# Patient Record
Sex: FEMALE | Race: BLACK OR AFRICAN AMERICAN | NOT HISPANIC OR LATINO | Employment: FULL TIME | URBAN - METROPOLITAN AREA
[De-identification: names, ages, dates, MRNs, and addresses within clinical notes are randomized per-mention and may not be internally consistent; named-entity substitution may affect disease eponyms.]

---

## 2017-01-03 DIAGNOSIS — F32.81 PMDD (PREMENSTRUAL DYSPHORIC DISORDER): ICD-10-CM

## 2017-01-03 RX ORDER — FLUOXETINE HYDROCHLORIDE 20 MG/1
CAPSULE ORAL
Qty: 30 CAPSULE | Refills: 0 | Status: SHIPPED | OUTPATIENT
Start: 2017-01-03 | End: 2017-02-20 | Stop reason: SDUPTHER

## 2017-01-27 ENCOUNTER — TELEPHONE (OUTPATIENT)
Dept: OBSTETRICS AND GYNECOLOGY | Facility: CLINIC | Age: 47
End: 2017-01-27

## 2017-01-27 DIAGNOSIS — Z12.31 OTHER SCREENING MAMMOGRAM: Primary | ICD-10-CM

## 2017-01-27 NOTE — TELEPHONE ENCOUNTER
----- Message from Adriana Mcnally sent at 1/27/2017  8:19 AM CST -----  Contact: pt  x_  1st Request  _  2nd Request  _  3rd Request      Who:pt    Why: pt would like orders sent in for [facility]    What Number to Call Back: [phone]    When to Expect a call back: (Before the end of the day)   -- if call after 3:00 call back will be tomorrow.

## 2017-02-05 DIAGNOSIS — F32.81 PMDD (PREMENSTRUAL DYSPHORIC DISORDER): ICD-10-CM

## 2017-02-06 RX ORDER — FLUOXETINE HYDROCHLORIDE 20 MG/1
CAPSULE ORAL
Qty: 30 CAPSULE | Refills: 0 | Status: SHIPPED | OUTPATIENT
Start: 2017-02-06 | End: 2017-02-20 | Stop reason: SDUPTHER

## 2017-02-20 ENCOUNTER — HOSPITAL ENCOUNTER (OUTPATIENT)
Dept: RADIOLOGY | Facility: OTHER | Age: 47
Discharge: HOME | End: 2017-02-20
Attending: OBSTETRICS & GYNECOLOGY
Payer: COMMERCIAL

## 2017-02-20 ENCOUNTER — OFFICE VISIT (OUTPATIENT)
Dept: OBSTETRICS AND GYNECOLOGY | Facility: CLINIC | Age: 47
End: 2017-02-20
Attending: OBSTETRICS & GYNECOLOGY
Payer: COMMERCIAL

## 2017-02-20 VITALS
WEIGHT: 161.19 LBS | HEIGHT: 61 IN | DIASTOLIC BLOOD PRESSURE: 78 MMHG | SYSTOLIC BLOOD PRESSURE: 120 MMHG | BODY MASS INDEX: 30.43 KG/M2

## 2017-02-20 DIAGNOSIS — Z01.419 ENCOUNTER FOR GYNECOLOGICAL EXAMINATION WITHOUT ABNORMAL FINDING: Primary | ICD-10-CM

## 2017-02-20 DIAGNOSIS — Z12.31 OTHER SCREENING MAMMOGRAM: ICD-10-CM

## 2017-02-20 PROCEDURE — 99999 PR PBB SHADOW E&M-EST. PATIENT-LVL III: CPT | Mod: PBBFAC,,, | Performed by: OBSTETRICS & GYNECOLOGY

## 2017-02-20 PROCEDURE — 3078F DIAST BP <80 MM HG: CPT | Mod: S$GLB,,, | Performed by: OBSTETRICS & GYNECOLOGY

## 2017-02-20 PROCEDURE — 99396 PREV VISIT EST AGE 40-64: CPT | Mod: S$GLB,,, | Performed by: OBSTETRICS & GYNECOLOGY

## 2017-02-20 PROCEDURE — 77067 SCR MAMMO BI INCL CAD: CPT | Mod: 26,,, | Performed by: RADIOLOGY

## 2017-02-20 PROCEDURE — 77067 SCR MAMMO BI INCL CAD: CPT | Mod: TC

## 2017-02-20 PROCEDURE — 77063 BREAST TOMOSYNTHESIS BI: CPT | Mod: 26,,, | Performed by: RADIOLOGY

## 2017-02-20 PROCEDURE — 3074F SYST BP LT 130 MM HG: CPT | Mod: S$GLB,,, | Performed by: OBSTETRICS & GYNECOLOGY

## 2017-02-20 RX ORDER — FLUOXETINE HYDROCHLORIDE 20 MG/1
20 CAPSULE ORAL DAILY
Qty: 30 CAPSULE | Refills: 11 | Status: SHIPPED | OUTPATIENT
Start: 2017-02-20 | End: 2017-11-20 | Stop reason: SDUPTHER

## 2017-02-20 NOTE — MR AVS SNAPSHOT
"    [facility] [address]  [address]  [facility] [address]  Phone: [phone]  Fax: [phone]                  Sheila Costa   2017 2:00 PM   Office Visit    Description:  Female : 1970   Provider:  Cortney Mcdonald MD   Department:  [facility] [address]           Reason for Visit     Gynecologic Exam                To Do List           Future Appointments        Provider Department Dept Phone    2017 2:45 PM [facility] MAMMO1 [facility] [phone]      Goals (5 Years of Data)     None      [facility]     [facility] -  Assistance  Registered nurses in the [facility] provide clinical advisement, health education, appointment booking, and other advisory services.  Call for this free service at [phone].             Medications           Message regarding Medications     Verify the changes and/or additions to your medication regime listed below are the same as discussed with your clinician today.  If any of these changes or additions are incorrect, please notify your healthcare provider.             Verify that the below list of medications is an accurate representation of the medications you are currently taking.  If none reported, the list may be blank. If incorrect, please contact your healthcare provider. Carry this list with you in case of emergency.           Current Medications     atenolol-chlorthalidone (TENORETIC) 50-25 mg Tab TAKE ONE-HALF TABLET BY MOUTH EVERY DAY    fluoxetine (PROZAC) 20 MG capsule TAKE ONE CAPSULE BY MOUTH EVERY DAY    fluticasone (FLONASE) 50 mcg/actuation nasal spray            Clinical Reference Information           Your Vitals Were     BP Height Weight BMI       120/78 5' 1" (1.549 m) 73.1 kg (161 lb 2.5 oz) 30.45 kg/m2       Blood Pressure          Most Recent Value    BP  120/78      Allergies as of 2017     Amoxicillin      Immunizations " Administered on Date of Encounter - 2/20/2017     None      Language Assistance Services     ATTENTION: Language assistance services are available, free of charge. Please call [phone].      ATENCIÓN: Si habarnoldo fragoso, tiene a johnson disposición servicios gratuitos de asistencia lingüística. Llame al [phone].     CHÚ Ý: N?u b?n nói Ti?ng Vi?t, có các d?ch v? h? tr? ngôn ng? mi?n phí dành cho b?n. G?i s? [phone].         [facility] [address] complies with applicable Federal civil rights laws and does not discriminate on the basis of race, color, national origin, age, disability, or sex.

## 2017-02-20 NOTE — PROGRESS NOTES
SUBJECTIVE:   46 y.o. female   for annual routine  checkup. No LMP recorded. Patient is not currently having periods (Reason: Birth Control)..  She has no unusual complaints.        Past Medical History   Diagnosis Date    Hypertension     Premenstrual symptom      Past Surgical History   Procedure Laterality Date    Refractive surgery       Social History     Social History    Marital status:      Spouse name: N/A    Number of children: N/A    Years of education: N/A     Occupational History     [place]     Social History Main Topics    Smoking status: Never Smoker    Smokeless tobacco: Not on file    Alcohol use Yes      Comment: Rare, social    Drug use: No    Sexual activity: Yes     Partners: Male     Birth control/ protection: Surgical     Other Topics Concern    Not on file     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     Family History   Problem Relation Age of Onset    Hypertension Mother     Thyroid disease Mother     Rheum arthritis Mother     Heart disease Father      Pacemaker    Cancer Maternal Grandmother     Cataracts Maternal Grandmother     Diabetes Maternal Grandmother     Hypertension Maternal Grandmother     Thyroid disease Maternal Grandmother     Cataracts Maternal Grandfather     Diabetes Maternal Grandfather     Hypertension Maternal Grandfather     Thyroid disease Maternal Grandfather     Lupus Cousin     Amblyopia Neg Hx     Blindness Neg Hx     Glaucoma Neg Hx     Macular degeneration Neg Hx     Retinal detachment Neg Hx     Strabismus Neg Hx     Stroke Neg Hx     Ovarian cancer Neg Hx     Colon cancer Neg Hx     Breast cancer Neg Hx      OB History    Para Term  AB SAB TAB Ectopic Multiple Living   2 2              # Outcome Date GA Lbr Louis/2nd Weight Sex Delivery Anes PTL Lv   2 Para            1 Para                     Current Outpatient Prescriptions   Medication  Sig Dispense Refill    atenolol-chlorthalidone (TENORETIC) 50-25 mg Tab TAKE ONE-HALF TABLET BY MOUTH EVERY DAY 90 tablet 1    fluoxetine (PROZAC) 20 MG capsule TAKE ONE CAPSULE BY MOUTH EVERY DAY 30 capsule 0    fluticasone (FLONASE) 50 mcg/actuation nasal spray   12     No current facility-administered medications for this visit.      Allergies: Amoxicillin     ROS:  Constitutional: no weight loss, weight gain, fever, fatigue  Eyes:  No vision changes, glasses/contacts  ENT/Mouth: No ulcers, sinus problems, ears ringing, headache  Cardiovascular: No inability to lie flat, chest pain, exercise intolerance, swelling, heart palpitations  Respiratory: No wheezing, coughing blood, shortness of breath, or cough  Gastrointestinal: No diarrhea, bloody stool, nausea/vomiting, constipation, gas, hemorrhoids  Genitourinary: No blood in urine, painful urination, urgency of urination, frequency of urination, incomplete emptying, incontinence, abnormal bleeding, painful periods, heavy periods, vaginal discharge, vaginal odor, painful intercourse, sexual problems, bleeding after intercourse.  Musculoskeletal: No muscle weakness  Skin/Breast: No painful breasts, nipple discharge, masses, rash, ulcers  Neurological: No passing out, seizures, numbness, headache  Endocrine: No diabetes, hypothyroid, hyperthyroid, hot flashes, hair loss, abnormal hair growth, acne  Psychiatric: No depression, crying  Hematologic: No bruises, bleeding, swollen lymph nodes, anemia.      Physical Exam:   Constitutional: She is oriented to person, place, and time. She appears well-developed and well-nourished.      Neck: Normal range of motion. No tracheal deviation present. No thyromegaly present.    Cardiovascular: Exam reveals no edema.     Pulmonary/Chest: Effort normal. She exhibits no mass, no tenderness, no deformity and no retraction. Right breast exhibits no inverted nipple, no mass, no nipple discharge, no skin change, no tenderness,  presence, no bleeding and no swelling. Left breast exhibits no inverted nipple, no mass, no nipple discharge, no skin change, no tenderness, presence, no bleeding and no swelling. Breasts are symmetrical.        Abdominal: Soft. She exhibits no distension and no mass. There is no tenderness. There is no rebound and no guarding. No hernia. Hernia confirmed negative in the left inguinal area.     Genitourinary: Vagina normal and uterus normal. Rectal exam shows no external hemorrhoid. There is no rash, tenderness or lesion on the right labia. There is no rash, tenderness or lesion on the left labia. Uterus is not deviated. Cervix is normal. No no adexnal prolapse. Right adnexum displays no mass, no tenderness and no fullness. Left adnexum displays no mass, no tenderness and no fullness. No tenderness, bleeding, rectocele, cystocele or unspecified prolapse of vaginal walls in the vagina. No vaginal discharge found. Cervix exhibits no motion tenderness, no discharge and no friability.           Musculoskeletal: Normal range of motion and moves all extremeties. She exhibits no edema.      Lymphadenopathy:        Right: No inguinal adenopathy present.        Left: No inguinal adenopathy present.    Neurological: She is alert and oriented to person, place, and time.    Skin: No rash noted. No erythema. No pallor.    Psychiatric: She has a normal mood and affect. Her behavior is normal. Judgment and thought content normal.     IUD strings visible    ASSESSMENT:   well woman    PLAN:   mammogram  return annually or prn

## 2017-07-19 RX ORDER — ATENOLOL AND CHLORTHALIDONE TABLET 50; 25 MG/1; MG/1
TABLET ORAL
Qty: 90 TABLET | Refills: 3 | Status: SHIPPED | OUTPATIENT
Start: 2017-07-19 | End: 2018-01-29 | Stop reason: SDUPTHER

## 2017-11-20 RX ORDER — FLUOXETINE HYDROCHLORIDE 20 MG/1
20 CAPSULE ORAL DAILY
Qty: 30 CAPSULE | Refills: 3 | Status: SHIPPED | OUTPATIENT
Start: 2017-11-20 | End: 2018-01-29 | Stop reason: SDUPTHER

## 2017-11-20 NOTE — TELEPHONE ENCOUNTER
Faxed received from [employer] for refill on prozac. Pt due for appointment 2/2018

## 2018-01-04 ENCOUNTER — PATIENT MESSAGE (OUTPATIENT)
Dept: OBSTETRICS AND GYNECOLOGY | Facility: CLINIC | Age: 48
End: 2018-01-04

## 2018-01-05 ENCOUNTER — TELEPHONE (OUTPATIENT)
Dept: OBSTETRICS AND GYNECOLOGY | Facility: CLINIC | Age: 48
End: 2018-01-05

## 2018-01-05 DIAGNOSIS — Z12.31 ENCOUNTER FOR SCREENING MAMMOGRAM FOR MALIGNANT NEOPLASM OF BREAST: Primary | ICD-10-CM

## 2018-01-06 ENCOUNTER — PATIENT MESSAGE (OUTPATIENT)
Dept: INTERNAL MEDICINE | Facility: CLINIC | Age: 48
End: 2018-01-06

## 2018-01-06 DIAGNOSIS — Z71.84 COUNSELING ABOUT TRAVEL: Primary | ICD-10-CM

## 2018-01-08 NOTE — TELEPHONE ENCOUNTER
I am referring to Infectious Disease Travel clinic for further evaluation. Please notify the patient and direct to scheduling desk.

## 2018-01-30 RX ORDER — FLUOXETINE HYDROCHLORIDE 20 MG/1
CAPSULE ORAL
Qty: 30 CAPSULE | Refills: 2 | Status: SHIPPED | OUTPATIENT
Start: 2018-01-30 | End: 2018-02-05 | Stop reason: SDUPTHER

## 2018-01-30 RX ORDER — ATENOLOL AND CHLORTHALIDONE TABLET 50; 25 MG/1; MG/1
TABLET ORAL
Qty: 90 TABLET | Refills: 0 | Status: SHIPPED | OUTPATIENT
Start: 2018-01-30 | End: 2018-07-31 | Stop reason: SDUPTHER

## 2018-02-05 ENCOUNTER — OFFICE VISIT (OUTPATIENT)
Dept: INFECTIOUS DISEASES | Facility: CLINIC | Age: 48
End: 2018-02-05
Payer: COMMERCIAL

## 2018-02-05 ENCOUNTER — CLINICAL SUPPORT (OUTPATIENT)
Dept: INFECTIOUS DISEASES | Facility: CLINIC | Age: 48
End: 2018-02-05
Payer: COMMERCIAL

## 2018-02-05 VITALS
DIASTOLIC BLOOD PRESSURE: 70 MMHG | HEIGHT: 61 IN | HEART RATE: 65 BPM | WEIGHT: 161.63 LBS | TEMPERATURE: 98 F | BODY MASS INDEX: 30.51 KG/M2 | SYSTOLIC BLOOD PRESSURE: 115 MMHG

## 2018-02-05 DIAGNOSIS — Z71.84 TRAVEL ADVICE ENCOUNTER: ICD-10-CM

## 2018-02-05 DIAGNOSIS — Z23 NEED FOR VACCINATION: Primary | ICD-10-CM

## 2018-02-05 PROCEDURE — 90691 TYPHOID VACCINE IM: CPT | Mod: S$GLB,,, | Performed by: INTERNAL MEDICINE

## 2018-02-05 PROCEDURE — 90471 IMMUNIZATION ADMIN: CPT | Mod: S$GLB,,, | Performed by: INTERNAL MEDICINE

## 2018-02-05 PROCEDURE — 90632 HEPA VACCINE ADULT IM: CPT | Mod: S$GLB,,, | Performed by: INTERNAL MEDICINE

## 2018-02-05 PROCEDURE — 99999 PR PBB SHADOW E&M-EST. PATIENT-LVL III: CPT | Mod: PBBFAC,,, | Performed by: INTERNAL MEDICINE

## 2018-02-05 PROCEDURE — 3008F BODY MASS INDEX DOCD: CPT | Mod: S$GLB,,, | Performed by: INTERNAL MEDICINE

## 2018-02-05 PROCEDURE — 90472 IMMUNIZATION ADMIN EACH ADD: CPT | Mod: S$GLB,,, | Performed by: INTERNAL MEDICINE

## 2018-02-05 PROCEDURE — 90686 IIV4 VACC NO PRSV 0.5 ML IM: CPT | Mod: S$GLB,,, | Performed by: INTERNAL MEDICINE

## 2018-02-05 PROCEDURE — 99204 OFFICE O/P NEW MOD 45 MIN: CPT | Mod: S$GLB,,, | Performed by: INTERNAL MEDICINE

## 2018-02-05 RX ORDER — ATOVAQUONE AND PROGUANIL HYDROCHLORIDE 250; 100 MG/1; MG/1
1 TABLET, FILM COATED ORAL DAILY
Qty: 13 TABLET | Refills: 0 | Status: SHIPPED | OUTPATIENT
Start: 2018-02-05 | End: 2018-02-18

## 2018-02-05 RX ORDER — AZITHROMYCIN 500 MG/1
500 TABLET, FILM COATED ORAL DAILY
Qty: 5 TABLET | Refills: 1 | Status: SHIPPED | OUTPATIENT
Start: 2018-02-05 | End: 2018-03-23 | Stop reason: ALTCHOICE

## 2018-02-05 NOTE — PROGRESS NOTES
Subjective:      Patient ID: Sheila Costa is a 47 y.o. female.    Chief Complaint:Travel Consult      History of Present Illness    Going to [facility] for 5 days.     Review of Systems   Constitution: Negative for chills, decreased appetite, fever, weakness, malaise/fatigue, night sweats, weight gain and weight loss.   HENT: Negative for congestion, ear pain, hearing loss, hoarse voice, sore throat and tinnitus.    Eyes: Negative for blurred vision, redness and visual disturbance.   Cardiovascular: Negative for chest pain, leg swelling and palpitations.   Respiratory: Negative for cough, hemoptysis, shortness of breath and sputum production.    Hematologic/Lymphatic: Negative for adenopathy. Does not bruise/bleed easily.   Skin: Negative for dry skin, itching, rash and suspicious lesions.   Musculoskeletal: Negative for back pain, joint pain, myalgias and neck pain.   Gastrointestinal: Negative for abdominal pain, constipation, diarrhea, heartburn, nausea and vomiting.   Genitourinary: Negative for dysuria, flank pain, frequency, hematuria, hesitancy and urgency.   Neurological: Negative for dizziness, headaches, numbness and paresthesias.   Psychiatric/Behavioral: Negative for depression and memory loss. The patient does not have insomnia and is not nervous/anxious.      Objective:   Physical Exam   Constitutional: She appears well-developed and well-nourished.   Eyes: Conjunctivae and EOM are normal. Pupils are equal, round, and reactive to light.   Musculoskeletal: She exhibits no edema.   Nursing note and vitals reviewed.    Assessment:       1. Travel advice encounter          Plan:       Discussed insect precautions. Discussed malaria risks, as well as dengue, zika, chikungunya. Will give malarone for 12 days. Discussed food and water precautions. Will give IM typhoid and HAV vaccines today. SHe will take azithromycin for traveler's diarrhea. Discussed Cholera vaccination - she will consider it.  Discussed animal precautions - she will present for evaluation if she has any concern about rabies exposure. Discussed traveler's safety. I spent over 50% of a 45 minute encounter counseling the patient.

## 2018-02-05 NOTE — PROGRESS NOTES
Pt received her immunizations (Hepatitis A, Typhoid, and influenza). Pt tolerated the injections well. Yellow travel card provided. Pt left the unit in NAD.

## 2018-03-05 ENCOUNTER — HOSPITAL ENCOUNTER (OUTPATIENT)
Dept: RADIOLOGY | Facility: OTHER | Age: 48
Discharge: HOME | End: 2018-03-05
Attending: OBSTETRICS & GYNECOLOGY
Payer: COMMERCIAL

## 2018-03-05 DIAGNOSIS — Z12.31 ENCOUNTER FOR SCREENING MAMMOGRAM FOR MALIGNANT NEOPLASM OF BREAST: ICD-10-CM

## 2018-03-05 PROCEDURE — 77067 SCR MAMMO BI INCL CAD: CPT | Mod: 26,,, | Performed by: RADIOLOGY

## 2018-03-05 PROCEDURE — 77063 BREAST TOMOSYNTHESIS BI: CPT | Mod: 26,,, | Performed by: RADIOLOGY

## 2018-03-05 PROCEDURE — 77067 SCR MAMMO BI INCL CAD: CPT | Mod: TC

## 2018-03-23 ENCOUNTER — LAB VISIT (OUTPATIENT)
Dept: LAB | Facility: HOSPITAL | Age: 48
End: 2018-03-23
Attending: INTERNAL MEDICINE
Payer: COMMERCIAL

## 2018-03-23 ENCOUNTER — OFFICE VISIT (OUTPATIENT)
Dept: INTERNAL MEDICINE | Facility: CLINIC | Age: 48
End: 2018-03-23
Payer: COMMERCIAL

## 2018-03-23 VITALS
HEIGHT: 61 IN | HEART RATE: 62 BPM | DIASTOLIC BLOOD PRESSURE: 74 MMHG | SYSTOLIC BLOOD PRESSURE: 122 MMHG | BODY MASS INDEX: 29.55 KG/M2 | WEIGHT: 156.5 LBS

## 2018-03-23 DIAGNOSIS — Z00.00 ANNUAL PHYSICAL EXAM: Primary | ICD-10-CM

## 2018-03-23 DIAGNOSIS — R53.83 FATIGUE, UNSPECIFIED TYPE: ICD-10-CM

## 2018-03-23 DIAGNOSIS — Z00.00 ANNUAL PHYSICAL EXAM: ICD-10-CM

## 2018-03-23 LAB
25(OH)D3+25(OH)D2 SERPL-MCNC: 34 NG/ML
ALBUMIN SERPL BCP-MCNC: 3.7 G/DL
ALP SERPL-CCNC: 61 U/L
ALT SERPL W/O P-5'-P-CCNC: 14 U/L
ANION GAP SERPL CALC-SCNC: 12 MMOL/L
AST SERPL-CCNC: 21 U/L
BILIRUB SERPL-MCNC: 0.4 MG/DL
BUN SERPL-MCNC: 12 MG/DL
CALCIUM SERPL-MCNC: 9.1 MG/DL
CHLORIDE SERPL-SCNC: 97 MMOL/L
CO2 SERPL-SCNC: 29 MMOL/L
CREAT SERPL-MCNC: 0.7 MG/DL
ERYTHROCYTE [DISTWIDTH] IN BLOOD BY AUTOMATED COUNT: 14.8 %
EST. GFR  (AFRICAN AMERICAN): >60 ML/MIN/1.73 M^2
EST. GFR  (NON AFRICAN AMERICAN): >60 ML/MIN/1.73 M^2
GLUCOSE SERPL-MCNC: 87 MG/DL
HCT VFR BLD AUTO: 39.7 %
HGB BLD-MCNC: 11.7 G/DL
MAGNESIUM SERPL-MCNC: 1.9 MG/DL
MCH RBC QN AUTO: 22.5 PG
MCHC RBC AUTO-ENTMCNC: 29.5 G/DL
MCV RBC AUTO: 76 FL
PLATELET # BLD AUTO: 470 K/UL
PMV BLD AUTO: 9 FL
POTASSIUM SERPL-SCNC: 3.4 MMOL/L
PROT SERPL-MCNC: 7.4 G/DL
RBC # BLD AUTO: 5.21 M/UL
SODIUM SERPL-SCNC: 138 MMOL/L
TSH SERPL DL<=0.005 MIU/L-ACNC: 1.96 UIU/ML
VIT B12 SERPL-MCNC: >2000 PG/ML
WBC # BLD AUTO: 11.6 K/UL

## 2018-03-23 PROCEDURE — 83735 ASSAY OF MAGNESIUM: CPT

## 2018-03-23 PROCEDURE — 80053 COMPREHEN METABOLIC PANEL: CPT

## 2018-03-23 PROCEDURE — 99396 PREV VISIT EST AGE 40-64: CPT | Mod: S$GLB,,, | Performed by: INTERNAL MEDICINE

## 2018-03-23 PROCEDURE — 82306 VITAMIN D 25 HYDROXY: CPT

## 2018-03-23 PROCEDURE — 85027 COMPLETE CBC AUTOMATED: CPT

## 2018-03-23 PROCEDURE — 99999 PR PBB SHADOW E&M-EST. PATIENT-LVL III: CPT | Mod: PBBFAC,,, | Performed by: INTERNAL MEDICINE

## 2018-03-23 PROCEDURE — 36415 COLL VENOUS BLD VENIPUNCTURE: CPT

## 2018-03-23 PROCEDURE — 84443 ASSAY THYROID STIM HORMONE: CPT

## 2018-03-23 PROCEDURE — 3074F SYST BP LT 130 MM HG: CPT | Mod: CPTII,S$GLB,, | Performed by: INTERNAL MEDICINE

## 2018-03-23 PROCEDURE — 82607 VITAMIN B-12: CPT

## 2018-03-23 PROCEDURE — 86038 ANTINUCLEAR ANTIBODIES: CPT

## 2018-03-23 PROCEDURE — 3078F DIAST BP <80 MM HG: CPT | Mod: CPTII,S$GLB,, | Performed by: INTERNAL MEDICINE

## 2018-03-23 RX ORDER — LORATADINE 10 MG/1
10 TABLET ORAL EVERY MORNING
COMMUNITY

## 2018-03-23 RX ORDER — ATOVAQUONE AND PROGUANIL HYDROCHLORIDE 250; 100 MG/1; MG/1
TABLET, FILM COATED ORAL
Refills: 0 | COMMUNITY
Start: 2018-03-05 | End: 2018-03-23 | Stop reason: ALTCHOICE

## 2018-03-23 NOTE — PROGRESS NOTES
Subjective:       Patient ID: Sheila Costa is a 47 y.o. female.    Chief Complaint: Annual Exam and Hypertension    HPI    Last visit with me 11/2016. Since then seen by Gynecology, Infectious Disease.     Fatigue daily. Sleeps 6-8 hrs/night, FIT BIT doesn't note a lot of disrupted sleep. Longer the sleeping then can feel more tired. Doesn't drink a lot of coffee, doesn't like it. If misses med the tiredness doesn't improve. Has been on Prozac because of PMS.    Takes Claritin daily, usually also uses Flonase.    Reviewed PMH, PSH, SH, FH, allergies, and medications.     Review of Systems   All other systems reviewed and are negative.      Objective:      Physical Exam   Constitutional: She is oriented to person, place, and time. No distress.   HENT:   Head: Atraumatic.   Right Ear: Tympanic membrane normal. No tenderness.   Left Ear: Tympanic membrane normal. No tenderness.   Mouth/Throat: Oropharynx is clear and moist. No oropharyngeal exudate.   Eyes: Pupils are equal, round, and reactive to light. Right eye exhibits no discharge. Left eye exhibits no discharge.   Neck: Normal range of motion. No thyromegaly present.   Cardiovascular: Normal rate, regular rhythm and normal heart sounds.    Pulmonary/Chest: Effort normal and breath sounds normal. No stridor. She has no wheezes. She has no rales.   Abdominal: Soft. She exhibits no distension and no mass. There is no tenderness. There is no guarding.   Musculoskeletal: She exhibits no edema or tenderness.   Lymphadenopathy:     She has no cervical adenopathy.   Neurological: She is alert and oriented to person, place, and time.   Skin: Skin is warm and dry. No rash noted.   Psychiatric: She has a normal mood and affect. Her behavior is normal.   Nursing note and vitals reviewed.      Vitals:    03/23/18 1449   BP: 122/74   BP Location: Right arm   Patient Position: Sitting   BP Method: Large (Manual)   Pulse: 62   Weight: 71 kg (156 lb 8.4 oz)   Height:  "5' 1" (1.549 m)     Body mass index is 29.58 kg/m².    RESULTS: Reviewed labs from last 12 months    Assessment:       1. Annual physical exam    2. Fatigue, unspecified type        Plan:   Sheila was seen today for annual exam and hypertension.    Diagnoses and all orders for this visit:    Annual physical exam:  Age-appropriate health screening reviewed, indicated tests ordered.   -     Magnesium; Future  -     Vitamin D; Future  -     Vitamin B12; Future  -     Comprehensive metabolic panel; Future  -     CBC Without Differential; Future  -     CARL; Future  -     TSH; Future    Fatigue, unspecified type:  Chronic problem, no abnormality noted on labs in past. possibly due to insufficient sleep. rule out metabolic abnormality on labs as below.  -     Magnesium; Future  -     Vitamin D; Future  -     Vitamin B12; Future  -     Comprehensive metabolic panel; Future  -     CBC Without Differential; Future  -     CARL; Future  -     TSH; Future      Follow-up in about 1 year (around 3/23/2019).  Jorge Alberto Ruiz MD  Internal Medicine    Portions of this note were completed using [employer] dictation software. Please excuse typographical or syntax errors.   "

## 2018-03-26 ENCOUNTER — OFFICE VISIT (OUTPATIENT)
Dept: OBSTETRICS AND GYNECOLOGY | Facility: CLINIC | Age: 48
End: 2018-03-26
Attending: OBSTETRICS & GYNECOLOGY
Payer: COMMERCIAL

## 2018-03-26 ENCOUNTER — PATIENT MESSAGE (OUTPATIENT)
Dept: INTERNAL MEDICINE | Facility: CLINIC | Age: 48
End: 2018-03-26

## 2018-03-26 VITALS
HEIGHT: 61 IN | DIASTOLIC BLOOD PRESSURE: 88 MMHG | BODY MASS INDEX: 29.97 KG/M2 | WEIGHT: 158.75 LBS | SYSTOLIC BLOOD PRESSURE: 132 MMHG

## 2018-03-26 DIAGNOSIS — Z12.4 SCREENING FOR CERVICAL CANCER: ICD-10-CM

## 2018-03-26 DIAGNOSIS — Z01.419 ENCOUNTER FOR GYNECOLOGICAL EXAMINATION WITHOUT ABNORMAL FINDING: Primary | ICD-10-CM

## 2018-03-26 LAB — ANA SER QL IF: NORMAL

## 2018-03-26 PROCEDURE — 88175 CYTOPATH C/V AUTO FLUID REDO: CPT

## 2018-03-26 PROCEDURE — 99396 PREV VISIT EST AGE 40-64: CPT | Mod: S$GLB,,, | Performed by: OBSTETRICS & GYNECOLOGY

## 2018-03-26 RX ORDER — FLUOXETINE HYDROCHLORIDE 20 MG/1
20 CAPSULE ORAL DAILY
Qty: 30 CAPSULE | Refills: 11 | Status: SHIPPED | OUTPATIENT
Start: 2018-03-26 | End: 2019-03-12 | Stop reason: SDUPTHER

## 2018-03-26 NOTE — PROGRESS NOTES
SUBJECTIVE:   47 y.o. female   for annual routine Pap and checkup. No LMP recorded. Patient has had an implant..  She reports recent minimal RLQ pain- improved with Ibuprofen. Patient wishes to stay on Prozac for PMS.        Past Medical History:   Diagnosis Date    Hypertension     Premenstrual symptom      Past Surgical History:   Procedure Laterality Date    REFRACTIVE SURGERY       Social History     Social History    Marital status:      Spouse name: N/A    Number of children: N/A    Years of education: N/A     Occupational History     [place]     Social History Main Topics    Smoking status: Never Smoker    Smokeless tobacco: Not on file    Alcohol use Yes      Comment: Rare, social    Drug use: No    Sexual activity: Yes     Partners: Male     Birth control/ protection: Surgical     Other Topics Concern    Not on file     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     Family History   Problem Relation Age of Onset    Hypertension Mother     Thyroid disease Mother     Rheum arthritis Mother     Heart disease Father      Pacemaker    Cancer Maternal Grandmother     Cataracts Maternal Grandmother     Diabetes Maternal Grandmother     Hypertension Maternal Grandmother     Thyroid disease Maternal Grandmother     Breast cancer Maternal Grandmother     Cataracts Maternal Grandfather     Diabetes Maternal Grandfather     Hypertension Maternal Grandfather     Thyroid disease Maternal Grandfather     Lupus Cousin     Amblyopia Neg Hx     Blindness Neg Hx     Glaucoma Neg Hx     Macular degeneration Neg Hx     Retinal detachment Neg Hx     Strabismus Neg Hx     Stroke Neg Hx     Ovarian cancer Neg Hx     Colon cancer Neg Hx      OB History    Para Term  AB Living   2 2 2         SAB TAB Ectopic Multiple Live Births                  # Outcome Date GA Lbr Louis/2nd Weight Sex Delivery Anes PTL Lv   2  Term            1 Term                       Current Outpatient Prescriptions   Medication Sig Dispense Refill    atenolol-chlorthalidone (TENORETIC) 50-25 mg Tab TAKE ONE-HALF TABLET BY MOUTH EVERY DAY 90 tablet 0    fluoxetine (PROZAC) 20 MG capsule TAKE ONE CAPSULE BY MOUTH EVERY DAY 30 capsule 0    fluticasone (FLONASE) 50 mcg/actuation nasal spray   12    loratadine (CLARITIN) 10 mg tablet Take 10 mg by mouth once daily.       No current facility-administered medications for this visit.      Allergies: Amoxicillin     The ASCVD Risk score (Stratton HOLLAND Jr., et al., 2013) failed to calculate for the following reasons:    Cannot find a previous HDL lab    Cannot find a previous total cholesterol lab      ROS:  Constitutional: no weight loss, weight gain, fever, fatigue  Eyes:  No vision changes, glasses/contacts  ENT/Mouth: No ulcers, sinus problems, ears ringing, headache  Cardiovascular: No inability to lie flat, chest pain, exercise intolerance, swelling, heart palpitations  Respiratory: No wheezing, coughing blood, shortness of breath, or cough  Gastrointestinal: No diarrhea, bloody stool, nausea/vomiting, constipation, gas, hemorrhoids  Genitourinary: No blood in urine, painful urination, urgency of urination, frequency of urination, incomplete emptying, incontinence, abnormal bleeding, painful periods, heavy periods, vaginal discharge, vaginal odor, painful intercourse, sexual problems, bleeding after intercourse.  Musculoskeletal: No muscle weakness  Skin/Breast: No painful breasts, nipple discharge, masses, rash, ulcers  Neurological: No passing out, seizures, numbness, headache  Endocrine: No diabetes, hypothyroid, hyperthyroid, hot flashes, hair loss, abnormal hair growth, acne  Psychiatric: No depression, crying  Hematologic: No bruises, bleeding, swollen lymph nodes, anemia.      Physical Exam:   Constitutional: She is oriented to person, place, and time. She appears well-developed and well-nourished.       Neck: Normal range of motion. No tracheal deviation present. No thyromegaly present.    Cardiovascular: Exam reveals no edema.     Pulmonary/Chest: Effort normal. She exhibits no mass, no tenderness, no deformity and no retraction. Right breast exhibits no inverted nipple, no mass, no nipple discharge, no skin change, no tenderness, presence, no bleeding and no swelling. Left breast exhibits no inverted nipple, no mass, no nipple discharge, no skin change, no tenderness, presence, no bleeding and no swelling. Breasts are symmetrical.        Abdominal: Soft. She exhibits no distension and no mass. There is no tenderness. There is no rebound and no guarding. No hernia. Hernia confirmed negative in the left inguinal area.     Genitourinary: Vagina normal and uterus normal. Rectal exam shows no external hemorrhoid. There is no rash, tenderness or lesion on the right labia. There is no rash, tenderness or lesion on the left labia. Uterus is not deviated. Cervix is normal. No no adexnal prolapse. Right adnexum displays no mass, no tenderness and no fullness. Left adnexum displays no mass, no tenderness and no fullness. No tenderness, bleeding, rectocele, cystocele or unspecified prolapse of vaginal walls in the vagina. No vaginal discharge found. Cervix exhibits no motion tenderness, no discharge and no friability.           Musculoskeletal: Normal range of motion and moves all extremeties. She exhibits no edema.      Lymphadenopathy:        Right: No inguinal adenopathy present.        Left: No inguinal adenopathy present.    Neurological: She is alert and oriented to person, place, and time.    Skin: No rash noted. No erythema. No pallor.    Psychiatric: She has a normal mood and affect. Her behavior is normal. Judgment and thought content normal.     Cervix is very anterior, IUD strings visible at os    ASSESSMENT:   well woman    PLAN:   mammogram  pap smear  Counseled patient on diet and exercise  If pain  persists, notify me and I will order pelvic sono  return annually or prn

## 2018-08-01 RX ORDER — ATENOLOL AND CHLORTHALIDONE TABLET 50; 25 MG/1; MG/1
TABLET ORAL
Qty: 90 TABLET | Refills: 0 | Status: SHIPPED | OUTPATIENT
Start: 2018-08-01 | End: 2019-02-04 | Stop reason: SDUPTHER

## 2018-08-09 ENCOUNTER — TELEPHONE (OUTPATIENT)
Dept: OBSTETRICS AND GYNECOLOGY | Facility: CLINIC | Age: 48
End: 2018-08-09

## 2018-08-09 NOTE — TELEPHONE ENCOUNTER
----- Message from Hailee Khan sent at 8/9/2018  8:54 AM CDT -----  Contact: ETHAN EPSTEIN [[id number]]      Name of Who is Calling: ETHAN EPSTEIN [[id number]]      What is the request in detail:  Patient called requesting an appointment to update her birth control / MIRENA / IUD.  Please give a call back at your earliest convenience.  THANKS!      Can the clinic reply by [facility]: NO      What Number to Call Back:  ETHAN EPSTEIN  / # [phone]

## 2018-08-10 ENCOUNTER — TELEPHONE (OUTPATIENT)
Dept: OBSTETRICS AND GYNECOLOGY | Facility: CLINIC | Age: 48
End: 2018-08-10

## 2018-08-10 DIAGNOSIS — Z97.5 CONTRACEPTION, DEVICE INTRAUTERINE: Primary | ICD-10-CM

## 2018-08-10 NOTE — TELEPHONE ENCOUNTER
Pt states her last mirena was inserted 8/2013 so pt wants to get another mirena. Advised pt will send in the paper work to make sure it is approved. Will schedule pt once it is approved

## 2018-08-10 NOTE — TELEPHONE ENCOUNTER
----- Message from Marianela Vazquez sent at 8/9/2018  4:10 PM CDT -----  Contact: self  Pt returning a missed call, s he can be reached at [phone].

## 2018-08-13 ENCOUNTER — TELEPHONE (OUTPATIENT)
Dept: PHARMACY | Facility: CLINIC | Age: 48
End: 2018-08-13

## 2018-08-13 NOTE — TELEPHONE ENCOUNTER
Good Afternoon,      [facility] received a prescription for MIRENA. Upon calling the patient's insurance company, we have been told that this medication is not covered under the patient's pharmacy benefits. [facility] is unable to bill medical claims for medications.       The medication itself, and the administration of the medication, will both have to be billed under the medical benefit and may require a prior authorization. Please contact [facility] with any questions at [phone].       Thank you,       Rosanne Hill      Patient Care Advocate      [facility]

## 2018-08-14 ENCOUNTER — TELEPHONE (OUTPATIENT)
Dept: OBSTETRICS AND GYNECOLOGY | Facility: CLINIC | Age: 48
End: 2018-08-14

## 2018-08-14 ENCOUNTER — PATIENT MESSAGE (OUTPATIENT)
Dept: OBSTETRICS AND GYNECOLOGY | Facility: CLINIC | Age: 48
End: 2018-08-14

## 2018-08-14 DIAGNOSIS — Z97.5 CONTRACEPTION, DEVICE INTRAUTERINE: Primary | ICD-10-CM

## 2018-08-14 NOTE — TELEPHONE ENCOUNTER
----- Message from Rosanne Hill sent at 8/13/2018  3:54 PM CDT -----  Regarding: MIRENA. Buy and Bill.   Good Afternoon,      [facility] received a prescription for MIRENA. Upon calling the patient's insurance company, we have been told that this medication is not covered under the patient's pharmacy benefits. [facility] is unable to bill medical claims for medications.       The medication itself, and the administration of the medication, will both have to be billed under the medical benefit and may require a prior authorization. Please contact [facility] with any questions at [phone].       Thank you,       Rosanne Hill      Patient Care Advocate      [facility]

## 2018-08-21 ENCOUNTER — PATIENT MESSAGE (OUTPATIENT)
Dept: OBSTETRICS AND GYNECOLOGY | Facility: CLINIC | Age: 48
End: 2018-08-21

## 2018-09-04 ENCOUNTER — PATIENT MESSAGE (OUTPATIENT)
Dept: OBSTETRICS AND GYNECOLOGY | Facility: CLINIC | Age: 48
End: 2018-09-04

## 2018-09-24 ENCOUNTER — PROCEDURE VISIT (OUTPATIENT)
Dept: OBSTETRICS AND GYNECOLOGY | Facility: CLINIC | Age: 48
End: 2018-09-24
Attending: OBSTETRICS & GYNECOLOGY
Payer: COMMERCIAL

## 2018-09-24 VITALS
BODY MASS INDEX: 30.53 KG/M2 | SYSTOLIC BLOOD PRESSURE: 124 MMHG | DIASTOLIC BLOOD PRESSURE: 62 MMHG | HEIGHT: 61 IN | WEIGHT: 161.69 LBS

## 2018-09-24 DIAGNOSIS — Z30.8 ENCOUNTER FOR OTHER CONTRACEPTIVE MANAGEMENT: Primary | ICD-10-CM

## 2018-09-24 DIAGNOSIS — Z30.431 IUD CHECK UP: ICD-10-CM

## 2018-09-24 DIAGNOSIS — Z30.433 ENCOUNTER FOR REMOVAL AND REINSERTION OF INTRAUTERINE CONTRACEPTIVE DEVICE (IUD): ICD-10-CM

## 2018-09-24 LAB
B-HCG UR QL: NEGATIVE
CTP QC/QA: YES

## 2018-09-24 PROCEDURE — 58301 REMOVE INTRAUTERINE DEVICE: CPT | Mod: S$GLB,,, | Performed by: OBSTETRICS & GYNECOLOGY

## 2018-09-24 PROCEDURE — 58300 INSERT INTRAUTERINE DEVICE: CPT | Mod: 51,S$GLB,, | Performed by: OBSTETRICS & GYNECOLOGY

## 2018-09-24 PROCEDURE — 81025 URINE PREGNANCY TEST: CPT | Mod: S$GLB,,, | Performed by: OBSTETRICS & GYNECOLOGY

## 2018-09-28 NOTE — PROCEDURES
Insertin of IUD-Today  Date/Time: 2018 2:30 PM  Performed by: Cortney Mcdonald MD  Authorized by: Cortney Mcdonald MD   Preparation: Patient was prepped and draped in the usual sterile fashion.  Local anesthesia used: no    Anesthesia:  Local anesthesia used: no    Sedation:  Patient sedated: no    Patient tolerance: Patient tolerated the procedure well with no immediate complications  Comments: Shelia Costa is a 47 y.o. female  presents for IUD removal and placement.  No LMP recorded. Patient has had an IUD.  She desires Mirena.  UPT is negative.      She was counseled on the risks, benefits, indications, and alternatives to IUD use.  She understands that with insertion there is a risk of bleeding, infection, and uterine perforation.  All questions are answered.  Consents signed.  Cervical cultures were not performed.    Procedure:  Time out performed.  The cervix was visualized with a speculum.  IUD strings not visible- IUD  Hook used to pull strings down. IUD strings then grasped with ring forceps and removed without difficulty  A single tooth tenaculum was placed on the anterior lip of the cervix.  The uterus sounds to 8cm using sterile technique.  A Mirena was loaded and placed high in the uterine fundus without difficulty using sterile technique.  The string was was then cut.  The tenaculum and speculum were removed.  The patient tolerated the procedure well.    Assessment:  1.  Contraceptive management/IUD insertion    Post IUD placement counseling:  Manage post IUD placement pain with NSAIDS, Tylenol or Rx per [facility].  IUD danger signs and how to check for strings were discussed.  The IUD needs to be removed in 5 years for Mirena and 10 years for Copper IUD.    Counseling lasted approximately 15 minutes and all her questions were answered.    Follow up:  2 weeks.

## 2018-10-08 ENCOUNTER — HOSPITAL ENCOUNTER (OUTPATIENT)
Dept: RADIOLOGY | Facility: OTHER | Age: 48
Discharge: HOME | End: 2018-10-08
Attending: OBSTETRICS & GYNECOLOGY
Payer: COMMERCIAL

## 2018-10-08 DIAGNOSIS — Z30.431 IUD CHECK UP: ICD-10-CM

## 2018-10-08 PROCEDURE — 76830 TRANSVAGINAL US NON-OB: CPT | Mod: 26,,, | Performed by: RADIOLOGY

## 2018-10-08 PROCEDURE — 76830 TRANSVAGINAL US NON-OB: CPT | Mod: TC

## 2018-10-08 PROCEDURE — 76856 US EXAM PELVIC COMPLETE: CPT | Mod: 26,,, | Performed by: RADIOLOGY

## 2018-10-08 PROCEDURE — 76856 US EXAM PELVIC COMPLETE: CPT | Mod: TC

## 2018-12-02 ENCOUNTER — PATIENT MESSAGE (OUTPATIENT)
Dept: INTERNAL MEDICINE | Facility: CLINIC | Age: 48
End: 2018-12-02

## 2018-12-03 ENCOUNTER — PATIENT MESSAGE (OUTPATIENT)
Dept: INTERNAL MEDICINE | Facility: CLINIC | Age: 48
End: 2018-12-03

## 2018-12-03 DIAGNOSIS — M25.50 POLYARTHRALGIA: Primary | ICD-10-CM

## 2018-12-06 NOTE — TELEPHONE ENCOUNTER
I am referring to Rheumatology  for further evaluation. Please schedule this appointment with the patient. I am also ordering nonfasting labs to be done sometime in the next few days, please schedule with the patient.

## 2018-12-06 NOTE — TELEPHONE ENCOUNTER
Dr pham pt would like a ref to a dr who specializes in the joints hands, wrist  Arms elbows legs, ankles and knees

## 2018-12-28 ENCOUNTER — LAB VISIT (OUTPATIENT)
Dept: LAB | Facility: HOSPITAL | Age: 48
End: 2018-12-28
Attending: INTERNAL MEDICINE
Payer: COMMERCIAL

## 2018-12-28 ENCOUNTER — IMMUNIZATION (OUTPATIENT)
Dept: PHARMACY | Facility: CLINIC | Age: 48
End: 2018-12-28
Payer: COMMERCIAL

## 2018-12-28 DIAGNOSIS — M25.50 POLYARTHRALGIA: ICD-10-CM

## 2018-12-28 LAB
ANION GAP SERPL CALC-SCNC: 7 MMOL/L
BUN SERPL-MCNC: 11 MG/DL
CALCIUM SERPL-MCNC: 9.3 MG/DL
CHLORIDE SERPL-SCNC: 99 MMOL/L
CO2 SERPL-SCNC: 33 MMOL/L
CREAT SERPL-MCNC: 0.8 MG/DL
CRP SERPL-MCNC: 8.8 MG/L
ERYTHROCYTE [DISTWIDTH] IN BLOOD BY AUTOMATED COUNT: 15.3 %
ERYTHROCYTE [SEDIMENTATION RATE] IN BLOOD BY WESTERGREN METHOD: 18 MM/HR
EST. GFR  (AFRICAN AMERICAN): >60 ML/MIN/1.73 M^2
EST. GFR  (NON AFRICAN AMERICAN): >60 ML/MIN/1.73 M^2
GLUCOSE SERPL-MCNC: 97 MG/DL
HCT VFR BLD AUTO: 41.3 %
HGB BLD-MCNC: 12.2 G/DL
MAGNESIUM SERPL-MCNC: 1.9 MG/DL
MCH RBC QN AUTO: 22.7 PG
MCHC RBC AUTO-ENTMCNC: 29.5 G/DL
MCV RBC AUTO: 77 FL
PLATELET # BLD AUTO: 453 K/UL
PMV BLD AUTO: 9.3 FL
POTASSIUM SERPL-SCNC: 3.4 MMOL/L
RBC # BLD AUTO: 5.37 M/UL
SODIUM SERPL-SCNC: 139 MMOL/L
URATE SERPL-MCNC: 6.3 MG/DL
WBC # BLD AUTO: 10.24 K/UL

## 2018-12-28 PROCEDURE — 80048 BASIC METABOLIC PNL TOTAL CA: CPT

## 2018-12-28 PROCEDURE — 36415 COLL VENOUS BLD VENIPUNCTURE: CPT

## 2018-12-28 PROCEDURE — 85652 RBC SED RATE AUTOMATED: CPT

## 2018-12-28 PROCEDURE — 85027 COMPLETE CBC AUTOMATED: CPT

## 2018-12-28 PROCEDURE — 84550 ASSAY OF BLOOD/URIC ACID: CPT

## 2018-12-28 PROCEDURE — 86140 C-REACTIVE PROTEIN: CPT

## 2018-12-28 PROCEDURE — 83735 ASSAY OF MAGNESIUM: CPT

## 2018-12-31 ENCOUNTER — PATIENT MESSAGE (OUTPATIENT)
Dept: INTERNAL MEDICINE | Facility: CLINIC | Age: 48
End: 2018-12-31

## 2018-12-31 DIAGNOSIS — M25.50 ARTHRALGIA, UNSPECIFIED JOINT: Primary | ICD-10-CM

## 2019-01-03 NOTE — TELEPHONE ENCOUNTER
No answer - left message with # for PCW for patient to call back to schedule labs in the next few days.

## 2019-01-23 ENCOUNTER — PATIENT MESSAGE (OUTPATIENT)
Dept: INTERNAL MEDICINE | Facility: CLINIC | Age: 49
End: 2019-01-23

## 2019-01-23 ENCOUNTER — PATIENT MESSAGE (OUTPATIENT)
Dept: OBSTETRICS AND GYNECOLOGY | Facility: CLINIC | Age: 49
End: 2019-01-23

## 2019-01-23 ENCOUNTER — TELEPHONE (OUTPATIENT)
Dept: OBSTETRICS AND GYNECOLOGY | Facility: CLINIC | Age: 49
End: 2019-01-23

## 2019-01-23 DIAGNOSIS — Z12.31 ENCOUNTER FOR SCREENING MAMMOGRAM FOR MALIGNANT NEOPLASM OF BREAST: Primary | ICD-10-CM

## 2019-02-05 ENCOUNTER — LAB VISIT (OUTPATIENT)
Dept: LAB | Facility: HOSPITAL | Age: 49
End: 2019-02-05
Attending: INTERNAL MEDICINE
Payer: COMMERCIAL

## 2019-02-05 ENCOUNTER — PATIENT MESSAGE (OUTPATIENT)
Dept: INTERNAL MEDICINE | Facility: CLINIC | Age: 49
End: 2019-02-05

## 2019-02-05 ENCOUNTER — PATIENT MESSAGE (OUTPATIENT)
Dept: INFECTIOUS DISEASES | Facility: CLINIC | Age: 49
End: 2019-02-05

## 2019-02-05 DIAGNOSIS — M25.50 ARTHRALGIA, UNSPECIFIED JOINT: ICD-10-CM

## 2019-02-05 LAB
CCP AB SER IA-ACNC: <0.5 U/ML
CRP SERPL-MCNC: 15.9 MG/L
ERYTHROCYTE [SEDIMENTATION RATE] IN BLOOD BY WESTERGREN METHOD: 18 MM/HR
RHEUMATOID FACT SERPL-ACNC: <10 IU/ML

## 2019-02-05 PROCEDURE — 86140 C-REACTIVE PROTEIN: CPT

## 2019-02-05 PROCEDURE — 86431 RHEUMATOID FACTOR QUANT: CPT

## 2019-02-05 PROCEDURE — 85652 RBC SED RATE AUTOMATED: CPT

## 2019-02-05 PROCEDURE — 86200 CCP ANTIBODY: CPT

## 2019-02-05 PROCEDURE — 36415 COLL VENOUS BLD VENIPUNCTURE: CPT

## 2019-02-06 ENCOUNTER — PATIENT MESSAGE (OUTPATIENT)
Dept: INTERNAL MEDICINE | Facility: CLINIC | Age: 49
End: 2019-02-06

## 2019-02-06 RX ORDER — ATENOLOL AND CHLORTHALIDONE TABLET 50; 25 MG/1; MG/1
0.5 TABLET ORAL DAILY
Qty: 90 TABLET | Refills: 0 | OUTPATIENT
Start: 2019-02-06

## 2019-02-06 RX ORDER — ATENOLOL AND CHLORTHALIDONE TABLET 50; 25 MG/1; MG/1
TABLET ORAL
Qty: 90 TABLET | Refills: 0 | Status: SHIPPED | OUTPATIENT
Start: 2019-02-06 | End: 2019-07-29 | Stop reason: SDUPTHER

## 2019-02-25 ENCOUNTER — INITIAL CONSULT (OUTPATIENT)
Dept: RHEUMATOLOGY | Facility: CLINIC | Age: 49
End: 2019-02-25
Payer: COMMERCIAL

## 2019-02-25 VITALS
BODY MASS INDEX: 31.3 KG/M2 | WEIGHT: 165.81 LBS | HEIGHT: 61 IN | SYSTOLIC BLOOD PRESSURE: 122 MMHG | HEART RATE: 65 BPM | DIASTOLIC BLOOD PRESSURE: 71 MMHG

## 2019-02-25 DIAGNOSIS — M79.632 PAIN IN BOTH FOREARMS: ICD-10-CM

## 2019-02-25 DIAGNOSIS — M54.2 CERVICAL PAIN (NECK): Primary | ICD-10-CM

## 2019-02-25 DIAGNOSIS — M79.631 PAIN IN BOTH FOREARMS: ICD-10-CM

## 2019-02-25 PROCEDURE — 3078F PR MOST RECENT DIASTOLIC BLOOD PRESSURE < 80 MM HG: ICD-10-PCS | Mod: CPTII,S$GLB,, | Performed by: INTERNAL MEDICINE

## 2019-02-25 PROCEDURE — 99999 PR PBB SHADOW E&M-EST. PATIENT-LVL IV: CPT | Mod: PBBFAC,,, | Performed by: INTERNAL MEDICINE

## 2019-02-25 PROCEDURE — 99999 PR PBB SHADOW E&M-EST. PATIENT-LVL IV: ICD-10-PCS | Mod: PBBFAC,,, | Performed by: INTERNAL MEDICINE

## 2019-02-25 PROCEDURE — 3008F BODY MASS INDEX DOCD: CPT | Mod: CPTII,S$GLB,, | Performed by: INTERNAL MEDICINE

## 2019-02-25 PROCEDURE — 99203 OFFICE O/P NEW LOW 30 MIN: CPT | Mod: S$GLB,,, | Performed by: INTERNAL MEDICINE

## 2019-02-25 PROCEDURE — 3078F DIAST BP <80 MM HG: CPT | Mod: CPTII,S$GLB,, | Performed by: INTERNAL MEDICINE

## 2019-02-25 PROCEDURE — 3074F SYST BP LT 130 MM HG: CPT | Mod: CPTII,S$GLB,, | Performed by: INTERNAL MEDICINE

## 2019-02-25 PROCEDURE — 3074F PR MOST RECENT SYSTOLIC BLOOD PRESSURE < 130 MM HG: ICD-10-PCS | Mod: CPTII,S$GLB,, | Performed by: INTERNAL MEDICINE

## 2019-02-25 PROCEDURE — 3008F PR BODY MASS INDEX (BMI) DOCUMENTED: ICD-10-PCS | Mod: CPTII,S$GLB,, | Performed by: INTERNAL MEDICINE

## 2019-02-25 PROCEDURE — 99203 PR OFFICE/OUTPT VISIT, NEW, LEVL III, 30-44 MIN: ICD-10-PCS | Mod: S$GLB,,, | Performed by: INTERNAL MEDICINE

## 2019-02-25 ASSESSMENT — ROUTINE ASSESSMENT OF PATIENT INDEX DATA (RAPID3)
TOTAL RAPID3 SCORE: 3
FATIGUE SCORE: 6
AM STIFFNESS SCORE: 1, YES
MDHAQ FUNCTION SCORE: 0
PATIENT GLOBAL ASSESSMENT SCORE: 4
PSYCHOLOGICAL DISTRESS SCORE: 0
PAIN SCORE: 5

## 2019-02-25 NOTE — PROGRESS NOTES
Chief Complaint   Patient presents with    Disease Management    Pain       Patient was referred by     History of presenting illness    48 year old black female comes in with arthralgias in the   Hands,wrists  Sometimes fingers hurt,sometimes wrists hurt    Sometimes back,neck hurts  Leg hurts    No joint swelling  Mornings she has joint stiffness  If she types a lot her hands can be stiff    She has pain in the forearms and arms    No known triggers    Legs can swell if she eats salty    Ibuprofen helps with the pain    No skin rashes,malar rash,photosensitivity  No telangiectasias  No calcinosis     No patchy alopecia  No oral and nasal ulcers  No sicca symptoms   No pleurisy or any cardiopulmonary complaints  No dysphagia,diplopia and dysphonia and muscle weakness  No n/v/d/c  No acid reflux+  No raynaud's+  No digital ulcers     No cytopenias  No renal issues  No blood clots     No fever,chills,night sweats,weight loss and loss of appetite     No pregnancy losses    Past history : HTN,PMS symptoms    Family history : Mom has RA    Social history: not a smoker or alcohol user    Labs     ESR nml  CRP 8.8/15.9  RF,CCP,CARL neg    Remote T/LS spine xrays normal    Review of Systems   Constitutional: Negative for activity change, appetite change, chills, diaphoresis, fatigue, fever and unexpected weight change.   HENT: Negative for congestion, dental problem, drooling, ear discharge, ear pain, facial swelling, hearing loss, mouth sores, nosebleeds, postnasal drip, rhinorrhea, sinus pressure, sinus pain, sneezing, sore throat, tinnitus, trouble swallowing and voice change.    Eyes: Negative for photophobia, pain, discharge, redness, itching and visual disturbance.   Respiratory: Negative for apnea, cough, choking, chest tightness, shortness of breath, wheezing and stridor.    Cardiovascular: Negative for chest pain, palpitations and leg swelling.   Gastrointestinal: Negative for abdominal distention,  abdominal pain, anal bleeding, blood in stool, constipation, diarrhea, nausea, rectal pain and vomiting.   Endocrine: Negative for cold intolerance, heat intolerance, polydipsia, polyphagia and polyuria.   Genitourinary: Negative for decreased urine volume, difficulty urinating, dysuria, enuresis, flank pain, frequency, genital sores, hematuria and urgency.   Musculoskeletal: Positive for arthralgias. Negative for back pain, gait problem, joint swelling, myalgias, neck pain and neck stiffness.   Skin: Negative for color change, pallor, rash and wound.   Allergic/Immunologic: Negative for environmental allergies, food allergies and immunocompromised state.   Neurological: Negative for dizziness, tremors, seizures, syncope, facial asymmetry, speech difficulty, weakness, light-headedness, numbness and headaches.   Hematological: Negative for adenopathy. Does not bruise/bleed easily.   Psychiatric/Behavioral: Negative for agitation, behavioral problems, confusion, decreased concentration, dysphoric mood, hallucinations, self-injury, sleep disturbance and suicidal ideas. The patient is not nervous/anxious and is not hyperactive.      Physical Exam     WOODARD-28 tender joint count: 0  WOODARD-28 swollen joint count: 0    She has no tender or swollen joints in the hands and wrists    She has a pain that shoots down from the neck along the arms and forearms when I was doing certain manuevers    She keeps mentioning that her arms and forearms and hands are all painful and tender but the pain is not inside the joints    C spine tender+    Physical Exam   Constitutional: She is oriented to person, place, and time and well-developed, well-nourished, and in no distress. No distress.   HENT:   Head: Normocephalic.   Mouth/Throat: Oropharynx is clear and moist.   Eyes: Conjunctivae are normal. Pupils are equal, round, and reactive to light. Right eye exhibits no discharge. Left eye exhibits no discharge. No scleral icterus.   Neck:  Normal range of motion. No thyromegaly present.   Cardiovascular: Normal rate, regular rhythm, normal heart sounds and intact distal pulses.    Pulmonary/Chest: Effort normal and breath sounds normal. No stridor.   Abdominal: Soft. Bowel sounds are normal.   Lymphadenopathy:     She has no cervical adenopathy.   Neurological: She is alert and oriented to person, place, and time.   Skin: Skin is warm. No rash noted. She is not diaphoretic.     Psychiatric: Affect and judgment normal.   Musculoskeletal: Normal range of motion.         Assessment     48 year old black female with HTN and premenstrual syndrome comes in with b/l arm,forearm and hand pain    She has no tender or swollen joints in the hands and wrists    She has a pain that shoots down from the neck along the arms and forearms when I was doing certain manuevers    She keeps mentioning that her arms and forearms and hands are all painful and tender but the pain is not inside the joints    C spine tender+    This makes me think that her problem is originating in the C spine       1. Cervical pain (neck)    2. Pain in both forearms            New problem     Plan    MRI C spine    PT C spine     EMG/NCS b/l TONJA Sosa was seen today for disease management and pain.    Diagnoses and all orders for this visit:    Cervical pain (neck)  -     MRI Cervical Spine Without Contrast; Future  -     Ambulatory Referral to Physical/Occupational Therapy  -     EMG W/ ULTRASOUND AND NERVE CONDUCTION TEST 2 Extremities; Future    Pain in both forearms  -     MRI Cervical Spine Without Contrast; Future  -     Ambulatory Referral to Physical/Occupational Therapy  -     EMG W/ ULTRASOUND AND NERVE CONDUCTION TEST 2 Extremities; Future

## 2019-02-25 NOTE — LETTER
February 25, 2019      Jorge Alberto Ruiz MD  [address]  [place] [address]           [facility] - Rheumatology  [address]  [place] [address]  Phone: [phone]  Fax: [phone]          Patient: Sheila Costa   MR Number: [id number]   YOB: 1970   Date of Visit: 2/25/2019       Dear Dr. Jorge Alberto Ruiz:    Thank you for referring Sheila Costa to me for evaluation. Attached you will find relevant portions of my assessment and plan of care.    If you have questions, please do not hesitate to call me. I look forward to following Sheila Costa along with you.    Sincerely,    Amrit Bailey MD    Enclosure  CC:  No Recipients    If you would like to receive this communication electronically, please contact [email or web address] or [phone] to request more information on [employer] Link access.    For providers and/or their staff who would like to refer a patient to [facility], please contact us through our one-stop-shop provider referral line, [facility], at [phone].    If you feel you have received this communication in error or would no longer like to receive these types of communications, please e-mail [email or web address]

## 2019-02-26 ENCOUNTER — TELEPHONE (OUTPATIENT)
Dept: OBSTETRICS AND GYNECOLOGY | Facility: CLINIC | Age: 49
End: 2019-02-26

## 2019-02-26 NOTE — TELEPHONE ENCOUNTER
----- Message from Sigrid Gilbert sent at 2/26/2019  4:31 PM CST -----  Contact: self  Patient is calling to discuss her appt on 06/10/19 the patient is requesting to be seen sonner than that if possible. The patient can be reached at [phone].

## 2019-03-06 ENCOUNTER — CLINICAL SUPPORT (OUTPATIENT)
Dept: REHABILITATION | Facility: HOSPITAL | Age: 49
End: 2019-03-06
Attending: INTERNAL MEDICINE
Payer: COMMERCIAL

## 2019-03-06 ENCOUNTER — HOSPITAL ENCOUNTER (OUTPATIENT)
Dept: RADIOLOGY | Facility: HOSPITAL | Age: 49
Discharge: HOME | End: 2019-03-06
Attending: INTERNAL MEDICINE
Payer: COMMERCIAL

## 2019-03-06 DIAGNOSIS — R29.3 POOR POSTURE: ICD-10-CM

## 2019-03-06 DIAGNOSIS — M79.632 PAIN IN BOTH FOREARMS: ICD-10-CM

## 2019-03-06 DIAGNOSIS — M54.2 CERVICAL PAIN (NECK): ICD-10-CM

## 2019-03-06 DIAGNOSIS — M79.631 PAIN IN BOTH FOREARMS: ICD-10-CM

## 2019-03-06 DIAGNOSIS — M79.601 PAIN IN BOTH UPPER EXTREMITIES: ICD-10-CM

## 2019-03-06 DIAGNOSIS — M79.602 PAIN IN BOTH UPPER EXTREMITIES: ICD-10-CM

## 2019-03-06 PROBLEM — M79.603 UPPER EXTREMITY PAIN: Status: ACTIVE | Noted: 2019-03-06

## 2019-03-06 PROCEDURE — 72141 MRI NECK SPINE W/O DYE: CPT | Mod: 26,,, | Performed by: RADIOLOGY

## 2019-03-06 PROCEDURE — 97161 PT EVAL LOW COMPLEX 20 MIN: CPT | Mod: PN

## 2019-03-06 PROCEDURE — 72141 MRI CERVICAL SPINE WITHOUT CONTRAST: ICD-10-PCS | Mod: 26,,, | Performed by: RADIOLOGY

## 2019-03-06 PROCEDURE — 72141 MRI NECK SPINE W/O DYE: CPT | Mod: TC

## 2019-03-06 NOTE — PLAN OF CARE
Physical Therapy Initial Evaluation     Name: Sheila Costa  Clinic Number: [id number]    Therapy Diagnosis:   Encounter Diagnoses   Name Primary?    Cervical pain (neck)     Pain in both upper extremities     Poor posture      Physician: Amrit Bailey*    Physician Orders: PT Eval and Treat   Medical Diagnosis from Referral:   M54.2 (ICD-10-CM) - Cervical pain (neck)   M79.632,M79.631 (ICD-10-CM) - Pain in both forearms     Evaluation Date: 3/6/2019  Authorization Period Expiration: 12/31/2019  Plan of Care Expiration: 6/6/2019  Visit # / Visits authorized: 1/50    Time In: 1400  Time Out: 1440  Total Billable Time: 40 minutes    Precautions: Standard    Subjective     Date of onset: About 1 year ago  History of current condition - Sheila reports:chronic cervical and UE pain over the past year which has been worsening.  States her pain is located throughout both sides of neck, extending into shoulders, and into her forearms as well.  Denies numbness/tingling.  States there are no aggravating activities, it just tends to hurt constantly and she just lives with it.  Has been tested for rheumatic abnormalities, but initial testing was negative.  Had an MRI today, results remain unknown at time of this examination.       Medical History:   Past Medical History:   Diagnosis Date    Hypertension     Premenstrual symptom        Surgical History:   Sheila Costa  has a past surgical history that includes Refractive surgery (2008).    Medications:   Sheila has a current medication list which includes the following prescription(s): atenolol-chlorthalidone, fluoxetine, fluoxetine, fluticasone, levonorgestrel, and loratadine.    Allergies:   Review of patient's allergies indicates:   Allergen Reactions    Amoxicillin Hives        Imaging, MRI studies: Results unknown at this time    Prior Therapy: Not for this condition, but is also seeing  a chiropractor.    Social History:  lives with their family  Occupation:   Prior Level of Function: Independent   DME owned/used: n/a  Current Level of Function: Independent; pain lifting objects, sitting for extended periods of time, performing household chores.      Pain:  Current 7/10, worst 8/10, best 5/10   Location: bilateral arms and neck   Description: Aching  Aggravating Factors: Unknown - pt is unable to identify  Easing Factors: pain medication    Pts goals:Pt would like to be able to perform her regular routine without any pain.          Objective     Dermatomes: Sensation: Light Touch reported to be intact and equal bilaterally    Posture Alignment: slouched posture    Palpation: TTP throughout cervical spine musculature, with increased tone noted B (L>R)    CERVICAL SPINE AROM:  WNL, painful end range ROT B (R >L)    SEGMENTAL MOBILITY: Hypomobility noted w/ P-A testing of mid cervical levels.      UPPER EXTREMITY STRENGTH:   Left Right   Shoulder Flexion 4+/5 4+/5   Shoulder Abduction 4+/5 4+/5     Elbow Flexion 4+/5 4+/5   Elbow Extension 4+/5 4+/5   Wrist Flexion 4+/5 4+/5   Wrist Extension 4+/5 4+/5     Neural tension testing: negative    Pt/family was provided educational information, including: role of PT, goals for PT, scheduling - pt verbalized understanding. Discussed insurance limitations with pt.     Functional Limitations Reports - G Codes      TREATMENT     Home Exercises and Patient Education Provided    Education provided:   - Current condition, HEP, POC    Written Home Exercises Provided: yes.  Exercises were reviewed and Sheila was able to demonstrate them prior to the end of the session.  Sheila demonstrated good  understanding of the education provided.     See EMR under Patient Instructions for exercises provided 3/6/2019.    Assessment     Sheila is a 48 y.o. female referred to outpatient Physical Therapy with a medical diagnosis of cervicalgia and B UE pain. She presents  with signs and symptoms including: increased cervical and UE pain, decreased pain-free cervical and UE ROM, decreased UE strength, soft tissue dysfunction, postural imbalance,impaired joint mobility, and decreased tolerance to functional activities. Examination of the cervical spine did not reveal any worsening of UE sx's.  Palpation demonstrates increased tone throughout posterior cervical musculature bilaterally, but L>R, all of which may indicate pain is myofascial in origin.  Functional limitations include:  Difficulty sitting for extended periods of time, sleeping, performing work duties, performing household chores.        Pt with good motivation to perform physical activity and responds well to cueing.      Pt prognosis is Good.   Pt will benefit from skilled outpatient Physical Therapy to address the deficits stated above and in the chart below, provide pt/family education, and to maximize pt's level of independence.     Plan of care discussed with patient: Yes  Pt's spiritual, cultural and educational needs considered and patient is agreeable to the plan of care and goals as stated below:     Anticipated Barriers for therapy: n/a    Medical Necessity is demonstrated by the following  History  Co-morbidities and personal factors that may impact the plan of care Co-morbidities:   n/a    Personal Factors:   no deficits     low   Examination  Body Structures and Functions, activity limitations and participation restrictions that may impact the plan of care Body Regions:   neck  upper extremities    Body Systems:    gross symmetry  ROM  strength  motor control    Participation Restrictions:   n/a    Activity limitations:   Learning and applying knowledge  no deficits    General Tasks and Commands  no deficits    Communication  no deficits    Mobility  lifting and carrying objects    Self care  no deficits    Domestic Life  doing house work (cleaning house, washing dishes,  laundry)    Interactions/Relationships  no deficits    Life Areas  no deficits    Community and Social Life  no deficits         low   Clinical Presentation stable and uncomplicated low   Decision Making/ Complexity Score: low     Pt's spiritual, cultural and educational needs considered and pt agreeable to plan of care and goals as stated below:       Short Term GOALS: 3 weeks. Pt agrees with goals set.  1. Patient demonstrates independence with HEP.   2. Patient demonstrates independence with Postural Awareness.   3. Patient demonstrates independence with body mechanics.   4. Patient will report pain of 5/10 at worst, on 0-10 pain scale, with all activity    Long Term GOALS: 6 weeks. Pt agrees with goals set.  1. Patient demonstrates ability to lift 5 lb object overhead to shelf without limitations d/t cervical or UE pain.  2. Patient will report no cervical or UE sx's when sitting at work for a normal full day.  3. Patient demonstrates improved overall function per FOTO Neck Survey to 20% Limitation or less.   4. Patient will report pain of 1/10 at worst, on 0-10 pain scale, with all activity      PLAN       Plan of care Certification: 3/6/2019 to 6/6/2019.    Outpatient Physical Therapy 1 times weekly for 8 weeks to include the following interventions: Cervical/Lumbar Traction, Electrical Stimulation (TENS), Manual Therapy, Moist Heat/ Ice, Neuromuscular Re-ed, Therapeutic Activites and Therapeutic Exercise.  Pt may be seen by PTA as part of the rehabilitation team.     Lauri Cheng, PT  3/6/2019    I have seen the patient, reviewed the therapist's plan of care, and I agree with the plan of care.      I certify the need for these services furnished under this plan of treatment and while under my care.     ___________________ ________ Physician/Referring Practitioner            ___________________________ Date of Signature

## 2019-03-12 RX ORDER — FLUOXETINE HYDROCHLORIDE 20 MG/1
CAPSULE ORAL
Qty: 30 CAPSULE | Refills: 0 | Status: SHIPPED | OUTPATIENT
Start: 2019-03-12 | End: 2020-07-01 | Stop reason: CLARIF

## 2019-03-15 ENCOUNTER — HOSPITAL ENCOUNTER (OUTPATIENT)
Dept: RADIOLOGY | Facility: OTHER | Age: 49
Discharge: HOME | End: 2019-03-15
Attending: OBSTETRICS & GYNECOLOGY
Payer: COMMERCIAL

## 2019-03-15 DIAGNOSIS — Z12.31 ENCOUNTER FOR SCREENING MAMMOGRAM FOR MALIGNANT NEOPLASM OF BREAST: ICD-10-CM

## 2019-03-15 PROCEDURE — 77063 MAMMO DIGITAL SCREENING BILAT WITH TOMOSYNTHESIS_CAD: ICD-10-PCS | Mod: 26,,, | Performed by: INTERNAL MEDICINE

## 2019-03-15 PROCEDURE — 77063 BREAST TOMOSYNTHESIS BI: CPT | Mod: 26,,, | Performed by: INTERNAL MEDICINE

## 2019-03-15 PROCEDURE — 77067 SCR MAMMO BI INCL CAD: CPT | Mod: TC

## 2019-03-15 PROCEDURE — 77067 MAMMO DIGITAL SCREENING BILAT WITH TOMOSYNTHESIS_CAD: ICD-10-PCS | Mod: 26,,, | Performed by: INTERNAL MEDICINE

## 2019-03-15 PROCEDURE — 77067 SCR MAMMO BI INCL CAD: CPT | Mod: 26,,, | Performed by: INTERNAL MEDICINE

## 2019-03-17 ENCOUNTER — PATIENT MESSAGE (OUTPATIENT)
Dept: RHEUMATOLOGY | Facility: CLINIC | Age: 49
End: 2019-03-17

## 2019-03-26 ENCOUNTER — CLINICAL SUPPORT (OUTPATIENT)
Dept: REHABILITATION | Facility: HOSPITAL | Age: 49
End: 2019-03-26
Attending: INTERNAL MEDICINE
Payer: COMMERCIAL

## 2019-03-26 DIAGNOSIS — R29.3 POOR POSTURE: ICD-10-CM

## 2019-03-26 DIAGNOSIS — M79.601 PAIN IN BOTH UPPER EXTREMITIES: ICD-10-CM

## 2019-03-26 DIAGNOSIS — M79.602 PAIN IN BOTH UPPER EXTREMITIES: ICD-10-CM

## 2019-03-26 DIAGNOSIS — M54.2 CERVICAL PAIN (NECK): ICD-10-CM

## 2019-03-26 PROCEDURE — 97110 THERAPEUTIC EXERCISES: CPT | Mod: PN

## 2019-03-26 NOTE — PROGRESS NOTES
Physical Therapy Daily Treatment Note     [id number]    Therapy Diagnosis:   Encounter Diagnoses   Name Primary?    Cervical pain (neck)     Pain in both upper extremities     Poor posture      Physician: Amrit Bailey*    Visit Date: 3/26/2019      Physician Orders: PT Eval and Treat   Medical Diagnosis from Referral:   M54.2 (ICD-10-CM) - Cervical pain (neck)   M79.632,M79.631 (ICD-10-CM) - Pain in both forearms      Evaluation Date: 3/6/2019  Authorization Period Expiration: 12/31/2019  Plan of Care Expiration: 6/6/2019  Visit # / Visits authorized: 2/50    Time In: 1700  Time Out: 1750  Total Billable Time: 30 minutes (1 on 1 w/ PT)    Precautions: Standard    Subjective     Pt reports: No new complaints.  She was compliant with home exercise program.  Response to previous treatment: N/a  Functional change:n/a    Pain: 3/10  Location: B cervical    Objective     BOLD = Performed Today  + = New Exercise or Progression    Sheila received therapeutic exercises to develop strength, ROM, flexibility and posture for 30 minutes including:     Exercise Resistance Sets/Reps/Hold    MHP  10 mins    Sidelying Thoracic ROT Stretch B  15x 3 secs    TB Row Green 3x15    1/2 Kneel Thoracic ROT mob on airex  15x B    Cervical Retraction w/ ROT   2x10 B                               Sheila received the following manual therapy techniques: Joint mobilizations, Manual traction, Myofacial release and Soft tissue Mobilization were applied to the: cervical paraspinals, upper trap, lev scap for 10 minutes, including:  SOR, manual traction, upper trap stretch, IASTM to L sided soft tissue structures.      Home Exercises Provided and Patient Education Provided     Education provided:   - HEP    Written Home Exercises Provided: Patient instructed to cont prior HEP.  Exercises were reviewed and Sheila was able to demonstrate them prior to the end of the session.  Sheila  demonstrated good  understanding of the education provided.     See EMR under Patient Instructions for exercises provided prior visit.    Assessment     Sheila is progressing well towards her goals.   She demonstrated improved L cervical rotation with less pain following manual treatment.   Postural exercises were also progressed to improve dysfunctional movement patterns of the UE and cervical/thoracic spine.    Pt prognosis is Good.     Pt will continue to benefit from skilled outpatient physical therapy to address the deficits listed in the problem list box on initial evaluation, provide pt/family education and to maximize pt's level of independence in the home and community environment.     Pt's spiritual, cultural and educational needs considered and pt agreeable to plan of care and goals.     Anticipated barriers to physical therapy: None    Goals:    Short Term GOALS: 3 weeks. Pt agrees with goals set.  1. Patient demonstrates independence with HEP.   2. Patient demonstrates independence with Postural Awareness.   3. Patient demonstrates independence with body mechanics.   4. Patient will report pain of 5/10 at worst, on 0-10 pain scale, with all activity     Long Term GOALS: 6 weeks. Pt agrees with goals set.  1. Patient demonstrates ability to lift 5 lb object overhead to shelf without limitations d/t cervical or UE pain.  2. Patient will report no cervical or UE sx's when sitting at work for a normal full day.  3. Patient demonstrates improved overall function per FOTO Neck Survey to 20% Limitation or less.   4. Patient will report pain of 1/10 at worst, on 0-10 pain scale, with all activity      Plan     Assess response, continue w/ POC.      Lauri Cheng, PT

## 2019-04-02 ENCOUNTER — CLINICAL SUPPORT (OUTPATIENT)
Dept: REHABILITATION | Facility: HOSPITAL | Age: 49
End: 2019-04-02
Attending: INTERNAL MEDICINE
Payer: COMMERCIAL

## 2019-04-02 DIAGNOSIS — M79.601 PAIN IN BOTH UPPER EXTREMITIES: ICD-10-CM

## 2019-04-02 DIAGNOSIS — M79.602 PAIN IN BOTH UPPER EXTREMITIES: ICD-10-CM

## 2019-04-02 DIAGNOSIS — M54.2 CERVICAL PAIN (NECK): ICD-10-CM

## 2019-04-02 DIAGNOSIS — R29.3 POOR POSTURE: ICD-10-CM

## 2019-04-02 PROCEDURE — 97140 MANUAL THERAPY 1/> REGIONS: CPT | Mod: PN

## 2019-04-02 NOTE — PROGRESS NOTES
Physical Therapy Daily Treatment Note     [facility] [id number]    Therapy Diagnosis:   Encounter Diagnoses   Name Primary?    Cervical pain (neck)     Pain in both upper extremities     Poor posture      Physician: Amrit Bailey*    Visit Date: 4/2/2019      Physician Orders: PT Eval and Treat   Medical Diagnosis from Referral:   M54.2 (ICD-10-CM) - Cervical pain (neck)   M79.632,M79.631 (ICD-10-CM) - Pain in both forearms      Evaluation Date: 3/6/2019  Authorization Period Expiration: 12/31/2019  Plan of Care Expiration: 6/6/2019  Visit # / Visits authorized: 3/50    Time In: 1700  Time Out: 1735  Total Billable Time: 30 minutes (1 on 1 w/ PT)    Precautions: Standard    Subjective     Pt reports: No new complaints.  Felt significant improvements in cervical sx's following previous visit.    She was compliant with home exercise program.  Response to previous treatment: N/a  Functional change:n/a    Pain: 3/10  Location: B cervical    Objective     BOLD = Performed Today  + = New Exercise or Progression    Sheila received therapeutic exercises to develop strength, ROM, flexibility and posture for 10 minutes including:     Exercise Resistance Sets/Reps/Hold    MHP  10 mins    Sidelying Thoracic ROT Stretch B  15x 3 secs    TB Row Green 3x15    1/2 Kneel Thoracic ROT mob on airex  15x B    Cervical Retraction w/ ROT   2x10 B                               Sheila received the following manual therapy techniques: Joint mobilizations, Manual traction, Myofacial release and Soft tissue Mobilization were applied to the: cervical paraspinals, upper trap, lev scap for 25 minutes, including:  SOR, manual traction, upper trap stretch, IASTM to L sided soft tissue structures.      Home Exercises Provided and Patient Education Provided     Education provided:   - HEP    Written Home Exercises Provided: Patient instructed to cont prior HEP.  Exercises were reviewed and Sheila was  able to demonstrate them prior to the end of the session.  Sheila demonstrated good  understanding of the education provided.     See EMR under Patient Instructions for exercises provided prior visit.    Assessment     Sheila is progressing well towards her goals.  She reported improved cervical sx's with less tension and pain following manual treatment.   Postural exercises were held today and will be performed as HEP.    Pt prognosis is Good.     Pt will continue to benefit from skilled outpatient physical therapy to address the deficits listed in the problem list box on initial evaluation, provide pt/family education and to maximize pt's level of independence in the home and community environment.     Pt's spiritual, cultural and educational needs considered and pt agreeable to plan of care and goals.     Anticipated barriers to physical therapy: None    Goals:    Short Term GOALS: 3 weeks. Pt agrees with goals set.  1. Patient demonstrates independence with HEP.   2. Patient demonstrates independence with Postural Awareness.   3. Patient demonstrates independence with body mechanics.   4. Patient will report pain of 5/10 at worst, on 0-10 pain scale, with all activity     Long Term GOALS: 6 weeks. Pt agrees with goals set.  1. Patient demonstrates ability to lift 5 lb object overhead to shelf without limitations d/t cervical or UE pain.  2. Patient will report no cervical or UE sx's when sitting at work for a normal full day.  3. Patient demonstrates improved overall function per FOTO Neck Survey to 20% Limitation or less.   4. Patient will report pain of 1/10 at worst, on 0-10 pain scale, with all activity      Plan     Assess response, continue w/ POC.      Lauri Cheng, PT

## 2019-04-09 ENCOUNTER — CLINICAL SUPPORT (OUTPATIENT)
Dept: REHABILITATION | Facility: HOSPITAL | Age: 49
End: 2019-04-09
Attending: INTERNAL MEDICINE
Payer: COMMERCIAL

## 2019-04-09 DIAGNOSIS — M79.601 PAIN IN BOTH UPPER EXTREMITIES: ICD-10-CM

## 2019-04-09 DIAGNOSIS — M79.602 PAIN IN BOTH UPPER EXTREMITIES: ICD-10-CM

## 2019-04-09 DIAGNOSIS — M54.2 CERVICAL PAIN (NECK): ICD-10-CM

## 2019-04-09 DIAGNOSIS — R29.3 POOR POSTURE: ICD-10-CM

## 2019-04-09 PROCEDURE — 97110 THERAPEUTIC EXERCISES: CPT | Mod: PN

## 2019-04-09 NOTE — PROGRESS NOTES
Physical Therapy Daily Treatment Note     [id number]    Therapy Diagnosis:   Encounter Diagnoses   Name Primary?    Cervical pain (neck)     Pain in both upper extremities     Poor posture      Physician: Amrit Bailey*    Visit Date: 4/9/2019      Physician Orders: PT Eval and Treat   Medical Diagnosis from Referral:   M54.2 (ICD-10-CM) - Cervical pain (neck)   M79.632,M79.631 (ICD-10-CM) - Pain in both forearms      Evaluation Date: 3/6/2019  Authorization Period Expiration: 12/31/2019  Plan of Care Expiration: 6/6/2019  Visit # / Visits authorized: 4/50    Time In:  1710  Time Out: 1800  Total Billable Time: 30 minutes (1 on 1 w/ PT)    Precautions: Standard    Subjective     Pt reports: No new complaints.  Felt significant improvements in cervical sx's following previous visit.    She was compliant with home exercise program.  Response to previous treatment: N/a  Functional change:n/a    Pain: 3/10  Location: B cervical    Objective     BOLD = Performed Today  + = New Exercise or Progression    Sheila received therapeutic exercises to develop strength, ROM, flexibility and posture for 10 minutes including:     Exercise Resistance Sets/Reps/Hold    UBE  8 mins    Sidelying Thoracic ROT Stretch B  15x 3 secs    TB Row Green 3x15    1/2 Kneel Thoracic ROT mob on airex  15x B    Cervical Retraction w/ ROT   2x10 B    Supine Horizontal ABD w/ TB on 1/2 FR  2x10    Quadruped Serratus Press Up  20x    FR Wall Slides  20x2             Sheila received the following manual therapy techniques: Joint mobilizations, Manual traction, Myofacial release and Soft tissue Mobilization were applied to the: cervical paraspinals, upper trap, lev scap for 25 minutes, including:  SOR, manual traction, upper trap stretch, IASTM to L sided soft tissue structures.      Home Exercises Provided and Patient Education Provided     Education provided:   - HEP    Written Home Exercises  Provided: Patient instructed to cont prior HEP.  Exercises were reviewed and Sheila was able to demonstrate them prior to the end of the session.  Sheila demonstrated good  understanding of the education provided.     See EMR under Patient Instructions for exercises provided prior visit.    Assessment     Sheila is progressing well towards her goals.  She reported improved cervical sx's with less tension and pain following manual treatment.   Improvements noted in upper trap tone compared to previous visits.      Pt prognosis is Good.     Pt will continue to benefit from skilled outpatient physical therapy to address the deficits listed in the problem list box on initial evaluation, provide pt/family education and to maximize pt's level of independence in the home and community environment.     Pt's spiritual, cultural and educational needs considered and pt agreeable to plan of care and goals.     Anticipated barriers to physical therapy: None    Goals:    Short Term GOALS: 3 weeks. Pt agrees with goals set.  1. Patient demonstrates independence with HEP.   2. Patient demonstrates independence with Postural Awareness.   3. Patient demonstrates independence with body mechanics.   4. Patient will report pain of 5/10 at worst, on 0-10 pain scale, with all activity     Long Term GOALS: 6 weeks. Pt agrees with goals set.  1. Patient demonstrates ability to lift 5 lb object overhead to shelf without limitations d/t cervical or UE pain.  2. Patient will report no cervical or UE sx's when sitting at work for a normal full day.  3. Patient demonstrates improved overall function per FOTO Neck Survey to 20% Limitation or less.   4. Patient will report pain of 1/10 at worst, on 0-10 pain scale, with all activity      Plan     Assess response, continue w/ POC.      Lauri Cheng, PT

## 2019-04-12 ENCOUNTER — OFFICE VISIT (OUTPATIENT)
Dept: OBSTETRICS AND GYNECOLOGY | Facility: CLINIC | Age: 49
End: 2019-04-12
Attending: OBSTETRICS & GYNECOLOGY
Payer: COMMERCIAL

## 2019-04-12 VITALS
WEIGHT: 162.69 LBS | BODY MASS INDEX: 30.71 KG/M2 | HEIGHT: 61 IN | DIASTOLIC BLOOD PRESSURE: 72 MMHG | SYSTOLIC BLOOD PRESSURE: 120 MMHG

## 2019-04-12 DIAGNOSIS — Z01.419 ENCOUNTER FOR GYNECOLOGICAL EXAMINATION WITHOUT ABNORMAL FINDING: ICD-10-CM

## 2019-04-12 DIAGNOSIS — F32.81 PMDD (PREMENSTRUAL DYSPHORIC DISORDER): ICD-10-CM

## 2019-04-12 DIAGNOSIS — Z12.4 SCREENING FOR MALIGNANT NEOPLASM OF CERVIX: Primary | ICD-10-CM

## 2019-04-12 PROCEDURE — 88175 CYTOPATH C/V AUTO FLUID REDO: CPT

## 2019-04-12 PROCEDURE — 3074F PR MOST RECENT SYSTOLIC BLOOD PRESSURE < 130 MM HG: ICD-10-PCS | Mod: CPTII,S$GLB,, | Performed by: OBSTETRICS & GYNECOLOGY

## 2019-04-12 PROCEDURE — 99396 PR PREVENTIVE VISIT,EST,40-64: ICD-10-PCS | Mod: S$GLB,,, | Performed by: OBSTETRICS & GYNECOLOGY

## 2019-04-12 PROCEDURE — 3078F DIAST BP <80 MM HG: CPT | Mod: CPTII,S$GLB,, | Performed by: OBSTETRICS & GYNECOLOGY

## 2019-04-12 PROCEDURE — 3074F SYST BP LT 130 MM HG: CPT | Mod: CPTII,S$GLB,, | Performed by: OBSTETRICS & GYNECOLOGY

## 2019-04-12 PROCEDURE — 99396 PREV VISIT EST AGE 40-64: CPT | Mod: S$GLB,,, | Performed by: OBSTETRICS & GYNECOLOGY

## 2019-04-12 PROCEDURE — 87624 HPV HI-RISK TYP POOLED RSLT: CPT

## 2019-04-12 PROCEDURE — 3078F PR MOST RECENT DIASTOLIC BLOOD PRESSURE < 80 MM HG: ICD-10-PCS | Mod: CPTII,S$GLB,, | Performed by: OBSTETRICS & GYNECOLOGY

## 2019-04-12 RX ORDER — FLUOXETINE HYDROCHLORIDE 20 MG/1
20 CAPSULE ORAL DAILY
Qty: 30 CAPSULE | Refills: 11 | Status: SHIPPED | OUTPATIENT
Start: 2019-04-12 | End: 2020-05-12 | Stop reason: SDUPTHER

## 2019-04-12 NOTE — PROGRESS NOTES
SUBJECTIVE:   48 y.o. female   for annual routine Pap and checkup. Patient's last menstrual period was 2019..  She has no unusual complaints and reports minimal spotting with IUD.        Past Medical History:   Diagnosis Date    Hypertension     Premenstrual symptom      Past Surgical History:   Procedure Laterality Date    REFRACTIVE SURGERY       Social History     Socioeconomic History    Marital status:      Spouse name: Not on file    Number of children: Not on file    Years of education: Not on file    Highest education level: Not on file   Occupational History     Employer: [employer]   Social Needs    Financial resource strain: Not on file    Food insecurity:     Worry: Not on file     Inability: Not on file    Transportation needs:     Medical: Not on file     Non-medical: Not on file   Tobacco Use    Smoking status: Never Smoker   Substance and Sexual Activity    Alcohol use: Yes     Comment: Rare, social    Drug use: No    Sexual activity: Yes     Partners: Male     Birth control/protection: Surgical   Lifestyle    Physical activity:     Days per week: Not on file     Minutes per session: Not on file    Stress: Not on file   Relationships    Social connections:     Talks on phone: Not on file     Gets together: Not on file     Attends [employer] service: Not on file     Active member of club or organization: Not on file     Attends meetings of clubs or organizations: Not on file     Relationship status: Not on file   Other Topics Concern    Not on file   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     Family History   Problem Relation Age of Onset    Hypertension Mother     Thyroid disease Mother     Rheum arthritis Mother     Heart disease Father         Pacemaker    Cancer Maternal Grandmother     Cataracts Maternal Grandmother     Diabetes Maternal Grandmother     Hypertension Maternal Grandmother      Thyroid disease Maternal Grandmother     Breast cancer Maternal Grandmother     Cataracts Maternal Grandfather     Diabetes Maternal Grandfather     Hypertension Maternal Grandfather     Thyroid disease Maternal Grandfather     Lupus Cousin     Amblyopia Neg Hx     Blindness Neg Hx     Glaucoma Neg Hx     Macular degeneration Neg Hx     Retinal detachment Neg Hx     Strabismus Neg Hx     Stroke Neg Hx     Ovarian cancer Neg Hx     Colon cancer Neg Hx      OB History    Para Term  AB Living   2 2 2         SAB TAB Ectopic Multiple Live Births                  # Outcome Date GA Lbr Louis/2nd Weight Sex Delivery Anes PTL Lv   2 Term            1 Term                    Current Outpatient Medications   Medication Sig Dispense Refill    atenolol-chlorthalidone (TENORETIC) 50-25 mg Tab TAKE ONE-HALF TABLET BY MOUTH EVERY DAY 90 tablet 0    fluoxetine (PROZAC) 20 MG capsule TAKE ONE CAPSULE BY MOUTH EVERY DAY 30 capsule 0    FLUoxetine 20 MG capsule TAKE ONE CAPSULE BY MOUTH ONCE DAILY 30 capsule 0    fluticasone (FLONASE) 50 mcg/actuation nasal spray   12    levonorgestrel (MIRENA) 20 mcg/24 hr (5 years) IUD 1 Intra Uterine Device by Intrauterine route once. for 1 dose 1 Intra Uterine Device 0    loratadine (CLARITIN) 10 mg tablet Take 10 mg by mouth once daily.       No current facility-administered medications for this visit.      Allergies: Amoxicillin     The ASCVD Risk score ([place] Jr., et al., 2013) failed to calculate for the following reasons:    Cannot find a previous HDL lab    Cannot find a previous total cholesterol lab      ROS:  Constitutional: no weight loss, weight gain, fever, fatigue  Eyes:  No vision changes, glasses/contacts  ENT/Mouth: No ulcers, sinus problems, ears ringing, headache  Cardiovascular: No inability to lie flat, chest pain, exercise intolerance, swelling, heart palpitations  Respiratory: No wheezing, coughing blood, shortness of breath, or  cough  Gastrointestinal: No diarrhea, bloody stool, nausea/vomiting, constipation, gas, hemorrhoids  Genitourinary: No blood in urine, painful urination, urgency of urination, frequency of urination, incomplete emptying, incontinence, abnormal bleeding, painful periods, heavy periods, vaginal discharge, vaginal odor, painful intercourse, sexual problems, bleeding after intercourse.  Musculoskeletal: No muscle weakness  Skin/Breast: No painful breasts, nipple discharge, masses, rash, ulcers  Neurological: No passing out, seizures, numbness, headache  Endocrine: No diabetes, hypothyroid, hyperthyroid, hot flashes, hair loss, abnormal hair growth, acne  Psychiatric: No depression, crying  Hematologic: No bruises, bleeding, swollen lymph nodes, anemia.      Physical Exam:   Constitutional: She is oriented to person, place, and time. She appears well-developed and well-nourished.      Neck: Normal range of motion. No tracheal deviation present. No thyromegaly present.    Cardiovascular: Exam reveals no edema.     Pulmonary/Chest: Effort normal. She exhibits no mass, no tenderness, no deformity and no retraction. Right breast exhibits no inverted nipple, no mass, no nipple discharge, no skin change, no tenderness, presence, no bleeding and no swelling. Left breast exhibits no inverted nipple, no mass, no nipple discharge, no skin change, no tenderness, presence, no bleeding and no swelling. Breasts are symmetrical.        Abdominal: Soft. She exhibits no distension and no mass. There is no tenderness. There is no rebound and no guarding. No hernia. Hernia confirmed negative in the left inguinal area.     Genitourinary: Vagina normal and uterus normal. Rectal exam shows no external hemorrhoid. There is no rash, tenderness or lesion on the right labia. There is no rash, tenderness or lesion on the left labia. Uterus is not deviated. Cervix is normal. No no adexnal prolapse. Right adnexum displays no mass, no tenderness and  no fullness. Left adnexum displays no mass, no tenderness and no fullness. No tenderness, bleeding, rectocele, cystocele or unspecified prolapse of vaginal walls in the vagina. No vaginal discharge found. Cervix exhibits no motion tenderness, no discharge and no friability.           Musculoskeletal: Normal range of motion and moves all extremeties. She exhibits no edema.      Lymphadenopathy:        Right: No inguinal adenopathy present.        Left: No inguinal adenopathy present.    Neurological: She is alert and oriented to person, place, and time.    Skin: No rash noted. No erythema. No pallor.    Psychiatric: She has a normal mood and affect. Her behavior is normal. Judgment and thought content normal.     IUD strings visible at os    ASSESSMENT:   well woman    PLAN:   mammogram  pap smear  return annually or prn

## 2019-04-16 ENCOUNTER — CLINICAL SUPPORT (OUTPATIENT)
Dept: REHABILITATION | Facility: HOSPITAL | Age: 49
End: 2019-04-16
Attending: INTERNAL MEDICINE
Payer: COMMERCIAL

## 2019-04-16 DIAGNOSIS — M79.601 PAIN IN BOTH UPPER EXTREMITIES: ICD-10-CM

## 2019-04-16 DIAGNOSIS — M54.2 CERVICAL PAIN (NECK): ICD-10-CM

## 2019-04-16 DIAGNOSIS — R29.3 POOR POSTURE: ICD-10-CM

## 2019-04-16 DIAGNOSIS — M79.602 PAIN IN BOTH UPPER EXTREMITIES: ICD-10-CM

## 2019-04-16 PROCEDURE — 97140 MANUAL THERAPY 1/> REGIONS: CPT | Mod: PN

## 2019-04-16 PROCEDURE — 97110 THERAPEUTIC EXERCISES: CPT | Mod: PN

## 2019-04-16 NOTE — PROGRESS NOTES
Physical Therapy Daily Treatment Note     [id number]    Therapy Diagnosis:   Encounter Diagnoses   Name Primary?    Cervical pain (neck)     Pain in both upper extremities     Poor posture      Physician: Amrit Bailey*    Visit Date: 4/16/2019      Physician Orders: PT Eval and Treat   Medical Diagnosis from Referral:   M54.2 (ICD-10-CM) - Cervical pain (neck)   M79.632,M79.631 (ICD-10-CM) - Pain in both forearms      Evaluation Date: 3/6/2019  Authorization Period Expiration: 12/31/2019  Plan of Care Expiration: 6/6/2019  Visit # / Visits authorized: 5/50    Time In:  1700  Time Out: 1800  Total Billable Time: 55 minutes (1 on 1 w/ PT)    Precautions: Standard    Subjective     Pt reports: No new complaints.  Continues to experience improvements, but notes discomfort and tension after sitting or writing for extended periods of time.    She was compliant with home exercise program.  Response to previous treatment: N/a  Functional change:n/a    Pain: 3/10  Location: B cervical    Objective     BOLD = Performed Today  + = New Exercise or Progression    Sheila received therapeutic exercises to develop strength, ROM, flexibility and posture for 30 minutes including:     Exercise Resistance Sets/Reps/Hold    UBE  8 mins    Sidelying Thoracic ROT Stretch B  20x 3 secs   + Matrix Row 25lbs 3x15    1/2 Kneel Thoracic ROT mob on airex  15x B    Cervical Retraction w/ ROT   2x10 B    Supine Horizontal ABD w/ TB on 1/2 FR Green 3x10    Quadruped Serratus Press Up  20x    FR Wall Slides  20x2   + Serratus Press on Matrix Chest Press 10lbs 3x10                                     Sheila received the following manual therapy techniques: Joint mobilizations, Manual traction, Myofacial release and Soft tissue Mobilization were applied to the: cervical paraspinals, upper trap, lev scap for 25 minutes, including:  SOR, manual traction, upper trap stretch, IASTM to L sided  soft tissue structures.      Home Exercises Provided and Patient Education Provided     Education provided:   - HEP    Written Home Exercises Provided: Patient instructed to cont prior HEP.  Exercises were reviewed and Sheila was able to demonstrate them prior to the end of the session.  Sheila demonstrated good  understanding of the education provided.     See EMR under Patient Instructions for exercises provided prior visit.    Assessment     Sheila is progressing well towards her goals.  She reported improved cervical sx's with less tension and pain following manual treatment.   Improvements are reported however, postural demands at work continue to exacerbate sx's d/t limited endurance and motor control over postural musculature.   Pt prognosis is Good.     Pt will continue to benefit from skilled outpatient physical therapy to address the deficits listed in the problem list box on initial evaluation, provide pt/family education and to maximize pt's level of independence in the home and community environment.     Pt's spiritual, cultural and educational needs considered and pt agreeable to plan of care and goals.     Anticipated barriers to physical therapy: None    Goals:    Short Term GOALS: 3 weeks. Pt agrees with goals set.  1. Patient demonstrates independence with HEP.   2. Patient demonstrates independence with Postural Awareness.   3. Patient demonstrates independence with body mechanics.   4. Patient will report pain of 5/10 at worst, on 0-10 pain scale, with all activity     Long Term GOALS: 6 weeks. Pt agrees with goals set.  1. Patient demonstrates ability to lift 5 lb object overhead to shelf without limitations d/t cervical or UE pain.  2. Patient will report no cervical or UE sx's when sitting at work for a normal full day.  3. Patient demonstrates improved overall function per FOTO Neck Survey to 20% Limitation or less.   4. Patient will report pain of 1/10 at worst, on 0-10 pain scale, with all  activity      Plan     Assess response, continue w/ POC.      Lauri Cheng, PT

## 2019-04-19 LAB
HPV HR 12 DNA CVX QL NAA+PROBE: NEGATIVE
HPV16 AG SPEC QL: NEGATIVE
HPV18 DNA SPEC QL NAA+PROBE: NEGATIVE

## 2019-04-22 NOTE — PROGRESS NOTES
Physical Therapy Daily Treatment Note     [id number]    Therapy Diagnosis:   Encounter Diagnoses   Name Primary?    Cervical pain (neck)     Pain in both upper extremities     Poor posture      Physician: Amrit Bailey*    Visit Date: 4/23/2019      Physician Orders: PT Eval and Treat   Medical Diagnosis from Referral:   M54.2 (ICD-10-CM) - Cervical pain (neck)   M79.632,M79.631 (ICD-10-CM) - Pain in both forearms      Evaluation Date: 3/6/2019  Authorization Period Expiration: 12/31/2019  Plan of Care Expiration: 6/6/2019  Visit # / Visits authorized: 6/50    Time In:  1705  Time Out: 1745  Total Billable Time: 40 minutes (1 on 1 w/ PT)    Precautions: Standard    Subjective     Pt reports: Currently suffering from sinus issues, and was going to cancel.  Asked to limit activity during today's session.    She was compliant with home exercise program.  Response to previous treatment: N/a  Functional change:n/a    Pain: 3/10  Location: B cervical    Objective     BOLD = Performed Today  + = New Exercise or Progression    Sheila received therapeutic exercises to develop strength, ROM, flexibility and posture for 30 minutes including:     Exercise Resistance Sets/Reps/Hold    UBE  8 mins    Sidelying Thoracic ROT Stretch   20x 3 secs    Matrix Row 25lbs 3x15    1/2 Kneel Thoracic ROT mob on airex  15x B    Cervical Retraction w/ ROT   2x10 B    Supine Horizontal ABD w/ TB on 1/2 FR Green 3x10    Quadruped Serratus Press Up  20x    FR Wall Slides  20x2    Serratus Press on Matrix Chest Press 10lbs 3x10   + HABD w/ TB standing w/ 1/2 FR against wall Red 3x10                               Sheila received the following manual therapy techniques: Joint mobilizations, Manual traction, Myofacial release and Soft tissue Mobilization were applied to the: cervical paraspinals, upper trap, lev scap for 15 minutes, including:  SOR, manual traction, upper trap stretch,  IASTM to L sided soft tissue structures.      Home Exercises Provided and Patient Education Provided     Education provided:   - HEP    Written Home Exercises Provided: Patient instructed to cont prior HEP.  Exercises were reviewed and Sheila was able to demonstrate them prior to the end of the session.  Sheila demonstrated good  understanding of the education provided.     See EMR under Patient Instructions for exercises provided prior visit.    Assessment     Sheila is progressing well towards her goals.  She reported improved cervical sx's with less tension and pain following manual treatment.   However, postural demands at work continue to exacerbate sx's d/t limited endurance and motor control over postural musculature.   Pt prognosis is Good.     Pt will continue to benefit from skilled outpatient physical therapy to address the deficits listed in the problem list box on initial evaluation, provide pt/family education and to maximize pt's level of independence in the home and community environment.     Pt's spiritual, cultural and educational needs considered and pt agreeable to plan of care and goals.     Anticipated barriers to physical therapy: None    Goals:    Short Term GOALS: 3 weeks. Pt agrees with goals set.  1. Patient demonstrates independence with HEP.   2. Patient demonstrates independence with Postural Awareness.   3. Patient demonstrates independence with body mechanics.   4. Patient will report pain of 5/10 at worst, on 0-10 pain scale, with all activity     Long Term GOALS: 6 weeks. Pt agrees with goals set.  1. Patient demonstrates ability to lift 5 lb object overhead to shelf without limitations d/t cervical or UE pain.  2. Patient will report no cervical or UE sx's when sitting at work for a normal full day.  3. Patient demonstrates improved overall function per FOTO Neck Survey to 20% Limitation or less.   4. Patient will report pain of 1/10 at worst, on 0-10 pain scale, with all  activity      Plan     Assess response, continue w/ POC.      Lauri Cheng, PT

## 2019-04-23 ENCOUNTER — CLINICAL SUPPORT (OUTPATIENT)
Dept: REHABILITATION | Facility: HOSPITAL | Age: 49
End: 2019-04-23
Attending: INTERNAL MEDICINE
Payer: COMMERCIAL

## 2019-04-23 DIAGNOSIS — M79.602 PAIN IN BOTH UPPER EXTREMITIES: ICD-10-CM

## 2019-04-23 DIAGNOSIS — M54.2 CERVICAL PAIN (NECK): ICD-10-CM

## 2019-04-23 DIAGNOSIS — M79.601 PAIN IN BOTH UPPER EXTREMITIES: ICD-10-CM

## 2019-04-23 DIAGNOSIS — R29.3 POOR POSTURE: ICD-10-CM

## 2019-04-23 PROCEDURE — 97110 THERAPEUTIC EXERCISES: CPT | Mod: PN

## 2019-04-23 PROCEDURE — 97140 MANUAL THERAPY 1/> REGIONS: CPT | Mod: PN

## 2019-04-30 ENCOUNTER — CLINICAL SUPPORT (OUTPATIENT)
Dept: REHABILITATION | Facility: HOSPITAL | Age: 49
End: 2019-04-30
Attending: INTERNAL MEDICINE
Payer: COMMERCIAL

## 2019-04-30 DIAGNOSIS — R29.3 POOR POSTURE: ICD-10-CM

## 2019-04-30 DIAGNOSIS — M54.2 CERVICAL PAIN (NECK): ICD-10-CM

## 2019-04-30 DIAGNOSIS — M79.602 PAIN IN BOTH UPPER EXTREMITIES: ICD-10-CM

## 2019-04-30 DIAGNOSIS — M79.601 PAIN IN BOTH UPPER EXTREMITIES: ICD-10-CM

## 2019-04-30 PROCEDURE — 97140 MANUAL THERAPY 1/> REGIONS: CPT | Mod: PN

## 2019-04-30 PROCEDURE — 97110 THERAPEUTIC EXERCISES: CPT | Mod: PN

## 2019-04-30 NOTE — PROGRESS NOTES
Physical Therapy Daily Treatment Note     [id number]    Therapy Diagnosis:   Encounter Diagnoses   Name Primary?    Cervical pain (neck)     Pain in both upper extremities     Poor posture      Physician: Amrit Bailey*    Visit Date: 4/30/2019      Physician Orders: PT Eval and Treat   Medical Diagnosis from Referral:   M54.2 (ICD-10-CM) - Cervical pain (neck)   M79.632,M79.631 (ICD-10-CM) - Pain in both forearms      Evaluation Date: 3/6/2019  Authorization Period Expiration: 12/31/2019  Plan of Care Expiration: 6/6/2019  Visit # / Visits authorized: 7/50    Time In:  1705  Time Out: 1800  Total Billable Time: 55 minutes (1 on 1 w/ PT)    Precautions: Standard    Subjective     Pt reports: Pt reports improvements depend on the day, as she still experiences symptoms fairly frequently.  States she believes treatment is helping somewhat.    She was compliant with home exercise program.  Response to previous treatment: N/a  Functional change:n/a    Pain: 3/10  Location: B cervical    Objective     BOLD = Performed Today  + = New Exercise or Progression    Sheila received therapeutic exercises to develop strength, ROM, flexibility and posture for 30 minutes including:     Exercise Resistance Sets/Reps/Hold    UBE  8 mins    Sidelying Thoracic ROT Stretch   20x 3 secs    Matrix Row 25lbs 3x15    1/2 Kneel Thoracic ROT mob on airex  10x2 B    Cervical Retraction w/ ROT   2x10 B    Supine Horizontal ABD w/ TB on 1/2 FR Green 3x10    Quadruped Serratus Press Up  20x    FR Wall Slides  20x2    Serratus Press on Matrix Chest Press 10lbs 3x10   + HABD w/ TB standing w/ 1/2 FR against wall Red 3x10                               Sheila received the following manual therapy techniques: Joint mobilizations, Manual traction, Myofacial release and Soft tissue Mobilization were applied to the: cervical paraspinals, upper trap, lev scap for 25 minutes, including:  SOR,  manual traction, upper trap stretch, retract/extend mob, IASTM to L sided soft tissue structures.      Home Exercises Provided and Patient Education Provided     Education provided:   - HEP    Written Home Exercises Provided: Patient instructed to cont prior HEP.  Exercises were reviewed and Sheila was able to demonstrate them prior to the end of the session.  Sheila demonstrated good  understanding of the education provided.     See EMR under Patient Instructions for exercises provided prior visit.    Assessment     Sheila is progressing well towards her goals.  She reported improved cervical sx's with less tension and pain following manual treatment.  No issues with new retract/extend mobilizations.  However, postural demands at work continue to exacerbate sx's d/t limited endurance and motor control over postural musculature.   Pt prognosis is Good.     Pt will continue to benefit from skilled outpatient physical therapy to address the deficits listed in the problem list box on initial evaluation, provide pt/family education and to maximize pt's level of independence in the home and community environment.     Pt's spiritual, cultural and educational needs considered and pt agreeable to plan of care and goals.     Anticipated barriers to physical therapy: None    Goals:    Short Term GOALS: 3 weeks. Pt agrees with goals set.  1. Patient demonstrates independence with HEP.   2. Patient demonstrates independence with Postural Awareness.   3. Patient demonstrates independence with body mechanics.   4. Patient will report pain of 5/10 at worst, on 0-10 pain scale, with all activity     Long Term GOALS: 6 weeks. Pt agrees with goals set.  1. Patient demonstrates ability to lift 5 lb object overhead to shelf without limitations d/t cervical or UE pain.  2. Patient will report no cervical or UE sx's when sitting at work for a normal full day.  3. Patient demonstrates improved overall function per FOTO Neck Survey to 20%  Limitation or less.   4. Patient will report pain of 1/10 at worst, on 0-10 pain scale, with all activity      Plan     Assess response, continue w/ POC.      Lauri Cheng, PT

## 2019-05-13 ENCOUNTER — CLINICAL SUPPORT (OUTPATIENT)
Dept: REHABILITATION | Facility: HOSPITAL | Age: 49
End: 2019-05-13
Attending: INTERNAL MEDICINE
Payer: COMMERCIAL

## 2019-05-13 DIAGNOSIS — R29.3 POOR POSTURE: ICD-10-CM

## 2019-05-13 DIAGNOSIS — M54.2 CERVICAL PAIN (NECK): ICD-10-CM

## 2019-05-13 DIAGNOSIS — M79.601 PAIN IN BOTH UPPER EXTREMITIES: ICD-10-CM

## 2019-05-13 DIAGNOSIS — M79.602 PAIN IN BOTH UPPER EXTREMITIES: ICD-10-CM

## 2019-05-13 PROCEDURE — 97012 MECHANICAL TRACTION THERAPY: CPT | Mod: PN

## 2019-05-13 PROCEDURE — 97110 THERAPEUTIC EXERCISES: CPT | Mod: PN

## 2019-05-13 PROCEDURE — 97140 MANUAL THERAPY 1/> REGIONS: CPT | Mod: PN

## 2019-05-13 NOTE — PROGRESS NOTES
Physical Therapy Daily Treatment Note     [id number]    Therapy Diagnosis:   Encounter Diagnoses   Name Primary?    Cervical pain (neck)     Pain in both upper extremities     Poor posture      Physician: Amrit Bailey*    Visit Date: 5/13/2019      Physician Orders: PT Eval and Treat   Medical Diagnosis from Referral:   M54.2 (ICD-10-CM) - Cervical pain (neck)   M79.632,M79.631 (ICD-10-CM) - Pain in both forearms      Evaluation Date: 3/6/2019  Authorization Period Expiration: 12/31/2019  Plan of Care Expiration: 6/6/2019  Visit # / Visits authorized: 7/50    Time In:  1:50 pm  Time Out: 2:50 pm  Total Billable Time: 40 minutes (1 on 1 w/ PT)    Precautions: Standard    Subjective     Pt reports: that neck pain comes and goes.   She was compliant with home exercise program.  Response to previous treatment: she had decrease in neck pain but neck pain came back  Functional change:No change     Pain: 2/10  Location: B cervical    Objective     BOLD = Performed Today  + = New Exercise or Progression    Sheila received therapeutic exercises to develop strength, ROM, flexibility and posture for 30 minutes including:     Exercise Resistance Sets/Reps/Hold    UBE  8 mins    Sidelying Thoracic ROT Stretch   20x 3 secs    Matrix Row 25lbs 3x15    1/2 Kneel Thoracic ROT mob on airex  10x2 B    Cervical Retraction w/ ROT   2x10 B    Supine Horizontal ABD w/ TB on 1/2 FR Green 3x10    Quadruped Serratus Press Up  20x    FR Wall Slides  20x2    Serratus Press on Matrix Chest Press 10lbs 3x10   + HABD w/ TB standing w/ 1/2 FR against wall Red 3x10    Pect stretch   3x10                             Sheila received the following manual therapy techniques: Joint mobilizations, Manual traction, Myofacial release and Soft tissue Mobilization were applied to the: cervical paraspinals, upper trap, lev scap for 10 minutes, including:  SOR, manual traction, upper trap stretch,  retract/extend mob, IASTM to L sided soft tissue structures.        The patient received the following supervised modalities after being cleared for contradictions: Mechanical Traction:  Sheila received static mechanical traction to the cervical spine at a force of 15 pounds for a total of 10 minutes. Hold time of 10 minutes and rest time for 0 Minutes. Patient tolerated treatment well without any adverse effects.        Home Exercises Provided and Patient Education Provided     Education provided:   - HEP    Written Home Exercises Provided: Patient instructed to cont prior HEP.  Exercises were reviewed and Sheila was able to demonstrate them prior to the end of the session.  Sheila demonstrated good  understanding of the education provided.     See EMR under Patient Instructions for exercises provided prior visit.    Assessment     Sheila is progressing well towards her goals.  Pt tolerated well mechanical cervical traction today with decrease of neck pain and radiating pain towards both shoulder, arms, and hands. Pt tolerated STM of cervical with decrease of left proximal upper traps restrictions. Pt demonstrated good tolerance to exercises today. Pt demonstrated weakness of  deep cervical muscles. Pt is ready to be progressed in therapeutic exercises.     Pt prognosis is Good.     Pt will continue to benefit from skilled outpatient physical therapy to address the deficits listed in the problem list box on initial evaluation, provide pt/family education and to maximize pt's level of independence in the home and community environment.     Pt's spiritual, cultural and educational needs considered and pt agreeable to plan of care and goals.     Anticipated barriers to physical therapy: None    Goals:    Short Term GOALS: 3 weeks. Pt agrees with goals set.  1. Patient demonstrates independence with HEP.   2. Patient demonstrates independence with Postural Awareness.   3. Patient demonstrates independence with body  mechanics.   4. Patient will report pain of 5/10 at worst, on 0-10 pain scale, with all activity     Long Term GOALS: 6 weeks. Pt agrees with goals set.  1. Patient demonstrates ability to lift 5 lb object overhead to shelf without limitations d/t cervical or UE pain.  2. Patient will report no cervical or UE sx's when sitting at work for a normal full day.  3. Patient demonstrates improved overall function per FOTO Neck Survey to 20% Limitation or less.   4. Patient will report pain of 1/10 at worst, on 0-10 pain scale, with all activity      Plan     Assess response, continue w/ POC.      Bean Dotson, PT

## 2019-05-21 ENCOUNTER — CLINICAL SUPPORT (OUTPATIENT)
Dept: REHABILITATION | Facility: HOSPITAL | Age: 49
End: 2019-05-21
Attending: INTERNAL MEDICINE
Payer: COMMERCIAL

## 2019-05-21 DIAGNOSIS — M79.601 PAIN IN BOTH UPPER EXTREMITIES: ICD-10-CM

## 2019-05-21 DIAGNOSIS — M54.2 CERVICAL PAIN (NECK): ICD-10-CM

## 2019-05-21 DIAGNOSIS — M79.602 PAIN IN BOTH UPPER EXTREMITIES: ICD-10-CM

## 2019-05-21 DIAGNOSIS — R29.3 POOR POSTURE: ICD-10-CM

## 2019-05-21 PROCEDURE — 97110 THERAPEUTIC EXERCISES: CPT | Mod: PN

## 2019-05-21 PROCEDURE — 97140 MANUAL THERAPY 1/> REGIONS: CPT | Mod: PN

## 2019-05-21 NOTE — PROGRESS NOTES
Physical Therapy Daily Treatment Note     [id number]    Therapy Diagnosis:   Encounter Diagnoses   Name Primary?    Cervical pain (neck)     Pain in both upper extremities     Poor posture      Physician: Amrit Bailey*    Visit Date: 5/21/2019      Physician Orders: PT Eval and Treat   Medical Diagnosis from Referral:   M54.2 (ICD-10-CM) - Cervical pain (neck)   M79.632,M79.631 (ICD-10-CM) - Pain in both forearms      Evaluation Date: 3/6/2019  Authorization Period Expiration: 12/31/2019  Plan of Care Expiration: 6/6/2019  Visit # / Visits authorized: 7/50    Time In:  1700  Time Out: 1810  Total Billable Time:  60 minutes     Precautions: Standard    Subjective     Pt reports: some thoracic and low back soreness following previous treatment session. Patient reports that she is unsure if it was because of the mechanical traction machine used last visit.   She was compliant with home exercise program.  Response to previous treatment: she had decrease in neck pain but neck pain came back  Functional change:No change     Pain: 2/10  Location: B cervical    Objective     BOLD = Performed Today  + = New Exercise or Progression    Sheila received therapeutic exercises to develop strength, ROM, flexibility and posture for 38 minutes including:     Exercise Resistance Sets/Reps/Hold    UBE  8 mins    Sidelying Thoracic ROT Stretch   20x 3 secs    Matrix Row 25lbs 3x15    1/2 Kneel Thoracic ROT mob on airex  10x2 B    Cervical Retraction w/ ROT   2x10 B    Supine Horizontal ABD w/ TB on 1/2 FR Green 3x10    Quadruped Serratus Press Up  20x    FR Wall Slides  20x2    Serratus Press on Matrix Chest Press 10lbs 3x10    HABD w/ TB standing w/ 1/2 FR against wall Red 3x10    Pect stretch   3x10                       Sheila received the following manual therapy techniques: Joint mobilizations, Manual traction, Myofacial release and Soft tissue Mobilization were applied  to the: cervical paraspinals, upper trap, lev scap for 22 minutes, including:  SOR, manual traction, upper trap stretch, retract/extend mob, IASTM to L sided soft tissue structures.      Home Exercises Provided and Patient Education Provided     Education provided:   - HEP    Written Home Exercises Provided: Patient instructed to cont prior HEP.  Exercises were reviewed and Sheila was able to demonstrate them prior to the end of the session.  Sheila demonstrated good  understanding of the education provided.     See EMR under Patient Instructions for exercises provided prior visit.    Assessment     Patient with good tolerance to rx session today. Increased tone and soft tissue restrictions present in bilateral upper traps, L > R. Good response to cupping with reports of improvement in overall muscle tightness. Pt demonstrated good recollection of exercises and good performance. Appropriate muscle fatigue achieved at end of session.   Pt prognosis is Good.     Pt will continue to benefit from skilled outpatient physical therapy to address the deficits listed in the problem list box on initial evaluation, provide pt/family education and to maximize pt's level of independence in the home and community environment.     Pt's spiritual, cultural and educational needs considered and pt agreeable to plan of care and goals.     Anticipated barriers to physical therapy: None    Goals:    Short Term GOALS: 3 weeks. Pt agrees with goals set.  1. Patient demonstrates independence with HEP.   2. Patient demonstrates independence with Postural Awareness.   3. Patient demonstrates independence with body mechanics.   4. Patient will report pain of 5/10 at worst, on 0-10 pain scale, with all activity     Long Term GOALS: 6 weeks. Pt agrees with goals set.  1. Patient demonstrates ability to lift 5 lb object overhead to shelf without limitations d/t cervical or UE pain.  2. Patient will report no cervical or UE sx's when sitting at  work for a normal full day.  3. Patient demonstrates improved overall function per FOTO Neck Survey to 20% Limitation or less.   4. Patient will report pain of 1/10 at worst, on 0-10 pain scale, with all activity      Plan     Assess response, continue w/ POC.      Sarina Turcios, SHAHRZAD Gardner, SPTA

## 2019-05-28 ENCOUNTER — CLINICAL SUPPORT (OUTPATIENT)
Dept: REHABILITATION | Facility: HOSPITAL | Age: 49
End: 2019-05-28
Attending: INTERNAL MEDICINE
Payer: COMMERCIAL

## 2019-05-28 DIAGNOSIS — M79.601 PAIN IN BOTH UPPER EXTREMITIES: ICD-10-CM

## 2019-05-28 DIAGNOSIS — R29.3 POOR POSTURE: ICD-10-CM

## 2019-05-28 DIAGNOSIS — M79.602 PAIN IN BOTH UPPER EXTREMITIES: ICD-10-CM

## 2019-05-28 DIAGNOSIS — M54.2 CERVICAL PAIN (NECK): ICD-10-CM

## 2019-05-28 PROCEDURE — 97110 THERAPEUTIC EXERCISES: CPT | Mod: PN

## 2019-05-28 PROCEDURE — 97140 MANUAL THERAPY 1/> REGIONS: CPT | Mod: PN

## 2019-05-28 NOTE — PROGRESS NOTES
Physical Therapy Daily Treatment Note     [id number]    Therapy Diagnosis:   No diagnosis found.  Physician: Amrit Bailey*    Visit Date: 5/28/2019      Physician Orders: PT Eval and Treat   Medical Diagnosis from Referral:   M54.2 (ICD-10-CM) - Cervical pain (neck)   M79.632,M79.631 (ICD-10-CM) - Pain in both forearms      Evaluation Date: 3/6/2019  Authorization Period Expiration: 12/31/2019  Plan of Care Expiration: 6/6/2019  Visit # / Visits authorized: 10/50    Time In:  1700  Time Out: 1810  Total Billable Time:  60 minutes     Precautions: Standard    Subjective     Pt reports: some thoracic and low back soreness following previous treatment session. Patient reports that she is unsure if it was because of the mechanical traction machine used last visit.   She was compliant with home exercise program.  Response to previous treatment: she had decrease in neck pain but neck pain came back  Functional change:No change     Pain: 2/10  Location: B cervical    Objective     BOLD = Performed Today  + = New Exercise or Progression    Sheila received therapeutic exercises to develop strength, ROM, flexibility and posture for 38 minutes including:     Exercise Resistance Sets/Reps/Hold    UBE  8 mins    Sidelying Thoracic ROT Stretch   20x 3 secs    Matrix Row 25lbs 3x15    1/2 Kneel Thoracic ROT mob on airex  10x2 B    Cervical Retraction w/ ROT   2x10 B    Supine Horizontal ABD w/ TB on 1/2 FR Green 3x10    Quadruped Serratus Press Up  20x    FR Wall Slides  20x2    Serratus Press on Matrix Chest Press 10lbs 3x10    HABD w/ TB standing w/ 1/2 FR against wall Red 3x10    Pec stretch   3x10                       Sheila received the following manual therapy techniques: Joint mobilizations, Manual traction, Myofacial release and Soft tissue Mobilization were applied to the: cervical paraspinals, upper trap, lev scap for 22 minutes, including:  SOR, manual traction,  upper trap stretch, retract/extend mob, IASTM to L sided soft tissue structures.      Home Exercises Provided and Patient Education Provided     Education provided:   - HEP    Written Home Exercises Provided: Patient instructed to cont prior HEP.  Exercises were reviewed and Sheila was able to demonstrate them prior to the end of the session.  Sheila demonstrated good  understanding of the education provided.     See EMR under Patient Instructions for exercises provided prior visit.    Assessment     Patient with good tolerance to rx session today. Increased tone and soft tissue restrictions present in bilateral upper traps, L > R. Good response to cupping with reports of improvement in overall muscle tightness. Pt demonstrated good recollection of exercises and good performance. Appropriate muscle fatigue achieved at end of session.   Pt prognosis is Good.     Pt will continue to benefit from skilled outpatient physical therapy to address the deficits listed in the problem list box on initial evaluation, provide pt/family education and to maximize pt's level of independence in the home and community environment.     Pt's spiritual, cultural and educational needs considered and pt agreeable to plan of care and goals.     Anticipated barriers to physical therapy: None    Goals:    Short Term GOALS: 3 weeks. Pt agrees with goals set.  1. Patient demonstrates independence with HEP.   2. Patient demonstrates independence with Postural Awareness.   3. Patient demonstrates independence with body mechanics.   4. Patient will report pain of 5/10 at worst, on 0-10 pain scale, with all activity     Long Term GOALS: 6 weeks. Pt agrees with goals set.  1. Patient demonstrates ability to lift 5 lb object overhead to shelf without limitations d/t cervical or UE pain.  2. Patient will report no cervical or UE sx's when sitting at work for a normal full day.  3. Patient demonstrates improved overall function per FOTO Neck Survey to  20% Limitation or less.   4. Patient will report pain of 1/10 at worst, on 0-10 pain scale, with all activity      Plan     Assess response, continue w/ POC.      Lauri Cheng, PT   DANTE Tatum

## 2019-05-28 NOTE — PROGRESS NOTES
"  Physical Therapy Daily Treatment Note     [id number]    Therapy Diagnosis:   Encounter Diagnoses   Name Primary?    Cervical pain (neck)     Pain in both upper extremities     Poor posture      Physician: Amrit Bailey*    Visit Date: 5/28/2019      Physician Orders: PT Eval and Treat   Medical Diagnosis from Referral:   M54.2 (ICD-10-CM) - Cervical pain (neck)   M79.632,M79.631 (ICD-10-CM) - Pain in both forearms      Evaluation Date: 3/6/2019  Authorization Period Expiration: 12/31/2019  Plan of Care Expiration: 6/6/2019  Visit # / Visits authorized: 8/50    Time In:  1700  Time Out: 1755  Total Billable Time:  55 minutes     Precautions: Standard    Subjective     Pt reports: some stiffness but liked the scraping she got last time and felt that helped.     She was compliant with home exercise program.  Response to previous treatment: she had decrease in neck pain but neck pain came back  Functional change:No change     Pain: 5/10  Location: B cervical; L worse than R     Objective     BOLD = Performed Today  + = New Exercise or Progression    Sheila received therapeutic exercises to develop strength, ROM, flexibility and posture for 35 minutes including:     Exercise Resistance Sets/Reps/Hold    UBE  8 mins    Sidelying Thoracic ROT Stretch   20x 3 secs    Matrix Row 30lbs 3x15    1/2 Kneel Thoracic ROT mob on airex  10x2 B    Cervical Retraction w/ ROT   2x10 B    Supine Horizontal ABD w/ TB on 1/2 FR Green 3x10    Quadruped Serratus Press Up  20x    FR Wall Slides with YTB SHD ER isometric  20x2    Serratus Press on Matrix Chest Press 15lbs 3x10    HABD w/ TB standing w/ 1/2 FR against wall Red 3x10    Pect stretch   3x30"                       Sheila received the following manual therapy techniques: Joint mobilizations, Manual traction, Myofacial release and Soft tissue Mobilization were applied to the: cervical paraspinals, upper trap, lev scap for " 20 minutes, including:    SOR, manual traction, IASTM to L and R sided soft tissue structures including UT, LT, and rhomboids.    retract/extend mob and upper trap stretch NP  Home Exercises Provided and Patient Education Provided     Education provided:   - HEP    Written Home Exercises Provided: Patient instructed to cont prior HEP.  Exercises were reviewed and Sheila was able to demonstrate them prior to the end of the session.  Sheila demonstrated good  understanding of the education provided.     See EMR under Patient Instructions for exercises provided prior visit.    Assessment     Patient tolerates treatment well today. Increased challenged serratus activation with FR slides adding YTB with min VC on perfromance. Increased resistances for selected exercises. Mild muscle fatigue achieved at end of session demonstrating good training effect. Increased tone and STR in B shoulders and neck present. Patient receives manual care with positive result with decreased stiffness and pain reduced to 0-1/10 at exit.      Pt prognosis is Good.     Pt will continue to benefit from skilled outpatient physical therapy to address the deficits listed in the problem list box on initial evaluation, provide pt/family education and to maximize pt's level of independence in the home and community environment.     Pt's spiritual, cultural and educational needs considered and pt agreeable to plan of care and goals.     Anticipated barriers to physical therapy: None    Goals:    Short Term GOALS: 3 weeks. Pt agrees with goals set.  1. Patient demonstrates independence with HEP.   2. Patient demonstrates independence with Postural Awareness.   3. Patient demonstrates independence with body mechanics.   4. Patient will report pain of 5/10 at worst, on 0-10 pain scale, with all activity     Long Term GOALS: 6 weeks. Pt agrees with goals set.  1. Patient demonstrates ability to lift 5 lb object overhead to shelf without limitations d/t  cervical or UE pain.  2. Patient will report no cervical or UE sx's when sitting at work for a normal full day.  3. Patient demonstrates improved overall function per FOTO Neck Survey to 20% Limitation or less.   4. Patient will report pain of 1/10 at worst, on 0-10 pain scale, with all activity      Plan     Assess response, continue w/ POC.      Hoang Tavarez, PT   Violet Gardner, DANTE

## 2019-06-11 ENCOUNTER — CLINICAL SUPPORT (OUTPATIENT)
Dept: REHABILITATION | Facility: HOSPITAL | Age: 49
End: 2019-06-11
Attending: INTERNAL MEDICINE
Payer: COMMERCIAL

## 2019-06-11 DIAGNOSIS — M79.602 PAIN IN BOTH UPPER EXTREMITIES: ICD-10-CM

## 2019-06-11 DIAGNOSIS — R29.3 POOR POSTURE: ICD-10-CM

## 2019-06-11 DIAGNOSIS — M54.2 CERVICAL PAIN (NECK): ICD-10-CM

## 2019-06-11 DIAGNOSIS — M79.601 PAIN IN BOTH UPPER EXTREMITIES: ICD-10-CM

## 2019-06-11 NOTE — PROGRESS NOTES
"  Physical Therapy Daily Treatment Note     [id number]    Therapy Diagnosis:   Encounter Diagnoses   Name Primary?    Cervical pain (neck)     Pain in both upper extremities     Poor posture      Physician: Amrit Bailey*    Visit Date: 6/11/2019      Physician Orders: PT Eval and Treat   Medical Diagnosis from Referral:   M54.2 (ICD-10-CM) - Cervical pain (neck)   M79.632,M79.631 (ICD-10-CM) - Pain in both forearms      Evaluation Date: 3/6/2019  Authorization Period Expiration: 12/31/2019  Plan of Care Expiration: 6/6/2019  Visit # / Visits authorized: 11/50    Time In:  1705  Time Out:  1740  Total Billable Time:  30 minutes     Precautions: Standard    Subjective     Pt reports: No new complaints.  Has to leave early today to  her son.      She was compliant with home exercise program.  Response to previous treatment: she had decrease in neck pain but neck pain came back  Functional change:No change     Pain: 5/10  Location: B cervical; L worse than R     Objective     BOLD = Performed Today  + = New Exercise or Progression    Sheila received therapeutic exercises to develop strength, ROM, flexibility and posture for 35 minutes including:     Exercise Resistance Sets/Reps/Hold    UBE  8 mins    Sidelying Thoracic ROT Stretch   20x 3 secs    Matrix Row 30lbs 3x15    1/2 Kneel Thoracic ROT mob on airex  10x2 B    Cervical Retraction w/ ROT   2x10 B    Supine Horizontal ABD w/ TB on 1/2 FR Green 3x10    Quadruped Serratus Press Up  20x    FR Wall Slides with YTB SHD ER isometric  20x2    Serratus Press on Matrix Chest Press 15lbs 3x10    HABD w/ TB standing w/ 1/2 FR against wall Red 3x10    Pect stretch   3x30"                       Sheila received the following manual therapy techniques: Joint mobilizations, Manual traction, Myofacial release and Soft tissue Mobilization were applied to the: cervical paraspinals, upper trap, lev scap for 25 minutes, " including:    SOR, manual traction, IASTM to L and R sided soft tissue structures including UT, LT, and rhomboids.    retract/extend mob and upper trap stretch NP  Home Exercises Provided and Patient Education Provided     Education provided:   - HEP    Written Home Exercises Provided: Patient instructed to cont prior HEP.  Exercises were reviewed and Sheila was able to demonstrate them prior to the end of the session.  Sheila demonstrated good  understanding of the education provided.     See EMR under Patient Instructions for exercises provided prior visit.    Assessment     Patient tolerates treatment well today. Increased tone and STR in B shoulders and neck present. Patient receives manual care with positive result with decreased stiffness and pain reduced by end of session.  Tx limited d/t patient requesting to leave early for another appointment.       Pt prognosis is Good.     Pt will continue to benefit from skilled outpatient physical therapy to address the deficits listed in the problem list box on initial evaluation, provide pt/family education and to maximize pt's level of independence in the home and community environment.     Pt's spiritual, cultural and educational needs considered and pt agreeable to plan of care and goals.     Anticipated barriers to physical therapy: None    Goals:    Short Term GOALS: 3 weeks. Pt agrees with goals set.  1. Patient demonstrates independence with HEP.   2. Patient demonstrates independence with Postural Awareness.   3. Patient demonstrates independence with body mechanics.   4. Patient will report pain of 5/10 at worst, on 0-10 pain scale, with all activity     Long Term GOALS: 6 weeks. Pt agrees with goals set.  1. Patient demonstrates ability to lift 5 lb object overhead to shelf without limitations d/t cervical or UE pain.  2. Patient will report no cervical or UE sx's when sitting at work for a normal full day.  3. Patient demonstrates improved overall function  per FOTO Neck Survey to 20% Limitation or less.   4. Patient will report pain of 1/10 at worst, on 0-10 pain scale, with all activity      Plan     Assess response, continue w/ POC.      Lauri Cheng, PT

## 2019-06-17 NOTE — PROGRESS NOTES
"  Physical Therapy PROGRESS NOTE     [id number]    Therapy Diagnosis:   Encounter Diagnoses   Name Primary?    Cervical pain (neck)     Pain in both upper extremities     Poor posture      Physician: Amrit Bailey*    Visit Date: 6/18/2019      Physician Orders: PT Eval and Treat   Medical Diagnosis from Referral:   M54.2 (ICD-10-CM) - Cervical pain (neck)   M79.632,M79.631 (ICD-10-CM) - Pain in both forearms      Evaluation Date: 3/6/2019  Authorization Period Expiration: 12/31/2019  Plan of Care Expiration: 7/18/2019  Visit # / Visits authorized: 12/50     Time In:  1705  Time Out:  1800  Total Billable Time:  55 minutes     Precautions: Standard    Subjective     Pt reports: No new complaints.  Continues to experience tightness at work.  Overall she feels improvements especially after exercise and manual treatments.      She was compliant with home exercise program.  Response to previous treatment: she had decrease in neck pain but neck pain came back  Functional change:No change     Pain: 5/10  Location: B cervical; L worse than R     Objective     BOLD = Performed Today  + = New Exercise or Progression    Sheila received therapeutic exercises to develop strength, ROM, flexibility and posture for 30 minutes including:     Exercise Resistance Sets/Reps/Hold    UBE  8 mins    Sidelying Thoracic ROT Stretch   20x 3 secs    Matrix Row 30lbs 3x15    1/2 Kneel Parloff Press Outs 7lbs 10x2 B    Cervical Retraction w/ ROT   2x10 B    Supine Horizontal ABD w/ TB on 1/2 FR Green 3x10    Quadruped Serratus Press Up  20x    FR Wall Slides with YTB SHD ER isometric  20x2    Serratus Press on Matrix Chest Press 15lbs 3x10    HABD w/ TB standing w/ 1/2 FR against wall Red 3x10    Pect stretch   3x30"    Tall Knee on Bosu w/ physioball overhead flexion  2x10                 Cervical ROM: R ROT 80 deg, L ROT 75 deg, FLX 60 deg, EXT 55 deg    Sheila received the following " manual therapy techniques: Joint mobilizations, Manual traction, Myofacial release and Soft tissue Mobilization were applied to the: cervical paraspinals, upper trap, lev scap for 25 minutes, including:    SOR, manual traction, IASTM to L and R sided soft tissue structures including UT, LT, and rhomboids.    retract/extend mob and upper trap stretch NP  Home Exercises Provided and Patient Education Provided     Education provided:   - HEP    Written Home Exercises Provided: Patient instructed to cont prior HEP.  Exercises were reviewed and Sheila was able to demonstrate them prior to the end of the session.  Sheila demonstrated good  understanding of the education provided.     See EMR under Patient Instructions for exercises provided prior visit.    Assessment     Patient tolerates treatment well today.  Exercise progressions today included core stabilization and lower trap engagement for scapular control.  Patient receives manual care with positive result with decreased stiffness and pain reduced by end of session.  Overall progress remains positive but limited when it comes to complaints with work activities.  Based on her reports, this is likely d/t postural demands and lack of endurance based on her tolerance to exercise progressions in the clinic.     Pt prognosis is Good.     Pt will continue to benefit from skilled outpatient physical therapy to address the deficits listed in the problem list box on initial evaluation, provide pt/family education and to maximize pt's level of independence in the home and community environment.     Pt's spiritual, cultural and educational needs considered and pt agreeable to plan of care and goals.     Anticipated barriers to physical therapy: None    Goals:    Short Term GOALS: 3 weeks. Pt agrees with goals set.  1. Patient demonstrates independence with HEP.  (met)  2. Patient demonstrates independence with Postural Awareness. (met)  3. Patient demonstrates independence  with body mechanics. (met)  4. Patient will report pain of 5/10 at worst, on 0-10 pain scale, with all activity (met)     Long Term GOALS: 6 weeks. Pt agrees with goals set.  1. Patient demonstrates ability to lift 5 lb object overhead to shelf without limitations d/t cervical or UE pain. (met)  2. Patient will report no cervical or UE sx's when sitting at work for a normal full day. (in progress)  3. Patient demonstrates improved overall function per FOTO Neck Survey to 20% Limitation or less. (in progress)  4. Patient will report pain of 1/10 at worst, on 0-10 pain scale, with all activity (in progress)      Plan     Assess response; extend POC to 7/18/2019.    Lauri Cheng, PT

## 2019-06-18 ENCOUNTER — CLINICAL SUPPORT (OUTPATIENT)
Dept: REHABILITATION | Facility: HOSPITAL | Age: 49
End: 2019-06-18
Attending: INTERNAL MEDICINE
Payer: COMMERCIAL

## 2019-06-18 ENCOUNTER — PATIENT MESSAGE (OUTPATIENT)
Dept: INTERNAL MEDICINE | Facility: CLINIC | Age: 49
End: 2019-06-18

## 2019-06-18 ENCOUNTER — TELEPHONE (OUTPATIENT)
Dept: INTERNAL MEDICINE | Facility: CLINIC | Age: 49
End: 2019-06-18

## 2019-06-18 DIAGNOSIS — M54.2 CERVICAL PAIN (NECK): ICD-10-CM

## 2019-06-18 DIAGNOSIS — M79.601 PAIN IN BOTH UPPER EXTREMITIES: ICD-10-CM

## 2019-06-18 DIAGNOSIS — R29.3 POOR POSTURE: ICD-10-CM

## 2019-06-18 DIAGNOSIS — M79.602 PAIN IN BOTH UPPER EXTREMITIES: ICD-10-CM

## 2019-06-18 PROCEDURE — 97110 THERAPEUTIC EXERCISES: CPT | Mod: PN

## 2019-06-18 PROCEDURE — 97140 MANUAL THERAPY 1/> REGIONS: CPT | Mod: PN

## 2019-06-18 NOTE — TELEPHONE ENCOUNTER
Patient is having leg swelling. I'm okay with having her double-booked to see me on Weds or Fri; I can't do Thursday since I'll be out in the middle of the day. Please call and see what works for her; if those days don't work out please schedule her with Yaima Thompson for evaluation.

## 2019-06-19 ENCOUNTER — OFFICE VISIT (OUTPATIENT)
Dept: INTERNAL MEDICINE | Facility: CLINIC | Age: 49
End: 2019-06-19
Payer: COMMERCIAL

## 2019-06-19 VITALS
HEIGHT: 61 IN | DIASTOLIC BLOOD PRESSURE: 76 MMHG | WEIGHT: 163.81 LBS | OXYGEN SATURATION: 99 % | HEART RATE: 69 BPM | BODY MASS INDEX: 30.93 KG/M2 | SYSTOLIC BLOOD PRESSURE: 124 MMHG

## 2019-06-19 DIAGNOSIS — Z00.00 HEALTH MAINTENANCE EXAMINATION: ICD-10-CM

## 2019-06-19 DIAGNOSIS — M79.89 LEFT LEG SWELLING: Primary | ICD-10-CM

## 2019-06-19 PROCEDURE — 99214 OFFICE O/P EST MOD 30 MIN: CPT | Mod: S$GLB,,, | Performed by: INTERNAL MEDICINE

## 2019-06-19 PROCEDURE — 3074F SYST BP LT 130 MM HG: CPT | Mod: CPTII,S$GLB,, | Performed by: INTERNAL MEDICINE

## 2019-06-19 PROCEDURE — 3078F DIAST BP <80 MM HG: CPT | Mod: CPTII,S$GLB,, | Performed by: INTERNAL MEDICINE

## 2019-06-19 PROCEDURE — 3078F PR MOST RECENT DIASTOLIC BLOOD PRESSURE < 80 MM HG: ICD-10-PCS | Mod: CPTII,S$GLB,, | Performed by: INTERNAL MEDICINE

## 2019-06-19 PROCEDURE — 99214 PR OFFICE/OUTPT VISIT, EST, LEVL IV, 30-39 MIN: ICD-10-PCS | Mod: S$GLB,,, | Performed by: INTERNAL MEDICINE

## 2019-06-19 PROCEDURE — 3074F PR MOST RECENT SYSTOLIC BLOOD PRESSURE < 130 MM HG: ICD-10-PCS | Mod: CPTII,S$GLB,, | Performed by: INTERNAL MEDICINE

## 2019-06-19 PROCEDURE — 3008F PR BODY MASS INDEX (BMI) DOCUMENTED: ICD-10-PCS | Mod: CPTII,S$GLB,, | Performed by: INTERNAL MEDICINE

## 2019-06-19 PROCEDURE — 99999 PR PBB SHADOW E&M-EST. PATIENT-LVL III: CPT | Mod: PBBFAC,,, | Performed by: INTERNAL MEDICINE

## 2019-06-19 PROCEDURE — 99999 PR PBB SHADOW E&M-EST. PATIENT-LVL III: ICD-10-PCS | Mod: PBBFAC,,, | Performed by: INTERNAL MEDICINE

## 2019-06-19 PROCEDURE — 3008F BODY MASS INDEX DOCD: CPT | Mod: CPTII,S$GLB,, | Performed by: INTERNAL MEDICINE

## 2019-06-19 NOTE — PROGRESS NOTES
"Subjective:       Patient ID: Sheila Costa is a 48 y.o. female.    Chief Complaint: No chief complaint on file.    HPI    Last visit with me March 2018.  Since then seen by Gynecology, Rheumatology, and rehab.  Upcoming appointments with rehab and Rheumatology.    Patient reports left leg swelling. This started about 7 days ago when she went to a conference in [place].  She was not getting a lot of sleep during those evenings and on her feet a lot, also eating a lot of food at the conference.  She arrived back to [place] on Sunday and symptoms started to improve gradually.  Elevating the leg helps.  No pain, no erythema, no tenderness.  She has had prior swelling of this leg periodically, usually improved with continued use of her blood pressure medication.  The flight was 1-1/2 hours.  Earlier in June she did take a trip to floor which was 5 or 6 hr in the car, though she did stand up at least once in between.    Review of Systems   All other systems reviewed and are negative.      Objective:      Physical Exam   Constitutional: She is oriented to person, place, and time. No distress.   -American woman whose Body mass index is 30.95 kg/m².    Cardiovascular: Normal rate, regular rhythm and normal heart sounds.   Pulmonary/Chest: Effort normal and breath sounds normal. No stridor. She has no wheezes. She has no rales.   Musculoskeletal: She exhibits edema (mild soft nonpitting edema in LLE ankle). She exhibits no tenderness.   Neurological: She is alert and oriented to person, place, and time.   Nursing note and vitals reviewed.      Vitals:    06/19/19 1144   BP: 124/76   BP Location: Right arm   Patient Position: Sitting   BP Method: Large (Manual)   Pulse: 69   SpO2: 99%   Weight: 74.3 kg (163 lb 12.8 oz)   Height: 5' 1" (1.549 m)     Body mass index is 30.95 kg/m².    RESULTS: Reviewed labs from last 12 months    Assessment:       1. Left leg swelling    2. Health maintenance examination  "       Plan:   Diagnoses and all orders for this visit:    Left leg swelling:  New episode of her recurring problem, likely fluid retention from increased salt intake and being on her feet last week while at a conference.  Continue half tablet of atenolol-chlorthalidone daily, encourage use of over-the-counter compression stockings for upcoming flight to [place].  Check ultrasound to rule out any vein problem in the left lower extremity contributing to swelling; low suspicion DVT at this time.  -     US Lower Extremity Veins Bilateral; Future    Health maintenance examination  -     Lipid panel; Future  -     Comprehensive metabolic panel; Future  -     CBC Without Differential; Future  -     TSH; Future    Follow up in about 1 year (around 6/19/2020) for fasting labs 1 week prior.  Jorge Alberto Ruiz MD  Internal Medicine    Portions of this note were completed using medical dictation software. Please excuse typographical or syntax errors that were missed on review.

## 2019-06-20 ENCOUNTER — HOSPITAL ENCOUNTER (OUTPATIENT)
Dept: RADIOLOGY | Facility: HOSPITAL | Age: 49
Discharge: HOME | End: 2019-06-20
Attending: INTERNAL MEDICINE
Payer: COMMERCIAL

## 2019-06-20 ENCOUNTER — PATIENT MESSAGE (OUTPATIENT)
Dept: INTERNAL MEDICINE | Facility: CLINIC | Age: 49
End: 2019-06-20

## 2019-06-20 DIAGNOSIS — M79.89 LEFT LEG SWELLING: ICD-10-CM

## 2019-06-20 PROCEDURE — 93970 US LOWER EXTREMITY VEINS BILATERAL: ICD-10-PCS | Mod: 26,,, | Performed by: RADIOLOGY

## 2019-06-20 PROCEDURE — 93970 EXTREMITY STUDY: CPT | Mod: 26,,, | Performed by: RADIOLOGY

## 2019-06-20 PROCEDURE — 93970 EXTREMITY STUDY: CPT | Mod: TC

## 2019-07-09 ENCOUNTER — CLINICAL SUPPORT (OUTPATIENT)
Dept: REHABILITATION | Facility: HOSPITAL | Age: 49
End: 2019-07-09
Attending: INTERNAL MEDICINE
Payer: COMMERCIAL

## 2019-07-09 DIAGNOSIS — M54.2 CERVICAL PAIN (NECK): ICD-10-CM

## 2019-07-09 DIAGNOSIS — R29.3 POOR POSTURE: ICD-10-CM

## 2019-07-09 DIAGNOSIS — M79.601 PAIN IN BOTH UPPER EXTREMITIES: ICD-10-CM

## 2019-07-09 DIAGNOSIS — M79.602 PAIN IN BOTH UPPER EXTREMITIES: ICD-10-CM

## 2019-07-09 PROCEDURE — 97110 THERAPEUTIC EXERCISES: CPT | Mod: PN

## 2019-07-09 PROCEDURE — 97140 MANUAL THERAPY 1/> REGIONS: CPT | Mod: PN

## 2019-07-09 NOTE — PROGRESS NOTES
"  Physical Therapy daily treatment NOTE     [id number]    Therapy Diagnosis:   Encounter Diagnoses   Name Primary?    Cervical pain (neck)     Pain in both upper extremities     Poor posture      Physician: Amrit Bailey*    Visit Date: 7/9/2019      Physician Orders: PT Eval and Treat   Medical Diagnosis from Referral:   M54.2 (ICD-10-CM) - Cervical pain (neck)   M79.632,M79.631 (ICD-10-CM) - Pain in both forearms      Evaluation Date: 3/6/2019  Authorization Period Expiration: 12/31/2019  Plan of Care Expiration: 7/18/2019  Visit # / Visits authorized: 12/50     Time In:  1600  Time Out:  1655  Total Billable Time:  55 minutes     Precautions: Standard    Subjective     Pt reports: overall she is much improved. Reports less pain.     She was compliant with home exercise program.  Response to previous treatment: good  Functional change none to note    Pain: 5/10  Location: B cervical; L worse than R     Objective     BOLD = Performed Today  + = New Exercise or Progression    Sheila received therapeutic exercises to develop strength, ROM, flexibility and posture for 40 minutes including:     Exercise Resistance Sets/Reps/Hold    UBE  8 mins    Sidelying Thoracic ROT Stretch   20x 3 secs    Matrix Row 30lbs 3x15    1/2 Kneel Parloff Press Outs 7lbs 10x2 B    Cervical Retraction w/ ROT   2x10 B    Supine Horizontal ABD w/ TB on 1/2 FR Green 3x10    Quadruped Serratus Press Up  20x    FR Wall Slides with YTB SHD ER isometric  20x2    Serratus Press on Matrix Chest Press 15lbs 3x10    HABD w/ TB standing w/ 1/2 FR against wall Red 3x10    Pect stretch   3x30"    Tall Knee on Bosu w/ physioball overhead flexion  2x10                 Sheila received the following manual therapy techniques: Joint mobilizations, Manual traction, Myofacial release and Soft tissue Mobilization were applied to the: cervical paraspinals, upper trap, lev scap for 15 minutes, including:  "   SOR, manual traction, IASTM to L and R sided soft tissue structures including UT, LT, and rhomboids.    retract/extend mob and upper trap stretch NP  Home Exercises Provided and Patient Education Provided     Education provided:   - HEP    Written Home Exercises Provided: Patient instructed to cont prior HEP.  Exercises were reviewed and Sheila was able to demonstrate them prior to the end of the session.  Sheila demonstrated good  understanding of the education provided.     See EMR under Patient Instructions for exercises provided prior visit.    Assessment     No c/o increased discomfort with prescribed activities.  Demonstrates improved postural awareness and requires decreased cueing with scapular position with therex.  Good response to exercise progression.  Decreased symptoms reported post treatment.      Pt prognosis is Good.     Pt will continue to benefit from skilled outpatient physical therapy to address the deficits listed in the problem list box on initial evaluation, provide pt/family education and to maximize pt's level of independence in the home and community environment.     Pt's spiritual, cultural and educational needs considered and pt agreeable to plan of care and goals.     Anticipated barriers to physical therapy: None    Goals:    Short Term GOALS: 3 weeks. Pt agrees with goals set.  1. Patient demonstrates independence with HEP.  (met)  2. Patient demonstrates independence with Postural Awareness. (met)  3. Patient demonstrates independence with body mechanics. (met)  4. Patient will report pain of 5/10 at worst, on 0-10 pain scale, with all activity (met)     Long Term GOALS: 6 weeks. Pt agrees with goals set.  1. Patient demonstrates ability to lift 5 lb object overhead to shelf without limitations d/t cervical or UE pain. (met)  2. Patient will report no cervical or UE sx's when sitting at work for a normal full day. (in progress)  3. Patient demonstrates improved overall function per  FOTO Neck Survey to 20% Limitation or less. (in progress)  4. Patient will report pain of 1/10 at worst, on 0-10 pain scale, with all activity (in progress)      Plan     Continue with established Plan of Care towards PT goals.     Recommended Treatment Plan: 1-2 times per week for 12 weeks with treatments to consist of:  Neuromuscular and postural re-education,  training, therapeutic exercise, therapeutic activities,balance training, gait training, manual therapy, soft tissue mobilization, ROM exercises, Cardiovascular,  Postural stabilization, manual traction, spinal mobilization, moist heat, cryotherapy, electrical stimulation, ultrasound, home exercise education and planning.      Robby Galvan, PT

## 2019-07-23 ENCOUNTER — CLINICAL SUPPORT (OUTPATIENT)
Dept: REHABILITATION | Facility: HOSPITAL | Age: 49
End: 2019-07-23
Attending: INTERNAL MEDICINE
Payer: COMMERCIAL

## 2019-07-23 DIAGNOSIS — M54.2 CERVICAL PAIN (NECK): ICD-10-CM

## 2019-07-23 DIAGNOSIS — M79.602 PAIN IN BOTH UPPER EXTREMITIES: ICD-10-CM

## 2019-07-23 DIAGNOSIS — R29.3 POOR POSTURE: ICD-10-CM

## 2019-07-23 DIAGNOSIS — M79.601 PAIN IN BOTH UPPER EXTREMITIES: ICD-10-CM

## 2019-07-23 PROCEDURE — 97110 THERAPEUTIC EXERCISES: CPT | Mod: PN

## 2019-07-23 PROCEDURE — 97140 MANUAL THERAPY 1/> REGIONS: CPT | Mod: PN

## 2019-07-23 NOTE — PROGRESS NOTES
"  Physical Therapy daily treatment NOTE     [id number]    Therapy Diagnosis:   Encounter Diagnoses   Name Primary?    Cervical pain (neck)     Pain in both upper extremities     Poor posture      Physician: Amrit Bailey*    Visit Date: 7/23/2019    Physician Orders: PT Eval and Treat   Medical Diagnosis from Referral:   M54.2 (ICD-10-CM) - Cervical pain (neck)   M79.632,M79.631 (ICD-10-CM) - Pain in both forearms      Evaluation Date: 3/6/2019  Authorization Period Expiration: 12/31/2019  Plan of Care Expiration: 7/18/2019  Visit # / Visits authorized: 14/50     Time In:  1600  Time Out:  1705  Total Billable Time:  55 minutes     Precautions: Standard    Subjective     Pt reports: Greater pain in mid-scapular region today. B upper trap tightness is constant.    She was compliant with home exercise program.  Response to previous treatment: good  Functional change none to note    Pain: 5/10  Location: B cervical; L worse than R     Objective     BOLD = Performed Today  + = New Exercise or Progression    Sheila received therapeutic exercises to develop strength, ROM, flexibility and posture for 40 minutes including:    UBE 3'/3'  Upper Trap Str 3x30" ea  Lev Scap Str 3x30" ea  Matrix Row 30# 3x15  Seated Thoracic Ext c/ towel roll 15x  Seated Thread the Needle c/ ball 10x   Standing Hor Abd w/ GTB against wall on 1/2 Foam 3x10  Serratus Wall Slides c/ foam roll (YTB at wrists) 20x  Quadruped Serratus Press-up 20x  Quadruped Chin Tuck 2x10  Pec Str on 1/2 Foam 3 mins    Sheila received the following manual therapy techniques: Joint mobilizations, Manual traction, Myofacial release and Soft tissue Mobilization were applied to the: cervical paraspinals, upper trap, lev scap for 15 minutes, including:    SOR, manual traction, IASTM to L and R sided soft tissue structures including UT, LT, and rhomboids.    retract/extend mob and upper trap stretch NP  Home " Exercises Provided and Patient Education Provided     Education provided:   - HEP    Written Home Exercises Provided: Patient instructed to cont prior HEP.  Exercises were reviewed and Sheila was able to demonstrate them prior to the end of the session.  Sheila demonstrated good  understanding of the education provided.     See EMR under Patient Instructions for exercises provided prior visit.    Assessment     Pt presented with greater mid-scapular pain, with good response to thoracic ROM activities with relief of symptoms. Pt with increased stiffness noted during upper thoracic joint mobs. Chin retraction progressed to quadruped position against gravity, appropriate movement pattern noted.      Pt prognosis is Good.     Pt will continue to benefit from skilled outpatient physical therapy to address the deficits listed in the problem list box on initial evaluation, provide pt/family education and to maximize pt's level of independence in the home and community environment.     Pt's spiritual, cultural and educational needs considered and pt agreeable to plan of care and goals.     Anticipated barriers to physical therapy: None    Goals:    Short Term GOALS: 3 weeks. Pt agrees with goals set.  1. Patient demonstrates independence with HEP.  (met)  2. Patient demonstrates independence with Postural Awareness. (met)  3. Patient demonstrates independence with body mechanics. (met)  4. Patient will report pain of 5/10 at worst, on 0-10 pain scale, with all activity (met)     Long Term GOALS: 6 weeks. Pt agrees with goals set.  1. Patient demonstrates ability to lift 5 lb object overhead to shelf without limitations d/t cervical or UE pain. (met)  2. Patient will report no cervical or UE sx's when sitting at work for a normal full day. (in progress)  3. Patient demonstrates improved overall function per FOTO Neck Survey to 20% Limitation or less. (in progress)  4. Patient will report pain of 1/10 at worst, on 0-10 pain  scale, with all activity (in progress)      Plan     Continue with established Plan of Care towards PT goals.     Recommended Treatment Plan: 1-2 times per week for 12 weeks with treatments to consist of:  Neuromuscular and postural re-education,  training, therapeutic exercise, therapeutic activities,balance training, gait training, manual therapy, soft tissue mobilization, ROM exercises, Cardiovascular,  Postural stabilization, manual traction, spinal mobilization, moist heat, cryotherapy, electrical stimulation, ultrasound, home exercise education and planning.      Cosmo Bradford, PT

## 2019-08-01 RX ORDER — ATENOLOL AND CHLORTHALIDONE TABLET 50; 25 MG/1; MG/1
TABLET ORAL
Qty: 90 TABLET | Refills: 0 | Status: SHIPPED | OUTPATIENT
Start: 2019-08-01 | End: 2020-01-31

## 2019-09-13 ENCOUNTER — PATIENT OUTREACH (OUTPATIENT)
Dept: ADMINISTRATIVE | Facility: OTHER | Age: 49
End: 2019-09-13

## 2019-09-13 ENCOUNTER — TELEPHONE (OUTPATIENT)
Dept: PAIN MEDICINE | Facility: CLINIC | Age: 49
End: 2019-09-13

## 2019-09-13 ENCOUNTER — OFFICE VISIT (OUTPATIENT)
Dept: RHEUMATOLOGY | Facility: CLINIC | Age: 49
End: 2019-09-13
Payer: COMMERCIAL

## 2019-09-13 VITALS
BODY MASS INDEX: 30.96 KG/M2 | HEART RATE: 67 BPM | WEIGHT: 164 LBS | HEIGHT: 61 IN | DIASTOLIC BLOOD PRESSURE: 75 MMHG | SYSTOLIC BLOOD PRESSURE: 123 MMHG

## 2019-09-13 DIAGNOSIS — M47.812 SPONDYLOSIS OF CERVICAL REGION WITHOUT MYELOPATHY OR RADICULOPATHY: Primary | ICD-10-CM

## 2019-09-13 PROCEDURE — 99214 OFFICE O/P EST MOD 30 MIN: CPT | Mod: S$GLB,,, | Performed by: INTERNAL MEDICINE

## 2019-09-13 PROCEDURE — 3078F PR MOST RECENT DIASTOLIC BLOOD PRESSURE < 80 MM HG: ICD-10-PCS | Mod: CPTII,S$GLB,, | Performed by: INTERNAL MEDICINE

## 2019-09-13 PROCEDURE — 99214 PR OFFICE/OUTPT VISIT, EST, LEVL IV, 30-39 MIN: ICD-10-PCS | Mod: S$GLB,,, | Performed by: INTERNAL MEDICINE

## 2019-09-13 PROCEDURE — 99999 PR PBB SHADOW E&M-EST. PATIENT-LVL III: CPT | Mod: PBBFAC,,, | Performed by: INTERNAL MEDICINE

## 2019-09-13 PROCEDURE — 3008F PR BODY MASS INDEX (BMI) DOCUMENTED: ICD-10-PCS | Mod: CPTII,S$GLB,, | Performed by: INTERNAL MEDICINE

## 2019-09-13 PROCEDURE — 3078F DIAST BP <80 MM HG: CPT | Mod: CPTII,S$GLB,, | Performed by: INTERNAL MEDICINE

## 2019-09-13 PROCEDURE — 3008F BODY MASS INDEX DOCD: CPT | Mod: CPTII,S$GLB,, | Performed by: INTERNAL MEDICINE

## 2019-09-13 PROCEDURE — 3074F PR MOST RECENT SYSTOLIC BLOOD PRESSURE < 130 MM HG: ICD-10-PCS | Mod: CPTII,S$GLB,, | Performed by: INTERNAL MEDICINE

## 2019-09-13 PROCEDURE — 3074F SYST BP LT 130 MM HG: CPT | Mod: CPTII,S$GLB,, | Performed by: INTERNAL MEDICINE

## 2019-09-13 PROCEDURE — 99999 PR PBB SHADOW E&M-EST. PATIENT-LVL III: ICD-10-PCS | Mod: PBBFAC,,, | Performed by: INTERNAL MEDICINE

## 2019-09-13 NOTE — TELEPHONE ENCOUNTER
Called to confirm appointment with Dr. Kirkland on 9/16/19 @ 2:30     Pt did not answer. Left pt detailed message to contact office @ [phone] to confirm appointment

## 2019-09-13 NOTE — PROGRESS NOTES
Chief Complaint   Patient presents with    Disease Management     cervical pain       Patient with neck pain for a follow up    History of presenting illness    48 year old black female comes in with arthralgias in the   Hands,wrists  Sometimes fingers hurt,sometimes wrists hurt    Sometimes back,neck hurts  Leg hurts    No joint swelling  Mornings she has joint stiffness  If she types a lot her hands can be stiff    She has pain in the forearms and arms    No known triggers    Legs can swell if she eats salty    Ibuprofen helps with the pain    No skin rashes,malar rash,photosensitivity  No telangiectasias  No calcinosis     No patchy alopecia  No oral and nasal ulcers  No sicca symptoms   No pleurisy or any cardiopulmonary complaints  No dysphagia,diplopia and dysphonia and muscle weakness  No n/v/d/c  No acid reflux+  No raynaud's+  No digital ulcers     No cytopenias  No renal issues  No blood clots     No fever,chills,night sweats,weight loss and loss of appetite     No pregnancy losses    MRI C spine     Mild degenerative changes of the upper cervical spine.  Mild left neural foraminal narrowing noted at C2 through C3.  No significant right neural foraminal narrowing or spinal canal stenosis.  2. Abnormal signal within the is C2-C3 disc with mild endplate irregularity and subcortical edema is likely degenerative.    She has done a good number of sessions of PT  But ongoing symptoms    Morning stiffness is bothersome  Pain intermittently throughout the day    Past history : HTN,PMS symptoms    Family history : Mom has RA    Social history: not a smoker or alcohol user    Labs     ESR nml  CRP 8.8/15.9  RF,CCP,CARL neg    Remote T/LS spine xrays normal    Review of Systems   Constitutional: Negative for activity change, appetite change, chills, diaphoresis, fatigue, fever and unexpected weight change.   HENT: Negative for congestion, dental problem, drooling, ear discharge, ear pain, facial swelling, hearing  loss, mouth sores, nosebleeds, postnasal drip, rhinorrhea, sinus pressure, sinus pain, sneezing, sore throat, tinnitus, trouble swallowing and voice change.    Eyes: Negative for photophobia, pain, discharge, redness, itching and visual disturbance.   Respiratory: Negative for apnea, cough, choking, chest tightness, shortness of breath, wheezing and stridor.    Cardiovascular: Negative for chest pain, palpitations and leg swelling.   Gastrointestinal: Negative for abdominal distention, abdominal pain, anal bleeding, blood in stool, constipation, diarrhea, nausea, rectal pain and vomiting.   Endocrine: Negative for cold intolerance, heat intolerance, polydipsia, polyphagia and polyuria.   Genitourinary: Negative for decreased urine volume, difficulty urinating, dysuria, enuresis, flank pain, frequency, genital sores, hematuria and urgency.   Musculoskeletal: Positive for arthralgias. Negative for back pain, gait problem, joint swelling, myalgias, neck pain and neck stiffness.   Skin: Negative for color change, pallor, rash and wound.   Allergic/Immunologic: Negative for environmental allergies, food allergies and immunocompromised state.   Neurological: Negative for dizziness, tremors, seizures, syncope, facial asymmetry, speech difficulty, weakness, light-headedness, numbness and headaches.   Hematological: Negative for adenopathy. Does not bruise/bleed easily.   Psychiatric/Behavioral: Negative for agitation, behavioral problems, confusion, decreased concentration, dysphoric mood, hallucinations, self-injury, sleep disturbance and suicidal ideas. The patient is not nervous/anxious and is not hyperactive.      Physical Exam     WOODARD-28 tender joint count: 0  WOODARD-28 swollen joint count: 0    She has no tender or swollen joints in the hands and wrists    She has a pain that shoots down from the neck along the arms and forearms when I was doing certain manuevers    She keeps mentioning that her arms and forearms and  hands are all painful and tender but the pain is not inside the joints    C spine tender+    Physical Exam   Constitutional: She is oriented to person, place, and time and well-developed, well-nourished, and in no distress. No distress.   HENT:   Head: Normocephalic.   Mouth/Throat: Oropharynx is clear and moist.   Eyes: Conjunctivae are normal. Pupils are equal, round, and reactive to light. Right eye exhibits no discharge. Left eye exhibits no discharge. No scleral icterus.   Neck: Normal range of motion. No thyromegaly present.   Cardiovascular: Normal rate, regular rhythm, normal heart sounds and intact distal pulses.    Pulmonary/Chest: Effort normal and breath sounds normal. No stridor.   Abdominal: Soft. Bowel sounds are normal.   Lymphadenopathy:     She has no cervical adenopathy.   Neurological: She is alert and oriented to person, place, and time.   Skin: Skin is warm. No rash noted. She is not diaphoretic.     Psychiatric: Affect and judgment normal.   Musculoskeletal: Normal range of motion.         Assessment     48 year old black female with HTN and premenstrual syndrome comes in with b/l arm,forearm and hand pain    She has no tender or swollen joints in the hands and wrists    She has a pain that shoots down from the neck along the arms and forearms when I was doing certain manuevers    She keeps mentioning that her arms and forearms and hands are all painful and tender but the pain is not inside the joints    C spine tender+    This makes me think that her problem is originating in the C spine     MRI C spine does support the diagnosis     PT didn't help significantly       1. Spondylosis of cervical region without myelopathy or radiculopathy            F/u problem    Refer to neurosurgery    EMG/NCS pending,ordered last visit     Pain clinic for possible injections    Refuses medication prescriptions  She takes NSAIDs      Sheila was seen today for disease management.    Diagnoses and all orders  for this visit:    Spondylosis of cervical region without myelopathy or radiculopathy  -     Ambulatory referral to Neurosurgery  -     Ambulatory consult to Pain Clinic

## 2019-09-16 ENCOUNTER — OFFICE VISIT (OUTPATIENT)
Dept: SPINE | Facility: CLINIC | Age: 49
End: 2019-09-16
Attending: ANESTHESIOLOGY
Payer: COMMERCIAL

## 2019-09-16 VITALS
RESPIRATION RATE: 18 BRPM | DIASTOLIC BLOOD PRESSURE: 73 MMHG | SYSTOLIC BLOOD PRESSURE: 119 MMHG | HEIGHT: 61 IN | HEART RATE: 65 BPM | TEMPERATURE: 99 F | BODY MASS INDEX: 29.83 KG/M2 | WEIGHT: 158 LBS

## 2019-09-16 DIAGNOSIS — M50.30 DDD (DEGENERATIVE DISC DISEASE), CERVICAL: Primary | ICD-10-CM

## 2019-09-16 DIAGNOSIS — M54.12 CERVICAL RADICULOPATHY: ICD-10-CM

## 2019-09-16 DIAGNOSIS — M47.9 OSTEOARTHRITIS OF SPINE, UNSPECIFIED SPINAL OSTEOARTHRITIS COMPLICATION STATUS, UNSPECIFIED SPINAL REGION: ICD-10-CM

## 2019-09-16 PROCEDURE — 99244 OFF/OP CNSLTJ NEW/EST MOD 40: CPT | Mod: S$GLB,,, | Performed by: ANESTHESIOLOGY

## 2019-09-16 PROCEDURE — 99999 PR PBB SHADOW E&M-EST. PATIENT-LVL III: ICD-10-PCS | Mod: PBBFAC,,, | Performed by: ANESTHESIOLOGY

## 2019-09-16 PROCEDURE — 99999 PR PBB SHADOW E&M-EST. PATIENT-LVL III: CPT | Mod: PBBFAC,,, | Performed by: ANESTHESIOLOGY

## 2019-09-16 PROCEDURE — 99244 PR OFFICE CONSULTATION,LEVEL IV: ICD-10-PCS | Mod: S$GLB,,, | Performed by: ANESTHESIOLOGY

## 2019-09-16 RX ORDER — MELOXICAM 7.5 MG/1
7.5 TABLET ORAL
Qty: 30 TABLET | Refills: 2 | Status: SHIPPED | OUTPATIENT
Start: 2019-09-16 | End: 2019-10-16

## 2019-09-16 RX ORDER — AMITRIPTYLINE HYDROCHLORIDE 25 MG/1
25 TABLET, FILM COATED ORAL NIGHTLY PRN
Qty: 30 TABLET | Refills: 2 | Status: SHIPPED | OUTPATIENT
Start: 2019-09-16 | End: 2020-08-24
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
## 2019-09-16 NOTE — LETTER
September 23, 2019      Amrit Bailey MD  [address] [facility] [address]           [address]  [address], [address]  [facility] [address]  Phone: [phone]  Fax: [phone]          Patient: Sheila Costa   MR Number: [id number]   YOB: 1970   Date of Visit: 9/16/2019       Dear Dr. Amrit Bailey:    Thank you for referring Sheila Costa to me for evaluation. Attached you will find relevant portions of my assessment and plan of care.    If you have questions, please do not hesitate to call me. I look forward to following Sheila Costa along with you.    Sincerely,    Cosme Kirkland MD    Enclosure  CC:  No Recipients    If you would like to receive this communication electronically, please contact [email or web address] or [phone] to request more information on [facility] access.    For providers and/or their staff who would like to refer a patient to [facility], please contact us through our one-stop-shop provider referral line, [facility], at [phone].    If you feel you have received this communication in error or would no longer like to receive these types of communications, please e-mail [email or web address]
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
## 2019-10-02 ENCOUNTER — HOSPITAL ENCOUNTER (OUTPATIENT)
Facility: OTHER | Age: 49
Discharge: HOME | End: 2019-10-03
Attending: ANESTHESIOLOGY | Admitting: ANESTHESIOLOGY
Payer: COMMERCIAL

## 2019-10-02 VITALS
HEIGHT: 61 IN | RESPIRATION RATE: 14 BRPM | WEIGHT: 160 LBS | SYSTOLIC BLOOD PRESSURE: 143 MMHG | HEART RATE: 72 BPM | DIASTOLIC BLOOD PRESSURE: 64 MMHG | BODY MASS INDEX: 30.21 KG/M2 | OXYGEN SATURATION: 98 % | TEMPERATURE: 98 F

## 2019-10-02 DIAGNOSIS — M50.30 DDD (DEGENERATIVE DISC DISEASE), CERVICAL: Primary | ICD-10-CM

## 2019-10-02 DIAGNOSIS — G89.29 CHRONIC PAIN: ICD-10-CM

## 2019-10-02 DIAGNOSIS — M54.12 CERVICAL RADICULOPATHY: ICD-10-CM

## 2019-10-02 PROCEDURE — 25500020 PHARM REV CODE 255: Performed by: ANESTHESIOLOGY

## 2019-10-02 PROCEDURE — 63600175 PHARM REV CODE 636 W HCPCS: Performed by: ANESTHESIOLOGY

## 2019-10-02 PROCEDURE — 62321 NJX INTERLAMINAR CRV/THRC: CPT | Performed by: ANESTHESIOLOGY

## 2019-10-02 PROCEDURE — 62321 NJX INTERLAMINAR CRV/THRC: CPT | Mod: ,,, | Performed by: ANESTHESIOLOGY

## 2019-10-02 PROCEDURE — 25000003 PHARM REV CODE 250: Performed by: ANESTHESIOLOGY

## 2019-10-02 PROCEDURE — 62321 PR INJ CERV/THORAC, W/GUIDANCE: ICD-10-PCS | Mod: ,,, | Performed by: ANESTHESIOLOGY

## 2019-10-02 RX ORDER — LIDOCAINE HYDROCHLORIDE 5 MG/ML
INJECTION, SOLUTION INFILTRATION; INTRAVENOUS
Status: DISCONTINUED | OUTPATIENT
Start: 2019-10-02 | End: 2019-10-02 | Stop reason: HOSPADM

## 2019-10-02 RX ORDER — SODIUM CHLORIDE 9 MG/ML
500 INJECTION, SOLUTION INTRAVENOUS CONTINUOUS
Status: DISCONTINUED | OUTPATIENT
Start: 2019-10-02 | End: 2019-10-03 | Stop reason: HOSPADM

## 2019-10-02 RX ORDER — SODIUM CHLORIDE 9 MG/ML
INJECTION, SOLUTION INTRAVENOUS
Status: COMPLETED | OUTPATIENT
Start: 2019-10-02 | End: 2019-10-02

## 2019-10-02 RX ORDER — LIDOCAINE HYDROCHLORIDE 10 MG/ML
INJECTION INFILTRATION; PERINEURAL
Status: DISCONTINUED | OUTPATIENT
Start: 2019-10-02 | End: 2019-10-02 | Stop reason: HOSPADM

## 2019-10-02 RX ORDER — DEXAMETHASONE SODIUM PHOSPHATE 4 MG/ML
INJECTION, SOLUTION INTRA-ARTICULAR; INTRALESIONAL; INTRAMUSCULAR; INTRAVENOUS; SOFT TISSUE
Status: DISCONTINUED | OUTPATIENT
Start: 2019-10-02 | End: 2019-10-02 | Stop reason: HOSPADM

## 2019-10-02 RX ORDER — MIDAZOLAM HYDROCHLORIDE 1 MG/ML
INJECTION INTRAMUSCULAR; INTRAVENOUS
Status: DISCONTINUED | OUTPATIENT
Start: 2019-10-02 | End: 2019-10-02 | Stop reason: HOSPADM

## 2019-10-02 NOTE — DISCHARGE INSTRUCTIONS
Thank you for allowing us to care for you today. You may receive a survey about the care we provided. Your feedback is valuable and helps us provide excellent care throughout the community.     Home Care Instructions for Pain Management:    1. DIET:   You may resume your normal diet today.   2. BATHING:   You may shower with luke warm water. No tub baths or anything that will soak injection sites under water for the next 24 hours.  3. DRESSING:   You may remove your bandage today.   4. ACTIVITY LEVEL:   You may resume your normal activities 24 hrs after your procedure. Nothing strenuous today.  5. MEDICATIONS:   You may resume your normal medications today. To restart blood thinners, ask your doctor.  6. DRIVING    If you have received any sedatives by mouth today, you may not drive for 12 hours.    If you have received any sedation through your IV, you may not drive for 24 hrs.   7. SPECIAL INSTRUCTIONS:   No heat to the injection site for 24 hrs including, hot bath or shower, heating pad, moist heat, or hot tubs.    Use ice pack to injection site for any pain or discomfort.  Apply ice packs for 20 minute intervals as needed.    IF you have diabetes, be sure to monitor your blood sugar more closely. IF your injection contained steroids your blood sugar levels may become higher than normal.    If you are still having pain upon discharge:  Your pain may improve over the next 48 hours. The anesthetic (numbing medication) works immediately to 48 hours. IF your injection contained a steroid (anti-inflammatory medication), it takes approximately 3 days to start feeling relief and 7-10 days to see your greatest results from the medication. It is possible you may need subsequent injections. This would be discussed at your follow up appointment with pain management or your referring doctor.    Please call the PAIN MANAGEMENT office at [phone] or ON CALL pager at [phone] if you experienced any:   -Weakness or  loss of sensation  -Fever > 101.5  -Pain uncontrolled with oral medications   -Persistent nausea, vomiting, or diarrhea  -Redness or drainage from the injection sites, or any other worrisome concerns.   If physician on call was not reached or could not communicate with our office for any reason please go to the nearest emergency department.  Adult Procedural Sedation Instructions    Recovery After Procedural Sedation (Adult)  You have been given medicine by vein to make you sleep during your surgery. This may have included both a pain medicine and sleeping medicine. Most of the effects have worn off. But you may still have some drowsiness for the next 6 to 8 hours.  Home care  Follow these guidelines when you get home:  · For the next 8 hours, you should be watched by a responsible adult. This person should make sure your condition is not getting worse.  · Don't drink any alcohol for the next 24 hours.  · Don't drive, operate dangerous machinery, or make important business or personal decisions during the next 24 hours.  Note: Your healthcare provider may tell you not to take any medicine by mouth for pain or sleep in the next 4 hours. These medicines may react with the medicines you were given in the hospital. This could cause a much stronger response than usual.  Follow-up care  Follow up with your healthcare provider if you are not alert and back to your usual level of activity within 12 hours.  When to seek medical advice  Call your healthcare provider right away if any of these occur:  · Drowsiness gets worse  · Weakness or dizziness gets worse  · Repeated vomiting  · You can't be awakened   Date Last Reviewed: 10/18/2016  © 2788-1583 [employer] [address] [place] [address], [place] [address]. All rights reserved. This information is not intended as a substitute for professional medical care. Always follow your healthcare professional's instructions.

## 2019-10-03 NOTE — DISCHARGE SUMMARY
Discharge Note  Short Stay      SUMMARY     Admit Date: 10/2/2019    Attending Physician: Cosme Kirkland      Discharge Physician: Cosme Kirkland      Discharge Date: 10/2/2019     Procedure(s) (LRB):  INJECTION, STEROID, EPIDURAL, C7-T1 (N/A)    Final Diagnosis: Cervical radiculopathy [M54.12]    Disposition: Home or self care    Patient Instructions:   Current Discharge Medication List      CONTINUE these medications which have NOT CHANGED    Details   amitriptyline (ELAVIL) 25 MG tablet Take 1 tablet (25 mg total) by mouth nightly as needed for Insomnia.  Qty: 30 tablet, Refills: 2    Associated Diagnoses: DDD (degenerative disc disease), cervical; Cervical radiculopathy; Osteoarthritis of spine, unspecified spinal osteoarthritis complication status, unspecified spinal region      atenolol-chlorthalidone (TENORETIC) 50-25 mg Tab TAKE ONE-HALF TABLET BY MOUTH EVERY DAY  Qty: 90 tablet, Refills: 0      !! FLUoxetine 20 MG capsule TAKE ONE CAPSULE BY MOUTH ONCE DAILY  Qty: 30 capsule, Refills: 0      !! FLUoxetine 20 MG capsule Take 1 capsule (20 mg total) by mouth once daily.  Qty: 30 capsule, Refills: 11    Associated Diagnoses: PMDD (premenstrual dysphoric disorder)      fluticasone (FLONASE) 50 mcg/actuation nasal spray Refills: 12      levonorgestrel (MIRENA) 20 mcg/24 hr (5 years) IUD 1 Intra Uterine Device by Intrauterine route once. for 1 dose  Qty: 1 Intra Uterine Device, Refills: 0    Associated Diagnoses: Contraception, device intrauterine      loratadine (CLARITIN) 10 mg tablet Take 10 mg by mouth once daily.      meloxicam (MOBIC) 7.5 MG tablet Take 1 tablet (7.5 mg total) by mouth daily with breakfast.  Qty: 30 tablet, Refills: 2    Associated Diagnoses: DDD (degenerative disc disease), cervical; Cervical radiculopathy; Osteoarthritis of spine, unspecified spinal osteoarthritis complication status, unspecified spinal region       !! - Potential duplicate medications found. Please discuss with  provider.              Discharge Diagnosis: Cervical radiculopathy [M54.12]  Condition on Discharge: Stable with no complications to procedure   Diet on Discharge: Same as before.  Activity: as per instruction sheet.  Discharge to: Home with a responsible adult.  Follow up: 2-4 weeks    Please call my office or pager at [phone] if experienced any weakness or loss of sensation, fever > 101.5, pain uncontrolled with oral medications, persistent nausea/vomiting/or diarrhea, redness or drainage from the incisions, or any other worrisome concerns. If physician on call was not reached or could not communicate with our office for any reason please go to the nearest emergency department

## 2019-10-03 NOTE — OP NOTE
"Patient Name: Sheila Costa  MRN: [id number]    INFORMED CONSENT: The procedure, risks, benefits and options were discussed with patient. There are no contraindications to the procedure. The patient expressed understanding and agreed to proceed. The personnel performing the procedure was discussed. I verify that I personally obtained Sheila's consent prior to the start of the procedure and the signed consent can be found on the patient's chart.    Procedure Date: 10/02/2019    Anesthesia: Topical    Pre Procedure diagnosis: Cervical radiculopathy [M54.12]  1. DDD (degenerative disc disease), cervical    2. Cervical radiculopathy    3. Chronic pain      Post-Procedure diagnosis: SAME        Moderate Sedation: None    PROCEDURE: C7-T1 CERVICAL EPIDURAL STEROID INJECTION         DESCRIPTION OF PROCEDURE: The patient was brought to the procedure room. After performing time out.  IV access was obtained prior to the procedure. The patient was positioned prone on the fluoroscopy table. Continuous hemodynamic monitoring was initiated including blood pressure, EKG, and pulse oximetry. The area of the cervical spine was prepped with chlorhexidine and draped in a sterile fashion. Skin anesthesia was achieved using 3 mL of Lidocaine 1% over the respective injection site. The C7-T1 interspace was visualized under fluoroscopic imaging. An 18 gauge 3 1/2" Tuohy needle was slowly inserted and advanced using loss of resistance to saline with AP, oblique and lateral fluoroscopic imaging for needle guidance. Negative aspiration for blood or CSF was confirmed. Epidural contrast spread was confirmed using 2mL of Omnipaque 300 contrast. Spread of the contrast in the cervical epidural space was noted . 6 mL of lidocaine 0.5% and 10 mg decadron was injected. The needle was removed and bleeding was nil. A sterile dressing was applied. No specimens collected. patient was taken back to the recovery room for further observation. "     Blood Loss: Nill  Specimen: None    Cosme Kirkland MD

## 2019-10-23 ENCOUNTER — PATIENT MESSAGE (OUTPATIENT)
Dept: NEUROSURGERY | Facility: CLINIC | Age: 49
End: 2019-10-23

## 2019-10-23 ENCOUNTER — TELEPHONE (OUTPATIENT)
Dept: NEUROSURGERY | Facility: CLINIC | Age: 49
End: 2019-10-23

## 2019-10-23 NOTE — TELEPHONE ENCOUNTER
----- Message from Shelia Kim sent at 10/23/2019  1:21 PM CDT -----  Contact: pt @ [phone]   Returning a call from Dr. Sparks's office, says she is on a short break, will be back in court. Asking if a list of options/appts can be left on the message. Please call.

## 2019-10-24 ENCOUNTER — PATIENT OUTREACH (OUTPATIENT)
Dept: ADMINISTRATIVE | Facility: OTHER | Age: 49
End: 2019-10-24

## 2019-10-24 DIAGNOSIS — Z12.11 ENCOUNTER FOR FECAL IMMUNOCHEMICAL TEST SCREENING: Primary | ICD-10-CM

## 2019-10-28 ENCOUNTER — OFFICE VISIT (OUTPATIENT)
Dept: PAIN MEDICINE | Facility: CLINIC | Age: 49
End: 2019-10-28
Payer: COMMERCIAL

## 2019-10-28 VITALS
HEART RATE: 68 BPM | DIASTOLIC BLOOD PRESSURE: 69 MMHG | TEMPERATURE: 98 F | OXYGEN SATURATION: 100 % | SYSTOLIC BLOOD PRESSURE: 106 MMHG | HEIGHT: 61 IN | WEIGHT: 165.13 LBS | BODY MASS INDEX: 31.18 KG/M2 | RESPIRATION RATE: 18 BRPM

## 2019-10-28 DIAGNOSIS — M50.30 DDD (DEGENERATIVE DISC DISEASE), CERVICAL: ICD-10-CM

## 2019-10-28 DIAGNOSIS — M47.812 CERVICAL SPONDYLOSIS: ICD-10-CM

## 2019-10-28 DIAGNOSIS — M79.18 MYOFASCIAL PAIN: ICD-10-CM

## 2019-10-28 DIAGNOSIS — M54.12 CERVICAL RADICULOPATHY: Primary | ICD-10-CM

## 2019-10-28 PROCEDURE — 3074F SYST BP LT 130 MM HG: CPT | Mod: CPTII,S$GLB,, | Performed by: NURSE PRACTITIONER

## 2019-10-28 PROCEDURE — 99213 OFFICE O/P EST LOW 20 MIN: CPT | Mod: 25,S$GLB,, | Performed by: NURSE PRACTITIONER

## 2019-10-28 PROCEDURE — 99999 PR PBB SHADOW E&M-EST. PATIENT-LVL III: ICD-10-PCS | Mod: PBBFAC,,, | Performed by: NURSE PRACTITIONER

## 2019-10-28 PROCEDURE — 3008F PR BODY MASS INDEX (BMI) DOCUMENTED: ICD-10-PCS | Mod: CPTII,S$GLB,, | Performed by: NURSE PRACTITIONER

## 2019-10-28 PROCEDURE — 99213 PR OFFICE/OUTPT VISIT, EST, LEVL III, 20-29 MIN: ICD-10-PCS | Mod: 25,S$GLB,, | Performed by: NURSE PRACTITIONER

## 2019-10-28 PROCEDURE — 99999 PR PBB SHADOW E&M-EST. PATIENT-LVL III: CPT | Mod: PBBFAC,,, | Performed by: NURSE PRACTITIONER

## 2019-10-28 PROCEDURE — 3078F PR MOST RECENT DIASTOLIC BLOOD PRESSURE < 80 MM HG: ICD-10-PCS | Mod: CPTII,S$GLB,, | Performed by: NURSE PRACTITIONER

## 2019-10-28 PROCEDURE — 3078F DIAST BP <80 MM HG: CPT | Mod: CPTII,S$GLB,, | Performed by: NURSE PRACTITIONER

## 2019-10-28 PROCEDURE — 20553 PR INJECT TRIGGER POINTS, > 3: ICD-10-PCS | Mod: S$GLB,,, | Performed by: NURSE PRACTITIONER

## 2019-10-28 PROCEDURE — 3008F BODY MASS INDEX DOCD: CPT | Mod: CPTII,S$GLB,, | Performed by: NURSE PRACTITIONER

## 2019-10-28 PROCEDURE — 3074F PR MOST RECENT SYSTOLIC BLOOD PRESSURE < 130 MM HG: ICD-10-PCS | Mod: CPTII,S$GLB,, | Performed by: NURSE PRACTITIONER

## 2019-10-28 PROCEDURE — 20553 NJX 1/MLT TRIGGER POINTS 3/>: CPT | Mod: S$GLB,,, | Performed by: NURSE PRACTITIONER

## 2019-10-28 RX ORDER — MELOXICAM 7.5 MG/1
TABLET ORAL
Refills: 2 | COMMUNITY
Start: 2019-10-16 | End: 2020-08-24

## 2019-10-28 RX ORDER — BUPIVACAINE HYDROCHLORIDE 2.5 MG/ML
9 INJECTION, SOLUTION EPIDURAL; INFILTRATION; INTRACAUDAL
Status: COMPLETED | OUTPATIENT
Start: 2019-10-28 | End: 2019-10-28

## 2019-10-28 RX ORDER — METHYLPREDNISOLONE ACETATE 40 MG/ML
40 INJECTION, SUSPENSION INTRA-ARTICULAR; INTRALESIONAL; INTRAMUSCULAR; SOFT TISSUE
Status: COMPLETED | OUTPATIENT
Start: 2019-10-28 | End: 2019-10-28

## 2019-10-28 RX ADMIN — METHYLPREDNISOLONE ACETATE 40 MG: 40 INJECTION, SUSPENSION INTRA-ARTICULAR; INTRALESIONAL; INTRAMUSCULAR; SOFT TISSUE at 03:10

## 2019-10-28 RX ADMIN — BUPIVACAINE HYDROCHLORIDE 22.5 MG: 2.5 INJECTION, SOLUTION EPIDURAL; INFILTRATION; INTRACAUDAL at 03:10
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
## 2019-10-29 ENCOUNTER — IMMUNIZATION (OUTPATIENT)
Dept: PHARMACY | Facility: CLINIC | Age: 49
End: 2019-10-29
Payer: COMMERCIAL

## 2019-11-21 ENCOUNTER — PATIENT OUTREACH (OUTPATIENT)
Dept: ADMINISTRATIVE | Facility: OTHER | Age: 49
End: 2019-11-21

## 2019-11-22 ENCOUNTER — TELEPHONE (OUTPATIENT)
Dept: VASCULAR SURGERY | Facility: CLINIC | Age: 49
End: 2019-11-22

## 2019-11-22 ENCOUNTER — HOSPITAL ENCOUNTER (OUTPATIENT)
Dept: RADIOLOGY | Facility: HOSPITAL | Age: 49
Discharge: HOME | End: 2019-11-22
Attending: INTERNAL MEDICINE
Payer: COMMERCIAL

## 2019-11-22 DIAGNOSIS — M47.812 SPONDYLOSIS OF CERVICAL REGION WITHOUT MYELOPATHY OR RADICULOPATHY: ICD-10-CM

## 2019-11-22 PROCEDURE — 72141 MRI NECK SPINE W/O DYE: CPT | Mod: 26,,, | Performed by: RADIOLOGY

## 2019-11-22 PROCEDURE — 72141 MRI NECK SPINE W/O DYE: CPT | Mod: TC

## 2019-11-22 PROCEDURE — 72141 MRI CERVICAL SPINE WITHOUT CONTRAST: ICD-10-PCS | Mod: 26,,, | Performed by: RADIOLOGY

## 2019-11-25 ENCOUNTER — OFFICE VISIT (OUTPATIENT)
Dept: PAIN MEDICINE | Facility: CLINIC | Age: 49
End: 2019-11-25
Payer: COMMERCIAL

## 2019-11-25 VITALS
WEIGHT: 162.25 LBS | SYSTOLIC BLOOD PRESSURE: 127 MMHG | TEMPERATURE: 98 F | BODY MASS INDEX: 30.63 KG/M2 | DIASTOLIC BLOOD PRESSURE: 78 MMHG | HEIGHT: 61 IN | HEART RATE: 65 BPM

## 2019-11-25 DIAGNOSIS — M50.30 DDD (DEGENERATIVE DISC DISEASE), CERVICAL: ICD-10-CM

## 2019-11-25 DIAGNOSIS — M47.812 CERVICAL SPONDYLOSIS: Primary | ICD-10-CM

## 2019-11-25 DIAGNOSIS — M54.12 CERVICAL RADICULOPATHY: ICD-10-CM

## 2019-11-25 DIAGNOSIS — M79.18 MYOFASCIAL PAIN: ICD-10-CM

## 2019-11-25 PROCEDURE — 3074F PR MOST RECENT SYSTOLIC BLOOD PRESSURE < 130 MM HG: ICD-10-PCS | Mod: CPTII,S$GLB,, | Performed by: NURSE PRACTITIONER

## 2019-11-25 PROCEDURE — 3008F BODY MASS INDEX DOCD: CPT | Mod: CPTII,S$GLB,, | Performed by: NURSE PRACTITIONER

## 2019-11-25 PROCEDURE — 99213 PR OFFICE/OUTPT VISIT, EST, LEVL III, 20-29 MIN: ICD-10-PCS | Mod: S$GLB,,, | Performed by: NURSE PRACTITIONER

## 2019-11-25 PROCEDURE — 3074F SYST BP LT 130 MM HG: CPT | Mod: CPTII,S$GLB,, | Performed by: NURSE PRACTITIONER

## 2019-11-25 PROCEDURE — 3078F DIAST BP <80 MM HG: CPT | Mod: CPTII,S$GLB,, | Performed by: NURSE PRACTITIONER

## 2019-11-25 PROCEDURE — 3078F PR MOST RECENT DIASTOLIC BLOOD PRESSURE < 80 MM HG: ICD-10-PCS | Mod: CPTII,S$GLB,, | Performed by: NURSE PRACTITIONER

## 2019-11-25 PROCEDURE — 99999 PR PBB SHADOW E&M-EST. PATIENT-LVL III: CPT | Mod: PBBFAC,,, | Performed by: NURSE PRACTITIONER

## 2019-11-25 PROCEDURE — 99999 PR PBB SHADOW E&M-EST. PATIENT-LVL III: ICD-10-PCS | Mod: PBBFAC,,, | Performed by: NURSE PRACTITIONER

## 2019-11-25 PROCEDURE — 3008F PR BODY MASS INDEX (BMI) DOCUMENTED: ICD-10-PCS | Mod: CPTII,S$GLB,, | Performed by: NURSE PRACTITIONER

## 2019-11-25 PROCEDURE — 99213 OFFICE O/P EST LOW 20 MIN: CPT | Mod: S$GLB,,, | Performed by: NURSE PRACTITIONER
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
## 2019-11-26 ENCOUNTER — PATIENT OUTREACH (OUTPATIENT)
Dept: ADMINISTRATIVE | Facility: OTHER | Age: 49
End: 2019-11-26

## 2019-11-29 ENCOUNTER — PATIENT MESSAGE (OUTPATIENT)
Dept: ADMINISTRATIVE | Facility: OTHER | Age: 49
End: 2019-11-29

## 2019-11-29 ENCOUNTER — PATIENT OUTREACH (OUTPATIENT)
Dept: ADMINISTRATIVE | Facility: OTHER | Age: 49
End: 2019-11-29

## 2019-11-29 NOTE — PROGRESS NOTES
2nd attempt to contact pt to remind her to return fitkit. No answer. Patient message sent to patient portal.

## 2019-12-10 ENCOUNTER — TELEPHONE (OUTPATIENT)
Dept: PAIN MEDICINE | Facility: CLINIC | Age: 49
End: 2019-12-10

## 2019-12-10 NOTE — TELEPHONE ENCOUNTER
----- Message from Sigrid Sam sent at 12/10/2019 11:57 AM CST -----  Thank you for informing staff.   ----- Message -----  From: Dariela Orozco RN  Sent: 12/10/2019  11:13 AM CST  To: [facility] Pain Management Schedulers    Patient R/S injection to 1/8/20 due to family emergency.

## 2019-12-13 ENCOUNTER — PROCEDURE VISIT (OUTPATIENT)
Dept: NEUROLOGY | Facility: CLINIC | Age: 49
End: 2019-12-13
Payer: COMMERCIAL

## 2019-12-13 DIAGNOSIS — M79.632 PAIN IN BOTH FOREARMS: ICD-10-CM

## 2019-12-13 DIAGNOSIS — M54.2 CERVICAL PAIN (NECK): ICD-10-CM

## 2019-12-13 DIAGNOSIS — M79.631 PAIN IN BOTH FOREARMS: ICD-10-CM

## 2019-12-13 PROCEDURE — 95912 PR NERVE CONDUCTION STUDY; 11 -12 STUDIES: ICD-10-PCS | Mod: S$GLB,,, | Performed by: PSYCHIATRY & NEUROLOGY

## 2019-12-13 PROCEDURE — 95886 MUSC TEST DONE W/N TEST COMP: CPT | Mod: S$GLB,,, | Performed by: PSYCHIATRY & NEUROLOGY

## 2019-12-13 PROCEDURE — 95886 PR EMG COMPLETE, W/ NERVE CONDUCTION STUDIES, 5+ MUSCLES: ICD-10-PCS | Mod: S$GLB,,, | Performed by: PSYCHIATRY & NEUROLOGY

## 2019-12-13 PROCEDURE — 95912 NRV CNDJ TEST 11-12 STUDIES: CPT | Mod: S$GLB,,, | Performed by: PSYCHIATRY & NEUROLOGY

## 2019-12-26 ENCOUNTER — PATIENT OUTREACH (OUTPATIENT)
Dept: ADMINISTRATIVE | Facility: OTHER | Age: 49
End: 2019-12-26

## 2019-12-30 ENCOUNTER — HOSPITAL ENCOUNTER (OUTPATIENT)
Dept: RADIOLOGY | Facility: HOSPITAL | Age: 49
Discharge: HOME | End: 2019-12-30
Attending: NEUROLOGICAL SURGERY
Payer: COMMERCIAL

## 2019-12-30 ENCOUNTER — OFFICE VISIT (OUTPATIENT)
Dept: NEUROSURGERY | Facility: CLINIC | Age: 49
End: 2019-12-30
Payer: COMMERCIAL

## 2019-12-30 VITALS
WEIGHT: 163 LBS | HEART RATE: 68 BPM | SYSTOLIC BLOOD PRESSURE: 118 MMHG | TEMPERATURE: 98 F | DIASTOLIC BLOOD PRESSURE: 73 MMHG | BODY MASS INDEX: 30.8 KG/M2

## 2019-12-30 DIAGNOSIS — M47.812 CERVICAL SPONDYLOSIS: Primary | ICD-10-CM

## 2019-12-30 DIAGNOSIS — M54.2 CERVICAL PAIN (NECK): ICD-10-CM

## 2019-12-30 DIAGNOSIS — M47.812 CERVICAL SPONDYLOSIS: ICD-10-CM

## 2019-12-30 PROCEDURE — 72052 XR CERVICAL SPINE 5 VIEW WITH FLEX AND EXT: ICD-10-PCS | Mod: 26,,, | Performed by: RADIOLOGY

## 2019-12-30 PROCEDURE — 72052 X-RAY EXAM NECK SPINE 6/>VWS: CPT | Mod: TC

## 2019-12-30 PROCEDURE — 99999 PR PBB SHADOW E&M-EST. PATIENT-LVL III: CPT | Mod: PBBFAC,,, | Performed by: NEUROLOGICAL SURGERY

## 2019-12-30 PROCEDURE — 99244 OFF/OP CNSLTJ NEW/EST MOD 40: CPT | Mod: S$GLB,,, | Performed by: NEUROLOGICAL SURGERY

## 2019-12-30 PROCEDURE — 99244 PR OFFICE CONSULTATION,LEVEL IV: ICD-10-PCS | Mod: S$GLB,,, | Performed by: NEUROLOGICAL SURGERY

## 2019-12-30 PROCEDURE — 72052 X-RAY EXAM NECK SPINE 6/>VWS: CPT | Mod: 26,,, | Performed by: RADIOLOGY

## 2019-12-30 PROCEDURE — 99999 PR PBB SHADOW E&M-EST. PATIENT-LVL III: ICD-10-PCS | Mod: PBBFAC,,, | Performed by: NEUROLOGICAL SURGERY

## 2019-12-30 NOTE — LETTER
December 31, 2019      Amrit Bailey MD  [address] [facility] [address]           [facility] - Neurosurgery 7th Fl  [address]  [place] [address]  Phone: [phone]          Patient: Sheila Costa   MR Number: [id number]   YOB: 1970   Date of Visit: 12/30/2019       Dear Dr. Amrit Bailey:    Thank you for referring Sheila Costa to me for evaluation. Attached you will find relevant portions of my assessment and plan of care.    If you have questions, please do not hesitate to call me. I look forward to following Sheila Costa along with you.    Sincerely,    Olga Sparks MD    Enclosure  CC:  No Recipients    If you would like to receive this communication electronically, please contact [email or web address] or [phone] to request more information on [facility] [facility] Link access.    For providers and/or their staff who would like to refer a patient to [facility], please contact us through our one-stop-shop provider referral line, [facility], at [phone].    If you feel you have received this communication in error or would no longer like to receive these types of communications, please e-mail [email or web address]
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
## 2020-01-08 ENCOUNTER — HOSPITAL ENCOUNTER (OUTPATIENT)
Facility: OTHER | Age: 50
Discharge: HOME | End: 2020-01-08
Attending: ANESTHESIOLOGY | Admitting: ANESTHESIOLOGY
Payer: COMMERCIAL

## 2020-01-08 VITALS
DIASTOLIC BLOOD PRESSURE: 68 MMHG | OXYGEN SATURATION: 100 % | RESPIRATION RATE: 18 BRPM | SYSTOLIC BLOOD PRESSURE: 139 MMHG | HEART RATE: 72 BPM

## 2020-01-08 DIAGNOSIS — G89.29 CHRONIC PAIN: ICD-10-CM

## 2020-01-08 DIAGNOSIS — G89.4 CHRONIC PAIN SYNDROME: Primary | ICD-10-CM

## 2020-01-08 DIAGNOSIS — M47.812 CERVICAL SPONDYLOSIS: ICD-10-CM

## 2020-01-08 DIAGNOSIS — M47.812 OSTEOARTHRITIS OF CERVICAL SPINE, UNSPECIFIED SPINAL OSTEOARTHRITIS COMPLICATION STATUS: ICD-10-CM

## 2020-01-08 PROCEDURE — 64492 INJ PARAVERT F JNT C/T 3 LEV: CPT | Mod: 50 | Performed by: ANESTHESIOLOGY

## 2020-01-08 PROCEDURE — 64490 INJ PARAVERT F JNT C/T 1 LEV: CPT | Mod: 50,,, | Performed by: ANESTHESIOLOGY

## 2020-01-08 PROCEDURE — 64491 INJ PARAVERT F JNT C/T 2 LEV: CPT | Mod: 50 | Performed by: ANESTHESIOLOGY

## 2020-01-08 PROCEDURE — 99152 PR MOD CONSCIOUS SEDATION, SAME PHYS, 5+ YRS, FIRST 15 MIN: ICD-10-PCS | Mod: ,,, | Performed by: ANESTHESIOLOGY

## 2020-01-08 PROCEDURE — 64490 INJ PARAVERT F JNT C/T 1 LEV: CPT | Mod: 50 | Performed by: ANESTHESIOLOGY

## 2020-01-08 PROCEDURE — 64490 PR INJ DX/THER AGNT PARAVERT FACET JOINT,IMG GUIDE,CERV/THORAC, 1ST LEVEL: ICD-10-PCS | Mod: 50,,, | Performed by: ANESTHESIOLOGY

## 2020-01-08 PROCEDURE — 64491 INJ PARAVERT F JNT C/T 2 LEV: CPT | Mod: 50,,, | Performed by: ANESTHESIOLOGY

## 2020-01-08 PROCEDURE — 25000003 PHARM REV CODE 250: Performed by: ANESTHESIOLOGY

## 2020-01-08 PROCEDURE — 63600175 PHARM REV CODE 636 W HCPCS: Performed by: STUDENT IN AN ORGANIZED HEALTH CARE EDUCATION/TRAINING PROGRAM

## 2020-01-08 PROCEDURE — 63600175 PHARM REV CODE 636 W HCPCS: Performed by: ANESTHESIOLOGY

## 2020-01-08 PROCEDURE — 99152 MOD SED SAME PHYS/QHP 5/>YRS: CPT | Mod: ,,, | Performed by: ANESTHESIOLOGY

## 2020-01-08 PROCEDURE — 64491 PR INJ DX/THER AGNT PARAVERT FACET JOINT,IMG GUIDE,CERV/THORAC, 2ND LEVEL: ICD-10-PCS | Mod: 50,,, | Performed by: ANESTHESIOLOGY

## 2020-01-08 RX ORDER — MIDAZOLAM HYDROCHLORIDE 1 MG/ML
INJECTION INTRAMUSCULAR; INTRAVENOUS
Status: DISCONTINUED | OUTPATIENT
Start: 2020-01-08 | End: 2020-01-08 | Stop reason: HOSPADM

## 2020-01-08 RX ORDER — DEXAMETHASONE SODIUM PHOSPHATE 4 MG/ML
INJECTION, SOLUTION INTRA-ARTICULAR; INTRALESIONAL; INTRAMUSCULAR; INTRAVENOUS; SOFT TISSUE
Status: DISCONTINUED | OUTPATIENT
Start: 2020-01-08 | End: 2020-01-08 | Stop reason: HOSPADM

## 2020-01-08 RX ORDER — BUPIVACAINE HYDROCHLORIDE 2.5 MG/ML
INJECTION, SOLUTION EPIDURAL; INFILTRATION; INTRACAUDAL
Status: DISCONTINUED | OUTPATIENT
Start: 2020-01-08 | End: 2020-01-08 | Stop reason: HOSPADM

## 2020-01-08 RX ORDER — FENTANYL CITRATE 50 UG/ML
INJECTION, SOLUTION INTRAMUSCULAR; INTRAVENOUS
Status: DISCONTINUED | OUTPATIENT
Start: 2020-01-08 | End: 2020-01-08 | Stop reason: HOSPADM

## 2020-01-08 RX ORDER — SODIUM CHLORIDE 9 MG/ML
500 INJECTION, SOLUTION INTRAVENOUS CONTINUOUS
Status: DISCONTINUED | OUTPATIENT
Start: 2020-01-08 | End: 2020-01-08 | Stop reason: HOSPADM

## 2020-01-08 RX ORDER — LIDOCAINE HYDROCHLORIDE 10 MG/ML
INJECTION INFILTRATION; PERINEURAL
Status: DISCONTINUED | OUTPATIENT
Start: 2020-01-08 | End: 2020-01-08 | Stop reason: HOSPADM

## 2020-01-08 RX ADMIN — SODIUM CHLORIDE 500 ML: 0.9 INJECTION, SOLUTION INTRAVENOUS at 03:01

## 2020-01-08 NOTE — INTERVAL H&P NOTE
The patient has been examined and the H&P has been reviewed:    No change in the location or quality of the pain since the most recent clinic visit.  No new symptoms.  She wishes to proceed with the procedure today.    PE, unchanged from previous:  CV:  RRR  Resp: unlabored, no wheezing.    NPO since MN.    I concur with the findings and no changes have occurred since H&P was written.    Anesthesia/Surgery risks, benefits and alternative options discussed and understood by patient/family.          There are no hospital problems to display for this patient.

## 2020-01-08 NOTE — DISCHARGE SUMMARY
Discharge Note  Short Stay      SUMMARY     Admit Date: 1/8/2020    Attending Physician: Cosme Kirkland      Discharge Physician: Cosme Kirkland      Discharge Date: 1/8/2020 3:48 PM    Procedure(s) (LRB):  BLOCK, NERVE C4,5,6 (Bilateral)    Final Diagnosis: Cervical spondylosis [M47.812]    Disposition: Home or self care    Patient Instructions:   Current Discharge Medication List      CONTINUE these medications which have NOT CHANGED    Details   amitriptyline (ELAVIL) 25 MG tablet Take 1 tablet (25 mg total) by mouth nightly as needed for Insomnia.  Qty: 30 tablet, Refills: 2    Associated Diagnoses: DDD (degenerative disc disease), cervical; Cervical radiculopathy; Osteoarthritis of spine, unspecified spinal osteoarthritis complication status, unspecified spinal region      atenolol-chlorthalidone (TENORETIC) 50-25 mg Tab TAKE ONE-HALF TABLET BY MOUTH EVERY DAY  Qty: 90 tablet, Refills: 0      !! FLUoxetine 20 MG capsule TAKE ONE CAPSULE BY MOUTH ONCE DAILY  Qty: 30 capsule, Refills: 0      !! FLUoxetine 20 MG capsule Take 1 capsule (20 mg total) by mouth once daily.  Qty: 30 capsule, Refills: 11    Associated Diagnoses: PMDD (premenstrual dysphoric disorder)      fluticasone (FLONASE) 50 mcg/actuation nasal spray Refills: 12      levonorgestrel (MIRENA) 20 mcg/24 hr (5 years) IUD 1 Intra Uterine Device by Intrauterine route once. for 1 dose  Qty: 1 Intra Uterine Device, Refills: 0    Associated Diagnoses: Contraception, device intrauterine      loratadine (CLARITIN) 10 mg tablet Take 10 mg by mouth once daily.      meloxicam (MOBIC) 7.5 MG tablet Refills: 2       !! - Potential duplicate medications found. Please discuss with provider.              Discharge Diagnosis: Cervical spondylosis [M47.812]  Condition on Discharge: Stable with no complications to procedure   Diet on Discharge: Same as before.  Activity: as per instruction sheet.  Discharge to: Home with a responsible adult.  Follow up: 2-4  weeks       Please call my office or pager at [phone] if experienced any weakness or loss of sensation, fever > 101.5, pain uncontrolled with oral medications, persistent nausea/vomiting/or diarrhea, redness or drainage from the incisions, or any other worrisome concerns. If physician on call was not reached or could not communicate with our office for any reason please go to the nearest emergency department

## 2020-01-08 NOTE — DISCHARGE INSTRUCTIONS

## 2020-01-08 NOTE — OP NOTE
"Patient Name: Sheila Costa  MRN: [id number]    INFORMED CONSENT: The procedure, risks, benefits and options were discussed with patient. There are no contraindications to the procedure. The patient expressed understanding and agreed to proceed. The personnel performing the procedure was discussed. I verify that I personally obtained Sheila's consent prior to the start of the procedure and the signed consent can be found on the patient's chart.    Procedure Date: 01/08/2020    Anesthesia: Topical    Pre Procedure diagnosis: Cervical spondylosis [M47.812]  1. Chronic pain syndrome    2. Osteoarthritis of cervical spine, unspecified spinal osteoarthritis complication status    3. Cervical spondylosis    4. Chronic pain      Post-Procedure diagnosis: SAME      Sedation: Yes - Fentanyl 100 mcg and Midazolam 2 mg    PROCEDURE: bilateral C4,5,6 CERVICAL FACET MEDIAL BRANCH NERVE BLOCK (Prone)      DESCRIPTION OF PROCEDURE: The patient was brought to the procedure room.  After performing time out. IV access was obtained prior to the procedure.  The patient was positioned prone on the fluoroscopy table.  Continuous hemodynamic monitoring was initiated including blood pressure, EKG, and pulse oximetry.  The skin overlying the cervical spine was prepped with chlorhexidine and draped into a sterile field.  Fluoroscopy was used to identify the location of the BILATERAL side C4 to C6 medial branch nerves.  Skin anesthesia was achieved using 5 cc of Lidocaine 1% over the injection sites. A 22 gauge, 3 1/2" spinal needle was slowly inserted at each level using AP, lateral and oblique fluoroscopic imaging. Final needle position was at the midpoint of the waist of the articular pillars. Negative aspiration for blood or CSF was confirmed.  6 ml bupivacaine 0.25%was injected at all sites..  The needles were removed and bleeding was nil. A sterile dressing was applied.No specimens collected. The patient was brought to recovery " in stable condition. Sheila was taken back to the recovery room for further observation.     Blood Loss: Nill  Specimen: None    Cosme Kirkland MD

## 2020-01-13 ENCOUNTER — TELEPHONE (OUTPATIENT)
Dept: PAIN MEDICINE | Facility: CLINIC | Age: 50
End: 2020-01-13

## 2020-01-13 NOTE — TELEPHONE ENCOUNTER
----- Message from Sigrid Sam sent at 1/13/2020  8:44 AM CST -----  Pt. Will like to schedule next Block with Dr. Kirkland, Medial Branch Block has been scanned in Epic, please advise.

## 2020-01-29 ENCOUNTER — HOSPITAL ENCOUNTER (OUTPATIENT)
Facility: OTHER | Age: 50
Discharge: HOME | End: 2020-01-29
Attending: ANESTHESIOLOGY | Admitting: ANESTHESIOLOGY
Payer: COMMERCIAL

## 2020-01-29 VITALS
WEIGHT: 160 LBS | HEART RATE: 63 BPM | OXYGEN SATURATION: 100 % | BODY MASS INDEX: 30.21 KG/M2 | RESPIRATION RATE: 16 BRPM | SYSTOLIC BLOOD PRESSURE: 146 MMHG | DIASTOLIC BLOOD PRESSURE: 78 MMHG | HEIGHT: 61 IN | TEMPERATURE: 98 F

## 2020-01-29 DIAGNOSIS — M47.812 OSTEOARTHRITIS OF CERVICAL SPINE, UNSPECIFIED SPINAL OSTEOARTHRITIS COMPLICATION STATUS: Primary | ICD-10-CM

## 2020-01-29 DIAGNOSIS — G89.29 CHRONIC PAIN: ICD-10-CM

## 2020-01-29 DIAGNOSIS — M47.812 CERVICAL SPONDYLOSIS: ICD-10-CM

## 2020-01-29 PROCEDURE — 64491 INJ PARAVERT F JNT C/T 2 LEV: CPT | Mod: 50 | Performed by: ANESTHESIOLOGY

## 2020-01-29 PROCEDURE — 64491 PR INJ DX/THER AGNT PARAVERT FACET JOINT,IMG GUIDE,CERV/THORAC, 2ND LEVEL: ICD-10-PCS | Mod: 50,,, | Performed by: ANESTHESIOLOGY

## 2020-01-29 PROCEDURE — 64490 INJ PARAVERT F JNT C/T 1 LEV: CPT | Mod: 50 | Performed by: ANESTHESIOLOGY

## 2020-01-29 PROCEDURE — 64490 INJ PARAVERT F JNT C/T 1 LEV: CPT | Mod: 50,,, | Performed by: ANESTHESIOLOGY

## 2020-01-29 PROCEDURE — 99152 PR MOD CONSCIOUS SEDATION, SAME PHYS, 5+ YRS, FIRST 15 MIN: ICD-10-PCS | Mod: ,,, | Performed by: ANESTHESIOLOGY

## 2020-01-29 PROCEDURE — 63600175 PHARM REV CODE 636 W HCPCS: Performed by: ANESTHESIOLOGY

## 2020-01-29 PROCEDURE — 64491 INJ PARAVERT F JNT C/T 2 LEV: CPT | Mod: 50,,, | Performed by: ANESTHESIOLOGY

## 2020-01-29 PROCEDURE — 64492 INJ PARAVERT F JNT C/T 3 LEV: CPT | Mod: 50 | Performed by: ANESTHESIOLOGY

## 2020-01-29 PROCEDURE — 99152 MOD SED SAME PHYS/QHP 5/>YRS: CPT | Mod: ,,, | Performed by: ANESTHESIOLOGY

## 2020-01-29 PROCEDURE — 25000003 PHARM REV CODE 250: Performed by: ANESTHESIOLOGY

## 2020-01-29 PROCEDURE — 64490 PR INJ DX/THER AGNT PARAVERT FACET JOINT,IMG GUIDE,CERV/THORAC, 1ST LEVEL: ICD-10-PCS | Mod: 50,,, | Performed by: ANESTHESIOLOGY

## 2020-01-29 RX ORDER — BUPIVACAINE HYDROCHLORIDE 2.5 MG/ML
INJECTION, SOLUTION EPIDURAL; INFILTRATION; INTRACAUDAL
Status: DISCONTINUED | OUTPATIENT
Start: 2020-01-29 | End: 2020-01-29 | Stop reason: HOSPADM

## 2020-01-29 RX ORDER — MIDAZOLAM HYDROCHLORIDE 1 MG/ML
INJECTION INTRAMUSCULAR; INTRAVENOUS
Status: DISCONTINUED | OUTPATIENT
Start: 2020-01-29 | End: 2020-01-29 | Stop reason: HOSPADM

## 2020-01-29 RX ORDER — SODIUM CHLORIDE 9 MG/ML
500 INJECTION, SOLUTION INTRAVENOUS CONTINUOUS
Status: DISCONTINUED | OUTPATIENT
Start: 2020-01-29 | End: 2020-01-29 | Stop reason: HOSPADM

## 2020-01-29 RX ORDER — FENTANYL CITRATE 50 UG/ML
INJECTION, SOLUTION INTRAMUSCULAR; INTRAVENOUS
Status: DISCONTINUED | OUTPATIENT
Start: 2020-01-29 | End: 2020-01-29 | Stop reason: HOSPADM

## 2020-01-29 RX ORDER — DEXAMETHASONE SODIUM PHOSPHATE 4 MG/ML
INJECTION, SOLUTION INTRA-ARTICULAR; INTRALESIONAL; INTRAMUSCULAR; INTRAVENOUS; SOFT TISSUE
Status: DISCONTINUED | OUTPATIENT
Start: 2020-01-29 | End: 2020-01-29 | Stop reason: HOSPADM

## 2020-01-29 RX ORDER — LIDOCAINE HYDROCHLORIDE 10 MG/ML
INJECTION INFILTRATION; PERINEURAL
Status: DISCONTINUED | OUTPATIENT
Start: 2020-01-29 | End: 2020-01-29 | Stop reason: HOSPADM

## 2020-01-29 NOTE — DISCHARGE SUMMARY
Discharge Note  Short Stay      SUMMARY     Admit Date: 1/29/2020    Attending Physician: Cosme Kirkland      Discharge Physician: Cosme Kirkland      Discharge Date: 1/29/2020 4:00 PM    Procedure(s) (LRB):  BLOCK, NERVE, Repeat C4-C5-C6 MEDIAL BRANCH (Bilateral)    Final Diagnosis: Cervical spondylosis [M47.812]    Disposition: Home or self care    Patient Instructions:   Current Discharge Medication List      CONTINUE these medications which have NOT CHANGED    Details   amitriptyline (ELAVIL) 25 MG tablet Take 1 tablet (25 mg total) by mouth nightly as needed for Insomnia.  Qty: 30 tablet, Refills: 2    Associated Diagnoses: DDD (degenerative disc disease), cervical; Cervical radiculopathy; Osteoarthritis of spine, unspecified spinal osteoarthritis complication status, unspecified spinal region      atenolol-chlorthalidone (TENORETIC) 50-25 mg Tab TAKE ONE-HALF TABLET BY MOUTH EVERY DAY  Qty: 90 tablet, Refills: 0      !! FLUoxetine 20 MG capsule TAKE ONE CAPSULE BY MOUTH ONCE DAILY  Qty: 30 capsule, Refills: 0      !! FLUoxetine 20 MG capsule Take 1 capsule (20 mg total) by mouth once daily.  Qty: 30 capsule, Refills: 11    Associated Diagnoses: PMDD (premenstrual dysphoric disorder)      fluticasone (FLONASE) 50 mcg/actuation nasal spray Refills: 12      levonorgestrel (MIRENA) 20 mcg/24 hr (5 years) IUD 1 Intra Uterine Device by Intrauterine route once. for 1 dose  Qty: 1 Intra Uterine Device, Refills: 0    Associated Diagnoses: Contraception, device intrauterine      loratadine (CLARITIN) 10 mg tablet Take 10 mg by mouth once daily.      meloxicam (MOBIC) 7.5 MG tablet Refills: 2       !! - Potential duplicate medications found. Please discuss with provider.              Discharge Diagnosis: Cervical spondylosis [M47.812]  Condition on Discharge: Stable with no complications to procedure   Diet on Discharge: Same as before.  Activity: as per instruction sheet.  Discharge to: Home with a responsible  adult.  Follow up: 2-4 weeks       Please call my office or pager at [phone] if experienced any weakness or loss of sensation, fever > 101.5, pain uncontrolled with oral medications, persistent nausea/vomiting/or diarrhea, redness or drainage from the incisions, or any other worrisome concerns. If physician on call was not reached or could not communicate with our office for any reason please go to the nearest emergency department

## 2020-01-29 NOTE — DISCHARGE INSTRUCTIONS
Thank you for allowing us to care for you today. You may receive a survey about the care we provided. Your feedback is valuable and helps us provide excellent care throughout the community.     Home Care Instructions for Pain Management:    1. DIET:   You may resume your normal diet today.   2. BATHING:   You may shower with luke warm water. No tub baths or anything that will soak injection sites under water for the next 24 hours.  3. DRESSING:   You may remove your bandage today.   4. ACTIVITY LEVEL:   You may resume your normal activities 24 hrs after your procedure. Nothing strenuous today.  5. MEDICATIONS:   You may resume your normal medications today. To restart blood thinners, ask your doctor.  6. DRIVING    If you have received any sedatives by mouth today, you may not drive for 12 hours.    If you have received any sedation through your IV, you may not drive for 24 hrs.   7. SPECIAL INSTRUCTIONS:   No heat to the injection site for 24 hrs including, hot bath or shower, heating pad, moist heat, or hot tubs.    Use ice pack to injection site for any pain or discomfort.  Apply ice packs for 20 minute intervals as needed.    IF you have diabetes, be sure to monitor your blood sugar more closely. IF your injection contained steroids your blood sugar levels may become higher than normal.    If you are still having pain upon discharge:  Your pain may improve over the next 48 hours. The anesthetic (numbing medication) works immediately to 48 hours. IF your injection contained a steroid (anti-inflammatory medication), it takes approximately 3 days to start feeling relief and 7-10 days to see your greatest results from the medication. It is possible you may need subsequent injections. This would be discussed at your follow up appointment with pain management or your referring doctor.    Please call the PAIN MANAGEMENT office at [phone] or ON CALL pager at [phone] if you experienced any:   -Weakness or  loss of sensation  -Fever > 101.5  -Pain uncontrolled with oral medications   -Persistent nausea, vomiting, or diarrhea  -Redness or drainage from the injection sites, or any other worrisome concerns.   If physician on call was not reached or could not communicate with our office for any reason please go to the nearest emergency department.    Recovery After Procedural Sedation (Adult)  You have been given medicine by vein to make you sleep during your surgery. This may have included both a pain medicine and sleeping medicine. Most of the effects have worn off. But you may still have some drowsiness for the next 6 to 8 hours.  Home care  Follow these guidelines when you get home:  · For the next 8 hours, you should be watched by a responsible adult. This person should make sure your condition is not getting worse.  · Don't drink any alcohol for the next 24 hours.  · Don't drive, operate dangerous machinery, or make important business or personal decisions during the next 24 hours.  Note: Your healthcare provider may tell you not to take any medicine by mouth for pain or sleep in the next 4 hours. These medicines may react with the medicines you were given in the hospital. This could cause a much stronger response than usual.  Follow-up care  Follow up with your healthcare provider if you are not alert and back to your usual level of activity within 12 hours.  When to seek medical advice  Call your healthcare provider right away if any of these occur:  · Drowsiness gets worse  · Weakness or dizziness gets worse  · Repeated vomiting  · You can't be awakened   Date Last Reviewed: 10/18/2016  © 3557-1099 [employer], [employer]. [address] [place] [address], [place] [address]. All rights reserved. This information is not intended as a substitute for professional medical care. Always follow your healthcare professional's instructions.

## 2020-01-29 NOTE — OP NOTE
"Patient Name: Sheila Costa  MRN: [id number]    INFORMED CONSENT: The procedure, risks, benefits and options were discussed with patient. There are no contraindications to the procedure. The patient expressed understanding and agreed to proceed. The personnel performing the procedure was discussed. I verify that I personally obtained Sheila's consent prior to the start of the procedure and the signed consent can be found on the patient's chart.    Procedure Date: 01/29/2020    Anesthesia: Topical    Pre Procedure diagnosis: Cervical spondylosis [M47.812]  1. Osteoarthritis of cervical spine, unspecified spinal osteoarthritis complication status    2. Cervical spondylosis    3. Chronic pain      Post-Procedure diagnosis: SAME      Sedation: Yes - Fentanyl 50 mcg and Midazolam 2 mg    PROCEDURE: BILATERAL C4,5,6 CERVICAL FACET MEDIAL BRANCH NERVE BLOCK (Prone)        DESCRIPTION OF PROCEDURE: The patient was brought to the procedure room.  After performing time out. IV access was obtained prior to the procedure.  The patient was positioned prone on the fluoroscopy table.  Continuous hemodynamic monitoring was initiated including blood pressure, EKG, and pulse oximetry.  The skin overlying the cervical spine was prepped with chlorhexidine and draped into a sterile field.  Fluoroscopy was used to identify the location of the bilateral side C3 to C6 medial branch nerves.  Skin anesthesia was achieved using 5 cc of Lidocaine 1% over the injection sites. A 22 gauge, 3 1/2" spinal needle was slowly inserted at each level using AP, lateral and oblique fluoroscopic imaging. Final needle position was at the midpoint of the waist of the articular pillars. Negative aspiration for blood or CSF was confirmed.  8 ml bupivacaine 0.25%was injected at all sites..  The needles were removed and bleeding was nil. A sterile dressing was applied.No specimens collected. The patient was brought to recovery in stable condition. " Sheila was taken back to the recovery room for further observation.     Blood Loss: Nill  Specimen: None    Cosme Kirkland MD

## 2020-01-31 ENCOUNTER — TELEPHONE (OUTPATIENT)
Dept: PAIN MEDICINE | Facility: CLINIC | Age: 50
End: 2020-01-31

## 2020-01-31 RX ORDER — ATENOLOL AND CHLORTHALIDONE TABLET 50; 25 MG/1; MG/1
TABLET ORAL
Qty: 45 TABLET | Refills: 0 | Status: SHIPPED | OUTPATIENT
Start: 2020-01-31 | End: 2020-04-29

## 2020-01-31 NOTE — TELEPHONE ENCOUNTER
Please schedule patient for labs     (CMP, LIPID)    Please also check with your provider if any further labs need to be added and scheduled together.     Thanks!

## 2020-01-31 NOTE — TELEPHONE ENCOUNTER
Staff left a voicemail requesting patient to give clinic a call back in regards to her message about being schedule for the next injection with provider Dr. Kirkland

## 2020-01-31 NOTE — TELEPHONE ENCOUNTER
----- Message from Lola Sutton sent at 1/31/2020  3:20 PM CST -----  Contact: ETHAN EPSTEIN  Name of Who is Calling: ETHAN EPSTEIN      What is the request in detail: Would like to speak to staff in regards to wanting to schedule her next procedure. Please advise.       Can the clinic reply by [facility]: Yes      What Number to Call Back if not in [facility]: [phone]

## 2020-01-31 NOTE — PROGRESS NOTES
Refill Authorization Note     is requesting a refill authorization.    Brief assessment and rationale for refill: APPROVE: needs labs     Medication-related problems identified: Requires labs    Medication Therapy Plan: BP-elevated at Hospital Encounter(Pain Medicine), previous 2 readings wnl; NTBS(cmp, lipid) per [facility]; Pt. has been taking 0.5mg since 10/14; Approve 3 more months                              Comments:   Requested Prescriptions   Pending Prescriptions Disp Refills    atenoloL-chlorthalidone (TENORETIC) 50-25 mg Tab [Pharmacy Med Name: ATENOLOL/CHLORTHALIDONE 50/25 TABS] 45 tablet 0     Sig: TAKE 1/2 TABLET BY MOUTH ONCE A DAY       Cardiovascular: Beta Blocker + Diuretic Combos Failed - 1/31/2020 10:39 AM        Failed - Last BP in normal range within 360 days     BP Readings from Last 3 Encounters:   01/29/20 (!) 146/78   01/08/20 139/68   12/30/19 118/73              Failed - K in normal range and within 180 days     Potassium   Date Value Ref Range Status   12/28/2018 3.4 (L) 3.5 - 5.1 mmol/L Final   03/23/2018 3.4 (L) 3.5 - 5.1 mmol/L Final   02/19/2016 3.5 3.5 - 5.1 mmol/L Final              Failed - Na in normal range and within 180 days     Sodium   Date Value Ref Range Status   12/28/2018 139 136 - 145 mmol/L Final   03/23/2018 138 136 - 145 mmol/L Final   02/19/2016 137 136 - 145 mmol/L Final              Failed - Cr is 1.4 or below and within 180 days     Creatinine   Date Value Ref Range Status   12/28/2018 0.8 0.5 - 1.4 mg/dL Final   03/23/2018 0.7 0.5 - 1.4 mg/dL Final   02/19/2016 0.8 0.5 - 1.4 mg/dL Final              Failed - Ca in normal range and within 360 days     Calcium   Date Value Ref Range Status   12/28/2018 9.3 8.7 - 10.5 mg/dL Final   03/23/2018 9.1 8.7 - 10.5 mg/dL Final   02/19/2016 9.4 8.7 - 10.5 mg/dL Final              Failed - eGFR within 180 days     eGFR if non    Date Value Ref Range Status   12/28/2018 >60 >60 mL/min/1.73 m^2  Final     Comment:     Calculation used to obtain the estimated glomerular filtration  rate (eGFR) is the CKD-EPI equation.      03/23/2018 >60.0 >60 mL/min/1.73 m^2 Final     Comment:     Calculation used to obtain the estimated glomerular filtration  rate (eGFR) is the CKD-EPI equation.      02/19/2016 >60.0 >60 mL/min/1.73 m^2 Final     Comment:     Calculation used to obtain the estimated glomerular filtration  rate (eGFR) is the CKD-EPI equation. Since race is unknown   in our information system, the eGFR values for   -American and Non--American patients are given   for each creatinine result.       eGFR if    Date Value Ref Range Status   12/28/2018 >60 >60 mL/min/1.73 m^2 Final   03/23/2018 >60.0 >60 mL/min/1.73 m^2 Final   02/19/2016 >60.0 >60 mL/min/1.73 m^2 Final              Passed - Patient is at least 18 years old        Passed - Negative Pregnancy Status Check        Passed - Last Heart Rate in normal range within 360 days     Pulse Readings from Last 3 Encounters:   01/29/20 63   01/08/20 72   12/30/19 68             Passed - Office visit in past 12 months or future 90 days     Recent Outpatient Visits            1 month ago Cervical spondylosis    [facility] - Neurosurgery 7th Fl Olga Sparks MD    2 months ago Cervical spondylosis    [facility] [place] [address] Tessa JOURDAN Guzmán, NP    3 months ago Cervical radiculopathy    [facility] [place] [address] Tessa JOURDAN Guzmán NP    4 months ago DDD (degenerative disc disease), cervical    [facility] [place] [address] Cosme Kirkland MD    4 months ago Spondylosis of cervical region without myelopathy or radiculopathy    [facility] - Rheumatology Amrit Bailey MD

## 2020-02-03 ENCOUNTER — TELEPHONE (OUTPATIENT)
Dept: PAIN MEDICINE | Facility: CLINIC | Age: 50
End: 2020-02-03

## 2020-02-03 RX ORDER — ATENOLOL AND CHLORTHALIDONE TABLET 50; 25 MG/1; MG/1
0.5 TABLET ORAL DAILY
Qty: 90 TABLET | Refills: 0 | OUTPATIENT
Start: 2020-02-03

## 2020-02-03 NOTE — TELEPHONE ENCOUNTER
Please schedule the patient for C4,5,6, RFA with Dr Kirkland, one side at a time, two weeks apart. Follow up 3 weeks after both sides are complete with me.     Please review.  Thanks

## 2020-02-03 NOTE — TELEPHONE ENCOUNTER
100% relief is an excellent result from the block.     Please schedule the patient for C4,5,6, RFA with Dr Kirkland, one side at a time, two weeks apart. Follow up 3 weeks after both sides are complete with me.

## 2020-02-03 NOTE — TELEPHONE ENCOUNTER
----- Message from Sigrid Sam sent at 2/3/2020 12:05 PM CST -----  Tessa hernandez. Ready to schedule RFA's , please advise for an approval.

## 2020-02-03 NOTE — PROGRESS NOTES
Refill Authorization Note     is requesting a refill authorization.    Brief assessment and rationale for refill: QUICK DC: duplicate                                         Comments:   Last Prescribed Info:        Ordering Provider: Jorge Alberto Ruiz MD YARA #:  [id number] NPI:  [id number]    Authorizing Provider: Jorge Alberto Ruiz MD YARA #:  [id number] NPI:  [id number]    Ordering User:  GREGROY RUIZ               Original Order:  atenolol-chlorthalidone (TENORETIC) 50-25 mg Tab [[id number]]      Pharmacy:  [id number] [place] [id number] YARA #:  [id number]     Pharmacy Comments:  --          Fill quantity remaining:  -- Fill quantity used:  -- Next fill due: --       Outpatient Medication Detail      Disp Refills Start End    atenoloL-chlorthalidone (TENORETIC) 50-25 mg Tab 45 tablet 0 1/31/2020     Sig: TAKE 1/2 TABLET BY MOUTH ONCE A DAY    Sent to pharmacy as: atenoloL-chlorthalidone (TENORETIC) 50-25 mg Tab    Notes to Pharmacy: Please delete all prior scripts with same name and strength including on holds.    E-Prescribing Status: Receipt confirmed by pharmacy (1/31/2020 11:39 AM CST)           Appointments  past 12m or future 3m with PCP    Date Provider   Last Visit   6/19/2019 Jorge Alberto Ruiz MD   Next Visit   Visit date not found Jorge Alberto Ruiz MD

## 2020-02-03 NOTE — TELEPHONE ENCOUNTER
----- Message from Jhony Sims, Patient Care Assistant sent at 2/3/2020  4:37 PM CST -----  Contact: ETHAN EPSTEIN [[id number]]  Type:  Patient Returning Call    Who Called: ETHAN EPSTEIN [[id number]]    Who Left Message for Patient: Leticia JACKMANBenjamin Garrison    Does the patient know what this is regarding?:Yes    Best Call Back Number:[phone]    Additional Information:   None

## 2020-02-03 NOTE — TELEPHONE ENCOUNTER
Patient called to report that she had 100% of pain relief from procedure on 1/29/20 with Dr. Kirkland and would like to know the next step.    Please review and advise.  Thanks

## 2020-02-04 ENCOUNTER — TELEPHONE (OUTPATIENT)
Dept: PAIN MEDICINE | Facility: CLINIC | Age: 50
End: 2020-02-04

## 2020-02-04 ENCOUNTER — TELEPHONE (OUTPATIENT)
Dept: ADMINISTRATIVE | Facility: OTHER | Age: 50
End: 2020-02-04

## 2020-02-04 NOTE — TELEPHONE ENCOUNTER
----- Message from Winnie Mustafa sent at 2/3/2020 11:03 AM CST -----  Contact: ETHAN EPSTEIN [[id number]]  Name of Who is Calling: ETHAN EPSTEIN [[id number]]      What is the request in detail: patient would like to schedule surgery. Please call     Can the clinic reply by [facility]: no    [facility] [phone]

## 2020-02-04 NOTE — TELEPHONE ENCOUNTER
----- Message from Sigrid Sam sent at 2/4/2020  3:44 PM CST -----  Pt. Scheduled 3/4/20 and 3/25/20 RFA's with  Dr. Kirkland.

## 2020-03-04 ENCOUNTER — HOSPITAL ENCOUNTER (OUTPATIENT)
Facility: OTHER | Age: 50
Discharge: HOME | End: 2020-03-04
Attending: ANESTHESIOLOGY | Admitting: ANESTHESIOLOGY
Payer: COMMERCIAL

## 2020-03-04 VITALS
DIASTOLIC BLOOD PRESSURE: 71 MMHG | OXYGEN SATURATION: 100 % | WEIGHT: 160 LBS | BODY MASS INDEX: 30.21 KG/M2 | SYSTOLIC BLOOD PRESSURE: 122 MMHG | RESPIRATION RATE: 16 BRPM | HEIGHT: 61 IN | HEART RATE: 64 BPM | TEMPERATURE: 98 F

## 2020-03-04 DIAGNOSIS — G89.29 CHRONIC PAIN: ICD-10-CM

## 2020-03-04 DIAGNOSIS — M47.812 OSTEOARTHRITIS OF CERVICAL SPINE, UNSPECIFIED SPINAL OSTEOARTHRITIS COMPLICATION STATUS: Primary | ICD-10-CM

## 2020-03-04 DIAGNOSIS — M47.812 CERVICAL SPONDYLOSIS: ICD-10-CM

## 2020-03-04 PROCEDURE — 99152 MOD SED SAME PHYS/QHP 5/>YRS: CPT | Mod: ,,, | Performed by: ANESTHESIOLOGY

## 2020-03-04 PROCEDURE — 25000003 PHARM REV CODE 250: Performed by: ANESTHESIOLOGY

## 2020-03-04 PROCEDURE — 64633 DESTROY CERV/THOR FACET JNT: CPT | Mod: LT,,, | Performed by: ANESTHESIOLOGY

## 2020-03-04 PROCEDURE — 64634 PR DESTROY C/TH FACET JNT ADDL: ICD-10-PCS | Mod: LT,,, | Performed by: ANESTHESIOLOGY

## 2020-03-04 PROCEDURE — 99152 MOD SED SAME PHYS/QHP 5/>YRS: CPT | Performed by: ANESTHESIOLOGY

## 2020-03-04 PROCEDURE — 63600175 PHARM REV CODE 636 W HCPCS: Performed by: ANESTHESIOLOGY

## 2020-03-04 PROCEDURE — 64633 PR DESTROY CERV/THOR FACET JNT: ICD-10-PCS | Mod: LT,,, | Performed by: ANESTHESIOLOGY

## 2020-03-04 PROCEDURE — 64634 DESTROY C/TH FACET JNT ADDL: CPT | Mod: LT,,, | Performed by: ANESTHESIOLOGY

## 2020-03-04 PROCEDURE — 63600175 PHARM REV CODE 636 W HCPCS: Performed by: STUDENT IN AN ORGANIZED HEALTH CARE EDUCATION/TRAINING PROGRAM

## 2020-03-04 PROCEDURE — 99152 PR MOD CONSCIOUS SEDATION, SAME PHYS, 5+ YRS, FIRST 15 MIN: ICD-10-PCS | Mod: ,,, | Performed by: ANESTHESIOLOGY

## 2020-03-04 PROCEDURE — 64633 DESTROY CERV/THOR FACET JNT: CPT | Mod: LT | Performed by: ANESTHESIOLOGY

## 2020-03-04 PROCEDURE — 64634 DESTROY C/TH FACET JNT ADDL: CPT | Mod: LT | Performed by: ANESTHESIOLOGY

## 2020-03-04 RX ORDER — DEXAMETHASONE SODIUM PHOSPHATE 4 MG/ML
INJECTION, SOLUTION INTRA-ARTICULAR; INTRALESIONAL; INTRAMUSCULAR; INTRAVENOUS; SOFT TISSUE
Status: DISCONTINUED | OUTPATIENT
Start: 2020-03-04 | End: 2020-03-04 | Stop reason: HOSPADM

## 2020-03-04 RX ORDER — BUPIVACAINE HYDROCHLORIDE 2.5 MG/ML
INJECTION, SOLUTION EPIDURAL; INFILTRATION; INTRACAUDAL
Status: DISCONTINUED | OUTPATIENT
Start: 2020-03-04 | End: 2020-03-04 | Stop reason: HOSPADM

## 2020-03-04 RX ORDER — LIDOCAINE HYDROCHLORIDE 10 MG/ML
INJECTION INFILTRATION; PERINEURAL
Status: DISCONTINUED | OUTPATIENT
Start: 2020-03-04 | End: 2020-03-04 | Stop reason: HOSPADM

## 2020-03-04 RX ORDER — MIDAZOLAM HYDROCHLORIDE 1 MG/ML
INJECTION INTRAMUSCULAR; INTRAVENOUS
Status: DISCONTINUED | OUTPATIENT
Start: 2020-03-04 | End: 2020-03-04 | Stop reason: HOSPADM

## 2020-03-04 RX ORDER — SODIUM CHLORIDE 9 MG/ML
500 INJECTION, SOLUTION INTRAVENOUS CONTINUOUS
Status: DISCONTINUED | OUTPATIENT
Start: 2020-03-04 | End: 2020-03-04 | Stop reason: HOSPADM

## 2020-03-04 RX ORDER — FENTANYL CITRATE 50 UG/ML
INJECTION, SOLUTION INTRAMUSCULAR; INTRAVENOUS
Status: DISCONTINUED | OUTPATIENT
Start: 2020-03-04 | End: 2020-03-04 | Stop reason: HOSPADM

## 2020-03-04 RX ADMIN — SODIUM CHLORIDE 500 ML: 0.9 INJECTION, SOLUTION INTRAVENOUS at 02:03

## 2020-03-04 NOTE — DISCHARGE INSTRUCTIONS

## 2020-03-04 NOTE — DISCHARGE SUMMARY
Discharge Note  Short Stay      SUMMARY     Admit Date: 3/4/2020    Attending Physician: Cosme Kirkland      Discharge Physician: Cosme Kirkland      Discharge Date: 3/4/2020 3:54 PM    Procedure(s) (LRB):  RADIOFREQUENCY ABLATION, C4-C5-C6 ME DIAL BRANCH 1 OF 2 need consent (Left)    Final Diagnosis: * No pre-op diagnosis entered *    Disposition: Home or self care    Patient Instructions:   Discharge Medication List as of 3/4/2020  3:19 PM      CONTINUE these medications which have NOT CHANGED    Details   amitriptyline (ELAVIL) 25 MG tablet Take 1 tablet (25 mg total) by mouth nightly as needed for Insomnia., Starting Mon 9/16/2019, Until Wed 10/16/2019, Normal      atenoloL-chlorthalidone (TENORETIC) 50-25 mg Tab TAKE 1/2 TABLET BY MOUTH ONCE A DAY, Normal      !! FLUoxetine 20 MG capsule TAKE ONE CAPSULE BY MOUTH ONCE DAILY, Normal      !! FLUoxetine 20 MG capsule Take 1 capsule (20 mg total) by mouth once daily., Starting Fri 4/12/2019, Normal      fluticasone (FLONASE) 50 mcg/actuation nasal spray Starting 11/15/2014, Until Discontinued, Historical Med      levonorgestrel (MIRENA) 20 mcg/24 hr (5 years) IUD 1 Intra Uterine Device by Intrauterine route once. for 1 dose, Starting Fri 8/10/2018, Normal      loratadine (CLARITIN) 10 mg tablet Take 10 mg by mouth once daily., Historical Med      meloxicam (MOBIC) 7.5 MG tablet Starting Wed 10/16/2019, Historical Med       !! - Potential duplicate medications found. Please discuss with provider.              Discharge Diagnosis: * No pre-op diagnosis entered *  Condition on Discharge: Stable with no complications to procedure   Diet on Discharge: Same as before.  Activity: as per instruction sheet.  Discharge to: Home with a responsible adult.  Follow up: 2-4 weeks       Please call my office or pager at [phone] if experienced any weakness or loss of sensation, fever > 101.5, pain uncontrolled with oral medications, persistent nausea/vomiting/or diarrhea,  redness or drainage from the incisions, or any other worrisome concerns. If physician on call was not reached or could not communicate with our office for any reason please go to the nearest emergency department

## 2020-03-04 NOTE — H&P
"HPI  Patient with known OA of the spine here today for MB RFA        Past Medical History:   Diagnosis Date    Cervical spondylosis 12/30/2019    Hypertension     Premenstrual symptom      Past Surgical History:   Procedure Laterality Date    EPIDURAL STEROID INJECTION N/A 10/2/2019    Procedure: INJECTION, STEROID, EPIDURAL, C7-T1;  Surgeon: Cosme Kirkland MD;  Location: [facility] PAIN MGT;  Service: Pain Management;  Laterality: N/A;    INJECTION OF ANESTHETIC AGENT AROUND NERVE Bilateral 1/8/2020    Procedure: BLOCK, NERVE C4,5,6;  Surgeon: Cosme Kirkland MD;  Location: [facility] PAIN MGT;  Service: Pain Management;  Laterality: Bilateral;  B/L MBB C4,5,6    INJECTION OF ANESTHETIC AGENT AROUND NERVE Bilateral 1/29/2020    Procedure: BLOCK, NERVE, Repeat C4-C5-C6 MEDIAL BRANCH;  Surgeon: Cosme Kirkland MD;  Location: [facility] PAIN MGT;  Service: Pain Management;  Laterality: Bilateral;    REFRACTIVE SURGERY  2008     Review of patient's allergies indicates:   Allergen Reactions    Amoxicillin Hives        PMHx, PSHx, Allergies, Medications reviewed in epic      ROS negative except pain complaints in HPI    OBJECTIVE:    /62 (BP Location: Right arm, Patient Position: Lying)   Pulse (!) 53   Temp 98.2 °F (36.8 °C) (Oral)   Resp 18   Ht 5' 1" (1.549 m)   Wt 72.6 kg (160 lb)   SpO2 100%   Breastfeeding? No   BMI 30.23 kg/m²     PHYSICAL EXAMINATION:    GENERAL: Well appearing, in no acute distress, alert and oriented x3.  PSYCH:  Mood and affect appropriate.  SKIN: Skin color, texture, turgor normal, no rashes or lesions.  CV: RRR with palpation of the radial artery.  PULM: No evidence of respiratory difficulty, symmetric chest rise. Clear to auscultation.  NEURO: Cranial nerves grossly intact.    Plan:    Proceed with procedure as planned    Cosme Kirkland  03/04/2020    "

## 2020-03-04 NOTE — PLAN OF CARE
PATIENT TOLERATED PROCEDURE WELL. PT COMPLAINS OF  6/10 PAIN. ASSISTED PATIENT UP FOR FIRST TIME. STEADY ON FEET AND DISCHARGE INSTRUCTIONS GIVEN.

## 2020-03-04 NOTE — OP NOTE
Patient Name: Sheila Costa  MRN: [id number]    INFORMED CONSENT: The procedure, risks, benefits and options were discussed with patient. There are no contraindications to the procedure. The patient expressed understanding and agreed to proceed. The personnel performing the procedure was discussed. I verify that I personally obtained Sheila's consent prior to the start of the procedure and the signed consent can be found on the patient's chart.    Procedure Date: 03/04/2020    Anesthesia: Topical    Pre Procedure diagnosis: * No pre-op diagnosis entered *  1. Osteoarthritis of cervical spine, unspecified spinal osteoarthritis complication status    2. Cervical spondylosis    3. Chronic pain      Post-Procedure diagnosis: SAME    Moderate Sedation: Yes - Fentanyl 100 mcg and Midazolam 2 mg    PROCEDURE: LEFT C4,5,6 FACET MEDIAL BRANCH NERVE RADIOFREQUENCY NEUROTOMY (prone)      DESCRIPTION OF PROCEDURE:The patient was brought to the procedure room.   After performing time out IV access was obtained prior to the procedure. The patient was positioned prone on the fluoroscopy table. Continuous hemodynamic monitoring was initiated including blood pressure and pulse oximetry. IV sedation was administered incrementally to allow the patient to remain comfortable and conversant throughout the procedure.  The area of the cervical spine was prepped chlorhexidine three times and draped into a sterile field.  Fluoroscopy was used to identify the location of the LEFT side medial branch nerves at the midpoint of the waist of the articular pillars at each respective level.  Skin anesthesia was achieved using 3 cc of Lidocaine 1% over the injection sites. A 20 gauge, 100mm (10mm active tip) curved RF needle was slowly inserted at each level using AP, lateral and oblique fluoroscopic imaging. Negative aspiration for blood or CSF was confirmed.  Sensory stimulation at 50Hz below 0.5V was achieved at every level. Motor  stimulation at 2Hz up to 1.5V did not cause any radicular symptoms at any level. Each level was anesthetized with 1.5 cc of lidocaine 1%.  Radiofrequency lesioning was performed for 90 seconds at 80 degrees in two different positions at each level. At each level, 1 cc of bupivacaine 0.25% and 2.5 mg of decadron was injected. The needles were removed and bleeding was nil.  A sterile dressing was applied. Sheila was taken back to the recovery room for further observation.     Stimulation Results:    C4 = Sensory positive @ 0.4, Motor negative @ 1.5  C5 = Sensory positive @ 0.4, Motor negative @ 1.5  C6 = Sensory positive @ 0.4, Motor negative @ 1.5    as above    Blood Loss: Nill  Specimen: None    Cosme Kirkland MD

## 2020-03-17 ENCOUNTER — TELEPHONE (OUTPATIENT)
Dept: PAIN MEDICINE | Facility: CLINIC | Age: 50
End: 2020-03-17

## 2020-03-31 ENCOUNTER — TELEPHONE (OUTPATIENT)
Dept: ADMINISTRATIVE | Facility: OTHER | Age: 50
End: 2020-03-31

## 2020-04-27 ENCOUNTER — TELEPHONE (OUTPATIENT)
Dept: PAIN MEDICINE | Facility: OTHER | Age: 50
End: 2020-04-27

## 2020-04-29 RX ORDER — ATENOLOL AND CHLORTHALIDONE TABLET 50; 25 MG/1; MG/1
TABLET ORAL
Qty: 45 TABLET | Refills: 0 | Status: SHIPPED | OUTPATIENT
Start: 2020-04-29 | End: 2020-07-27

## 2020-04-29 NOTE — TELEPHONE ENCOUNTER
Please schedule patient for Labs (CMP, LIPID, TSH, CBC without differential)    Please also check with your provider if any further labs need to be added and scheduled together.    Thanks !

## 2020-04-29 NOTE — PROGRESS NOTES
Refill Authorization Note    is requesting a refill authorization.    Brief assessment and rationale for refill: APPROVE: NEEDS LABS     Medication-related problems identified: Requires labs    Medication Therapy Plan: NTBS(cmp, lipid, tsh, cbc without differential per Dr. Ruiz 6/19/19) per [facility]; Approve 3 more months    Medication reconciliation completed: No                         Comments:      Requested Prescriptions   Pending Prescriptions Disp Refills    atenoloL-chlorthalidone (TENORETIC) 50-25 mg Tab [Pharmacy Med Name: ATENOLOL/CHLORTHALIDONE 50/25 TABS] 45 tablet 0     Sig: TAKE 1/2 TABLET BY MOUTH EVERY DAY       Cardiovascular: Beta Blocker + Diuretic Combos Failed - 4/25/2020 12:26 PM        Failed - K in normal range and within 180 days     Potassium   Date Value Ref Range Status   12/28/2018 3.4 (L) 3.5 - 5.1 mmol/L Final   03/23/2018 3.4 (L) 3.5 - 5.1 mmol/L Final   02/19/2016 3.5 3.5 - 5.1 mmol/L Final              Failed - Na is between 130 and 148 and within 180 days     Sodium   Date Value Ref Range Status   12/28/2018 139 136 - 145 mmol/L Final   03/23/2018 138 136 - 145 mmol/L Final   02/19/2016 137 136 - 145 mmol/L Final              Failed - Cr is 1.3 or below and within 180 days     Creatinine   Date Value Ref Range Status   12/28/2018 0.8 0.5 - 1.4 mg/dL Final   03/23/2018 0.7 0.5 - 1.4 mg/dL Final   02/19/2016 0.8 0.5 - 1.4 mg/dL Final              Failed - Ca in normal range and within 360 days     Calcium   Date Value Ref Range Status   12/28/2018 9.3 8.7 - 10.5 mg/dL Final   03/23/2018 9.1 8.7 - 10.5 mg/dL Final   02/19/2016 9.4 8.7 - 10.5 mg/dL Final              Failed - eGFR within 180 days     eGFR if non    Date Value Ref Range Status   12/28/2018 >60 >60 mL/min/1.73 m^2 Final     Comment:     Calculation used to obtain the estimated glomerular filtration  rate (eGFR) is the CKD-EPI equation.      03/23/2018 >60.0 >60 mL/min/1.73 m^2 Final      Comment:     Calculation used to obtain the estimated glomerular filtration  rate (eGFR) is the CKD-EPI equation.      02/19/2016 >60.0 >60 mL/min/1.73 m^2 Final     Comment:     Calculation used to obtain the estimated glomerular filtration  rate (eGFR) is the CKD-EPI equation. Since race is unknown   in our information system, the eGFR values for   -American and Non--American patients are given   for each creatinine result.       eGFR if    Date Value Ref Range Status   12/28/2018 >60 >60 mL/min/1.73 m^2 Final   03/23/2018 >60.0 >60 mL/min/1.73 m^2 Final   02/19/2016 >60.0 >60 mL/min/1.73 m^2 Final              Passed - Patient is at least 18 years old        Passed - Negative Pregnancy Status Check        Passed - Last BP in normal range within 360 days.     BP Readings from Last 3 Encounters:   03/04/20 122/71   01/29/20 (!) 146/78   01/08/20 139/68              Passed - Last Heart Rate in normal range within 360 days.     Pulse Readings from Last 3 Encounters:   03/04/20 64   01/29/20 63   01/08/20 72             Passed - Office visit in past 12 months or future 90 days.     Recent Outpatient Visits            4 months ago Cervical spondylosis    [facility] - Neurosurgery 7th Fl Olga Sparks MD    5 months ago Cervical spondylosis    [facility] [address] Tessa Guzmán, NP    6 months ago Cervical radiculopathy    [facility] [facility] [address] Tessa Guzmán NP    7 months ago DDD (degenerative disc disease), cervical    [facility] [facility] Cosme Kirkland MD    7 months ago Spondylosis of cervical region without myelopathy or radiculopathy    [facility] - Rheumatology Amrit Bailey MD                     Appointments  past 12m or future 3m with PCP    Date Provider   Last Visit   6/19/2019 Jorge Alberto Ruiz MD   Next Visit   Visit date not found Jorge Alberto Ruiz MD   ED visits in past 90 days: 0     Note composed:5:45 PM 04/29/2020

## 2020-05-01 ENCOUNTER — PATIENT MESSAGE (OUTPATIENT)
Dept: OBSTETRICS AND GYNECOLOGY | Facility: CLINIC | Age: 50
End: 2020-05-01

## 2020-05-01 ENCOUNTER — TELEPHONE (OUTPATIENT)
Dept: OBSTETRICS AND GYNECOLOGY | Facility: CLINIC | Age: 50
End: 2020-05-01

## 2020-05-01 DIAGNOSIS — Z12.31 ENCOUNTER FOR SCREENING MAMMOGRAM FOR MALIGNANT NEOPLASM OF BREAST: Primary | ICD-10-CM

## 2020-05-12 DIAGNOSIS — F32.81 PMDD (PREMENSTRUAL DYSPHORIC DISORDER): ICD-10-CM

## 2020-05-12 RX ORDER — FLUOXETINE HYDROCHLORIDE 20 MG/1
20 CAPSULE ORAL DAILY
Qty: 30 CAPSULE | Refills: 2 | Status: SHIPPED | OUTPATIENT
Start: 2020-05-12 | End: 2020-07-30

## 2020-05-19 ENCOUNTER — PATIENT MESSAGE (OUTPATIENT)
Dept: INTERNAL MEDICINE | Facility: CLINIC | Age: 50
End: 2020-05-19

## 2020-05-19 DIAGNOSIS — Z12.11 COLON CANCER SCREENING: Primary | ICD-10-CM

## 2020-05-20 ENCOUNTER — PATIENT MESSAGE (OUTPATIENT)
Dept: INTERNAL MEDICINE | Facility: CLINIC | Age: 50
End: 2020-05-20

## 2020-05-27 DIAGNOSIS — Z12.11 SPECIAL SCREENING FOR MALIGNANT NEOPLASMS, COLON: Primary | ICD-10-CM

## 2020-05-27 RX ORDER — SODIUM, POTASSIUM,MAG SULFATES 17.5-3.13G
1 SOLUTION, RECONSTITUTED, ORAL ORAL DAILY
Qty: 1 KIT | Refills: 0 | Status: SHIPPED | OUTPATIENT
Start: 2020-05-27 | End: 2020-05-29

## 2020-06-01 ENCOUNTER — LAB VISIT (OUTPATIENT)
Dept: FAMILY MEDICINE | Facility: CLINIC | Age: 50
End: 2020-06-01
Payer: COMMERCIAL

## 2020-06-01 ENCOUNTER — TELEPHONE (OUTPATIENT)
Dept: PAIN MEDICINE | Facility: CLINIC | Age: 50
End: 2020-06-01

## 2020-06-01 DIAGNOSIS — Z01.812 PRE-PROCEDURE LAB EXAM: ICD-10-CM

## 2020-06-01 DIAGNOSIS — Z01.812 PRE-PROCEDURE LAB EXAM: Primary | ICD-10-CM

## 2020-06-01 PROCEDURE — U0003 INFECTIOUS AGENT DETECTION BY NUCLEIC ACID (DNA OR RNA); SEVERE ACUTE RESPIRATORY SYNDROME CORONAVIRUS 2 (SARS-COV-2) (CORONAVIRUS DISEASE [COVID-19]), AMPLIFIED PROBE TECHNIQUE, MAKING USE OF HIGH THROUGHPUT TECHNOLOGIES AS DESCRIBED BY CMS-2020-01-R: HCPCS

## 2020-06-01 NOTE — TELEPHONE ENCOUNTER
Called patient to schedule covid19 test prior to 06/03/2020 procedure, no answer, message states mailbox full, unable to leave voicemail message

## 2020-06-01 NOTE — TELEPHONE ENCOUNTER
----- Message from Celena Thompson sent at 6/1/2020  8:39 AM CDT -----  Please re-order covid test today, patient rescheduled with Isael for 6/3/20.    Thanks Celena Hernández

## 2020-06-02 LAB — SARS-COV-2 RNA RESP QL NAA+PROBE: NOT DETECTED

## 2020-06-03 ENCOUNTER — HOSPITAL ENCOUNTER (OUTPATIENT)
Facility: OTHER | Age: 50
Discharge: HOME | End: 2020-06-03
Attending: ANESTHESIOLOGY | Admitting: ANESTHESIOLOGY
Payer: COMMERCIAL

## 2020-06-03 VITALS
HEIGHT: 61 IN | OXYGEN SATURATION: 99 % | WEIGHT: 160 LBS | SYSTOLIC BLOOD PRESSURE: 118 MMHG | HEART RATE: 62 BPM | TEMPERATURE: 98 F | DIASTOLIC BLOOD PRESSURE: 65 MMHG | RESPIRATION RATE: 16 BRPM | BODY MASS INDEX: 30.21 KG/M2

## 2020-06-03 DIAGNOSIS — M47.812 CERVICAL SPONDYLOSIS: ICD-10-CM

## 2020-06-03 DIAGNOSIS — M47.812 OSTEOARTHRITIS OF CERVICAL SPINE, UNSPECIFIED SPINAL OSTEOARTHRITIS COMPLICATION STATUS: Primary | ICD-10-CM

## 2020-06-03 DIAGNOSIS — G89.29 CHRONIC PAIN: ICD-10-CM

## 2020-06-03 PROCEDURE — 25000003 PHARM REV CODE 250: Performed by: ANESTHESIOLOGY

## 2020-06-03 PROCEDURE — 63600175 PHARM REV CODE 636 W HCPCS: Performed by: ANESTHESIOLOGY

## 2020-06-03 PROCEDURE — 64634 DESTROY C/TH FACET JNT ADDL: CPT | Mod: RT,,, | Performed by: ANESTHESIOLOGY

## 2020-06-03 PROCEDURE — 64634 DESTROY C/TH FACET JNT ADDL: CPT

## 2020-06-03 PROCEDURE — 64633 DESTROY CERV/THOR FACET JNT: CPT

## 2020-06-03 PROCEDURE — 64634 PR DESTROY C/TH FACET JNT ADDL: ICD-10-PCS | Mod: RT,,, | Performed by: ANESTHESIOLOGY

## 2020-06-03 PROCEDURE — 64633 DESTROY CERV/THOR FACET JNT: CPT | Mod: RT,,, | Performed by: ANESTHESIOLOGY

## 2020-06-03 PROCEDURE — 64633 PR DESTROY CERV/THOR FACET JNT: ICD-10-PCS | Mod: RT,,, | Performed by: ANESTHESIOLOGY

## 2020-06-03 RX ORDER — FENTANYL CITRATE 50 UG/ML
INJECTION, SOLUTION INTRAMUSCULAR; INTRAVENOUS
Status: DISCONTINUED | OUTPATIENT
Start: 2020-06-03 | End: 2020-06-03 | Stop reason: HOSPADM

## 2020-06-03 RX ORDER — SODIUM CHLORIDE 9 MG/ML
500 INJECTION, SOLUTION INTRAVENOUS CONTINUOUS
Status: DISCONTINUED | OUTPATIENT
Start: 2020-06-03 | End: 2020-06-03 | Stop reason: HOSPADM

## 2020-06-03 RX ORDER — DEXAMETHASONE SODIUM PHOSPHATE 4 MG/ML
INJECTION, SOLUTION INTRA-ARTICULAR; INTRALESIONAL; INTRAMUSCULAR; INTRAVENOUS; SOFT TISSUE
Status: DISCONTINUED | OUTPATIENT
Start: 2020-06-03 | End: 2020-06-03 | Stop reason: HOSPADM

## 2020-06-03 RX ORDER — LIDOCAINE HYDROCHLORIDE 10 MG/ML
INJECTION INFILTRATION; PERINEURAL
Status: DISCONTINUED | OUTPATIENT
Start: 2020-06-03 | End: 2020-06-03 | Stop reason: HOSPADM

## 2020-06-03 RX ORDER — MIDAZOLAM HYDROCHLORIDE 1 MG/ML
INJECTION INTRAMUSCULAR; INTRAVENOUS
Status: DISCONTINUED | OUTPATIENT
Start: 2020-06-03 | End: 2020-06-03 | Stop reason: HOSPADM

## 2020-06-03 RX ORDER — BUPIVACAINE HYDROCHLORIDE 2.5 MG/ML
INJECTION, SOLUTION EPIDURAL; INFILTRATION; INTRACAUDAL
Status: DISCONTINUED | OUTPATIENT
Start: 2020-06-03 | End: 2020-06-03 | Stop reason: HOSPADM

## 2020-06-03 NOTE — H&P
"HPI  Patient presenting for Procedure(s) (LRB):  RADIOFREQUENCY ABLATION, C4-C5-C6 MEDIAL BRANCH 2 OF 2 need consent (Right)     Patient on Anti-coagulation No    No health changes since previous encounter    Past Medical History:   Diagnosis Date    Cervical spondylosis 12/30/2019    Hypertension     Premenstrual symptom      Past Surgical History:   Procedure Laterality Date    EPIDURAL STEROID INJECTION N/A 10/2/2019    Procedure: INJECTION, STEROID, EPIDURAL, C7-T1;  Surgeon: Cosme Kirkland MD;  Location: [facility] PAIN MGT;  Service: Pain Management;  Laterality: N/A;    INJECTION OF ANESTHETIC AGENT AROUND NERVE Bilateral 1/8/2020    Procedure: BLOCK, NERVE C4,5,6;  Surgeon: Cosme Kirkland MD;  Location: [facility] PAIN MGT;  Service: Pain Management;  Laterality: Bilateral;  B/L MBB C4,5,6    INJECTION OF ANESTHETIC AGENT AROUND NERVE Bilateral 1/29/2020    Procedure: BLOCK, NERVE, Repeat C4-C5-C6 MEDIAL BRANCH;  Surgeon: Cosme Kirkland MD;  Location: [facility] PAIN MGT;  Service: Pain Management;  Laterality: Bilateral;    RADIOFREQUENCY ABLATION Left 3/4/2020    Procedure: RADIOFREQUENCY ABLATION, C4-C5-C6 ME DIAL BRANCH 1 OF 2 need consent;  Surgeon: Cosme Kirkland MD;  Location: [facility] PAIN MGT;  Service: Pain Management;  Laterality: Left;    REFRACTIVE SURGERY  2008     Review of patient's allergies indicates:   Allergen Reactions    Amoxicillin Hives      Current Facility-Administered Medications   Medication    0.9%  NaCl infusion       PMHx, PSHx, Allergies, Medications reviewed in epic    ROS negative except pain complaints in HPI    OBJECTIVE:    Pulse (!) 57   Temp 98.1 °F (36.7 °C) (Oral)   Resp 16   Ht 5' 1" (1.549 m)   Wt 72.6 kg (160 lb)   SpO2 99%   BMI 30.23 kg/m²     PHYSICAL EXAMINATION:    GENERAL: Well appearing, in no acute distress, alert and oriented x3.  PSYCH:  Mood and affect appropriate.  SKIN: Skin color, texture, turgor normal, no rashes or lesions which will " impact the procedure.  CV: RRR with palpation of the radial artery.  PULM: No evidence of respiratory difficulty, symmetric chest rise. Clear to auscultation.  NEURO: Cranial nerves grossly intact.    Plan:    Proceed with procedure as planned Procedure(s) (LRB):  RADIOFREQUENCY ABLATION, C4-C5-C6 MEDIAL BRANCH 2 OF 2 need consent (Right)    Paul Trejo MD  06/03/2020

## 2020-06-03 NOTE — OP NOTE
Patient Name: Sheila Costa  MRN: [id number]    INFORMED CONSENT: The procedure, risks, benefits and options were discussed with patient. There are no contraindications to the procedure. The patient expressed understanding and agreed to proceed. The personnel performing the procedure was discussed. I verify that I personally obtained Sheila's consent prior to the start of the procedure and the signed consent can be found on the patient's chart.    Procedure Date: 06/03/2020    Anesthesia: Topical    Pre Procedure diagnosis: Cervical spondylosis [M47.812]  1. Osteoarthritis of cervical spine, unspecified spinal osteoarthritis complication status    2. Cervical spondylosis    3. Chronic pain      Post-Procedure diagnosis: SAME    Moderate Sedation: Yes - Fentanyl 100 mcg and Midazolam 2 mg    PROCEDURE: RIGHT C4-5-6 FACET MEDIAL BRANCH NERVE RADIOFREQUENCY NEUROTOMY (prone)      DESCRIPTION OF PROCEDURE:The patient was brought to the procedure room.   After performing time out IV access was obtained prior to the procedure. The patient was positioned prone on the fluoroscopy table. Continuous hemodynamic monitoring was initiated including blood pressure and pulse oximetry. IV sedation was administered incrementally to allow the patient to remain comfortable and conversant throughout the procedure.  The area of the cervical spine was prepped chlorhexidine three times and draped into a sterile field.  Fluoroscopy was used to identify the location of the right side medial branch nerves at the midpoint of the waist of the articular pillars at each respective level.  Skin anesthesia was achieved using 3 cc of Lidocaine 1% over the injection sites. A 20 gauge, 100mm (10mm active tip) curved RF needle was slowly inserted at each level using AP, lateral and oblique fluoroscopic imaging. Negative aspiration for blood or CSF was confirmed.  Sensory stimulation at 50Hz below 0.5V was achieved at every level. Motor  stimulation at 2Hz up to 1.5V did not cause any radicular symptoms at any level. Each level was anesthetized with 1.5 cc of lidocaine 1%.  Radiofrequency lesioning was performed for 90 seconds at 80 degrees in two different positions at each level. At each level, 1 cc of bupivacaine 0.25% and 2.5 mg of decadron was injected. The needles were removed and bleeding was nil.  A sterile dressing was applied. Sheila was taken back to the recovery room for further observation.     Stimulation Results:  C4 = Sensory positive @ 0.5, Motor negative @ 1.5  C5 = Sensory positive @ 0.5, Motor negative @ 1.5  C6 = Sensory positive @ 0.5, Motor negative @ 1.5  as above    Blood Loss: Nill  Specimen: None    Paul Trejo MD

## 2020-06-03 NOTE — DISCHARGE SUMMARY
Discharge Note  Short Stay      SUMMARY     Admit Date: 6/3/2020    Attending Physician: Paul Trejo      Discharge Physician: Paul Trejo      Discharge Date: 6/3/2020 1:43 PM    Procedure(s) (LRB):  RADIOFREQUENCY ABLATION, C4-C5-C6 MEDIAL BRANCH 2 OF 2 need consent (Right)    Final Diagnosis: Cervical spondylosis [M47.812]    Disposition: Home or self care    Patient Instructions:   Current Discharge Medication List      CONTINUE these medications which have NOT CHANGED    Details   amitriptyline (ELAVIL) 25 MG tablet Take 1 tablet (25 mg total) by mouth nightly as needed for Insomnia.  Qty: 30 tablet, Refills: 2    Associated Diagnoses: DDD (degenerative disc disease), cervical; Cervical radiculopathy; Osteoarthritis of spine, unspecified spinal osteoarthritis complication status, unspecified spinal region      atenoloL-chlorthalidone (TENORETIC) 50-25 mg Tab TAKE 1/2 TABLET BY MOUTH EVERY DAY  Qty: 45 tablet, Refills: 0    Comments: Please delete all prior scripts with same name and strength including on holds.      !! FLUoxetine 20 MG capsule TAKE ONE CAPSULE BY MOUTH ONCE DAILY  Qty: 30 capsule, Refills: 0      !! FLUoxetine 20 MG capsule Take 1 capsule (20 mg total) by mouth once daily.  Qty: 30 capsule, Refills: 2    Comments: Pt needs to call office to schedule appointment  Associated Diagnoses: PMDD (premenstrual dysphoric disorder)      fluticasone (FLONASE) 50 mcg/actuation nasal spray Refills: 12      levonorgestrel (MIRENA) 20 mcg/24 hr (5 years) IUD 1 Intra Uterine Device by Intrauterine route once. for 1 dose  Qty: 1 Intra Uterine Device, Refills: 0    Associated Diagnoses: Contraception, device intrauterine      loratadine (CLARITIN) 10 mg tablet Take 10 mg by mouth once daily.      meloxicam (MOBIC) 7.5 MG tablet Refills: 2       !! - Potential duplicate medications found. Please discuss with provider.              Discharge Diagnosis: Cervical spondylosis [M47.812]  Condition on  Discharge: Stable with no complications to procedure   Diet on Discharge: Same as before.  Activity: as per instruction sheet.  Discharge to: Home with a responsible adult.  Follow up: 2-4 weeks       Please call my office or pager at [phone] if experienced any weakness or loss of sensation, fever > 101.5, pain uncontrolled with oral medications, persistent nausea/vomiting/or diarrhea, redness or drainage from the incisions, or any other worrisome concerns. If physician on call was not reached or could not communicate with our office for any reason please go to the nearest emergency department

## 2020-06-03 NOTE — DISCHARGE INSTRUCTIONS
Thank you for allowing us to care for you today. You may receive a survey about the care we provided. Your feedback is valuable and helps us provide excellent care throughout the community.     Home Care Instructions for Pain Management:    1. DIET:   You may resume your normal diet today.   2. BATHING:   You may shower with luke warm water. No tub baths or anything that will soak injection sites under water for the next 24 hours.  3. DRESSING:   You may remove your bandage today.   4. ACTIVITY LEVEL:   You may resume your normal activities 24 hrs after your procedure. Nothing strenuous today.  5. MEDICATIONS:   You may resume your normal medications today. To restart blood thinners, ask your doctor.  6. DRIVING    If you have received any sedatives by mouth today, you may not drive for 12 hours.    If you have received any sedation through your IV, you may not drive for 24 hrs.   7. SPECIAL INSTRUCTIONS:   No heat to the injection site for 24 hrs including, hot bath or shower, heating pad, moist heat, or hot tubs.    Use ice pack to injection site for any pain or discomfort.  Apply ice packs for 20 minute intervals as needed.    IF you have diabetes, be sure to monitor your blood sugar more closely. IF your injection contained steroids your blood sugar levels may become higher than normal.    If you are still having pain upon discharge:  Your pain may improve over the next 48 hours. The anesthetic (numbing medication) works immediately to 48 hours. IF your injection contained a steroid (anti-inflammatory medication), it takes approximately 3 days to start feeling relief and 7-10 days to see your greatest results from the medication. It is possible you may need subsequent injections. This would be discussed at your follow up appointment with pain management or your referring doctor.    Please call the PAIN MANAGEMENT office at [phone] or ON CALL pager at [phone] if you experienced any:   -Weakness or  loss of sensation  -Fever > 101.5  -Pain uncontrolled with oral medications   -Persistent nausea, vomiting, or diarrhea  -Redness or drainage from the injection sites, or any other worrisome concerns.   If physician on call was not reached or could not communicate with our office for any reason please go to the nearest emergency department.  Adult Procedural Sedation Instructions    Recovery After Procedural Sedation (Adult)  You have been given medicine by vein to make you sleep during your surgery. This may have included both a pain medicine and sleeping medicine. Most of the effects have worn off. But you may still have some drowsiness for the next 6 to 8 hours.  Home care  Follow these guidelines when you get home:  · For the next 8 hours, you should be watched by a responsible adult. This person should make sure your condition is not getting worse.  · Don't drink any alcohol for the next 24 hours.  · Don't drive, operate dangerous machinery, or make important business or personal decisions during the next 24 hours.  Note: Your healthcare provider may tell you not to take any medicine by mouth for pain or sleep in the next 4 hours. These medicines may react with the medicines you were given in the hospital. This could cause a much stronger response than usual.  Follow-up care  Follow up with your healthcare provider if you are not alert and back to your usual level of activity within 12 hours.  When to seek medical advice  Call your healthcare provider right away if any of these occur:  · Drowsiness gets worse  · Weakness or dizziness gets worse  · Repeated vomiting  · You can't be awakened   Date Last Reviewed: 10/18/2016  © 3312-2906 [employer] ([employer]). [address] [place] [address], [place] [address]. All rights reserved. This information is not intended as a substitute for professional medical care. Always follow your healthcare professional's instructions.

## 2020-06-05 ENCOUNTER — HOSPITAL ENCOUNTER (OUTPATIENT)
Dept: RADIOLOGY | Facility: OTHER | Age: 50
Discharge: HOME | End: 2020-06-05
Attending: OBSTETRICS & GYNECOLOGY
Payer: COMMERCIAL

## 2020-06-05 DIAGNOSIS — Z12.31 ENCOUNTER FOR SCREENING MAMMOGRAM FOR MALIGNANT NEOPLASM OF BREAST: ICD-10-CM

## 2020-06-05 PROCEDURE — 77067 MAMMO DIGITAL SCREENING BILAT WITH TOMOSYNTHESIS_CAD: ICD-10-PCS | Mod: 26,,, | Performed by: RADIOLOGY

## 2020-06-05 PROCEDURE — 77063 BREAST TOMOSYNTHESIS BI: CPT | Mod: 26,,, | Performed by: RADIOLOGY

## 2020-06-05 PROCEDURE — 77067 SCR MAMMO BI INCL CAD: CPT | Mod: 26,,, | Performed by: RADIOLOGY

## 2020-06-05 PROCEDURE — 77067 SCR MAMMO BI INCL CAD: CPT | Mod: TC

## 2020-06-05 PROCEDURE — 77063 MAMMO DIGITAL SCREENING BILAT WITH TOMOSYNTHESIS_CAD: ICD-10-PCS | Mod: 26,,, | Performed by: RADIOLOGY

## 2020-06-09 DIAGNOSIS — Z01.818 PRE-OP TESTING: ICD-10-CM

## 2020-06-16 ENCOUNTER — LAB VISIT (OUTPATIENT)
Dept: SPORTS MEDICINE | Facility: CLINIC | Age: 50
End: 2020-06-16
Payer: COMMERCIAL

## 2020-06-16 DIAGNOSIS — Z01.818 PRE-OP TESTING: ICD-10-CM

## 2020-06-16 PROCEDURE — U0003 INFECTIOUS AGENT DETECTION BY NUCLEIC ACID (DNA OR RNA); SEVERE ACUTE RESPIRATORY SYNDROME CORONAVIRUS 2 (SARS-COV-2) (CORONAVIRUS DISEASE [COVID-19]), AMPLIFIED PROBE TECHNIQUE, MAKING USE OF HIGH THROUGHPUT TECHNOLOGIES AS DESCRIBED BY CMS-2020-01-R: HCPCS

## 2020-06-16 NOTE — PROGRESS NOTES
COVID Screening Specimen Collected    POSTIVE for the following symptoms:  None    NEGATIVE for the following symptoms:  Fever  Cough  Shortness of breath  Difficulty breathing  GI symptoms such as diarrhea or nausea  Loss of taste  Loss of smell    Patient was given:  1.Instructions for Patients Awaiting COVID-19 Test Results  2. CDC: What to do if you are sick with coronavirus disease 2019 (COVID-19)

## 2020-06-17 LAB — SARS-COV-2 RNA RESP QL NAA+PROBE: NOT DETECTED

## 2020-06-18 ENCOUNTER — TELEPHONE (OUTPATIENT)
Dept: SURGERY | Facility: CLINIC | Age: 50
End: 2020-06-18

## 2020-06-18 ENCOUNTER — ANESTHESIA (OUTPATIENT)
Dept: ENDOSCOPY | Facility: HOSPITAL | Age: 50
End: 2020-06-18
Payer: COMMERCIAL

## 2020-06-18 ENCOUNTER — HOSPITAL ENCOUNTER (OUTPATIENT)
Facility: HOSPITAL | Age: 50
Discharge: HOME | End: 2020-06-18
Attending: INTERNAL MEDICINE | Admitting: INTERNAL MEDICINE
Payer: COMMERCIAL

## 2020-06-18 ENCOUNTER — ANESTHESIA EVENT (OUTPATIENT)
Dept: ENDOSCOPY | Facility: HOSPITAL | Age: 50
End: 2020-06-18
Payer: COMMERCIAL

## 2020-06-18 VITALS
OXYGEN SATURATION: 100 % | TEMPERATURE: 98 F | SYSTOLIC BLOOD PRESSURE: 128 MMHG | BODY MASS INDEX: 30.21 KG/M2 | WEIGHT: 160 LBS | RESPIRATION RATE: 20 BRPM | HEIGHT: 61 IN | HEART RATE: 66 BPM | DIASTOLIC BLOOD PRESSURE: 65 MMHG

## 2020-06-18 DIAGNOSIS — Z12.11 COLON CANCER SCREENING: ICD-10-CM

## 2020-06-18 LAB
B-HCG UR QL: NEGATIVE
CTP QC/QA: YES

## 2020-06-18 PROCEDURE — 25000003 PHARM REV CODE 250: Performed by: INTERNAL MEDICINE

## 2020-06-18 PROCEDURE — 88305 TISSUE EXAM BY PATHOLOGIST: ICD-10-PCS | Mod: 26,,, | Performed by: PATHOLOGY

## 2020-06-18 PROCEDURE — 45380 COLONOSCOPY AND BIOPSY: CPT | Mod: 33,,, | Performed by: INTERNAL MEDICINE

## 2020-06-18 PROCEDURE — 88305 TISSUE EXAM BY PATHOLOGIST: CPT | Mod: 26,,, | Performed by: PATHOLOGY

## 2020-06-18 PROCEDURE — 63600175 PHARM REV CODE 636 W HCPCS: Performed by: NURSE ANESTHETIST, CERTIFIED REGISTERED

## 2020-06-18 PROCEDURE — E9220 PRA ENDO ANESTHESIA: HCPCS | Mod: 33,,, | Performed by: NURSE ANESTHETIST, CERTIFIED REGISTERED

## 2020-06-18 PROCEDURE — 37000009 HC ANESTHESIA EA ADD 15 MINS: Performed by: INTERNAL MEDICINE

## 2020-06-18 PROCEDURE — 27201012 HC FORCEPS, HOT/COLD, DISP: Performed by: INTERNAL MEDICINE

## 2020-06-18 PROCEDURE — E9220 PRA ENDO ANESTHESIA: ICD-10-PCS | Mod: 33,,, | Performed by: NURSE ANESTHETIST, CERTIFIED REGISTERED

## 2020-06-18 PROCEDURE — 81025 URINE PREGNANCY TEST: CPT | Performed by: INTERNAL MEDICINE

## 2020-06-18 PROCEDURE — 88305 TISSUE EXAM BY PATHOLOGIST: CPT | Performed by: PATHOLOGY

## 2020-06-18 PROCEDURE — 45380 PR COLONOSCOPY,BIOPSY: ICD-10-PCS | Mod: 33,,, | Performed by: INTERNAL MEDICINE

## 2020-06-18 PROCEDURE — 25000003 PHARM REV CODE 250: Performed by: NURSE ANESTHETIST, CERTIFIED REGISTERED

## 2020-06-18 PROCEDURE — 37000008 HC ANESTHESIA 1ST 15 MINUTES: Performed by: INTERNAL MEDICINE

## 2020-06-18 PROCEDURE — 45380 COLONOSCOPY AND BIOPSY: CPT | Performed by: INTERNAL MEDICINE

## 2020-06-18 RX ORDER — SODIUM CHLORIDE 9 MG/ML
INJECTION, SOLUTION INTRAVENOUS CONTINUOUS PRN
Status: DISCONTINUED | OUTPATIENT
Start: 2020-06-18 | End: 2020-06-18

## 2020-06-18 RX ORDER — SODIUM CHLORIDE 9 MG/ML
INJECTION, SOLUTION INTRAVENOUS CONTINUOUS
Status: CANCELLED | OUTPATIENT
Start: 2020-06-18

## 2020-06-18 RX ORDER — PROPOFOL 10 MG/ML
VIAL (ML) INTRAVENOUS CONTINUOUS PRN
Status: DISCONTINUED | OUTPATIENT
Start: 2020-06-18 | End: 2020-06-18

## 2020-06-18 RX ORDER — LIDOCAINE HCL/PF 100 MG/5ML
SYRINGE (ML) INTRAVENOUS
Status: DISCONTINUED | OUTPATIENT
Start: 2020-06-18 | End: 2020-06-18

## 2020-06-18 RX ORDER — SODIUM CHLORIDE 9 MG/ML
INJECTION, SOLUTION INTRAVENOUS CONTINUOUS
Status: DISCONTINUED | OUTPATIENT
Start: 2020-06-18 | End: 2020-06-18 | Stop reason: HOSPADM

## 2020-06-18 RX ORDER — PROPOFOL 10 MG/ML
VIAL (ML) INTRAVENOUS
Status: DISCONTINUED | OUTPATIENT
Start: 2020-06-18 | End: 2020-06-18

## 2020-06-18 RX ADMIN — PROPOFOL 20 MG: 10 INJECTION, EMULSION INTRAVENOUS at 01:06

## 2020-06-18 RX ADMIN — Medication 80 MG: at 01:06

## 2020-06-18 RX ADMIN — SODIUM CHLORIDE: 0.9 INJECTION, SOLUTION INTRAVENOUS at 12:06

## 2020-06-18 RX ADMIN — PROPOFOL 80 MG: 10 INJECTION, EMULSION INTRAVENOUS at 01:06

## 2020-06-18 RX ADMIN — SODIUM CHLORIDE: 0.9 INJECTION, SOLUTION INTRAVENOUS at 01:06

## 2020-06-18 RX ADMIN — PROPOFOL 200 MCG/KG/MIN: 10 INJECTION, EMULSION INTRAVENOUS at 01:06

## 2020-06-18 NOTE — TRANSFER OF CARE
"Anesthesia Transfer of Care Note    Patient: Sheila Costa    Procedure(s) Performed: Procedure(s) (LRB):  COLONOSCOPY (N/A)    Patient location: PACU    Anesthesia Type: general    Transport from OR: Transported from OR on room air with adequate spontaneous ventilation    Post pain: adequate analgesia    Post assessment: no apparent anesthetic complications and tolerated procedure well    Post vital signs: stable    Level of consciousness: sedated    Nausea/Vomiting: no nausea/vomiting    Complications: none    Transfer of care protocol was followed      Last vitals:   Visit Vitals  BP (!) 130/59 (BP Location: Left arm, Patient Position: Lying)   Pulse 74   Temp 36.6 °C (97.9 °F) (Temporal)   Resp 20   Ht 5' 1" (1.549 m)   Wt 72.6 kg (160 lb)   LMP 04/18/2020   SpO2 99%   BMI 30.23 kg/m²     "

## 2020-06-18 NOTE — DISCHARGE INSTRUCTIONS
Colonoscopy     A camera attached to a flexible tube with a viewing lens is used to take video pictures.     Colonoscopy is a test to view the inside of your lower digestive tract (colon and rectum). Sometimes it can show the last part of the small intestine (ileum). During the test, small pieces of tissue may be removed for testing. This is called a biopsy. Small growths, such as polyps, may also be removed.   Why is colonoscopy done?  The test is done to help look for colon cancer. And it can help find the source of abdominal pain, bleeding, and changes in bowel habits. It may be needed once a year, depending on factors such as your:  · Age  · Health history  · Family health history  · Symptoms  · Results from any prior colonoscopy  Risks and possible complications  These include:  · Bleeding               · A puncture or tear in the colon   · Risks of anesthesia  · A cancer lesion not being seen  Getting ready   To prepare for the test:  · Talk with your healthcare provider about the risks of the test (see below). Also ask your healthcare provider about alternatives to the test.  · Tell your healthcare provider about any medicines you take. Also tell him or her about any health conditions you may have.  · Make sure your rectum and colon are empty for the test. Follow the diet and bowel prep instructions exactly. If you dont, the test may need to be rescheduled.  · Plan for a friend or family member to drive you home after the test.     Colonoscopy provides an inside view of the entire colon.     You may discuss the results with your doctor right away or at a future visit.  During the test   The test is usually done in the hospital on an outpatient basis. This means you go home the same day. The procedure takes about 30 minutes. During that time:  · You are given relaxing (sedating) medicine through an IV line. You may be drowsy, or fully asleep.  · The healthcare provider will first give you a physical exam to  check for anal and rectal problems.  · Then the anus is lubricated and the scope inserted.  · If you are awake, you may have a feeling similar to needing to have a bowel movement. You may also feel pressure as air is pumped into the colon. Its OK to pass gas during the procedure.  · Biopsy, polyp removal, or other treatments may be done during the test.  After the test   You may have gas right after the test. It can help to try to pass it to help prevent later bloating. Your healthcare provider may discuss the results with you right away. Or you may need to schedule a follow-up visit to talk about the results. After the test, you can go back to your normal eating and other activities. You may be tired from the sedation and need to rest for a few hours.  Date Last Reviewed: 11/1/2016 © 2000-2017 [employer]. [address] [place] [address], [place] [address]. All rights reserved. This information is not intended as a substitute for professional medical care. Always follow your healthcare professional's instructions.

## 2020-06-18 NOTE — [employer] PATIENT INSTRUCTIONS
Discharge Summary/Instructions after an Endoscopic Procedure  Patient Name: Sheila Hinds  Patient MRN: [id number]  Patient   YOB: 1970  Thursday, June 18, 2020  Eliot Hill MD  RESTRICTIONS:  During your procedure today, you received medications for sedation.  These   medications may affect your judgment, balance and coordination.  Therefore,   for 24 hours, you have the following restrictions:   - DO NOT drive a car, operate machinery, make legal/financial decisions,   sign important papers or drink alcohol.    ACTIVITY:  Today: no heavy lifting, straining or running due to procedural   sedation/anesthesia.  The following day: return to full activity including work.  DIET:  Eat and drink normally unless instructed otherwise.     TREATMENT FOR COMMON SIDE EFFECTS:  - Mild abdominal pain, nausea, belching, bloating or excessive gas:  rest,   eat lightly and use a heating pad.  - Sore Throat: treat with throat lozenges and/or gargle with warm salt   water.  - Because air was used during the procedure, expelling large amounts of air   from your rectum or belching is normal.  - If a bowel prep was taken, you may not have a bowel movement for 1-3 days.    This is normal.  SYMPTOMS TO WATCH FOR AND REPORT TO YOUR PHYSICIAN:  1. Abdominal pain or bloating, other than gas cramps.  2. Chest pain.  3. Back pain.  4. Signs of infection such as: chills or fever occurring within 24 hours   after the procedure.  5. Rectal bleeding, which would show as bright red, maroon, or black stools.   (A tablespoon of blood from the rectum is not serious, especially if   hemorrhoids are present.)  6. Vomiting.  7. Weakness or dizziness.  GO DIRECTLY TO THE NEAREST EMERGENCY ROOM IF YOU HAVE ANY OF THE FOLLOWING:      Difficulty breathing              Chills and/or fever over 101 F   Persistent vomiting and/or vomiting blood   Severe abdominal pain   Severe chest pain   Black, tarry stools   Bleeding- more than one  tablespoon   Any other symptom or condition that you feel may need urgent attention  Your doctor recommends these additional instructions:  If any biopsies were taken, your doctors clinic will contact you in 1 to 2   weeks with any results.  - Patient has a contact number available for emergencies.  The signs and   symptoms of potential delayed complications were discussed with the   patient.  Return to normal activities tomorrow.  Written discharge   instructions were provided to the patient.   - Discharge patient to home (ambulatory).   - Resume previous diet.   - Continue present medications.   - Await pathology results.   - Refer to a colo-rectal surgeon at appointment to be scheduled.   - Repeat colonoscopy (date not yet determined) for surveillance based on   pathology results.  For questions, problems or results please call your physician - Eliot Hill MD at Work:  [phone].  [facility], EMERGENCY ROOM PHONE NUMBER: [phone]  IF A COMPLICATION OR EMERGENCY SITUATION ARISES AND YOU ARE UNABLE TO REACH   YOUR PHYSICIAN - GO DIRECTLY TO THE EMERGENCY ROOM.  Eliot Hill MD  6/18/2020 1:39:07 PM  This report has been verified and signed electronically.  [employer]

## 2020-06-18 NOTE — ANESTHESIA PREPROCEDURE EVALUATION
06/18/2020  Sheila Costa is a 49 y.o., female with pmh listed below presenting for colonoscopy.    Past Medical History:   Diagnosis Date    Cervical spondylosis 12/30/2019    Hypertension     Premenstrual symptom      Past Surgical History:   Procedure Laterality Date    EPIDURAL STEROID INJECTION N/A 10/2/2019    Procedure: INJECTION, STEROID, EPIDURAL, C7-T1;  Surgeon: Cosme Kirkland MD;  Location: [facility];  Service: Pain Management;  Laterality: N/A;    INJECTION OF ANESTHETIC AGENT AROUND NERVE Bilateral 1/8/2020    Procedure: BLOCK, NERVE C4,5,6;  Surgeon: Cosme Kirkland MD;  Location: [facility];  Service: Pain Management;  Laterality: Bilateral;  B/L MBB C4,5,6    INJECTION OF ANESTHETIC AGENT AROUND NERVE Bilateral 1/29/2020    Procedure: BLOCK, NERVE, Repeat C4-C5-C6 MEDIAL BRANCH;  Surgeon: Cosme Kirkland MD;  Location: [facility];  Service: Pain Management;  Laterality: Bilateral;    RADIOFREQUENCY ABLATION Left 3/4/2020    Procedure: RADIOFREQUENCY ABLATION, C4-C5-C6 ME DIAL BRANCH 1 OF 2 need consent;  Surgeon: Cosme Kirkland MD;  Location: [facility];  Service: Pain Management;  Laterality: Left;    RADIOFREQUENCY ABLATION Right 6/3/2020    Procedure: RADIOFREQUENCY ABLATION, C4-C5-C6 MEDIAL BRANCH 2 OF 2 need consent;  Surgeon: Cosme Kirkland MD;  Location: [facility];  Service: Pain Management;  Laterality: Right;    REFRACTIVE SURGERY  2008         Anesthesia Evaluation    I have reviewed the Patient Summary Reports.    I have reviewed the Nursing Notes. I have reviewed the NPO Status.   I have reviewed the Medications.     Review of Systems  Anesthesia Hx:  No problems with previous Anesthesia   Denies Personal Hx of Anesthesia complications.   Social:  Non-Smoker    Cardiovascular:   Hypertension, well controlled     Pulmonary:  Pulmonary Normal    Hepatic/GI:   Bowel Prep.    Endocrine:  Endocrine Normal        Physical Exam  General:  Well nourished    Airway/Jaw/Neck:  Airway Findings: Mouth Opening: Normal Tongue: Normal  General Airway Assessment: Adult  Mallampati: II  Improves to I with phonation.  TM Distance: Normal, at least 6 cm  Jaw/Neck Findings:  Neck ROM: Normal ROM      Dental:  Dental Findings: In tact   Chest/Lungs:  Chest/Lungs Findings: Clear to auscultation, Normal Respiratory Rate     Heart/Vascular:  Heart Findings: Rate: Normal  Rhythm: Regular Rhythm  Sounds: Normal        Mental Status:  Mental Status Findings: Normal        Anesthesia Plan  Type of Anesthesia, risks & benefits discussed:  Anesthesia Type:  general, MAC  Patient's Preference:   Intra-op Monitoring Plan: standard ASA monitors  Intra-op Monitoring Plan Comments:   Post Op Pain Control Plan: multimodal analgesia, IV/PO Opioids PRN and per primary service following discharge from PACU  Post Op Pain Control Plan Comments:   Induction:   IV  Beta Blocker:  Patient is not currently on a Beta-Blocker (No further documentation required).       Informed Consent: Patient understands risks and agrees with Anesthesia plan.  Questions answered. Anesthesia consent signed with patient.  ASA Score: 2     Day of Surgery Review of History & Physical:    H&P update referred to the surgeon.         Ready For Surgery From Anesthesia Perspective.

## 2020-06-18 NOTE — ANESTHESIA POSTPROCEDURE EVALUATION
Anesthesia Post Evaluation    Patient: Sheila Costa    Procedure(s) Performed: Procedure(s) (LRB):  COLONOSCOPY (N/A)    Final Anesthesia Type: general    Patient location during evaluation: PACU  Patient participation: Yes- Able to Participate  Level of consciousness: awake and alert and oriented  Post-procedure vital signs: reviewed and stable  Pain management: adequate  Airway patency: patent    PONV status at discharge: No PONV  Anesthetic complications: no      Cardiovascular status: blood pressure returned to baseline  Respiratory status: unassisted  Hydration status: euvolemic  Follow-up not needed.          Vitals Value Taken Time   /65 06/18/20 1410   Temp 36.6 °C (97.9 °F) 06/18/20 1340   Pulse 66 06/18/20 1410   Resp 20 06/18/20 1410   SpO2 100 % 06/18/20 1410         Event Time   Out of Recovery 14:18:21         Pain/Yadira Score: Yadira Score: 10 (6/18/2020  2:10 PM)

## 2020-06-18 NOTE — PROGRESS NOTES
CRS Office Visit History and Physical    Referring Md:   Wes Raya Md  [address] [place] [address]    SUBJECTIVE:     Chief Complaint:  Cecal polyp    History of Present Illness:  The patient is new patient to this practice.   Course is as follows:  Patient is a 49 y.o. female presents with cecal polyp.  8/18/20:  Underwent screening colonoscopy.  30 mm pedunculated polyp found the appendiceal orifice.   - pathology: pending    She reports no symptoms from this polyp. This was her first colonoscopy. Denies blood per rectum or hematochezia. Denies changes in bowel habits. Denies abdominal pain or weight loss.  No past abdominal surgeries.  No family history of colon or rectal cancer.  She has regular bowel movements with no fecal incontinence.        Review of patient's allergies indicates:   Allergen Reactions    Amoxicillin Hives       Past Medical History:   Diagnosis Date    Cervical spondylosis 12/30/2019    Hypertension     Premenstrual symptom      Past Surgical History:   Procedure Laterality Date    COLONOSCOPY N/A 6/18/2020    Procedure: COLONOSCOPY;  Surgeon: Eliot Hill MD;  Location: [address];  Service: Endoscopy;  Laterality: N/A;  COVID [facility]    EPIDURAL STEROID INJECTION N/A 10/2/2019    Procedure: INJECTION, STEROID, EPIDURAL, C7-T1;  Surgeon: Cosme Kirkland MD;  Location: [facility] PAIN MGT;  Service: Pain Management;  Laterality: N/A;    INJECTION OF ANESTHETIC AGENT AROUND NERVE Bilateral 1/8/2020    Procedure: BLOCK, NERVE C4,5,6;  Surgeon: Cosme Kirkland MD;  Location: [facility] PAIN MGT;  Service: Pain Management;  Laterality: Bilateral;  B/L MBB C4,5,6    INJECTION OF ANESTHETIC AGENT AROUND NERVE Bilateral 1/29/2020    Procedure: BLOCK, NERVE, Repeat C4-C5-C6 MEDIAL BRANCH;  Surgeon: Cosme Kirkland MD;  Location: [facility] PAIN MGT;  Service: Pain Management;  Laterality: Bilateral;    RADIOFREQUENCY ABLATION Left 3/4/2020     Procedure: RADIOFREQUENCY ABLATION, C4-C5-C6 ME DIAL BRANCH 1 OF 2 need consent;  Surgeon: Cosme Kirkland MD;  Location: [facility];  Service: Pain Management;  Laterality: Left;    RADIOFREQUENCY ABLATION Right 6/3/2020    Procedure: RADIOFREQUENCY ABLATION, C4-C5-C6 MEDIAL BRANCH 2 OF 2 need consent;  Surgeon: Cosme Kirkland MD;  Location: [facility] PAIN T;  Service: Pain Management;  Laterality: Right;    REFRACTIVE SURGERY  2008     Family History   Problem Relation Age of Onset    Hypertension Mother     Thyroid disease Mother     Rheum arthritis Mother     Heart disease Father         Pacemaker    Cancer Maternal Grandmother     Cataracts Maternal Grandmother     Diabetes Maternal Grandmother     Hypertension Maternal Grandmother     Thyroid disease Maternal Grandmother     Breast cancer Maternal Grandmother     Cataracts Maternal Grandfather     Diabetes Maternal Grandfather     Hypertension Maternal Grandfather     Thyroid disease Maternal Grandfather     Lupus Cousin     Amblyopia Neg Hx     Blindness Neg Hx     Glaucoma Neg Hx     Macular degeneration Neg Hx     Retinal detachment Neg Hx     Strabismus Neg Hx     Stroke Neg Hx     Ovarian cancer Neg Hx     Colon cancer Neg Hx      Social History     Tobacco Use    Smoking status: Never Smoker   Substance Use Topics    Alcohol use: Yes     Comment: Rare, social    Drug use: No        Review of Systems:  Review of Systems   Constitutional: Negative.  Negative for chills, diaphoresis, fever, malaise/fatigue and weight loss.   HENT: Negative.  Negative for congestion.    Eyes: Negative.    Respiratory: Negative.  Negative for shortness of breath.    Cardiovascular: Negative.  Negative for chest pain and leg swelling.   Gastrointestinal: Negative.  Negative for abdominal pain, blood in stool, constipation, nausea and vomiting.   Genitourinary: Negative.  Negative for dysuria.   Musculoskeletal: Positive for neck pain.  "Negative for back pain and myalgias.   Skin: Negative.  Negative for rash.   Neurological: Negative.  Negative for dizziness and weakness.   Endo/Heme/Allergies: Negative.  Does not bruise/bleed easily.   Psychiatric/Behavioral: Negative.  Negative for depression.       OBJECTIVE:     Vital Signs (Most Recent)  BP (!) 140/71 (BP Location: Left arm, Patient Position: Sitting, BP Method: Large (Automatic))   Pulse 65   Ht 5' 1" (1.549 m)   Wt 75 kg (165 lb 5.5 oz)   BMI 31.24 kg/m²     Physical Exam:  General: Black or  female in no distress   Neuro: alert and oriented x 4.  Moves all extremities.     HEENT: no icterus.  Trachea midline  Respiratory: respirations are even and unlabored  Cardiac: regular rate  Abdomen: soft, nontender, nondistended  Extremities: Warm dry and intact  Skin: no rashes  Anorectal: deferred    Labs:  No recent labs    Imaging:  No recent imaging      ASSESSMENT/PLAN:     Sheila was seen today for colon polyps.    Diagnoses and all orders for this visit:    Cecal neoplasm  -     Case Request Operating Room: EXCISION, CECUM, LAPAROSCOPIC, colonoscopy to start, ERAS low  -     CBC auto differential; Future  -     Comprehensive metabolic panel; Future  -     CT Abdomen Pelvis With Contrast; Future    Cecal polyp  -     Case Request Operating Room: EXCISION, CECUM, LAPAROSCOPIC, colonoscopy to start, ERAS low  -     CBC auto differential; Future  -     Comprehensive metabolic panel; Future  -     CT Abdomen Pelvis With Contrast; Future        49 year old female with large polyp at appendiceal orifice noted on first screening colonoscopy. This appears as if it might be distant enough from the ileocecal valve to be treated with cecectomy for definitive diagnosis. Discussed surgical options with patient. We would plan to start with a colonoscopy to assess distance from ileocecal valve, and if complete removal with a laparoscopic stapler is possible without compromising the " valve, a stapled partial cecectomy would be performed. If this is not possible, we would perform a laparoscopic right colectomy. If the polyp ultimately harbors an invasive cancer, right colectomy may be subsequently indicated for this as well. Discussed this with the patient, who would like to proceed with surgery.   Will plan to start with a colonoscopy as long as her initial path is benign.    GREGG Guzmán MD, FACS  Staff Surgeon  Colon & Rectal Surgery

## 2020-06-18 NOTE — H&P
[facility]  Gastroenterology  H&P    Patient Name: Sheila Costa  MRN: [id number]  Admission Date: 6/18/2020  Code Status: No Order    Attending Provider: estela chand  Primary Care Physician: Jorge Alberto Ruiz MD  Principal Problem:<principal problem not specified>    Subjective:     History of Present Illness: screening    Past Medical History:   Diagnosis Date    Cervical spondylosis 12/30/2019    Hypertension     Premenstrual symptom        Past Surgical History:   Procedure Laterality Date    EPIDURAL STEROID INJECTION N/A 10/2/2019    Procedure: INJECTION, STEROID, EPIDURAL, C7-T1;  Surgeon: Cosme Kirkland MD;  Location: [facility] PAIN MGT;  Service: Pain Management;  Laterality: N/A;    INJECTION OF ANESTHETIC AGENT AROUND NERVE Bilateral 1/8/2020    Procedure: BLOCK, NERVE C4,5,6;  Surgeon: Cosme Kirkland MD;  Location: [facility] PAIN MGT;  Service: Pain Management;  Laterality: Bilateral;  B/L MBB C4,5,6    INJECTION OF ANESTHETIC AGENT AROUND NERVE Bilateral 1/29/2020    Procedure: BLOCK, NERVE, Repeat C4-C5-C6 MEDIAL BRANCH;  Surgeon: Cosme Kirkland MD;  Location: [facility] PAIN MGT;  Service: Pain Management;  Laterality: Bilateral;    RADIOFREQUENCY ABLATION Left 3/4/2020    Procedure: RADIOFREQUENCY ABLATION, C4-C5-C6 ME DIAL BRANCH 1 OF 2 need consent;  Surgeon: Cosme Kirkland MD;  Location: [facility] PAIN MGT;  Service: Pain Management;  Laterality: Left;    RADIOFREQUENCY ABLATION Right 6/3/2020    Procedure: RADIOFREQUENCY ABLATION, C4-C5-C6 MEDIAL BRANCH 2 OF 2 need consent;  Surgeon: Cosme Kirkland MD;  Location: [facility] PAIN MGT;  Service: Pain Management;  Laterality: Right;    REFRACTIVE SURGERY  2008       Review of patient's allergies indicates:   Allergen Reactions    Amoxicillin Hives     Family History     Problem Relation (Age of Onset)    Breast cancer Maternal Grandmother    Cancer Maternal Grandmother    Cataracts Maternal Grandmother, Maternal Grandfather     Diabetes Maternal Grandmother, Maternal Grandfather    Heart disease Father    Hypertension Mother, Maternal Grandmother, Maternal Grandfather    Lupus Cousin    Rheum arthritis Mother    Thyroid disease Mother, Maternal Grandmother, Maternal Grandfather        Tobacco Use    Smoking status: Never Smoker   Substance and Sexual Activity    Alcohol use: Yes     Comment: Rare, social    Drug use: No    Sexual activity: Yes     Partners: Male     Birth control/protection: Surgical     Review of Systems   Respiratory: Negative.    Cardiovascular: Negative.      Objective:     Vital Signs (Most Recent):  Temp: 98.6 °F (37 °C) (06/18/20 1253)  Pulse: 75 (06/18/20 1253)  Resp: 18 (06/18/20 1253)  BP: 139/71 (06/18/20 1253)  SpO2: 99 % (06/18/20 1253) Vital Signs (24h Range):  Temp:  [98.6 °F (37 °C)] 98.6 °F (37 °C)  Pulse:  [75] 75  Resp:  [18] 18  SpO2:  [99 %] 99 %  BP: (139)/(71) 139/71     Weight: 72.6 kg (160 lb) (06/18/20 1253)  Body mass index is 30.23 kg/m².    No intake or output data in the 24 hours ending 06/18/20 1301    Lines/Drains/Airways     Peripheral Intravenous Line                 Peripheral IV - Single Lumen 06/18/20 1242 20 G;1 in Right Hand less than 1 day                Physical Exam  Cardiovascular:      Rate and Rhythm: Normal rate and regular rhythm.   Pulmonary:      Effort: Pulmonary effort is normal.      Breath sounds: Normal breath sounds.         Significant Labs:      Significant Imaging:      Assessment/Plan:     There are no hospital problems to display for this patient.      Indication for procedure:    ASA:II  Airway normal  Malampati class:per anes    Personal and family history negative for anesthesia problems    Plan:colon  Anesthesia plan: general      Eliot Hill MD  Gastroenterology  [facility]

## 2020-06-19 ENCOUNTER — OFFICE VISIT (OUTPATIENT)
Dept: SURGERY | Facility: CLINIC | Age: 50
End: 2020-06-19
Payer: COMMERCIAL

## 2020-06-19 ENCOUNTER — TELEPHONE (OUTPATIENT)
Dept: SURGERY | Facility: CLINIC | Age: 50
End: 2020-06-19

## 2020-06-19 VITALS
BODY MASS INDEX: 31.23 KG/M2 | SYSTOLIC BLOOD PRESSURE: 140 MMHG | HEART RATE: 65 BPM | DIASTOLIC BLOOD PRESSURE: 71 MMHG | WEIGHT: 165.38 LBS | HEIGHT: 61 IN

## 2020-06-19 DIAGNOSIS — K63.5 CECAL POLYP: ICD-10-CM

## 2020-06-19 DIAGNOSIS — D49.0 CECAL NEOPLASM: Primary | ICD-10-CM

## 2020-06-19 DIAGNOSIS — Z01.818 PREOP TESTING: Primary | ICD-10-CM

## 2020-06-19 PROCEDURE — 99204 PR OFFICE/OUTPT VISIT, NEW, LEVL IV, 45-59 MIN: ICD-10-PCS | Mod: S$GLB,,, | Performed by: COLON & RECTAL SURGERY

## 2020-06-19 PROCEDURE — 99999 PR PBB SHADOW E&M-EST. PATIENT-LVL V: ICD-10-PCS | Mod: PBBFAC,,, | Performed by: COLON & RECTAL SURGERY

## 2020-06-19 PROCEDURE — 99204 OFFICE O/P NEW MOD 45 MIN: CPT | Mod: S$GLB,,, | Performed by: COLON & RECTAL SURGERY

## 2020-06-19 PROCEDURE — 3008F PR BODY MASS INDEX (BMI) DOCUMENTED: ICD-10-PCS | Mod: CPTII,S$GLB,, | Performed by: COLON & RECTAL SURGERY

## 2020-06-19 PROCEDURE — 3078F PR MOST RECENT DIASTOLIC BLOOD PRESSURE < 80 MM HG: ICD-10-PCS | Mod: CPTII,S$GLB,, | Performed by: COLON & RECTAL SURGERY

## 2020-06-19 PROCEDURE — 99999 PR PBB SHADOW E&M-EST. PATIENT-LVL V: CPT | Mod: PBBFAC,,, | Performed by: COLON & RECTAL SURGERY

## 2020-06-19 PROCEDURE — 3077F SYST BP >= 140 MM HG: CPT | Mod: CPTII,S$GLB,, | Performed by: COLON & RECTAL SURGERY

## 2020-06-19 PROCEDURE — 3077F PR MOST RECENT SYSTOLIC BLOOD PRESSURE >= 140 MM HG: ICD-10-PCS | Mod: CPTII,S$GLB,, | Performed by: COLON & RECTAL SURGERY

## 2020-06-19 PROCEDURE — 3008F BODY MASS INDEX DOCD: CPT | Mod: CPTII,S$GLB,, | Performed by: COLON & RECTAL SURGERY

## 2020-06-19 PROCEDURE — 3078F DIAST BP <80 MM HG: CPT | Mod: CPTII,S$GLB,, | Performed by: COLON & RECTAL SURGERY

## 2020-06-19 RX ORDER — NEOMYCIN SULFATE 500 MG/1
TABLET ORAL
Qty: 6 TABLET | Refills: 0 | Status: ON HOLD | OUTPATIENT
Start: 2020-06-19 | End: 2020-07-06 | Stop reason: HOSPADM

## 2020-06-19 RX ORDER — METRONIDAZOLE 500 MG/1
TABLET ORAL
Qty: 3 TABLET | Refills: 0 | Status: ON HOLD | OUTPATIENT
Start: 2020-06-19 | End: 2020-07-06 | Stop reason: HOSPADM

## 2020-06-19 RX ORDER — POLYETHYLENE GLYCOL 3350 17 G/17G
POWDER, FOR SOLUTION ORAL
Qty: 238 G | Refills: 0 | Status: ON HOLD | OUTPATIENT
Start: 2020-06-19 | End: 2020-07-06 | Stop reason: HOSPADM

## 2020-06-19 NOTE — LETTER
June 19, 2020      Wes Raya MD  [address] [place] [address]           [facility]  [address]  [facility] [address]  Phone: [phone]          Patient: Sheila Costa   MR Number: [id number]   YOB: 1970   Date of Visit: 6/19/2020       Dear Dr. Wes Raya:    Thank you for referring Sheila Costa to me for evaluation. Attached you will find relevant portions of my assessment and plan of care.    If you have questions, please do not hesitate to call me. I look forward to following Sheila Costa along with you.    Sincerely,    Kang Guzmán MD    Enclosure  CC:  No Recipients    If you would like to receive this communication electronically, please contact [email or web address] or [phone] to request more information on [facility] Link access.    For providers and/or their staff who would like to refer a patient to [facility], please contact us through our one-stop-shop provider referral line, [facility], at [phone].    If you feel you have received this communication in error or would no longer like to receive these types of communications, please e-mail [email or web address]

## 2020-06-23 LAB
FINAL PATHOLOGIC DIAGNOSIS: NORMAL
GROSS: NORMAL

## 2020-06-26 ENCOUNTER — HOSPITAL ENCOUNTER (OUTPATIENT)
Dept: RADIOLOGY | Facility: HOSPITAL | Age: 50
Discharge: HOME | End: 2020-06-26
Attending: COLON & RECTAL SURGERY
Payer: COMMERCIAL

## 2020-06-26 DIAGNOSIS — K63.5 CECAL POLYP: ICD-10-CM

## 2020-06-26 DIAGNOSIS — D49.0 CECAL NEOPLASM: ICD-10-CM

## 2020-06-26 PROCEDURE — 74177 CT ABD & PELVIS W/CONTRAST: CPT | Mod: 26,,, | Performed by: RADIOLOGY

## 2020-06-26 PROCEDURE — 74177 CT ABDOMEN PELVIS WITH CONTRAST: ICD-10-PCS | Mod: 26,,, | Performed by: RADIOLOGY

## 2020-06-26 PROCEDURE — 25500020 PHARM REV CODE 255: Performed by: COLON & RECTAL SURGERY

## 2020-06-26 PROCEDURE — 74177 CT ABD & PELVIS W/CONTRAST: CPT | Mod: TC

## 2020-06-26 RX ADMIN — IOHEXOL 75 ML: 350 INJECTION, SOLUTION INTRAVENOUS at 08:06

## 2020-06-26 RX ADMIN — IOHEXOL 15 ML: 300 INJECTION, SOLUTION INTRAVENOUS at 08:06

## 2020-07-02 ENCOUNTER — TELEPHONE (OUTPATIENT)
Dept: SURGERY | Facility: CLINIC | Age: 50
End: 2020-07-02

## 2020-07-02 NOTE — TELEPHONE ENCOUNTER
Spoke with patient and instructed her to come to the 2nd floor day of surgery center on Monday at 5:00am.

## 2020-07-02 NOTE — TELEPHONE ENCOUNTER
----- Message from Homero Macias sent at 7/2/2020 10:12 AM CDT -----  Contact: pt  Pt is calling to receive surgery arrival time, surgery scheduled for 7/6        Please contact pt: [phone]

## 2020-07-02 NOTE — TELEPHONE ENCOUNTER
----- Message from Homero Macias sent at 7/2/2020 10:12 AM CDT -----  Contact: pt  Pt is calling to receive surgery arrival time, surgery scheduled for 7/6        Please contact pt: [phone]

## 2020-07-05 ENCOUNTER — ANESTHESIA EVENT (OUTPATIENT)
Dept: SURGERY | Facility: HOSPITAL | Age: 50
DRG: 331 | End: 2020-07-05
Payer: COMMERCIAL

## 2020-07-05 NOTE — ANESTHESIA PREPROCEDURE EVALUATION
[facility]  Anesthesia Pre-Operative Evaluation         Patient Name: Sheila Costa  YOB: 1970  MRN: [id number]    SUBJECTIVE:     Pre-operative evaluation for Procedure(s) (LRB):  EXCISION, CECUM, LAPAROSCOPIC, colonoscopy to startCRISTHIAN low (N/A)     07/05/2020    Sheila Costa is a 49 y.o. female w/ a significant PMHx of HTN, cervical spondylosis and cecal polyp who presents for the above procedure.    Prev airway: None documented.       Patient Active Problem List   Diagnosis    Essential hypertension    Premenstrual symptom    Overweight    Sinus congestion    Travel advice encounter    Cervical pain (neck)    Upper extremity pain    Poor posture    Chronic pain    Cervical spondylosis    Colon cancer screening       Review of patient's allergies indicates:   Allergen Reactions    Amoxicillin Hives       Current Inpatient Medications:      No current facility-administered medications on file prior to encounter.      Current Outpatient Medications on File Prior to Encounter   Medication Sig Dispense Refill    atenoloL-chlorthalidone (TENORETIC) 50-25 mg Tab TAKE 1/2 TABLET BY MOUTH EVERY DAY (Patient taking differently: nightly. ) 45 tablet 0    FLUoxetine 20 MG capsule Take 1 capsule (20 mg total) by mouth once daily. (Patient taking differently: Take 20 mg by mouth every evening. ) 30 capsule 2    gabapentin (NEURONTIN) 300 MG capsule Take 1 capsule (300 mg total) by mouth 3 (three) times daily. (Patient taking differently: Take 300 mg by mouth every evening. ) 90 capsule 11    loratadine (CLARITIN) 10 mg tablet Take 10 mg by mouth every morning.       amitriptyline (ELAVIL) 25 MG tablet Take 1 tablet (25 mg total) by mouth nightly as needed for Insomnia. 30 tablet 2    levonorgestrel (MIRENA) 20 mcg/24 hr (5 years) IUD 1 Intra Uterine Device by Intrauterine route once. for 1 dose 1 Intra Uterine Device 0    meloxicam (MOBIC) 7.5 MG  tablet   2       Past Surgical History:   Procedure Laterality Date    COLONOSCOPY N/A 6/18/2020    Procedure: COLONOSCOPY;  Surgeon: Eliot Hill MD;  Location: [facility] ENDO (4TH FLR);  Service: Endoscopy;  Laterality: N/A;  [facility]16/20-[facility]    EPIDURAL STEROID INJECTION N/A 10/2/2019    Procedure: INJECTION, STEROID, EPIDURAL, C7-T1;  Surgeon: Cosme Kirkland MD;  Location: [facility] PAIN MGT;  Service: Pain Management;  Laterality: N/A;    INJECTION OF ANESTHETIC AGENT AROUND NERVE Bilateral 1/8/2020    Procedure: BLOCK, NERVE C4,5,6;  Surgeon: Cosme Kirkland MD;  Location: [facility] PAIN MGT;  Service: Pain Management;  Laterality: Bilateral;  B/L MBB C4,5,6    INJECTION OF ANESTHETIC AGENT AROUND NERVE Bilateral 1/29/2020    Procedure: BLOCK, NERVE, Repeat C4-C5-C6 MEDIAL BRANCH;  Surgeon: Cosme Kirkalnd MD;  Location: [facility] PAIN MGT;  Service: Pain Management;  Laterality: Bilateral;    RADIOFREQUENCY ABLATION Left 3/4/2020    Procedure: RADIOFREQUENCY ABLATION, C4-C5-C6 ME DIAL BRANCH 1 OF 2 need consent;  Surgeon: Cosme Kirkland MD;  Location: [facility] PAIN MGT;  Service: Pain Management;  Laterality: Left;    RADIOFREQUENCY ABLATION Right 6/3/2020    Procedure: RADIOFREQUENCY ABLATION, C4-C5-C6 MEDIAL BRANCH 2 OF 2 need consent;  Surgeon: Cosme Kirkland MD;  Location: [facility] PAIN MGT;  Service: Pain Management;  Laterality: Right;    REFRACTIVE SURGERY  2008       Social History     Socioeconomic History    Marital status:      Spouse name: Not on file    Number of children: Not on file    Years of education: Not on file    Highest education level: Not on file   Occupational History     Employer: [employer]   Social Needs    Financial resource strain: Not on file    Food insecurity     Worry: Not on file     Inability: Not on file    Transportation needs     Medical: Not on file     Non-medical: Not on file   Tobacco Use    Smoking status: Never Smoker    Substance and Sexual Activity    Alcohol use: Yes     Comment: Rare, social    Drug use: No    Sexual activity: Yes     Partners: Male     Birth control/protection: Surgical   Lifestyle    Physical activity     Days per week: Not on file     Minutes per session: Not on file    Stress: Not on file   Relationships    Social connections     Talks on phone: Not on file     Gets together: Not on file     Attends Synagogue service: Not on file     Active member of club or organization: Not on file     Attends meetings of clubs or organizations: Not on file     Relationship status: Not on file   Other Topics Concern    Not on file   Social History Narrative    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    CMP: No results for input(s): NA, K, CL, CO2, BUN, CREATININE, GLU, MG, PHOS, CALCIUM, ALBUMIN, PROT, ALKPHOS, ALT, AST, BILITOT in the last 72 hours.    INR:  No results for input(s): PT, INR, PROTIME, APTT in the last 72 hours.    Diagnostic Studies: No relevant studies.    EKG: No recent studies available.    2D ECHO:  No results found for this or any previous visit.      ASSESSMENT/PLAN:                                                                                                                   07/05/2020  Sheila Costa is a 49 y.o., female.    Anesthesia Evaluation    I have reviewed the Patient Summary Reports.    I have reviewed the Nursing Notes. I have reviewed the NPO Status.   I have reviewed the Medications.     Review of Systems  Anesthesia Hx:  No problems with previous Anesthesia   Denies Personal Hx of Anesthesia complications.   Social:  Non-Smoker  Denies Tobacco Use.   Cardiovascular:   Hypertension, poorly controlled Denies MI.  Denies CAD.    Denies CABG/stent.  Denies Dysrhythmias.  no hyperlipidemia   Denies Coronary Artery Disease.  Hypertension, Essential Hypertension , stage 2 hypertension, systolic 160 - 170 or diastolic 100 - 109, Poorly Controlled on RX    Pulmonary:   Denies COPD.  Denies Asthma.  Denies Shortness of breath.  Denies Recent URI.  Denies Asthma.  Denies Chronic Obstructive Pulmonary Disease (COPD).    Renal/:   Denies Chronic Renal Disease.   Denies Kidney Function/Disease    Hepatic/GI:   Bowel Prep. Denies GERD. Denies Liver Disease.  Denies Esophageal / Stomach Disorders  Denies Liver Disease    Neurological:   Denies TIA. Denies CVA. Denies Seizures.  Denies Seizure Disorder  Denies CVA - Cerebrovasular Accident  Denies TIA - Transient Ischemic Attack    Endocrine:   Denies Diabetes. Denies Hypothyroidism.  Denies Thyroid Disease  Denies Metabolic Disorders      Physical Exam  General:  Well nourished    Airway/Jaw/Neck:  Airway Findings: Mouth Opening: Normal Tongue: Normal  General Airway Assessment: Adult  Mallampati: III  Improves to II with phonation.  TM Distance: 4 - 6 cm  Jaw/Neck Findings:  Neck ROM: Normal ROM      Dental:  Dental Findings: In tact   Chest/Lungs:  Chest/Lungs Findings: Clear to auscultation, Normal Respiratory Rate     Heart/Vascular:  Heart Findings: Rate: Normal  Rhythm: Regular Rhythm  Sounds: Normal  Heart murmur: negative       Mental Status:  Mental Status Findings:  Cooperative, Alert and Oriented         Anesthesia Plan  Type of Anesthesia, risks & benefits discussed:  Anesthesia Type:  general  Patient's Preference:   Intra-op Monitoring Plan: standard ASA monitors  Intra-op Monitoring Plan Comments:   Post Op Pain Control Plan: multimodal analgesia, IV/PO Opioids PRN and per primary service following discharge from PACU  Post Op Pain Control Plan Comments:   Induction:   IV  Beta Blocker:  Patient is on a Beta-Blocker and has received one dose within the past 24 hours (No further documentation required).       Informed Consent: Patient understands  risks and agrees with Anesthesia plan.  Questions answered. Anesthesia consent signed with patient.  ASA Score: 2     Day of Surgery Review of History & Physical:    H&P update referred to the surgeon.         Ready For Surgery From Anesthesia Perspective.

## 2020-07-06 ENCOUNTER — HOSPITAL ENCOUNTER (OUTPATIENT)
Facility: HOSPITAL | Age: 50
Discharge: HOME | DRG: 331 | End: 2020-07-06
Attending: COLON & RECTAL SURGERY | Admitting: COLON & RECTAL SURGERY
Payer: COMMERCIAL

## 2020-07-06 ENCOUNTER — ANESTHESIA (OUTPATIENT)
Dept: SURGERY | Facility: HOSPITAL | Age: 50
DRG: 331 | End: 2020-07-06
Payer: COMMERCIAL

## 2020-07-06 VITALS
OXYGEN SATURATION: 94 % | HEART RATE: 72 BPM | HEIGHT: 61 IN | RESPIRATION RATE: 18 BRPM | BODY MASS INDEX: 30.21 KG/M2 | TEMPERATURE: 98 F | SYSTOLIC BLOOD PRESSURE: 99 MMHG | WEIGHT: 160 LBS | DIASTOLIC BLOOD PRESSURE: 57 MMHG

## 2020-07-06 DIAGNOSIS — K63.5 CECAL POLYP: Primary | ICD-10-CM

## 2020-07-06 LAB
ABO + RH BLD: NORMAL
BLD GP AB SCN CELLS X3 SERPL QL: NORMAL
SARS-COV-2 RDRP RESP QL NAA+PROBE: NEGATIVE

## 2020-07-06 PROCEDURE — 63600175 PHARM REV CODE 636 W HCPCS: Performed by: STUDENT IN AN ORGANIZED HEALTH CARE EDUCATION/TRAINING PROGRAM

## 2020-07-06 PROCEDURE — 63600175 PHARM REV CODE 636 W HCPCS: Performed by: COLON & RECTAL SURGERY

## 2020-07-06 PROCEDURE — 25000003 PHARM REV CODE 250: Performed by: SURGERY

## 2020-07-06 PROCEDURE — 36000710: Performed by: COLON & RECTAL SURGERY

## 2020-07-06 PROCEDURE — 96375 TX/PRO/DX INJ NEW DRUG ADDON: CPT | Mod: 59

## 2020-07-06 PROCEDURE — 88305 TISSUE EXAM BY PATHOLOGIST: CPT | Performed by: PATHOLOGY

## 2020-07-06 PROCEDURE — 71000015 HC POSTOP RECOV 1ST HR: Performed by: COLON & RECTAL SURGERY

## 2020-07-06 PROCEDURE — 88305 TISSUE EXAM BY PATHOLOGIST: CPT | Mod: 26,,, | Performed by: PATHOLOGY

## 2020-07-06 PROCEDURE — 96374 THER/PROPH/DIAG INJ IV PUSH: CPT | Mod: 59

## 2020-07-06 PROCEDURE — U0002 COVID-19 LAB TEST NON-CDC: HCPCS

## 2020-07-06 PROCEDURE — 63600175 PHARM REV CODE 636 W HCPCS: Performed by: NURSE PRACTITIONER

## 2020-07-06 PROCEDURE — G0378 HOSPITAL OBSERVATION PER HR: HCPCS

## 2020-07-06 PROCEDURE — D9220A PRA ANESTHESIA: Mod: ,,, | Performed by: ANESTHESIOLOGY

## 2020-07-06 PROCEDURE — 27100025 HC TUBING, SET FLUID WARMER: Performed by: ANESTHESIOLOGY

## 2020-07-06 PROCEDURE — 94799 UNLISTED PULMONARY SVC/PX: CPT

## 2020-07-06 PROCEDURE — 37000009 HC ANESTHESIA EA ADD 15 MINS: Performed by: COLON & RECTAL SURGERY

## 2020-07-06 PROCEDURE — 27000221 HC OXYGEN, UP TO 24 HOURS

## 2020-07-06 PROCEDURE — S0030 INJECTION, METRONIDAZOLE: HCPCS | Performed by: NURSE PRACTITIONER

## 2020-07-06 PROCEDURE — C9290 INJ, BUPIVACAINE LIPOSOME: HCPCS | Performed by: COLON & RECTAL SURGERY

## 2020-07-06 PROCEDURE — 44979 PR LAPAROSCOPY, APPENDECTOMY W/OTHER MAJOR PROCEDURE: ICD-10-PCS | Mod: ,,, | Performed by: COLON & RECTAL SURGERY

## 2020-07-06 PROCEDURE — 71000016 HC POSTOP RECOV ADDL HR: Performed by: COLON & RECTAL SURGERY

## 2020-07-06 PROCEDURE — D9220A PRA ANESTHESIA: ICD-10-PCS | Mod: ,,, | Performed by: ANESTHESIOLOGY

## 2020-07-06 PROCEDURE — 12000002 HC ACUTE/MED SURGE SEMI-PRIVATE ROOM

## 2020-07-06 PROCEDURE — 86900 BLOOD TYPING SEROLOGIC ABO: CPT

## 2020-07-06 PROCEDURE — 27201423 OPTIME MED/SURG SUP & DEVICES STERILE SUPPLY: Performed by: COLON & RECTAL SURGERY

## 2020-07-06 PROCEDURE — 88305 TISSUE EXAM BY PATHOLOGIST: ICD-10-PCS | Mod: 26,,, | Performed by: PATHOLOGY

## 2020-07-06 PROCEDURE — 63600175 PHARM REV CODE 636 W HCPCS: Performed by: ANESTHESIOLOGY

## 2020-07-06 PROCEDURE — 44979 UNLISTED LAPS PX APPENDIX: CPT | Mod: ,,, | Performed by: COLON & RECTAL SURGERY

## 2020-07-06 PROCEDURE — 44202 LAP ENTERECTOMY: CPT | Mod: ,,, | Performed by: COLON & RECTAL SURGERY

## 2020-07-06 PROCEDURE — 25000003 PHARM REV CODE 250: Performed by: NURSE PRACTITIONER

## 2020-07-06 PROCEDURE — 96372 THER/PROPH/DIAG INJ SC/IM: CPT | Mod: 59

## 2020-07-06 PROCEDURE — 37000008 HC ANESTHESIA 1ST 15 MINUTES: Performed by: COLON & RECTAL SURGERY

## 2020-07-06 PROCEDURE — 25000003 PHARM REV CODE 250: Performed by: STUDENT IN AN ORGANIZED HEALTH CARE EDUCATION/TRAINING PROGRAM

## 2020-07-06 PROCEDURE — 36000711: Performed by: COLON & RECTAL SURGERY

## 2020-07-06 PROCEDURE — 63600175 PHARM REV CODE 636 W HCPCS

## 2020-07-06 PROCEDURE — 44202 PR LAP,SURG,ENTERECTOMY,RESECT & ANAST: ICD-10-PCS | Mod: ,,, | Performed by: COLON & RECTAL SURGERY

## 2020-07-06 PROCEDURE — 71000033 HC RECOVERY, INTIAL HOUR: Performed by: COLON & RECTAL SURGERY

## 2020-07-06 RX ORDER — PHENYLEPHRINE HYDROCHLORIDE 10 MG/ML
INJECTION INTRAVENOUS
Status: DISCONTINUED | OUTPATIENT
Start: 2020-07-06 | End: 2020-07-06

## 2020-07-06 RX ORDER — KETOROLAC TROMETHAMINE 30 MG/ML
30 INJECTION, SOLUTION INTRAMUSCULAR; INTRAVENOUS ONCE
Status: DISCONTINUED | OUTPATIENT
Start: 2020-07-06 | End: 2020-07-06

## 2020-07-06 RX ORDER — LIDOCAINE HYDROCHLORIDE ANHYDROUS AND DEXTROSE MONOHYDRATE .8; 5 G/100ML; G/100ML
INJECTION, SOLUTION INTRAVENOUS CONTINUOUS PRN
Status: DISCONTINUED | OUTPATIENT
Start: 2020-07-06 | End: 2020-07-06

## 2020-07-06 RX ORDER — ACETAMINOPHEN 650 MG/20.3ML
975 LIQUID ORAL
Status: COMPLETED | OUTPATIENT
Start: 2020-07-06 | End: 2020-07-06

## 2020-07-06 RX ORDER — FENTANYL CITRATE 50 UG/ML
INJECTION, SOLUTION INTRAMUSCULAR; INTRAVENOUS
Status: COMPLETED
Start: 2020-07-06 | End: 2020-07-06

## 2020-07-06 RX ORDER — ACETAMINOPHEN 325 MG/1
325 TABLET ORAL EVERY 6 HOURS
Qty: 40 TABLET | Refills: 0 | Status: SHIPPED | OUTPATIENT
Start: 2020-07-06 | End: 2021-03-14

## 2020-07-06 RX ORDER — FENTANYL CITRATE 50 UG/ML
25 INJECTION, SOLUTION INTRAMUSCULAR; INTRAVENOUS EVERY 5 MIN PRN
Status: COMPLETED | OUTPATIENT
Start: 2020-07-06 | End: 2020-07-06

## 2020-07-06 RX ORDER — ROCURONIUM BROMIDE 10 MG/ML
INJECTION, SOLUTION INTRAVENOUS
Status: DISCONTINUED | OUTPATIENT
Start: 2020-07-06 | End: 2020-07-06

## 2020-07-06 RX ORDER — ACETAMINOPHEN 10 MG/ML
1000 INJECTION, SOLUTION INTRAVENOUS ONCE
Status: DISCONTINUED | OUTPATIENT
Start: 2020-07-06 | End: 2020-07-06

## 2020-07-06 RX ORDER — ONDANSETRON 2 MG/ML
4 INJECTION INTRAMUSCULAR; INTRAVENOUS ONCE AS NEEDED
Status: DISCONTINUED | OUTPATIENT
Start: 2020-07-06 | End: 2020-07-06 | Stop reason: HOSPADM

## 2020-07-06 RX ORDER — FENTANYL CITRATE 50 UG/ML
INJECTION, SOLUTION INTRAMUSCULAR; INTRAVENOUS
Status: DISCONTINUED | OUTPATIENT
Start: 2020-07-06 | End: 2020-07-06

## 2020-07-06 RX ORDER — LIDOCAINE HYDROCHLORIDE 10 MG/ML
1 INJECTION, SOLUTION EPIDURAL; INFILTRATION; INTRACAUDAL; PERINEURAL
Status: DISCONTINUED | OUTPATIENT
Start: 2020-07-06 | End: 2022-01-26

## 2020-07-06 RX ORDER — LIDOCAINE HYDROCHLORIDE 20 MG/ML
INJECTION, SOLUTION EPIDURAL; INFILTRATION; INTRACAUDAL; PERINEURAL
Status: DISCONTINUED | OUTPATIENT
Start: 2020-07-06 | End: 2020-07-06

## 2020-07-06 RX ORDER — SODIUM CHLORIDE 9 MG/ML
INJECTION, SOLUTION INTRAVENOUS
Status: COMPLETED | OUTPATIENT
Start: 2020-07-06 | End: 2020-07-06

## 2020-07-06 RX ORDER — HEPARIN SODIUM 5000 [USP'U]/ML
5000 INJECTION, SOLUTION INTRAVENOUS; SUBCUTANEOUS EVERY 8 HOURS
Status: COMPLETED | OUTPATIENT
Start: 2020-07-06 | End: 2020-07-06

## 2020-07-06 RX ORDER — GABAPENTIN 300 MG/1
300 CAPSULE ORAL 3 TIMES DAILY
Status: DISCONTINUED | OUTPATIENT
Start: 2020-07-06 | End: 2022-01-26

## 2020-07-06 RX ORDER — ACETAMINOPHEN 10 MG/ML
1000 INJECTION, SOLUTION INTRAVENOUS ONCE
Status: COMPLETED | OUTPATIENT
Start: 2020-07-06 | End: 2020-07-06

## 2020-07-06 RX ORDER — KETOROLAC TROMETHAMINE 30 MG/ML
INJECTION, SOLUTION INTRAMUSCULAR; INTRAVENOUS
Status: DISCONTINUED
Start: 2020-07-06 | End: 2020-07-06 | Stop reason: HOSPADM

## 2020-07-06 RX ORDER — ALVIMOPAN 12 MG/1
12 CAPSULE ORAL
Status: COMPLETED | OUTPATIENT
Start: 2020-07-06 | End: 2020-07-06

## 2020-07-06 RX ORDER — METRONIDAZOLE 500 MG/100ML
500 INJECTION, SOLUTION INTRAVENOUS
Status: COMPLETED | OUTPATIENT
Start: 2020-07-06 | End: 2020-07-06

## 2020-07-06 RX ORDER — MUPIROCIN 20 MG/G
1 OINTMENT TOPICAL
Status: COMPLETED | OUTPATIENT
Start: 2020-07-06 | End: 2020-07-06

## 2020-07-06 RX ORDER — OXYCODONE HYDROCHLORIDE 5 MG/1
5 TABLET ORAL ONCE AS NEEDED
Status: COMPLETED | OUTPATIENT
Start: 2020-07-06 | End: 2020-07-06

## 2020-07-06 RX ORDER — PROPOFOL 10 MG/ML
VIAL (ML) INTRAVENOUS
Status: DISCONTINUED | OUTPATIENT
Start: 2020-07-06 | End: 2020-07-06

## 2020-07-06 RX ORDER — GABAPENTIN 300 MG/1
300 CAPSULE ORAL
Status: COMPLETED | OUTPATIENT
Start: 2020-07-06 | End: 2020-07-06

## 2020-07-06 RX ORDER — OXYCODONE HYDROCHLORIDE 5 MG/1
5 TABLET ORAL EVERY 4 HOURS PRN
Qty: 20 TABLET | Refills: 0 | Status: SHIPPED | OUTPATIENT
Start: 2020-07-06 | End: 2020-08-24 | Stop reason: ALTCHOICE

## 2020-07-06 RX ORDER — HYDROMORPHONE HYDROCHLORIDE 1 MG/ML
0.4 INJECTION, SOLUTION INTRAMUSCULAR; INTRAVENOUS; SUBCUTANEOUS EVERY 5 MIN PRN
Status: DISCONTINUED | OUTPATIENT
Start: 2020-07-06 | End: 2020-07-06 | Stop reason: HOSPADM

## 2020-07-06 RX ORDER — ONDANSETRON 2 MG/ML
INJECTION INTRAMUSCULAR; INTRAVENOUS
Status: DISCONTINUED | OUTPATIENT
Start: 2020-07-06 | End: 2020-07-06

## 2020-07-06 RX ORDER — SODIUM CHLORIDE 0.9 % (FLUSH) 0.9 %
2 SYRINGE (ML) INJECTION
Status: DISCONTINUED | OUTPATIENT
Start: 2020-07-06 | End: 2020-07-06 | Stop reason: HOSPADM

## 2020-07-06 RX ORDER — KETAMINE HCL IN 0.9 % NACL 50 MG/5 ML
SYRINGE (ML) INTRAVENOUS
Status: DISCONTINUED | OUTPATIENT
Start: 2020-07-06 | End: 2020-07-06

## 2020-07-06 RX ORDER — KETOROLAC TROMETHAMINE 30 MG/ML
30 INJECTION, SOLUTION INTRAMUSCULAR; INTRAVENOUS ONCE
Status: COMPLETED | OUTPATIENT
Start: 2020-07-06 | End: 2020-07-06

## 2020-07-06 RX ORDER — SODIUM CHLORIDE 9 MG/ML
INJECTION, SOLUTION INTRAVENOUS CONTINUOUS
Status: DISCONTINUED | OUTPATIENT
Start: 2020-07-06 | End: 2022-01-26

## 2020-07-06 RX ORDER — TRIPROLIDINE/PSEUDOEPHEDRINE 2.5MG-60MG
600 TABLET ORAL
Status: COMPLETED | OUTPATIENT
Start: 2020-07-06 | End: 2020-07-06

## 2020-07-06 RX ORDER — MIDAZOLAM HYDROCHLORIDE 1 MG/ML
INJECTION, SOLUTION INTRAMUSCULAR; INTRAVENOUS
Status: DISCONTINUED | OUTPATIENT
Start: 2020-07-06 | End: 2020-07-06

## 2020-07-06 RX ADMIN — IBUPROFEN 600 MG: 100 SUSPENSION ORAL at 05:07

## 2020-07-06 RX ADMIN — MIDAZOLAM HYDROCHLORIDE 2 MG: 1 INJECTION, SOLUTION INTRAMUSCULAR; INTRAVENOUS at 07:07

## 2020-07-06 RX ADMIN — FENTANYL CITRATE 100 MCG: 50 INJECTION, SOLUTION INTRAMUSCULAR; INTRAVENOUS at 07:07

## 2020-07-06 RX ADMIN — PHENYLEPHRINE HYDROCHLORIDE 100 MCG: 10 INJECTION INTRAVENOUS at 07:07

## 2020-07-06 RX ADMIN — FENTANYL CITRATE 29 MCG: 50 INJECTION, SOLUTION INTRAMUSCULAR; INTRAVENOUS at 08:07

## 2020-07-06 RX ADMIN — FENTANYL CITRATE 25 MCG: 50 INJECTION INTRAMUSCULAR; INTRAVENOUS at 09:07

## 2020-07-06 RX ADMIN — HYDROMORPHONE HYDROCHLORIDE 0.4 MG: 1 INJECTION, SOLUTION INTRAMUSCULAR; INTRAVENOUS; SUBCUTANEOUS at 10:07

## 2020-07-06 RX ADMIN — ACETAMINOPHEN 976.6 MG: 650 SOLUTION ORAL at 05:07

## 2020-07-06 RX ADMIN — GENTAMICIN SULFATE 288.5 MG: 40 INJECTION, SOLUTION INTRAMUSCULAR; INTRAVENOUS at 07:07

## 2020-07-06 RX ADMIN — ROCURONIUM BROMIDE 50 MG: 10 INJECTION, SOLUTION INTRAVENOUS at 07:07

## 2020-07-06 RX ADMIN — HEPARIN SODIUM 5000 UNITS: 5000 INJECTION INTRAVENOUS; SUBCUTANEOUS at 06:07

## 2020-07-06 RX ADMIN — METRONIDAZOLE 500 MG: 500 SOLUTION INTRAVENOUS at 07:07

## 2020-07-06 RX ADMIN — SODIUM CHLORIDE: 0.9 INJECTION, SOLUTION INTRAVENOUS at 07:07

## 2020-07-06 RX ADMIN — KETOROLAC TROMETHAMINE 30 MG: 30 INJECTION, SOLUTION INTRAMUSCULAR at 09:07

## 2020-07-06 RX ADMIN — Medication 20 MG: at 07:07

## 2020-07-06 RX ADMIN — ONDANSETRON 4 MG: 2 INJECTION, SOLUTION INTRAMUSCULAR; INTRAVENOUS at 07:07

## 2020-07-06 RX ADMIN — HYDROMORPHONE HYDROCHLORIDE 0.2 MG: 1 INJECTION, SOLUTION INTRAMUSCULAR; INTRAVENOUS; SUBCUTANEOUS at 10:07

## 2020-07-06 RX ADMIN — LIDOCAINE HYDROCHLORIDE 0.03 MG/KG/MIN: 8 INJECTION, SOLUTION INTRAVENOUS at 07:07

## 2020-07-06 RX ADMIN — LIDOCAINE HYDROCHLORIDE 70 MG: 20 INJECTION, SOLUTION EPIDURAL; INFILTRATION; INTRACAUDAL; PERINEURAL at 07:07

## 2020-07-06 RX ADMIN — GABAPENTIN 300 MG: 300 CAPSULE ORAL at 05:07

## 2020-07-06 RX ADMIN — ALVIMOPAN 12 MG: 12 CAPSULE ORAL at 05:07

## 2020-07-06 RX ADMIN — MUPIROCIN 1 G: 20 OINTMENT TOPICAL at 05:07

## 2020-07-06 RX ADMIN — OXYCODONE HYDROCHLORIDE 5 MG: 5 TABLET ORAL at 09:07

## 2020-07-06 RX ADMIN — PROPOFOL 140 MG: 10 INJECTION, EMULSION INTRAVENOUS at 07:07

## 2020-07-06 RX ADMIN — ACETAMINOPHEN 1000 MG: 10 INJECTION, SOLUTION INTRAVENOUS at 10:07

## 2020-07-06 NOTE — ANESTHESIA POSTPROCEDURE EVALUATION
Anesthesia Post Evaluation    Patient: Sheila Costa    Procedure(s) Performed: Procedure(s) (LRB):  EXCISION, CECUM, LAPAROSCOPIC, colonoscopy to start, ERAS low (N/A)  COLONOSCOPY (N/A)    Final Anesthesia Type: general    Patient location during evaluation: PACU  Patient participation: Yes- Able to Participate  Level of consciousness: awake and alert and oriented  Post-procedure vital signs: reviewed and stable  Pain management: pain needs to be addressed  Airway patency: patent    PONV status at discharge: No PONV  Anesthetic complications: no      Cardiovascular status: hemodynamically stable and blood pressure returned to baseline  Respiratory status: room air, spontaneous ventilation and unassisted  Hydration status: euvolemic  Follow-up not needed.          Vitals Value Taken Time   /59 07/06/20 1031   Temp 36.4 °C (97.5 °F) 07/06/20 0850   Pulse 59 07/06/20 1036   Resp 13 07/06/20 1036   SpO2 96 % 07/06/20 1036   Vitals shown include unvalidated device data.      Event Time   Out of Recovery 09:45:00         Pain/Yadira Score: Pain Rating Prior to Med Admin: 7 (7/6/2020 10:22 AM)  Pain Rating Post Med Admin: 7 (7/6/2020 10:00 AM)  Yadira Score: 10 (7/6/2020 10:00 AM)

## 2020-07-06 NOTE — ANESTHESIA PROCEDURE NOTES
Intubation  Performed by: Rupert Rudolph MD  Authorized by: Cuauhtemoc Andersen MD     Intubation:     Induction:  Intravenous    Intubated:  Postinduction    Mask Ventilation:  Easy mask    Attempts:  1    Attempted By:  Resident anesthesiologist    Method of Intubation:  Direct    Blade:  Pal 2    Laryngeal View Grade: Grade I - full view of chords      Difficult Airway Encountered?: No      Complications:  None    Airway Device:  Oral endotracheal tube    Airway Device Size:  7.0    Style/Cuff Inflation:  Cuffed    Inflation Amount (mL):  6    Tube secured:  21    Secured at:  The lips    Placement Verified By:  Capnometry    Complicating Factors:  None    Findings Post-Intubation:  BS equal bilateral and atraumatic/condition of teeth unchanged

## 2020-07-06 NOTE — TRANSFER OF CARE
"Anesthesia Transfer of Care Note    Patient: Sheila Costa    Procedure(s) Performed: Procedure(s) (LRB):  EXCISION, CECUM, LAPAROSCOPIC, colonoscopy to start, ERAS low (N/A)  COLONOSCOPY (N/A)    Patient location: PACU    Transport from OR: Transported from OR on 6-10 L/min O2 by face mask with adequate spontaneous ventilation    Post pain: adequate analgesia    Post assessment: no apparent anesthetic complications    Post vital signs: stable    Level of consciousness: awake and alert    Nausea/Vomiting: no nausea/vomiting    Complications: none    Transfer of care protocol was followed      Last vitals:   Visit Vitals  BP (!) 171/70   Temp 36.5 °C (97.7 °F) (Oral)   Resp 16   Ht 5' 1" (1.549 m)   Wt 72.6 kg (160 lb)   LMP  (LMP Unknown)   SpO2 100%   Breastfeeding No   BMI 30.23 kg/m²     "

## 2020-07-10 LAB
FINAL PATHOLOGIC DIAGNOSIS: NORMAL
GROSS: NORMAL

## 2020-07-16 ENCOUNTER — PATIENT OUTREACH (OUTPATIENT)
Dept: ADMINISTRATIVE | Facility: HOSPITAL | Age: 50
End: 2020-07-16

## 2020-07-16 DIAGNOSIS — E78.5 HYPERLIPIDEMIA, UNSPECIFIED HYPERLIPIDEMIA TYPE: Primary | ICD-10-CM

## 2020-07-23 NOTE — PROGRESS NOTES
"  CRS Office Post Operative Visit    SUBJECTIVE:     Chief Complaint: followup from surgery.     Procedure:   7/6/20: lap cecectomy for large villous polyp     Pathology:   Villous adenoma (1.8 cm), completely resected, measuring approximately 1 cm from the nearest stapled margin  All surgical margins negative for dysplasia  Unremarkable appendix  Four reactive lymph nodes  Negative for malignancy    Current Status:  Doing Well.  Having regular bowel movements.  Pain is resolved.  No issues with her incisions.  Pleased with result.    Review of Systems:  Review of Systems   Constitutional: Negative for chills, diaphoresis, fever, malaise/fatigue and weight loss.   HENT: Negative for congestion.    Respiratory: Negative for shortness of breath.    Cardiovascular: Negative for chest pain and leg swelling.   Gastrointestinal: Negative for abdominal pain, blood in stool, constipation, nausea and vomiting.   Genitourinary: Negative for dysuria.   Musculoskeletal: Negative for back pain and myalgias.   Skin: Negative for rash.   Neurological: Negative for dizziness and weakness.   Endo/Heme/Allergies: Does not bruise/bleed easily.   Psychiatric/Behavioral: Negative for depression.       OBJECTIVE:     Vital Signs (Most Recent)  BP (!) 142/71 (BP Location: Left arm, Patient Position: Sitting, BP Method: Large (Automatic))   Pulse 69   Ht 5' 1" (1.549 m)   Wt 73.1 kg (161 lb 2.5 oz)   LMP  (LMP Unknown)   BMI 30.45 kg/m²     Physical Exam:  General: Black or  female in no distress   Respiratory: respirations are even and unlabored  Cardiac: regular rate  Abdomen:  Abdominal incision is healing well.  No evidence of hernia  Extremities: Warm dry and intact  Anorectal:  Deferred    ASSESSMENT/PLAN:     Sheila was seen today for post-op evaluation.    Diagnoses and all orders for this visit:    Tubulovillous adenoma of colon  -     Case request GI: COLONOSCOPY with Arielle in July 2021        49 y.o. " female post op from laparoscopic cecectomy for large tubulovillous adenoma on 07/06/2020.  Overall doing very well.  Repeat colonoscopy in 1 year for surveillance.  Follow up pflavio Guzmán MD, FACS  Staff Surgeon  Colon & Rectal Surgery

## 2020-07-24 ENCOUNTER — OFFICE VISIT (OUTPATIENT)
Dept: SURGERY | Facility: CLINIC | Age: 50
End: 2020-07-24
Payer: COMMERCIAL

## 2020-07-24 VITALS
HEART RATE: 69 BPM | WEIGHT: 161.19 LBS | HEIGHT: 61 IN | BODY MASS INDEX: 30.43 KG/M2 | SYSTOLIC BLOOD PRESSURE: 142 MMHG | DIASTOLIC BLOOD PRESSURE: 71 MMHG

## 2020-07-24 DIAGNOSIS — D12.6 TUBULOVILLOUS ADENOMA OF COLON: Primary | ICD-10-CM

## 2020-07-24 PROCEDURE — 99999 PR PBB SHADOW E&M-EST. PATIENT-LVL III: CPT | Mod: PBBFAC,,, | Performed by: COLON & RECTAL SURGERY

## 2020-07-24 PROCEDURE — 99024 POSTOP FOLLOW-UP VISIT: CPT | Mod: S$GLB,,, | Performed by: COLON & RECTAL SURGERY

## 2020-07-24 PROCEDURE — 99024 PR POST-OP FOLLOW-UP VISIT: ICD-10-PCS | Mod: S$GLB,,, | Performed by: COLON & RECTAL SURGERY

## 2020-07-24 PROCEDURE — 99999 PR PBB SHADOW E&M-EST. PATIENT-LVL III: ICD-10-PCS | Mod: PBBFAC,,, | Performed by: COLON & RECTAL SURGERY

## 2020-07-25 DIAGNOSIS — I10 ESSENTIAL HYPERTENSION: Primary | ICD-10-CM

## 2020-07-25 NOTE — TELEPHONE ENCOUNTER
No new care gaps identified.  Powered by [employer]. Reference number: [id number]. 7/25/2020 1:04:48 PM CDT

## 2020-07-27 RX ORDER — ATENOLOL AND CHLORTHALIDONE TABLET 50; 25 MG/1; MG/1
TABLET ORAL
Qty: 45 TABLET | Refills: 0 | Status: SHIPPED | OUTPATIENT
Start: 2020-07-27 | End: 2020-11-02 | Stop reason: SDUPTHER

## 2020-07-27 NOTE — PROGRESS NOTES
Refill Authorization Note    is requesting a refill authorization.    Brief assessment and rationale for refill: APPROVE: prr          Medication Therapy Plan: FOVS: approve 3 more    Medication reconciliation completed: No                         Comments:      Orders Placed This Encounter    atenoloL-chlorthalidone (TENORETIC) 50-25 mg Tab      Requested Prescriptions   Signed Prescriptions Disp Refills    atenoloL-chlorthalidone (TENORETIC) 50-25 mg Tab 45 tablet 0     Sig: Take 1/2 tablet by mouth once daily       There is no refill protocol information for this order         Appointments  past 12m or future 3m with PCP    Date Provider   Last Visit   6/19/2019 Jorge Alberto Ruiz MD   Next Visit   8/24/2020 Jorge Alberto Ruiz MD   ED visits in past 90 days: 0     Note composed:4:42 PM 07/27/2020

## 2020-08-10 ENCOUNTER — PATIENT OUTREACH (OUTPATIENT)
Dept: ADMINISTRATIVE | Facility: HOSPITAL | Age: 50
End: 2020-08-10

## 2020-08-24 ENCOUNTER — IMMUNIZATION (OUTPATIENT)
Dept: PHARMACY | Facility: CLINIC | Age: 50
End: 2020-08-24
Payer: COMMERCIAL

## 2020-08-24 ENCOUNTER — OFFICE VISIT (OUTPATIENT)
Dept: INTERNAL MEDICINE | Facility: CLINIC | Age: 50
End: 2020-08-24
Payer: COMMERCIAL

## 2020-08-24 VITALS
OXYGEN SATURATION: 99 % | DIASTOLIC BLOOD PRESSURE: 74 MMHG | BODY MASS INDEX: 31.15 KG/M2 | SYSTOLIC BLOOD PRESSURE: 124 MMHG | WEIGHT: 165 LBS | HEIGHT: 61 IN | HEART RATE: 63 BPM

## 2020-08-24 DIAGNOSIS — E87.6 HYPOKALEMIA: ICD-10-CM

## 2020-08-24 DIAGNOSIS — R20.2 PARESTHESIA OF HAND, BILATERAL: ICD-10-CM

## 2020-08-24 DIAGNOSIS — Z71.85 VACCINE COUNSELING: ICD-10-CM

## 2020-08-24 DIAGNOSIS — Z00.00 ANNUAL PHYSICAL EXAM: Primary | ICD-10-CM

## 2020-08-24 DIAGNOSIS — M79.602 PAIN IN BOTH UPPER EXTREMITIES: ICD-10-CM

## 2020-08-24 DIAGNOSIS — M79.601 PAIN IN BOTH UPPER EXTREMITIES: ICD-10-CM

## 2020-08-24 DIAGNOSIS — I10 ESSENTIAL HYPERTENSION: ICD-10-CM

## 2020-08-24 PROCEDURE — 3008F BODY MASS INDEX DOCD: CPT | Mod: CPTII,S$GLB,, | Performed by: INTERNAL MEDICINE

## 2020-08-24 PROCEDURE — 99999 PR PBB SHADOW E&M-EST. PATIENT-LVL IV: ICD-10-PCS | Mod: PBBFAC,,, | Performed by: INTERNAL MEDICINE

## 2020-08-24 PROCEDURE — 3078F DIAST BP <80 MM HG: CPT | Mod: CPTII,S$GLB,, | Performed by: INTERNAL MEDICINE

## 2020-08-24 PROCEDURE — 99999 PR PBB SHADOW E&M-EST. PATIENT-LVL IV: CPT | Mod: PBBFAC,,, | Performed by: INTERNAL MEDICINE

## 2020-08-24 PROCEDURE — 3074F SYST BP LT 130 MM HG: CPT | Mod: CPTII,S$GLB,, | Performed by: INTERNAL MEDICINE

## 2020-08-24 PROCEDURE — 99396 PREV VISIT EST AGE 40-64: CPT | Mod: S$GLB,,, | Performed by: INTERNAL MEDICINE

## 2020-08-24 PROCEDURE — 99396 PR PREVENTIVE VISIT,EST,40-64: ICD-10-PCS | Mod: S$GLB,,, | Performed by: INTERNAL MEDICINE

## 2020-08-24 PROCEDURE — 3008F PR BODY MASS INDEX (BMI) DOCUMENTED: ICD-10-PCS | Mod: CPTII,S$GLB,, | Performed by: INTERNAL MEDICINE

## 2020-08-24 PROCEDURE — 3074F PR MOST RECENT SYSTOLIC BLOOD PRESSURE < 130 MM HG: ICD-10-PCS | Mod: CPTII,S$GLB,, | Performed by: INTERNAL MEDICINE

## 2020-08-24 PROCEDURE — 3078F PR MOST RECENT DIASTOLIC BLOOD PRESSURE < 80 MM HG: ICD-10-PCS | Mod: CPTII,S$GLB,, | Performed by: INTERNAL MEDICINE

## 2020-08-24 NOTE — PROGRESS NOTES
Subjective:       Patient ID: Sheila Costa is a 49 y.o. female.    Chief Complaint: Follow-up      Last visit with me 6/19/2019.  Upcoming gynecology appointment.  Over the last 6 months seen by Colorectal surgery.    HPI    Patient notes occasionally some paresthesias in the bilateral hands.  This is been a longstanding problem that comes off and on.  No weakness, no significant pain.    Reviewed PMH, PSH, SH, FH, allergies, and medications.    Review of Systems   Constitutional: Negative for activity change and unexpected weight change.   HENT: Negative for hearing loss, rhinorrhea and trouble swallowing.    Eyes: Negative for discharge and visual disturbance.   Respiratory: Negative for chest tightness and wheezing.    Cardiovascular: Negative for chest pain and palpitations.   Gastrointestinal: Negative for blood in stool, constipation, diarrhea and vomiting.   Endocrine: Negative for polydipsia and polyuria.   Genitourinary: Negative for difficulty urinating, dysuria, hematuria and menstrual problem.   Musculoskeletal: Positive for neck pain. Negative for arthralgias and joint swelling.   Neurological: Negative for weakness and headaches.   Psychiatric/Behavioral: Negative for confusion and dysphoric mood.       Objective:      Physical Exam  Vitals signs and nursing note reviewed.   Constitutional:       General: She is not in acute distress.     Appearance: She is not diaphoretic.   HENT:      Head: Atraumatic.      Right Ear: External ear normal.      Left Ear: External ear normal.   Eyes:      General:         Right eye: No discharge.         Left eye: No discharge.      Pupils: Pupils are equal, round, and reactive to light.   Neck:      Musculoskeletal: Normal range of motion.      Thyroid: No thyromegaly.   Cardiovascular:      Rate and Rhythm: Normal rate and regular rhythm.      Heart sounds: Normal heart sounds.   Pulmonary:      Effort: Pulmonary effort is normal.      Breath sounds:  "Normal breath sounds. No stridor. No wheezing or rales.   Musculoskeletal:         General: No tenderness.      Right lower leg: No edema.      Left lower leg: No edema.   Lymphadenopathy:      Cervical: No cervical adenopathy.   Skin:     General: Skin is warm and dry.      Findings: No rash.   Neurological:      General: No focal deficit present.      Mental Status: She is alert and oriented to person, place, and time.   Psychiatric:         Mood and Affect: Mood normal.         Behavior: Behavior normal.         Vitals:    08/24/20 1336   BP: 124/74   BP Location: Right arm   Patient Position: Sitting   BP Method: Large (Manual)   Pulse: 63   SpO2: 99%   Weight: 74.8 kg (165 lb)   Height: 5' 1" (1.549 m)     Body mass index is 31.18 kg/m².    RESULTS: Reviewed labs from last 12 months    Assessment:       1. Annual physical exam    2. Hypokalemia    3. Vaccine counseling    4. Pain in both upper extremities    5. Paresthesia of hand, bilateral    6. Essential hypertension        Plan:   Sheila was seen today for follow-up.    Diagnoses and all orders for this visit:    Annual physical exam:  Age-appropriate health screening reviewed, indicated tests ordered.   -     Comprehensive metabolic panel; Future  -     CBC Without Differential; Future  -     Lipid Panel; Future  -     Hepatitis C Antibody; Future  -     HIV 1/2 Ag/Ab (4th Gen); Future  -     Magnesium; Future  -     TSH; Future  -     Ferritin; Future  -     Iron and TIBC; Future    Hypokalemia:  Prior diagnosis, likely secondary to chlorthalidone, recheck.    Vaccine counseling:  Shingrix today.    Pain in both upper extremities:  New problem, EMG normal in 2019, likely combination of C spine radiculopathy and/or carpal tunnel syndrome.  Please notify the office if the symptoms persist or worsen.     Paresthesia of hand, bilateral    Essential hypertension:  Prior diagnosis, stable, well controlled on current management. No changes at this time, will " continue to monitor. Encourage ongoing cardiovascular exercise.      Follow up in about 1 year (around 8/24/2021) for EPP annual exam, fasting labs 1 week prior.  Fasting lab work in the next few days.  Jorge Alberto Ruiz MD  Internal Medicine    Portions of this note were completed using medical dictation software. Please excuse typographical or syntax errors that were missed on review.

## 2020-09-04 ENCOUNTER — LAB VISIT (OUTPATIENT)
Dept: LAB | Facility: HOSPITAL | Age: 50
End: 2020-09-04
Attending: INTERNAL MEDICINE
Payer: COMMERCIAL

## 2020-09-04 DIAGNOSIS — Z00.00 ANNUAL PHYSICAL EXAM: ICD-10-CM

## 2020-09-04 LAB
ALBUMIN SERPL BCP-MCNC: 3.5 G/DL (ref 3.5–5.2)
ALP SERPL-CCNC: 56 U/L (ref 55–135)
ALT SERPL W/O P-5'-P-CCNC: 15 U/L (ref 10–44)
ANION GAP SERPL CALC-SCNC: 5 MMOL/L (ref 8–16)
AST SERPL-CCNC: 17 U/L (ref 10–40)
BILIRUB SERPL-MCNC: 0.3 MG/DL (ref 0.1–1)
BUN SERPL-MCNC: 15 MG/DL (ref 6–20)
CALCIUM SERPL-MCNC: 9 MG/DL (ref 8.7–10.5)
CHLORIDE SERPL-SCNC: 99 MMOL/L (ref 95–110)
CHOLEST SERPL-MCNC: 161 MG/DL (ref 120–199)
CHOLEST/HDLC SERPL: 3.5 {RATIO} (ref 2–5)
CO2 SERPL-SCNC: 33 MMOL/L (ref 23–29)
CREAT SERPL-MCNC: 0.8 MG/DL (ref 0.5–1.4)
ERYTHROCYTE [DISTWIDTH] IN BLOOD BY AUTOMATED COUNT: 14.9 % (ref 11.5–14.5)
EST. GFR  (AFRICAN AMERICAN): >60 ML/MIN/1.73 M^2
EST. GFR  (NON AFRICAN AMERICAN): >60 ML/MIN/1.73 M^2
FERRITIN SERPL-MCNC: 121 NG/ML (ref 20–300)
GLUCOSE SERPL-MCNC: 104 MG/DL (ref 70–110)
HCT VFR BLD AUTO: 39.1 % (ref 37–48.5)
HDLC SERPL-MCNC: 46 MG/DL (ref 40–75)
HDLC SERPL: 28.6 % (ref 20–50)
HGB BLD-MCNC: 11.4 G/DL (ref 12–16)
IRON SERPL-MCNC: 52 UG/DL (ref 30–160)
LDLC SERPL CALC-MCNC: 94.6 MG/DL (ref 63–159)
MAGNESIUM SERPL-MCNC: 1.9 MG/DL (ref 1.6–2.6)
MCH RBC QN AUTO: 22.7 PG (ref 27–31)
MCHC RBC AUTO-ENTMCNC: 29.2 G/DL (ref 32–36)
MCV RBC AUTO: 78 FL (ref 82–98)
NONHDLC SERPL-MCNC: 115 MG/DL
PLATELET # BLD AUTO: 408 K/UL (ref 150–350)
PMV BLD AUTO: 9.5 FL (ref 9.2–12.9)
POTASSIUM SERPL-SCNC: 3 MMOL/L (ref 3.5–5.1)
PROT SERPL-MCNC: 6.9 G/DL (ref 6–8.4)
RBC # BLD AUTO: 5.02 M/UL (ref 4–5.4)
SATURATED IRON: 15 % (ref 20–50)
SODIUM SERPL-SCNC: 137 MMOL/L (ref 136–145)
TOTAL IRON BINDING CAPACITY: 352 UG/DL (ref 250–450)
TRANSFERRIN SERPL-MCNC: 238 MG/DL (ref 200–375)
TRIGL SERPL-MCNC: 102 MG/DL (ref 30–150)
TSH SERPL DL<=0.005 MIU/L-ACNC: 1.43 UIU/ML (ref 0.4–4)
WBC # BLD AUTO: 8.38 K/UL (ref 3.9–12.7)

## 2020-09-04 PROCEDURE — 80053 COMPREHEN METABOLIC PANEL: CPT

## 2020-09-04 PROCEDURE — 83540 ASSAY OF IRON: CPT

## 2020-09-04 PROCEDURE — 83735 ASSAY OF MAGNESIUM: CPT

## 2020-09-04 PROCEDURE — 36415 COLL VENOUS BLD VENIPUNCTURE: CPT

## 2020-09-04 PROCEDURE — 86803 HEPATITIS C AB TEST: CPT

## 2020-09-04 PROCEDURE — 80061 LIPID PANEL: CPT

## 2020-09-04 PROCEDURE — 86703 HIV-1/HIV-2 1 RESULT ANTBDY: CPT

## 2020-09-04 PROCEDURE — 85027 COMPLETE CBC AUTOMATED: CPT

## 2020-09-04 PROCEDURE — 84443 ASSAY THYROID STIM HORMONE: CPT

## 2020-09-04 PROCEDURE — 82728 ASSAY OF FERRITIN: CPT

## 2020-09-05 ENCOUNTER — PATIENT MESSAGE (OUTPATIENT)
Dept: INTERNAL MEDICINE | Facility: CLINIC | Age: 50
End: 2020-09-05

## 2020-09-05 DIAGNOSIS — E87.6 HYPOKALEMIA: Primary | ICD-10-CM

## 2020-09-05 RX ORDER — POTASSIUM CHLORIDE 750 MG/1
10 TABLET, EXTENDED RELEASE ORAL DAILY
Qty: 90 TABLET | Refills: 3 | Status: SHIPPED | OUTPATIENT
Start: 2020-09-05 | End: 2021-08-26

## 2020-09-06 ENCOUNTER — PATIENT OUTREACH (OUTPATIENT)
Dept: ADMINISTRATIVE | Facility: OTHER | Age: 50
End: 2020-09-06

## 2020-09-07 LAB
HCV AB SERPL QL IA: NEGATIVE
HIV 1+2 AB+HIV1 P24 AG SERPL QL IA: NEGATIVE

## 2020-09-08 ENCOUNTER — PATIENT MESSAGE (OUTPATIENT)
Dept: INTERNAL MEDICINE | Facility: CLINIC | Age: 50
End: 2020-09-08

## 2020-09-09 ENCOUNTER — TELEPHONE (OUTPATIENT)
Dept: INTERNAL MEDICINE | Facility: CLINIC | Age: 50
End: 2020-09-09

## 2020-09-09 ENCOUNTER — OFFICE VISIT (OUTPATIENT)
Dept: OBSTETRICS AND GYNECOLOGY | Facility: CLINIC | Age: 50
End: 2020-09-09
Attending: OBSTETRICS & GYNECOLOGY
Payer: COMMERCIAL

## 2020-09-09 VITALS
BODY MASS INDEX: 31.23 KG/M2 | HEART RATE: 80 BPM | SYSTOLIC BLOOD PRESSURE: 132 MMHG | HEIGHT: 61 IN | WEIGHT: 165.38 LBS | DIASTOLIC BLOOD PRESSURE: 70 MMHG

## 2020-09-09 DIAGNOSIS — Z01.419 ENCOUNTER FOR GYNECOLOGICAL EXAMINATION WITHOUT ABNORMAL FINDING: Primary | ICD-10-CM

## 2020-09-09 DIAGNOSIS — D50.9 MICROCYTIC ANEMIA: Primary | ICD-10-CM

## 2020-09-09 PROCEDURE — 99396 PREV VISIT EST AGE 40-64: CPT | Mod: S$GLB,,, | Performed by: OBSTETRICS & GYNECOLOGY

## 2020-09-09 PROCEDURE — 99396 PR PREVENTIVE VISIT,EST,40-64: ICD-10-PCS | Mod: S$GLB,,, | Performed by: OBSTETRICS & GYNECOLOGY

## 2020-09-09 PROCEDURE — 3075F PR MOST RECENT SYSTOLIC BLOOD PRESS GE 130-139MM HG: ICD-10-PCS | Mod: CPTII,S$GLB,, | Performed by: OBSTETRICS & GYNECOLOGY

## 2020-09-09 PROCEDURE — 3078F PR MOST RECENT DIASTOLIC BLOOD PRESSURE < 80 MM HG: ICD-10-PCS | Mod: CPTII,S$GLB,, | Performed by: OBSTETRICS & GYNECOLOGY

## 2020-09-09 PROCEDURE — 3078F DIAST BP <80 MM HG: CPT | Mod: CPTII,S$GLB,, | Performed by: OBSTETRICS & GYNECOLOGY

## 2020-09-09 PROCEDURE — 3008F BODY MASS INDEX DOCD: CPT | Mod: CPTII,S$GLB,, | Performed by: OBSTETRICS & GYNECOLOGY

## 2020-09-09 PROCEDURE — 3008F PR BODY MASS INDEX (BMI) DOCUMENTED: ICD-10-PCS | Mod: CPTII,S$GLB,, | Performed by: OBSTETRICS & GYNECOLOGY

## 2020-09-09 PROCEDURE — 3075F SYST BP GE 130 - 139MM HG: CPT | Mod: CPTII,S$GLB,, | Performed by: OBSTETRICS & GYNECOLOGY

## 2020-09-09 RX ORDER — FLUOXETINE HYDROCHLORIDE 20 MG/1
20 CAPSULE ORAL DAILY
Qty: 30 CAPSULE | Refills: 11 | Status: SHIPPED | OUTPATIENT
Start: 2020-09-09 | End: 2021-09-09

## 2020-09-09 NOTE — PROGRESS NOTES
SUBJECTIVE:   49 y.o. female   for annual routine  checkup. No LMP recorded. Patient has had an implant..  She has no unusual complaints.    She would like to continue fluoxetine for PMS symptoms    Past Medical History:   Diagnosis Date    Cervical spondylosis 2019    Hypertension     Premenstrual symptom      Past Surgical History:   Procedure Laterality Date    COLONOSCOPY N/A 2020    Procedure: COLONOSCOPY;  Surgeon: Eliot Hill MD;  Location: [facility] ENDO (4TH FLR);  Service: Endoscopy;  Laterality: N/A;  [facility]    COLONOSCOPY N/A 2020    Procedure: COLONOSCOPY;  Surgeon: Kang Guzmán MD;  Location: [facility] OR 2ND FLR;  Service: Colon and Rectal;  Laterality: N/A;    EPIDURAL STEROID INJECTION N/A 10/2/2019    Procedure: INJECTION, STEROID, EPIDURAL, C7-T1;  Surgeon: Cosme Kirkland MD;  Location: [facility] PAIN MGT;  Service: Pain Management;  Laterality: N/A;    EYE SURGERY      INJECTION OF ANESTHETIC AGENT AROUND NERVE Bilateral 2020    Procedure: BLOCK, NERVE C4,5,6;  Surgeon: Cosme Kirkland MD;  Location: [facility] PAIN MGT;  Service: Pain Management;  Laterality: Bilateral;  B/L MBB C4,5,6    INJECTION OF ANESTHETIC AGENT AROUND NERVE Bilateral 2020    Procedure: BLOCK, NERVE, Repeat C4-C5-C6 MEDIAL BRANCH;  Surgeon: Cosme Kirkland MD;  Location: [facility] PAIN MGT;  Service: Pain Management;  Laterality: Bilateral;    LAPAROSCOPIC SURGICAL REMOVAL OF CECUM N/A 2020    Procedure: EXCISION, CECUM, LAPAROSCOPIC, colonoscopy to start, ERAS low;  Surgeon: Kang Guzmán MD;  Location: [facility] OR 2ND FLR;  Service: Colon and Rectal;  Laterality: N/A;    RADIOFREQUENCY ABLATION Left 3/4/2020    Procedure: RADIOFREQUENCY ABLATION, C4-C5-C6 ME DIAL BRANCH 1 OF 2 need consent;  Surgeon: Cosme Kirkland MD;  Location: [facility] PAIN MGT;  Service: Pain Management;  Laterality: Left;    RADIOFREQUENCY ABLATION Right 6/3/2020     Procedure: RADIOFREQUENCY ABLATION, C4-C5-C6 MEDIAL BRANCH 2 OF 2 need consent;  Surgeon: Cosme Kirkland MD;  Location: [facility];  Service: Pain Management;  Laterality: Right;    REFRACTIVE SURGERY  2008    SMALL INTESTINE SURGERY  7/6/20     Social History     Socioeconomic History    Marital status:      Spouse name: Not on file    Number of children: Not on file    Years of education: Not on file    Highest education level: Not on file   Occupational History     Employer: [employer]   Social Needs    Financial resource strain: Not hard at all    Food insecurity     Worry: Never true     Inability: Never true    Transportation needs     Medical: No     Non-medical: No   Tobacco Use    Smoking status: Never Smoker   Substance and Sexual Activity    Alcohol use: Yes     Alcohol/week: 3.0 standard drinks     Types: 2 Glasses of wine, 1 Standard drinks or equivalent per week     Frequency: 2-3 times a week     Drinks per session: 1 or 2     Binge frequency: Less than monthly     Comment: Rare, social    Drug use: No    Sexual activity: Yes     Partners: Male     Birth control/protection: I.U.D.   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     Family History   Problem Relation Age of Onset    Hypertension Mother     Thyroid disease Mother     Rheum arthritis Mother     Hearing loss Mother     Heart disease Father         Pacemaker    Cancer Maternal Grandmother      Cataracts Maternal Grandmother     Diabetes Maternal Grandmother     Hypertension Maternal Grandmother     Thyroid disease Maternal Grandmother     Breast cancer Maternal Grandmother     Hearing loss Maternal Grandmother     Cataracts Maternal Grandfather     Diabetes Maternal Grandfather     Hypertension Maternal Grandfather     Thyroid disease Maternal Grandfather     Lupus Cousin     Amblyopia Neg Hx     Blindness Neg Hx     Glaucoma Neg Hx     Macular degeneration Neg Hx     Retinal detachment Neg Hx     Strabismus Neg Hx     Stroke Neg Hx     Ovarian cancer Neg Hx     Colon cancer Neg Hx      OB History    Para Term  AB Living   2 2 2         SAB TAB Ectopic Multiple Live Births                  # Outcome Date GA Lbr Louis/2nd Weight Sex Delivery Anes PTL Lv   2 Term            1 Term                    Current Outpatient Medications   Medication Sig Dispense Refill    atenoloL-chlorthalidone (TENORETIC) 50-25 mg Tab Take 1/2 tablet by mouth once daily 45 tablet 0    FLUoxetine 20 MG capsule TAKE ONE CAPSULE BY MOUTH ONCE DAILY 30 capsule 2    gabapentin (NEURONTIN) 300 MG capsule Take 1 capsule (300 mg total) by mouth 3 (three) times daily. (Patient taking differently: Take 300 mg by mouth every evening. ) 90 capsule 11    loratadine (CLARITIN) 10 mg tablet Take 10 mg by mouth every morning.       potassium chloride SA (K-DUR,KLOR-CON) 10 MEQ tablet Take 1 tablet (10 mEq total) by mouth once daily. 90 tablet 3    acetaminophen (TYLENOL) 325 MG tablet Take 1 tablet (325 mg total) by mouth every 6 (six) hours. (Patient not taking: Reported on 2020) 40 tablet 0    levonorgestrel (MIRENA) 20 mcg/24 hr (5 years) IUD 1 Intra Uterine Device by Intrauterine route once. for 1 dose 1 Intra Uterine Device 0     No current facility-administered medications for this visit.      Facility-Administered Medications Ordered in Other Visits   Medication Dose Route Frequency Provider Last  Rate Last Dose    0.9%  NaCl infusion   Intravenous Continuous Estefania Miller NP        gabapentin capsule 300 mg  300 mg Oral TID Estefania Miller NP        lidocaine (PF) 10 mg/ml (1%) injection 10 mg  1 mL Intradermal On Call Procedure Estefania Miller NP         Allergies: Amoxicillin     The 10-year ASCVD risk score (Rosario LINO Jr., et al., 2013) is: 4.1%    Values used to calculate the score:      Age: 49 years      Sex: Female      Is Non- : Yes      Diabetic: No      Tobacco smoker: No      Systolic Blood Pressure: 132 mmHg      Is BP treated: Yes      HDL Cholesterol: 46 mg/dL      Total Cholesterol: 161 mg/dL      ROS:  Constitutional: no weight loss, weight gain, fever, fatigue  Eyes:  No vision changes, glasses/contacts  ENT/Mouth: No ulcers, sinus problems, ears ringing, headache  Cardiovascular: No inability to lie flat, chest pain, exercise intolerance, swelling, heart palpitations  Respiratory: No wheezing, coughing blood, shortness of breath, or cough  Gastrointestinal: No diarrhea, bloody stool, nausea/vomiting, constipation, gas, hemorrhoids  Genitourinary: No blood in urine, painful urination, urgency of urination, frequency of urination, incomplete emptying, incontinence, abnormal bleeding, painful periods, heavy periods, vaginal discharge, vaginal odor, painful intercourse, sexual problems, bleeding after intercourse.  Musculoskeletal: No muscle weakness  Skin/Breast: No painful breasts, nipple discharge, masses, rash, ulcers  Neurological: No passing out, seizures, numbness, headache  Endocrine: No diabetes, hypothyroid, hyperthyroid, hot flashes, hair loss, abnormal hair growth, acne  Psychiatric: No depression, crying  Hematologic: No bruises, bleeding, swollen lymph nodes, anemia.      Physical Exam:   Constitutional: She is oriented to person, place, and time. She appears well-developed and well-nourished.      Neck: Normal range of motion. No tracheal  deviation present. No thyromegaly present.    Cardiovascular: Exam reveals no edema.     Pulmonary/Chest: Effort normal. She exhibits no mass, no tenderness, no deformity and no retraction. Right breast exhibits no inverted nipple, no mass, no nipple discharge, no skin change, no tenderness, presence, no bleeding and no swelling. Left breast exhibits no inverted nipple, no mass, no nipple discharge, no skin change, no tenderness, presence, no bleeding and no swelling. Breasts are symmetrical.        Abdominal: Soft. She exhibits no distension and no mass. There is no abdominal tenderness. There is no rebound and no guarding. No hernia. Hernia confirmed negative in the left inguinal area.     Genitourinary:    Vagina and uterus normal.   Rectum:      No external hemorrhoid.   There is no rash, tenderness or lesion on the right labia. There is no rash, tenderness or lesion on the left labia. Uterus is not deviated. Cervix is normal. No no adexnal prolapse. Right adnexum displays no mass, no tenderness and no fullness. Left adnexum displays no mass, no tenderness and no fullness. No tenderness, bleeding, rectocele, cystocele or unspecified prolapse of vaginal walls in the vagina. Cervix exhibits no motion tenderness, no discharge and no friability. negative for vaginal discharge          Musculoskeletal: Normal range of motion and moves all extremeties. No edema.       Neurological: She is alert and oriented to person, place, and time.    Skin: No rash noted. No erythema. No pallor.    Psychiatric: She has a normal mood and affect. Her behavior is normal. Judgment and thought content normal.     IUD strings visualized at os    ASSESSMENT:   well woman    PLAN:   mammogram  return annually or prn

## 2020-11-01 ENCOUNTER — PATIENT MESSAGE (OUTPATIENT)
Dept: INTERNAL MEDICINE | Facility: CLINIC | Age: 50
End: 2020-11-01

## 2020-11-01 DIAGNOSIS — I10 ESSENTIAL HYPERTENSION: ICD-10-CM

## 2020-11-02 RX ORDER — ATENOLOL AND CHLORTHALIDONE TABLET 50; 25 MG/1; MG/1
TABLET ORAL
Qty: 45 TABLET | Refills: 3 | Status: SHIPPED | OUTPATIENT
Start: 2020-11-02 | End: 2021-10-28 | Stop reason: SDUPTHER

## 2020-11-02 NOTE — TELEPHONE ENCOUNTER
No new care gaps identified.  Powered by [employer]. Reference number: [id number]. 11/02/2020 9:15:10 AM CST

## 2020-11-17 ENCOUNTER — TELEPHONE (OUTPATIENT)
Dept: INTERNAL MEDICINE | Facility: CLINIC | Age: 50
End: 2020-11-17

## 2020-11-17 NOTE — TELEPHONE ENCOUNTER
----- Message from Yasmeen Christian sent at 11/17/2020  2:11 PM CST -----  Regarding: Shots  Contact: [phone]  Pt called in stated she would like to make an appt for her second shingle shot and a flu shot. Pt can be reached at [phone]

## 2020-11-18 ENCOUNTER — IMMUNIZATION (OUTPATIENT)
Dept: PHARMACY | Facility: CLINIC | Age: 50
End: 2020-11-18
Payer: COMMERCIAL

## 2020-11-18 ENCOUNTER — IMMUNIZATION (OUTPATIENT)
Dept: PHARMACY | Facility: CLINIC | Age: 50
End: 2020-11-18

## 2020-12-04 ENCOUNTER — PATIENT MESSAGE (OUTPATIENT)
Dept: INTERNAL MEDICINE | Facility: CLINIC | Age: 50
End: 2020-12-04

## 2020-12-07 ENCOUNTER — LAB VISIT (OUTPATIENT)
Dept: LAB | Facility: HOSPITAL | Age: 50
End: 2020-12-07
Attending: INTERNAL MEDICINE
Payer: COMMERCIAL

## 2020-12-07 DIAGNOSIS — Z00.00 ANNUAL PHYSICAL EXAM: ICD-10-CM

## 2020-12-07 DIAGNOSIS — D50.9 MICROCYTIC ANEMIA: ICD-10-CM

## 2020-12-07 DIAGNOSIS — E87.6 HYPOKALEMIA: ICD-10-CM

## 2020-12-07 LAB
ALBUMIN SERPL BCP-MCNC: 3.5 G/DL (ref 3.5–5.2)
ALP SERPL-CCNC: 63 U/L (ref 55–135)
ALT SERPL W/O P-5'-P-CCNC: 13 U/L (ref 10–44)
ANION GAP SERPL CALC-SCNC: 11 MMOL/L (ref 8–16)
ANION GAP SERPL CALC-SCNC: 11 MMOL/L (ref 8–16)
AST SERPL-CCNC: 15 U/L (ref 10–40)
BILIRUB SERPL-MCNC: 0.4 MG/DL (ref 0.1–1)
BUN SERPL-MCNC: 16 MG/DL (ref 6–20)
BUN SERPL-MCNC: 16 MG/DL (ref 6–20)
CALCIUM SERPL-MCNC: 9.1 MG/DL (ref 8.7–10.5)
CALCIUM SERPL-MCNC: 9.1 MG/DL (ref 8.7–10.5)
CHLORIDE SERPL-SCNC: 100 MMOL/L (ref 95–110)
CHLORIDE SERPL-SCNC: 100 MMOL/L (ref 95–110)
CO2 SERPL-SCNC: 29 MMOL/L (ref 23–29)
CO2 SERPL-SCNC: 29 MMOL/L (ref 23–29)
CREAT SERPL-MCNC: 0.8 MG/DL (ref 0.5–1.4)
CREAT SERPL-MCNC: 0.8 MG/DL (ref 0.5–1.4)
ERYTHROCYTE [DISTWIDTH] IN BLOOD BY AUTOMATED COUNT: 14.9 % (ref 11.5–14.5)
EST. GFR  (AFRICAN AMERICAN): >60 ML/MIN/1.73 M^2
EST. GFR  (AFRICAN AMERICAN): >60 ML/MIN/1.73 M^2
EST. GFR  (NON AFRICAN AMERICAN): >60 ML/MIN/1.73 M^2
EST. GFR  (NON AFRICAN AMERICAN): >60 ML/MIN/1.73 M^2
GLUCOSE SERPL-MCNC: 102 MG/DL (ref 70–110)
GLUCOSE SERPL-MCNC: 102 MG/DL (ref 70–110)
HCT VFR BLD AUTO: 39.6 % (ref 37–48.5)
HGB BLD-MCNC: 11.6 G/DL (ref 12–16)
MAGNESIUM SERPL-MCNC: 1.9 MG/DL (ref 1.6–2.6)
MCH RBC QN AUTO: 22.1 PG (ref 27–31)
MCHC RBC AUTO-ENTMCNC: 29.3 G/DL (ref 32–36)
MCV RBC AUTO: 75 FL (ref 82–98)
PLATELET # BLD AUTO: 414 K/UL (ref 150–350)
PMV BLD AUTO: 9.1 FL (ref 9.2–12.9)
POTASSIUM SERPL-SCNC: 3.5 MMOL/L (ref 3.5–5.1)
POTASSIUM SERPL-SCNC: 3.5 MMOL/L (ref 3.5–5.1)
PROT SERPL-MCNC: 7 G/DL (ref 6–8.4)
RBC # BLD AUTO: 5.26 M/UL (ref 4–5.4)
SODIUM SERPL-SCNC: 140 MMOL/L (ref 136–145)
SODIUM SERPL-SCNC: 140 MMOL/L (ref 136–145)
WBC # BLD AUTO: 9.49 K/UL (ref 3.9–12.7)

## 2020-12-07 PROCEDURE — 81269 HBA1/HBA2 GENE DUP/DEL VRNTS: CPT

## 2020-12-07 PROCEDURE — 80053 COMPREHEN METABOLIC PANEL: CPT

## 2020-12-07 PROCEDURE — 85027 COMPLETE CBC AUTOMATED: CPT

## 2020-12-07 PROCEDURE — 36415 COLL VENOUS BLD VENIPUNCTURE: CPT

## 2020-12-07 PROCEDURE — 83735 ASSAY OF MAGNESIUM: CPT

## 2020-12-09 ENCOUNTER — OFFICE VISIT (OUTPATIENT)
Dept: INTERNAL MEDICINE | Facility: CLINIC | Age: 50
End: 2020-12-09
Payer: COMMERCIAL

## 2020-12-09 VITALS
WEIGHT: 167.13 LBS | HEART RATE: 88 BPM | DIASTOLIC BLOOD PRESSURE: 72 MMHG | HEIGHT: 61 IN | BODY MASS INDEX: 31.55 KG/M2 | SYSTOLIC BLOOD PRESSURE: 128 MMHG | OXYGEN SATURATION: 99 %

## 2020-12-09 DIAGNOSIS — R22.9 SKIN NODULE: Primary | ICD-10-CM

## 2020-12-09 PROCEDURE — 3008F PR BODY MASS INDEX (BMI) DOCUMENTED: ICD-10-PCS | Mod: CPTII,S$GLB,, | Performed by: INTERNAL MEDICINE

## 2020-12-09 PROCEDURE — 99999 PR PBB SHADOW E&M-EST. PATIENT-LVL III: ICD-10-PCS | Mod: PBBFAC,,, | Performed by: INTERNAL MEDICINE

## 2020-12-09 PROCEDURE — 3008F BODY MASS INDEX DOCD: CPT | Mod: CPTII,S$GLB,, | Performed by: INTERNAL MEDICINE

## 2020-12-09 PROCEDURE — 3078F PR MOST RECENT DIASTOLIC BLOOD PRESSURE < 80 MM HG: ICD-10-PCS | Mod: CPTII,S$GLB,, | Performed by: INTERNAL MEDICINE

## 2020-12-09 PROCEDURE — 99999 PR PBB SHADOW E&M-EST. PATIENT-LVL III: CPT | Mod: PBBFAC,,, | Performed by: INTERNAL MEDICINE

## 2020-12-09 PROCEDURE — 99213 PR OFFICE/OUTPT VISIT, EST, LEVL III, 20-29 MIN: ICD-10-PCS | Mod: S$GLB,,, | Performed by: INTERNAL MEDICINE

## 2020-12-09 PROCEDURE — 3074F SYST BP LT 130 MM HG: CPT | Mod: CPTII,S$GLB,, | Performed by: INTERNAL MEDICINE

## 2020-12-09 PROCEDURE — 3074F PR MOST RECENT SYSTOLIC BLOOD PRESSURE < 130 MM HG: ICD-10-PCS | Mod: CPTII,S$GLB,, | Performed by: INTERNAL MEDICINE

## 2020-12-09 PROCEDURE — 1126F AMNT PAIN NOTED NONE PRSNT: CPT | Mod: S$GLB,,, | Performed by: INTERNAL MEDICINE

## 2020-12-09 PROCEDURE — 1126F PR PAIN SEVERITY QUANTIFIED, NO PAIN PRESENT: ICD-10-PCS | Mod: S$GLB,,, | Performed by: INTERNAL MEDICINE

## 2020-12-09 PROCEDURE — 3078F DIAST BP <80 MM HG: CPT | Mod: CPTII,S$GLB,, | Performed by: INTERNAL MEDICINE

## 2020-12-09 PROCEDURE — 99213 OFFICE O/P EST LOW 20 MIN: CPT | Mod: S$GLB,,, | Performed by: INTERNAL MEDICINE

## 2020-12-09 NOTE — PROGRESS NOTES
Subjective:       Patient ID: Sheila Costa is a 50 y.o. female.    Chief Complaint: Cyst    Here for urgent care --  She has a knot R posterior ear area. Has not expressed any fluid. Not enlarging. No f/c/ns. No other areas of nodules.  Feels well in general and in USOH.    Review of Systems   Constitutional: Negative for activity change, chills, fatigue, fever and unexpected weight change.   Hematological: Positive for adenopathy (R posterior auricle). Does not bruise/bleed easily.           Objective:      Physical Exam  Constitutional:       General: She is not in acute distress.     Appearance: Normal appearance. She is well-developed. She is not ill-appearing, toxic-appearing or diaphoretic.      Comments: Well appearing, breathing comfortably, speaking full sentences, no coughing during encounter     Pulmonary:      Effort: Pulmonary effort is normal. No respiratory distress.   Skin:     Findings: No rash.      Comments: R posterior to ear there is a nodule that is  moveable, nontender, pea sized, not hard. No punctate opening seen  No other nodules felt along neck or scalp   Neurological:      Mental Status: She is alert and oriented to person, place, and time.   Psychiatric:         Behavior: Behavior normal.         Thought Content: Thought content normal.         Judgment: Judgment normal.         Assessment:       1. Skin nodule        Plan:       Sheila was seen today for cyst.    Diagnoses and all orders for this visit:    Skin nodule  Lymph node is likely, vicente is possible as well.  She will monitor for size and any B symptoms. She will let us know if any changes arise.

## 2020-12-09 NOTE — TELEPHONE ENCOUNTER
I have sent the patient a [facility] message. Please schedule for same day visit with one of my colleagues for nodule on head

## 2020-12-21 LAB
ALPHA GLOBIN RELEASED BY: NORMAL
ALPHA-GLOBIN SPECIMEN: NORMAL
GENETICIST REVIEW: NORMAL
HBA1 GENE MUT ANL BLD/T: NORMAL
HBA1 GENE MUT ANL BLD/T: NORMAL
REF LAB TEST METHOD: NORMAL
SERVICE CMNT-IMP: NORMAL
SPECIMEN SOURCE: NORMAL

## 2020-12-22 ENCOUNTER — PATIENT MESSAGE (OUTPATIENT)
Dept: INTERNAL MEDICINE | Facility: CLINIC | Age: 50
End: 2020-12-22

## 2020-12-31 ENCOUNTER — TELEPHONE (OUTPATIENT)
Dept: INTERNAL MEDICINE | Facility: CLINIC | Age: 50
End: 2020-12-31

## 2020-12-31 DIAGNOSIS — D56.0 ALPHA THALASSEMIA: ICD-10-CM

## 2020-12-31 DIAGNOSIS — D56.3 ALPHA THALASSEMIA TRAIT: Primary | ICD-10-CM

## 2021-01-05 ENCOUNTER — TELEPHONE (OUTPATIENT)
Dept: HEMATOLOGY/ONCOLOGY | Facility: CLINIC | Age: 51
End: 2021-01-05

## 2021-01-06 ENCOUNTER — TELEPHONE (OUTPATIENT)
Dept: HEMATOLOGY/ONCOLOGY | Facility: CLINIC | Age: 51
End: 2021-01-06

## 2021-01-12 ENCOUNTER — OFFICE VISIT (OUTPATIENT)
Dept: HEMATOLOGY/ONCOLOGY | Facility: CLINIC | Age: 51
End: 2021-01-12
Payer: COMMERCIAL

## 2021-01-12 ENCOUNTER — LAB VISIT (OUTPATIENT)
Dept: LAB | Facility: OTHER | Age: 51
End: 2021-01-12
Payer: COMMERCIAL

## 2021-01-12 VITALS
HEART RATE: 60 BPM | DIASTOLIC BLOOD PRESSURE: 67 MMHG | WEIGHT: 167.31 LBS | RESPIRATION RATE: 16 BRPM | SYSTOLIC BLOOD PRESSURE: 135 MMHG | HEIGHT: 61 IN | OXYGEN SATURATION: 100 % | BODY MASS INDEX: 31.59 KG/M2 | TEMPERATURE: 98 F

## 2021-01-12 DIAGNOSIS — D50.9 MICROCYTIC ANEMIA: ICD-10-CM

## 2021-01-12 DIAGNOSIS — R10.11 RIGHT UPPER QUADRANT ABDOMINAL PAIN: ICD-10-CM

## 2021-01-12 DIAGNOSIS — R53.82 CHRONIC FATIGUE: ICD-10-CM

## 2021-01-12 DIAGNOSIS — D56.3 ALPHA THALASSEMIA TRAIT: Primary | ICD-10-CM

## 2021-01-12 DIAGNOSIS — D75.839 THROMBOCYTOSIS: ICD-10-CM

## 2021-01-12 LAB
ALBUMIN SERPL BCP-MCNC: 3.9 G/DL (ref 3.5–5.2)
ALP SERPL-CCNC: 62 U/L (ref 55–135)
ALT SERPL W/O P-5'-P-CCNC: 14 U/L (ref 10–44)
ANION GAP SERPL CALC-SCNC: 8 MMOL/L (ref 8–16)
AST SERPL-CCNC: 14 U/L (ref 10–40)
BASOPHILS # BLD AUTO: 0.05 K/UL (ref 0–0.2)
BASOPHILS NFR BLD: 0.5 % (ref 0–1.9)
BILIRUB SERPL-MCNC: 0.3 MG/DL (ref 0.1–1)
BILIRUB UR QL STRIP: NEGATIVE
BUN SERPL-MCNC: 15 MG/DL (ref 6–20)
CALCIUM SERPL-MCNC: 9.3 MG/DL (ref 8.7–10.5)
CHLORIDE SERPL-SCNC: 100 MMOL/L (ref 95–110)
CLARITY UR REFRACT.AUTO: CLEAR
CO2 SERPL-SCNC: 31 MMOL/L (ref 23–29)
COLOR UR AUTO: YELLOW
CREAT SERPL-MCNC: 0.7 MG/DL (ref 0.5–1.4)
DIFFERENTIAL METHOD: ABNORMAL
EOSINOPHIL # BLD AUTO: 0.1 K/UL (ref 0–0.5)
EOSINOPHIL NFR BLD: 0.8 % (ref 0–8)
ERYTHROCYTE [DISTWIDTH] IN BLOOD BY AUTOMATED COUNT: 14.8 % (ref 11.5–14.5)
ERYTHROCYTE [SEDIMENTATION RATE] IN BLOOD: 16 MM/HR (ref 0–20)
EST. GFR  (AFRICAN AMERICAN): >60 ML/MIN/1.73 M^2
EST. GFR  (NON AFRICAN AMERICAN): >60 ML/MIN/1.73 M^2
GLUCOSE SERPL-MCNC: 94 MG/DL (ref 70–110)
GLUCOSE UR QL STRIP: NEGATIVE
HCT VFR BLD AUTO: 39.9 % (ref 37–48.5)
HGB BLD-MCNC: 11.9 G/DL (ref 12–16)
HGB UR QL STRIP: NEGATIVE
IMM GRANULOCYTES # BLD AUTO: 0.03 K/UL (ref 0–0.04)
IMM GRANULOCYTES NFR BLD AUTO: 0.3 % (ref 0–0.5)
KETONES UR QL STRIP: NEGATIVE
LDH SERPL L TO P-CCNC: 161 U/L (ref 110–260)
LEUKOCYTE ESTERASE UR QL STRIP: NEGATIVE
LYMPHOCYTES # BLD AUTO: 3.2 K/UL (ref 1–4.8)
LYMPHOCYTES NFR BLD: 30.2 % (ref 18–48)
MAGNESIUM SERPL-MCNC: 1.8 MG/DL (ref 1.6–2.6)
MCH RBC QN AUTO: 22 PG (ref 27–31)
MCHC RBC AUTO-ENTMCNC: 29.8 G/DL (ref 32–36)
MCV RBC AUTO: 74 FL (ref 82–98)
MONOCYTES # BLD AUTO: 0.9 K/UL (ref 0.3–1)
MONOCYTES NFR BLD: 8.8 % (ref 4–15)
NEUTROPHILS # BLD AUTO: 6.3 K/UL (ref 1.8–7.7)
NEUTROPHILS NFR BLD: 59.4 % (ref 38–73)
NITRITE UR QL STRIP: NEGATIVE
NRBC BLD-RTO: 0 /100 WBC
PH UR STRIP: 6 [PH] (ref 5–8)
PLATELET # BLD AUTO: 427 K/UL (ref 150–350)
PMV BLD AUTO: 8.7 FL (ref 9.2–12.9)
POTASSIUM SERPL-SCNC: 3.2 MMOL/L (ref 3.5–5.1)
PROT SERPL-MCNC: 7.6 G/DL (ref 6–8.4)
PROT UR QL STRIP: NEGATIVE
RBC # BLD AUTO: 5.41 M/UL (ref 4–5.4)
RETICS/RBC NFR AUTO: 1.3 % (ref 0.5–2.5)
SODIUM SERPL-SCNC: 139 MMOL/L (ref 136–145)
SP GR UR STRIP: 1.02 (ref 1–1.03)
URN SPEC COLLECT METH UR: NORMAL
WBC # BLD AUTO: 10.64 K/UL (ref 3.9–12.7)

## 2021-01-12 PROCEDURE — 3008F BODY MASS INDEX DOCD: CPT | Mod: CPTII,S$GLB,, | Performed by: STUDENT IN AN ORGANIZED HEALTH CARE EDUCATION/TRAINING PROGRAM

## 2021-01-12 PROCEDURE — 81003 URINALYSIS AUTO W/O SCOPE: CPT

## 2021-01-12 PROCEDURE — 82668 ASSAY OF ERYTHROPOIETIN: CPT

## 2021-01-12 PROCEDURE — 85025 COMPLETE CBC W/AUTO DIFF WBC: CPT

## 2021-01-12 PROCEDURE — 80053 COMPREHEN METABOLIC PANEL: CPT

## 2021-01-12 PROCEDURE — 3075F SYST BP GE 130 - 139MM HG: CPT | Mod: CPTII,S$GLB,, | Performed by: STUDENT IN AN ORGANIZED HEALTH CARE EDUCATION/TRAINING PROGRAM

## 2021-01-12 PROCEDURE — 85651 RBC SED RATE NONAUTOMATED: CPT

## 2021-01-12 PROCEDURE — 3078F DIAST BP <80 MM HG: CPT | Mod: CPTII,S$GLB,, | Performed by: STUDENT IN AN ORGANIZED HEALTH CARE EDUCATION/TRAINING PROGRAM

## 2021-01-12 PROCEDURE — 1126F PR PAIN SEVERITY QUANTIFIED, NO PAIN PRESENT: ICD-10-PCS | Mod: S$GLB,,, | Performed by: STUDENT IN AN ORGANIZED HEALTH CARE EDUCATION/TRAINING PROGRAM

## 2021-01-12 PROCEDURE — 36415 COLL VENOUS BLD VENIPUNCTURE: CPT

## 2021-01-12 PROCEDURE — 1126F AMNT PAIN NOTED NONE PRSNT: CPT | Mod: S$GLB,,, | Performed by: STUDENT IN AN ORGANIZED HEALTH CARE EDUCATION/TRAINING PROGRAM

## 2021-01-12 PROCEDURE — 99205 OFFICE O/P NEW HI 60 MIN: CPT | Mod: S$GLB,,, | Performed by: STUDENT IN AN ORGANIZED HEALTH CARE EDUCATION/TRAINING PROGRAM

## 2021-01-12 PROCEDURE — 3078F PR MOST RECENT DIASTOLIC BLOOD PRESSURE < 80 MM HG: ICD-10-PCS | Mod: CPTII,S$GLB,, | Performed by: STUDENT IN AN ORGANIZED HEALTH CARE EDUCATION/TRAINING PROGRAM

## 2021-01-12 PROCEDURE — 84238 ASSAY NONENDOCRINE RECEPTOR: CPT

## 2021-01-12 PROCEDURE — 99999 PR PBB SHADOW E&M-EST. PATIENT-LVL IV: ICD-10-PCS | Mod: PBBFAC,,, | Performed by: STUDENT IN AN ORGANIZED HEALTH CARE EDUCATION/TRAINING PROGRAM

## 2021-01-12 PROCEDURE — 3008F PR BODY MASS INDEX (BMI) DOCUMENTED: ICD-10-PCS | Mod: CPTII,S$GLB,, | Performed by: STUDENT IN AN ORGANIZED HEALTH CARE EDUCATION/TRAINING PROGRAM

## 2021-01-12 PROCEDURE — 83735 ASSAY OF MAGNESIUM: CPT

## 2021-01-12 PROCEDURE — 83615 LACTATE (LD) (LDH) ENZYME: CPT

## 2021-01-12 PROCEDURE — 99205 PR OFFICE/OUTPT VISIT, NEW, LEVL V, 60-74 MIN: ICD-10-PCS | Mod: S$GLB,,, | Performed by: STUDENT IN AN ORGANIZED HEALTH CARE EDUCATION/TRAINING PROGRAM

## 2021-01-12 PROCEDURE — 82728 ASSAY OF FERRITIN: CPT

## 2021-01-12 PROCEDURE — 85045 AUTOMATED RETICULOCYTE COUNT: CPT

## 2021-01-12 PROCEDURE — 3075F PR MOST RECENT SYSTOLIC BLOOD PRESS GE 130-139MM HG: ICD-10-PCS | Mod: CPTII,S$GLB,, | Performed by: STUDENT IN AN ORGANIZED HEALTH CARE EDUCATION/TRAINING PROGRAM

## 2021-01-12 PROCEDURE — 82746 ASSAY OF FOLIC ACID SERUM: CPT

## 2021-01-12 PROCEDURE — 83540 ASSAY OF IRON: CPT

## 2021-01-12 PROCEDURE — 99999 PR PBB SHADOW E&M-EST. PATIENT-LVL IV: CPT | Mod: PBBFAC,,, | Performed by: STUDENT IN AN ORGANIZED HEALTH CARE EDUCATION/TRAINING PROGRAM

## 2021-01-13 ENCOUNTER — TELEPHONE (OUTPATIENT)
Dept: SLEEP MEDICINE | Facility: CLINIC | Age: 51
End: 2021-01-13

## 2021-01-13 ENCOUNTER — PATIENT MESSAGE (OUTPATIENT)
Dept: HEMATOLOGY/ONCOLOGY | Facility: CLINIC | Age: 51
End: 2021-01-13

## 2021-01-13 LAB
FERRITIN SERPL-MCNC: 137 NG/ML (ref 20–300)
FOLATE SERPL-MCNC: 16.2 NG/ML (ref 4–24)
IRON SERPL-MCNC: 45 UG/DL (ref 30–160)
SATURATED IRON: 11 % (ref 20–50)
TOTAL IRON BINDING CAPACITY: 394 UG/DL (ref 250–450)
TRANSFERRIN SERPL-MCNC: 266 MG/DL (ref 200–375)

## 2021-01-14 LAB
EPO SERPL-ACNC: 7.4 MIU/ML (ref 2.6–18.5)
STFR SERPL-MCNC: 3.3 MG/L (ref 1.8–4.6)

## 2021-01-15 ENCOUNTER — HOSPITAL ENCOUNTER (OUTPATIENT)
Dept: RADIOLOGY | Facility: OTHER | Age: 51
Discharge: HOME | End: 2021-01-15
Attending: STUDENT IN AN ORGANIZED HEALTH CARE EDUCATION/TRAINING PROGRAM
Payer: COMMERCIAL

## 2021-01-15 DIAGNOSIS — D56.3 ALPHA THALASSEMIA TRAIT: Primary | ICD-10-CM

## 2021-01-15 DIAGNOSIS — R10.11 RIGHT UPPER QUADRANT ABDOMINAL PAIN: ICD-10-CM

## 2021-01-15 PROCEDURE — 76705 ECHO EXAM OF ABDOMEN: CPT | Mod: TC

## 2021-01-15 PROCEDURE — 76705 US ABDOMEN LIMITED_LIVER: ICD-10-PCS | Mod: 26,,, | Performed by: RADIOLOGY

## 2021-01-15 PROCEDURE — 76705 ECHO EXAM OF ABDOMEN: CPT | Mod: 26,,, | Performed by: RADIOLOGY

## 2021-01-17 PROBLEM — R53.82 CHRONIC FATIGUE: Status: ACTIVE | Noted: 2021-01-17

## 2021-01-17 PROBLEM — D56.3 ALPHA THALASSEMIA TRAIT: Status: ACTIVE | Noted: 2021-01-17

## 2021-01-17 PROBLEM — D75.839 THROMBOCYTOSIS: Status: ACTIVE | Noted: 2021-01-17

## 2021-01-17 PROBLEM — R10.11 RIGHT UPPER QUADRANT ABDOMINAL PAIN: Status: ACTIVE | Noted: 2021-01-17

## 2021-01-17 PROBLEM — D50.9 MICROCYTIC ANEMIA: Status: ACTIVE | Noted: 2021-01-17

## 2021-01-29 ENCOUNTER — PATIENT MESSAGE (OUTPATIENT)
Dept: HEMATOLOGY/ONCOLOGY | Facility: CLINIC | Age: 51
End: 2021-01-29

## 2021-02-01 ENCOUNTER — PATIENT MESSAGE (OUTPATIENT)
Dept: HEMATOLOGY/ONCOLOGY | Facility: CLINIC | Age: 51
End: 2021-02-01

## 2021-03-01 ENCOUNTER — TELEPHONE (OUTPATIENT)
Dept: GENETICS | Facility: CLINIC | Age: 51
End: 2021-03-01

## 2021-03-01 ENCOUNTER — TELEPHONE (OUTPATIENT)
Dept: HEMATOLOGY/ONCOLOGY | Facility: CLINIC | Age: 51
End: 2021-03-01

## 2021-03-05 ENCOUNTER — LAB VISIT (OUTPATIENT)
Dept: LAB | Facility: OTHER | Age: 51
End: 2021-03-05
Attending: STUDENT IN AN ORGANIZED HEALTH CARE EDUCATION/TRAINING PROGRAM
Payer: COMMERCIAL

## 2021-03-05 DIAGNOSIS — D56.3 ALPHA THALASSEMIA TRAIT: ICD-10-CM

## 2021-03-05 LAB
ALBUMIN SERPL BCP-MCNC: 3.5 G/DL (ref 3.5–5.2)
ALP SERPL-CCNC: 60 U/L (ref 55–135)
ALT SERPL W/O P-5'-P-CCNC: 13 U/L (ref 10–44)
ANION GAP SERPL CALC-SCNC: 8 MMOL/L (ref 8–16)
AST SERPL-CCNC: 15 U/L (ref 10–40)
BASOPHILS # BLD AUTO: 0.04 K/UL (ref 0–0.2)
BASOPHILS NFR BLD: 0.4 % (ref 0–1.9)
BILIRUB SERPL-MCNC: 0.3 MG/DL (ref 0.1–1)
BUN SERPL-MCNC: 11 MG/DL (ref 6–20)
CALCIUM SERPL-MCNC: 9.4 MG/DL (ref 8.7–10.5)
CHLORIDE SERPL-SCNC: 99 MMOL/L (ref 95–110)
CO2 SERPL-SCNC: 30 MMOL/L (ref 23–29)
CREAT SERPL-MCNC: 0.7 MG/DL (ref 0.5–1.4)
DIFFERENTIAL METHOD: ABNORMAL
EOSINOPHIL # BLD AUTO: 0.1 K/UL (ref 0–0.5)
EOSINOPHIL NFR BLD: 1.3 % (ref 0–8)
ERYTHROCYTE [DISTWIDTH] IN BLOOD BY AUTOMATED COUNT: 15.1 % (ref 11.5–14.5)
ERYTHROCYTE [SEDIMENTATION RATE] IN BLOOD: 27 MM/HR (ref 0–20)
EST. GFR  (AFRICAN AMERICAN): >60 ML/MIN/1.73 M^2
EST. GFR  (NON AFRICAN AMERICAN): >60 ML/MIN/1.73 M^2
FERRITIN SERPL-MCNC: 131 NG/ML (ref 20–300)
FOLATE SERPL-MCNC: 17.6 NG/ML (ref 4–24)
GLUCOSE SERPL-MCNC: 104 MG/DL (ref 70–110)
HCT VFR BLD AUTO: 38.2 % (ref 37–48.5)
HGB BLD-MCNC: 11.4 G/DL (ref 12–16)
IMM GRANULOCYTES # BLD AUTO: 0.04 K/UL (ref 0–0.04)
IMM GRANULOCYTES NFR BLD AUTO: 0.4 % (ref 0–0.5)
IRON SERPL-MCNC: 62 UG/DL (ref 30–160)
LDH SERPL L TO P-CCNC: 139 U/L (ref 110–260)
LYMPHOCYTES # BLD AUTO: 2.5 K/UL (ref 1–4.8)
LYMPHOCYTES NFR BLD: 24.4 % (ref 18–48)
MAGNESIUM SERPL-MCNC: 1.9 MG/DL (ref 1.6–2.6)
MCH RBC QN AUTO: 22.2 PG (ref 27–31)
MCHC RBC AUTO-ENTMCNC: 29.8 G/DL (ref 32–36)
MCV RBC AUTO: 75 FL (ref 82–98)
MONOCYTES # BLD AUTO: 1 K/UL (ref 0.3–1)
MONOCYTES NFR BLD: 9.4 % (ref 4–15)
NEUTROPHILS # BLD AUTO: 6.5 K/UL (ref 1.8–7.7)
NEUTROPHILS NFR BLD: 64.1 % (ref 38–73)
NRBC BLD-RTO: 0 /100 WBC
PLATELET # BLD AUTO: 395 K/UL (ref 150–350)
PMV BLD AUTO: 8.7 FL (ref 9.2–12.9)
POTASSIUM SERPL-SCNC: 3.3 MMOL/L (ref 3.5–5.1)
PROT SERPL-MCNC: 7.1 G/DL (ref 6–8.4)
RBC # BLD AUTO: 5.13 M/UL (ref 4–5.4)
RETICS/RBC NFR AUTO: 1.6 % (ref 0.5–2.5)
SATURATED IRON: 17 % (ref 20–50)
SODIUM SERPL-SCNC: 137 MMOL/L (ref 136–145)
TOTAL IRON BINDING CAPACITY: 358 UG/DL (ref 250–450)
TRANSFERRIN SERPL-MCNC: 242 MG/DL (ref 200–375)
WBC # BLD AUTO: 10.06 K/UL (ref 3.9–12.7)

## 2021-03-05 PROCEDURE — 82668 ASSAY OF ERYTHROPOIETIN: CPT | Performed by: STUDENT IN AN ORGANIZED HEALTH CARE EDUCATION/TRAINING PROGRAM

## 2021-03-05 PROCEDURE — 85045 AUTOMATED RETICULOCYTE COUNT: CPT | Performed by: STUDENT IN AN ORGANIZED HEALTH CARE EDUCATION/TRAINING PROGRAM

## 2021-03-05 PROCEDURE — 80053 COMPREHEN METABOLIC PANEL: CPT | Performed by: STUDENT IN AN ORGANIZED HEALTH CARE EDUCATION/TRAINING PROGRAM

## 2021-03-05 PROCEDURE — 82728 ASSAY OF FERRITIN: CPT | Performed by: STUDENT IN AN ORGANIZED HEALTH CARE EDUCATION/TRAINING PROGRAM

## 2021-03-05 PROCEDURE — 36415 COLL VENOUS BLD VENIPUNCTURE: CPT | Performed by: STUDENT IN AN ORGANIZED HEALTH CARE EDUCATION/TRAINING PROGRAM

## 2021-03-05 PROCEDURE — 83615 LACTATE (LD) (LDH) ENZYME: CPT | Performed by: STUDENT IN AN ORGANIZED HEALTH CARE EDUCATION/TRAINING PROGRAM

## 2021-03-05 PROCEDURE — 82746 ASSAY OF FOLIC ACID SERUM: CPT | Performed by: STUDENT IN AN ORGANIZED HEALTH CARE EDUCATION/TRAINING PROGRAM

## 2021-03-05 PROCEDURE — 84238 ASSAY NONENDOCRINE RECEPTOR: CPT | Performed by: STUDENT IN AN ORGANIZED HEALTH CARE EDUCATION/TRAINING PROGRAM

## 2021-03-05 PROCEDURE — 83540 ASSAY OF IRON: CPT | Performed by: STUDENT IN AN ORGANIZED HEALTH CARE EDUCATION/TRAINING PROGRAM

## 2021-03-05 PROCEDURE — 83735 ASSAY OF MAGNESIUM: CPT | Performed by: STUDENT IN AN ORGANIZED HEALTH CARE EDUCATION/TRAINING PROGRAM

## 2021-03-05 PROCEDURE — 85651 RBC SED RATE NONAUTOMATED: CPT | Performed by: STUDENT IN AN ORGANIZED HEALTH CARE EDUCATION/TRAINING PROGRAM

## 2021-03-05 PROCEDURE — 85025 COMPLETE CBC W/AUTO DIFF WBC: CPT | Performed by: STUDENT IN AN ORGANIZED HEALTH CARE EDUCATION/TRAINING PROGRAM

## 2021-03-06 LAB — STFR SERPL-MCNC: 2.8 MG/L (ref 1.8–4.6)

## 2021-03-08 LAB — EPO SERPL-ACNC: 9.6 MIU/ML (ref 2.6–18.5)

## 2021-03-12 ENCOUNTER — OFFICE VISIT (OUTPATIENT)
Dept: HEMATOLOGY/ONCOLOGY | Facility: CLINIC | Age: 51
End: 2021-03-12
Payer: COMMERCIAL

## 2021-03-12 ENCOUNTER — TELEPHONE (OUTPATIENT)
Dept: GENETICS | Facility: CLINIC | Age: 51
End: 2021-03-12

## 2021-03-12 VITALS
BODY MASS INDEX: 31.59 KG/M2 | TEMPERATURE: 98 F | DIASTOLIC BLOOD PRESSURE: 60 MMHG | SYSTOLIC BLOOD PRESSURE: 121 MMHG | OXYGEN SATURATION: 96 % | HEIGHT: 61 IN | HEART RATE: 63 BPM | RESPIRATION RATE: 16 BRPM | WEIGHT: 167.31 LBS

## 2021-03-12 DIAGNOSIS — R53.82 CHRONIC FATIGUE: ICD-10-CM

## 2021-03-12 DIAGNOSIS — D75.839 THROMBOCYTOSIS: ICD-10-CM

## 2021-03-12 DIAGNOSIS — D37.4 VILLOUS ADENOMA OF COLON: ICD-10-CM

## 2021-03-12 DIAGNOSIS — D56.3 ALPHA THALASSEMIA TRAIT: ICD-10-CM

## 2021-03-12 DIAGNOSIS — D50.9 MICROCYTIC ANEMIA: Primary | ICD-10-CM

## 2021-03-12 DIAGNOSIS — K76.0 FATTY LIVER: ICD-10-CM

## 2021-03-12 PROCEDURE — 99214 OFFICE O/P EST MOD 30 MIN: CPT | Mod: S$GLB,,, | Performed by: STUDENT IN AN ORGANIZED HEALTH CARE EDUCATION/TRAINING PROGRAM

## 2021-03-12 PROCEDURE — 1126F PR PAIN SEVERITY QUANTIFIED, NO PAIN PRESENT: ICD-10-PCS | Mod: S$GLB,,, | Performed by: STUDENT IN AN ORGANIZED HEALTH CARE EDUCATION/TRAINING PROGRAM

## 2021-03-12 PROCEDURE — 3078F DIAST BP <80 MM HG: CPT | Mod: CPTII,S$GLB,, | Performed by: STUDENT IN AN ORGANIZED HEALTH CARE EDUCATION/TRAINING PROGRAM

## 2021-03-12 PROCEDURE — 3008F BODY MASS INDEX DOCD: CPT | Mod: CPTII,S$GLB,, | Performed by: STUDENT IN AN ORGANIZED HEALTH CARE EDUCATION/TRAINING PROGRAM

## 2021-03-12 PROCEDURE — 3008F PR BODY MASS INDEX (BMI) DOCUMENTED: ICD-10-PCS | Mod: CPTII,S$GLB,, | Performed by: STUDENT IN AN ORGANIZED HEALTH CARE EDUCATION/TRAINING PROGRAM

## 2021-03-12 PROCEDURE — 99999 PR PBB SHADOW E&M-EST. PATIENT-LVL IV: CPT | Mod: PBBFAC,,, | Performed by: STUDENT IN AN ORGANIZED HEALTH CARE EDUCATION/TRAINING PROGRAM

## 2021-03-12 PROCEDURE — 99999 PR PBB SHADOW E&M-EST. PATIENT-LVL IV: ICD-10-PCS | Mod: PBBFAC,,, | Performed by: STUDENT IN AN ORGANIZED HEALTH CARE EDUCATION/TRAINING PROGRAM

## 2021-03-12 PROCEDURE — 1126F AMNT PAIN NOTED NONE PRSNT: CPT | Mod: S$GLB,,, | Performed by: STUDENT IN AN ORGANIZED HEALTH CARE EDUCATION/TRAINING PROGRAM

## 2021-03-12 PROCEDURE — 3078F PR MOST RECENT DIASTOLIC BLOOD PRESSURE < 80 MM HG: ICD-10-PCS | Mod: CPTII,S$GLB,, | Performed by: STUDENT IN AN ORGANIZED HEALTH CARE EDUCATION/TRAINING PROGRAM

## 2021-03-12 PROCEDURE — 99214 PR OFFICE/OUTPT VISIT, EST, LEVL IV, 30-39 MIN: ICD-10-PCS | Mod: S$GLB,,, | Performed by: STUDENT IN AN ORGANIZED HEALTH CARE EDUCATION/TRAINING PROGRAM

## 2021-03-12 PROCEDURE — 3074F PR MOST RECENT SYSTOLIC BLOOD PRESSURE < 130 MM HG: ICD-10-PCS | Mod: CPTII,S$GLB,, | Performed by: STUDENT IN AN ORGANIZED HEALTH CARE EDUCATION/TRAINING PROGRAM

## 2021-03-12 PROCEDURE — 3074F SYST BP LT 130 MM HG: CPT | Mod: CPTII,S$GLB,, | Performed by: STUDENT IN AN ORGANIZED HEALTH CARE EDUCATION/TRAINING PROGRAM

## 2021-03-14 PROBLEM — D37.4 VILLOUS ADENOMA OF COLON: Status: ACTIVE | Noted: 2021-03-14

## 2021-03-14 PROBLEM — K76.0 FATTY LIVER: Status: ACTIVE | Noted: 2021-03-14

## 2021-03-15 ENCOUNTER — OFFICE VISIT (OUTPATIENT)
Dept: GENETICS | Facility: CLINIC | Age: 51
End: 2021-03-15
Payer: COMMERCIAL

## 2021-03-15 DIAGNOSIS — D56.3 ALPHA THALASSEMIA TRAIT: ICD-10-CM

## 2021-03-15 PROCEDURE — 99499 NO LOS: ICD-10-PCS | Mod: S$GLB,,, | Performed by: MEDICAL GENETICS

## 2021-03-15 PROCEDURE — 99499 UNLISTED E&M SERVICE: CPT | Mod: S$GLB,,, | Performed by: MEDICAL GENETICS

## 2021-03-15 PROCEDURE — 96040 PR GENETIC COUNSELING, EACH 30 MIN: ICD-10-PCS | Mod: S$GLB,,, | Performed by: MEDICAL GENETICS

## 2021-03-15 PROCEDURE — 99999 PR PBB SHADOW E&M-EST. PATIENT-LVL I: ICD-10-PCS | Mod: PBBFAC,,,

## 2021-03-15 PROCEDURE — 96040 PR GENETIC COUNSELING, EACH 30 MIN: CPT | Mod: S$GLB,,, | Performed by: MEDICAL GENETICS

## 2021-03-15 PROCEDURE — 99999 PR PBB SHADOW E&M-EST. PATIENT-LVL I: CPT | Mod: PBBFAC,,,

## 2021-03-18 DIAGNOSIS — D56.3 ALPHA THALASSEMIA TRAIT: Primary | ICD-10-CM

## 2021-03-18 DIAGNOSIS — D50.9 MICROCYTIC ANEMIA: ICD-10-CM

## 2021-04-21 ENCOUNTER — PATIENT MESSAGE (OUTPATIENT)
Dept: OBSTETRICS AND GYNECOLOGY | Facility: CLINIC | Age: 51
End: 2021-04-21

## 2021-04-22 ENCOUNTER — TELEPHONE (OUTPATIENT)
Dept: OBSTETRICS AND GYNECOLOGY | Facility: CLINIC | Age: 51
End: 2021-04-22

## 2021-04-22 DIAGNOSIS — Z12.31 ENCOUNTER FOR SCREENING MAMMOGRAM FOR MALIGNANT NEOPLASM OF BREAST: Primary | ICD-10-CM

## 2021-04-22 DIAGNOSIS — D12.6 TUBULOVILLOUS ADENOMA OF COLON: Primary | ICD-10-CM

## 2021-04-26 ENCOUNTER — PATIENT MESSAGE (OUTPATIENT)
Dept: ENDOSCOPY | Facility: HOSPITAL | Age: 51
End: 2021-04-26

## 2021-04-26 ENCOUNTER — PATIENT MESSAGE (OUTPATIENT)
Dept: RESEARCH | Facility: HOSPITAL | Age: 51
End: 2021-04-26

## 2021-04-26 DIAGNOSIS — Z12.11 SPECIAL SCREENING FOR MALIGNANT NEOPLASMS, COLON: Primary | ICD-10-CM

## 2021-04-26 DIAGNOSIS — Z01.818 PRE-OP TESTING: ICD-10-CM

## 2021-04-26 RX ORDER — SODIUM, POTASSIUM,MAG SULFATES 17.5-3.13G
1 SOLUTION, RECONSTITUTED, ORAL ORAL DAILY
Qty: 1 KIT | Refills: 0 | Status: SHIPPED | OUTPATIENT
Start: 2021-04-26 | End: 2021-04-28

## 2021-05-03 ENCOUNTER — PATIENT MESSAGE (OUTPATIENT)
Dept: INTERNAL MEDICINE | Facility: CLINIC | Age: 51
End: 2021-05-03

## 2021-06-15 ENCOUNTER — HOSPITAL ENCOUNTER (OUTPATIENT)
Dept: RADIOLOGY | Facility: OTHER | Age: 51
Discharge: HOME | End: 2021-06-15
Attending: OBSTETRICS & GYNECOLOGY
Payer: COMMERCIAL

## 2021-06-15 DIAGNOSIS — Z12.31 ENCOUNTER FOR SCREENING MAMMOGRAM FOR MALIGNANT NEOPLASM OF BREAST: ICD-10-CM

## 2021-06-15 PROCEDURE — 77067 SCR MAMMO BI INCL CAD: CPT | Mod: 26,,, | Performed by: RADIOLOGY

## 2021-06-15 PROCEDURE — 77063 MAMMO DIGITAL SCREENING BILAT WITH TOMO: ICD-10-PCS | Mod: 26,,, | Performed by: RADIOLOGY

## 2021-06-15 PROCEDURE — 77067 SCR MAMMO BI INCL CAD: CPT | Mod: TC

## 2021-06-15 PROCEDURE — 77063 BREAST TOMOSYNTHESIS BI: CPT | Mod: 26,,, | Performed by: RADIOLOGY

## 2021-06-15 PROCEDURE — 77067 MAMMO DIGITAL SCREENING BILAT WITH TOMO: ICD-10-PCS | Mod: 26,,, | Performed by: RADIOLOGY

## 2021-07-06 ENCOUNTER — LAB VISIT (OUTPATIENT)
Dept: LAB | Facility: HOSPITAL | Age: 51
End: 2021-07-06
Attending: STUDENT IN AN ORGANIZED HEALTH CARE EDUCATION/TRAINING PROGRAM
Payer: COMMERCIAL

## 2021-07-06 ENCOUNTER — LAB VISIT (OUTPATIENT)
Dept: FAMILY MEDICINE | Facility: CLINIC | Age: 51
End: 2021-07-06
Payer: COMMERCIAL

## 2021-07-06 DIAGNOSIS — D56.3 ALPHA THALASSEMIA TRAIT: ICD-10-CM

## 2021-07-06 DIAGNOSIS — Z01.818 PRE-OP TESTING: ICD-10-CM

## 2021-07-06 DIAGNOSIS — D50.9 MICROCYTIC ANEMIA: ICD-10-CM

## 2021-07-06 LAB
BASOPHILS # BLD AUTO: 0.04 K/UL (ref 0–0.2)
BASOPHILS NFR BLD: 0.4 % (ref 0–1.9)
DIFFERENTIAL METHOD: ABNORMAL
EOSINOPHIL # BLD AUTO: 0.1 K/UL (ref 0–0.5)
EOSINOPHIL NFR BLD: 1.2 % (ref 0–8)
ERYTHROCYTE [DISTWIDTH] IN BLOOD BY AUTOMATED COUNT: 15.1 % (ref 11.5–14.5)
HCT VFR BLD AUTO: 38.4 % (ref 37–48.5)
HGB BLD-MCNC: 11.9 G/DL (ref 12–16)
IMM GRANULOCYTES # BLD AUTO: 0.03 K/UL (ref 0–0.04)
IMM GRANULOCYTES NFR BLD AUTO: 0.3 % (ref 0–0.5)
LYMPHOCYTES # BLD AUTO: 2.9 K/UL (ref 1–4.8)
LYMPHOCYTES NFR BLD: 32.3 % (ref 18–48)
MCH RBC QN AUTO: 22.6 PG (ref 27–31)
MCHC RBC AUTO-ENTMCNC: 31 G/DL (ref 32–36)
MCV RBC AUTO: 73 FL (ref 82–98)
MONOCYTES # BLD AUTO: 0.6 K/UL (ref 0.3–1)
MONOCYTES NFR BLD: 6.1 % (ref 4–15)
NEUTROPHILS # BLD AUTO: 5.4 K/UL (ref 1.8–7.7)
NEUTROPHILS NFR BLD: 59.7 % (ref 38–73)
NRBC BLD-RTO: 0 /100 WBC
PLATELET # BLD AUTO: 414 K/UL (ref 150–450)
PMV BLD AUTO: 9.5 FL (ref 9.2–12.9)
RBC # BLD AUTO: 5.26 M/UL (ref 4–5.4)
RETICS/RBC NFR AUTO: 1.2 % (ref 0.5–2.5)
WBC # BLD AUTO: 9.06 K/UL (ref 3.9–12.7)

## 2021-07-06 PROCEDURE — 85025 COMPLETE CBC W/AUTO DIFF WBC: CPT | Performed by: STUDENT IN AN ORGANIZED HEALTH CARE EDUCATION/TRAINING PROGRAM

## 2021-07-06 PROCEDURE — 36415 COLL VENOUS BLD VENIPUNCTURE: CPT | Mod: PO | Performed by: STUDENT IN AN ORGANIZED HEALTH CARE EDUCATION/TRAINING PROGRAM

## 2021-07-06 PROCEDURE — 82728 ASSAY OF FERRITIN: CPT | Performed by: STUDENT IN AN ORGANIZED HEALTH CARE EDUCATION/TRAINING PROGRAM

## 2021-07-06 PROCEDURE — 80053 COMPREHEN METABOLIC PANEL: CPT | Performed by: STUDENT IN AN ORGANIZED HEALTH CARE EDUCATION/TRAINING PROGRAM

## 2021-07-06 PROCEDURE — U0005 INFEC AGEN DETEC AMPLI PROBE: HCPCS | Performed by: CLINICAL NURSE SPECIALIST

## 2021-07-06 PROCEDURE — 82746 ASSAY OF FOLIC ACID SERUM: CPT | Performed by: STUDENT IN AN ORGANIZED HEALTH CARE EDUCATION/TRAINING PROGRAM

## 2021-07-06 PROCEDURE — 83735 ASSAY OF MAGNESIUM: CPT | Performed by: STUDENT IN AN ORGANIZED HEALTH CARE EDUCATION/TRAINING PROGRAM

## 2021-07-06 PROCEDURE — U0003 INFECTIOUS AGENT DETECTION BY NUCLEIC ACID (DNA OR RNA); SEVERE ACUTE RESPIRATORY SYNDROME CORONAVIRUS 2 (SARS-COV-2) (CORONAVIRUS DISEASE [COVID-19]), AMPLIFIED PROBE TECHNIQUE, MAKING USE OF HIGH THROUGHPUT TECHNOLOGIES AS DESCRIBED BY CMS-2020-01-R: HCPCS | Performed by: CLINICAL NURSE SPECIALIST

## 2021-07-06 PROCEDURE — 83615 LACTATE (LD) (LDH) ENZYME: CPT | Performed by: STUDENT IN AN ORGANIZED HEALTH CARE EDUCATION/TRAINING PROGRAM

## 2021-07-06 PROCEDURE — 85045 AUTOMATED RETICULOCYTE COUNT: CPT | Performed by: STUDENT IN AN ORGANIZED HEALTH CARE EDUCATION/TRAINING PROGRAM

## 2021-07-07 LAB
ALBUMIN SERPL BCP-MCNC: 3.6 G/DL (ref 3.5–5.2)
ALP SERPL-CCNC: 62 U/L (ref 55–135)
ALT SERPL W/O P-5'-P-CCNC: 14 U/L (ref 10–44)
ANION GAP SERPL CALC-SCNC: 10 MMOL/L (ref 8–16)
AST SERPL-CCNC: 16 U/L (ref 10–40)
BILIRUB SERPL-MCNC: 0.3 MG/DL (ref 0.1–1)
BUN SERPL-MCNC: 15 MG/DL (ref 6–20)
CALCIUM SERPL-MCNC: 9.3 MG/DL (ref 8.7–10.5)
CHLORIDE SERPL-SCNC: 99 MMOL/L (ref 95–110)
CO2 SERPL-SCNC: 27 MMOL/L (ref 23–29)
CREAT SERPL-MCNC: 0.8 MG/DL (ref 0.5–1.4)
EST. GFR  (AFRICAN AMERICAN): >60 ML/MIN/1.73 M^2
EST. GFR  (NON AFRICAN AMERICAN): >60 ML/MIN/1.73 M^2
FERRITIN SERPL-MCNC: 136 NG/ML (ref 20–300)
FOLATE SERPL-MCNC: 16 NG/ML (ref 4–24)
GLUCOSE SERPL-MCNC: 140 MG/DL (ref 70–110)
LDH SERPL L TO P-CCNC: 172 U/L (ref 110–260)
MAGNESIUM SERPL-MCNC: 1.8 MG/DL (ref 1.6–2.6)
POTASSIUM SERPL-SCNC: 3.4 MMOL/L (ref 3.5–5.1)
PROT SERPL-MCNC: 7.1 G/DL (ref 6–8.4)
SARS-COV-2 RNA RESP QL NAA+PROBE: NOT DETECTED
SARS-COV-2- CYCLE NUMBER: -1
SODIUM SERPL-SCNC: 136 MMOL/L (ref 136–145)

## 2021-07-09 ENCOUNTER — ANESTHESIA EVENT (OUTPATIENT)
Dept: ENDOSCOPY | Facility: HOSPITAL | Age: 51
End: 2021-07-09
Payer: COMMERCIAL

## 2021-07-09 ENCOUNTER — ANESTHESIA (OUTPATIENT)
Dept: ENDOSCOPY | Facility: HOSPITAL | Age: 51
End: 2021-07-09
Payer: COMMERCIAL

## 2021-07-09 ENCOUNTER — HOSPITAL ENCOUNTER (OUTPATIENT)
Facility: HOSPITAL | Age: 51
Discharge: HOME | End: 2021-07-09
Attending: COLON & RECTAL SURGERY | Admitting: COLON & RECTAL SURGERY
Payer: COMMERCIAL

## 2021-07-09 VITALS
OXYGEN SATURATION: 98 % | HEIGHT: 61 IN | RESPIRATION RATE: 18 BRPM | HEART RATE: 53 BPM | SYSTOLIC BLOOD PRESSURE: 128 MMHG | DIASTOLIC BLOOD PRESSURE: 69 MMHG | WEIGHT: 160 LBS | BODY MASS INDEX: 30.21 KG/M2 | TEMPERATURE: 98 F

## 2021-07-09 DIAGNOSIS — K63.5 CECAL POLYP: Primary | ICD-10-CM

## 2021-07-09 PROBLEM — Z86.0100 HISTORY OF COLON POLYPS: Status: ACTIVE | Noted: 2021-07-09

## 2021-07-09 PROBLEM — Z86.010 HISTORY OF COLON POLYPS: Status: ACTIVE | Noted: 2021-07-09

## 2021-07-09 LAB
B-HCG UR QL: NEGATIVE
CTP QC/QA: YES

## 2021-07-09 PROCEDURE — 25000003 PHARM REV CODE 250: Performed by: COLON & RECTAL SURGERY

## 2021-07-09 PROCEDURE — 25000003 PHARM REV CODE 250: Performed by: REGISTERED NURSE

## 2021-07-09 PROCEDURE — 88305 TISSUE EXAM BY PATHOLOGIST: CPT | Mod: 26,,, | Performed by: PATHOLOGY

## 2021-07-09 PROCEDURE — E9220 PRA ENDO ANESTHESIA: ICD-10-PCS | Mod: 33,,, | Performed by: REGISTERED NURSE

## 2021-07-09 PROCEDURE — E9220 PRA ENDO ANESTHESIA: HCPCS | Mod: 33,,, | Performed by: REGISTERED NURSE

## 2021-07-09 PROCEDURE — 45385 COLONOSCOPY W/LESION REMOVAL: CPT | Performed by: COLON & RECTAL SURGERY

## 2021-07-09 PROCEDURE — 27201089 HC SNARE, DISP (ANY): Performed by: COLON & RECTAL SURGERY

## 2021-07-09 PROCEDURE — 45385 COLONOSCOPY W/LESION REMOVAL: CPT | Mod: 33,,, | Performed by: COLON & RECTAL SURGERY

## 2021-07-09 PROCEDURE — 45385 PR COLONOSCOPY,REMV LESN,SNARE: ICD-10-PCS | Mod: 33,,, | Performed by: COLON & RECTAL SURGERY

## 2021-07-09 PROCEDURE — 37000008 HC ANESTHESIA 1ST 15 MINUTES: Performed by: COLON & RECTAL SURGERY

## 2021-07-09 PROCEDURE — 81025 URINE PREGNANCY TEST: CPT | Performed by: COLON & RECTAL SURGERY

## 2021-07-09 PROCEDURE — 63600175 PHARM REV CODE 636 W HCPCS: Performed by: REGISTERED NURSE

## 2021-07-09 PROCEDURE — 88305 TISSUE EXAM BY PATHOLOGIST: ICD-10-PCS | Mod: 26,,, | Performed by: PATHOLOGY

## 2021-07-09 PROCEDURE — 37000009 HC ANESTHESIA EA ADD 15 MINS: Performed by: COLON & RECTAL SURGERY

## 2021-07-09 PROCEDURE — 88305 TISSUE EXAM BY PATHOLOGIST: CPT | Performed by: PATHOLOGY

## 2021-07-09 RX ORDER — PROPOFOL 10 MG/ML
VIAL (ML) INTRAVENOUS
Status: DISCONTINUED | OUTPATIENT
Start: 2021-07-09 | End: 2021-07-09

## 2021-07-09 RX ORDER — SODIUM CHLORIDE 9 MG/ML
INJECTION, SOLUTION INTRAVENOUS CONTINUOUS
Status: DISCONTINUED | OUTPATIENT
Start: 2021-07-09 | End: 2021-07-09 | Stop reason: HOSPADM

## 2021-07-09 RX ORDER — PROPOFOL 10 MG/ML
VIAL (ML) INTRAVENOUS CONTINUOUS PRN
Status: DISCONTINUED | OUTPATIENT
Start: 2021-07-09 | End: 2021-07-09

## 2021-07-09 RX ORDER — LIDOCAINE HYDROCHLORIDE 20 MG/ML
INJECTION INTRAVENOUS
Status: DISCONTINUED | OUTPATIENT
Start: 2021-07-09 | End: 2021-07-09

## 2021-07-09 RX ADMIN — Medication 115 MCG/KG/MIN: at 09:07

## 2021-07-09 RX ADMIN — PROPOFOL 20 MG: 10 INJECTION, EMULSION INTRAVENOUS at 09:07

## 2021-07-09 RX ADMIN — PROPOFOL 30 MG: 10 INJECTION, EMULSION INTRAVENOUS at 09:07

## 2021-07-09 RX ADMIN — LIDOCAINE HYDROCHLORIDE 80 MG: 20 INJECTION, SOLUTION INTRAVENOUS at 09:07

## 2021-07-09 RX ADMIN — SODIUM CHLORIDE: 0.9 INJECTION, SOLUTION INTRAVENOUS at 09:07

## 2021-07-09 RX ADMIN — PROPOFOL 50 MG: 10 INJECTION, EMULSION INTRAVENOUS at 09:07

## 2021-07-09 RX ADMIN — SODIUM CHLORIDE: 0.9 INJECTION, SOLUTION INTRAVENOUS at 08:07

## 2021-07-12 ENCOUNTER — OFFICE VISIT (OUTPATIENT)
Dept: HEMATOLOGY/ONCOLOGY | Facility: CLINIC | Age: 51
End: 2021-07-12
Payer: COMMERCIAL

## 2021-07-12 DIAGNOSIS — D56.3 ALPHA THALASSEMIA TRAIT: Primary | ICD-10-CM

## 2021-07-12 DIAGNOSIS — R53.82 CHRONIC FATIGUE: ICD-10-CM

## 2021-07-12 DIAGNOSIS — D50.9 MICROCYTIC ANEMIA: ICD-10-CM

## 2021-07-12 DIAGNOSIS — D37.4 VILLOUS ADENOMA OF COLON: ICD-10-CM

## 2021-07-12 DIAGNOSIS — K76.0 FATTY LIVER: ICD-10-CM

## 2021-07-12 PROCEDURE — 99214 PR OFFICE/OUTPT VISIT, EST, LEVL IV, 30-39 MIN: ICD-10-PCS | Mod: 95,,, | Performed by: STUDENT IN AN ORGANIZED HEALTH CARE EDUCATION/TRAINING PROGRAM

## 2021-07-12 PROCEDURE — 99214 OFFICE O/P EST MOD 30 MIN: CPT | Mod: 95,,, | Performed by: STUDENT IN AN ORGANIZED HEALTH CARE EDUCATION/TRAINING PROGRAM

## 2021-07-19 LAB
FINAL PATHOLOGIC DIAGNOSIS: NORMAL
GROSS: NORMAL
Lab: NORMAL

## 2021-08-09 ENCOUNTER — TELEPHONE (OUTPATIENT)
Dept: OBSTETRICS AND GYNECOLOGY | Facility: CLINIC | Age: 51
End: 2021-08-09

## 2021-08-09 ENCOUNTER — TELEPHONE (OUTPATIENT)
Dept: INTERNAL MEDICINE | Facility: CLINIC | Age: 51
End: 2021-08-09

## 2021-08-26 DIAGNOSIS — E87.6 HYPOKALEMIA: ICD-10-CM

## 2021-08-26 RX ORDER — POTASSIUM CHLORIDE 750 MG/1
TABLET, EXTENDED RELEASE ORAL
Qty: 90 TABLET | Refills: 0 | Status: SHIPPED | OUTPATIENT
Start: 2021-08-26 | End: 2021-09-20 | Stop reason: SDUPTHER

## 2021-09-20 DIAGNOSIS — E87.6 HYPOKALEMIA: ICD-10-CM

## 2021-09-28 ENCOUNTER — PATIENT OUTREACH (OUTPATIENT)
Dept: ADMINISTRATIVE | Facility: OTHER | Age: 51
End: 2021-09-28

## 2021-09-29 ENCOUNTER — PATIENT MESSAGE (OUTPATIENT)
Dept: OBSTETRICS AND GYNECOLOGY | Facility: CLINIC | Age: 51
End: 2021-09-29

## 2021-09-29 ENCOUNTER — TELEPHONE (OUTPATIENT)
Dept: OBSTETRICS AND GYNECOLOGY | Facility: CLINIC | Age: 51
End: 2021-09-29

## 2021-09-29 ENCOUNTER — OFFICE VISIT (OUTPATIENT)
Dept: OBSTETRICS AND GYNECOLOGY | Facility: CLINIC | Age: 51
End: 2021-09-29
Attending: OBSTETRICS & GYNECOLOGY
Payer: COMMERCIAL

## 2021-09-29 VITALS
WEIGHT: 170 LBS | DIASTOLIC BLOOD PRESSURE: 80 MMHG | BODY MASS INDEX: 32.1 KG/M2 | HEART RATE: 80 BPM | HEIGHT: 61 IN | SYSTOLIC BLOOD PRESSURE: 132 MMHG

## 2021-09-29 DIAGNOSIS — Z01.419 ENCOUNTER FOR GYNECOLOGICAL EXAMINATION WITHOUT ABNORMAL FINDING: Primary | ICD-10-CM

## 2021-09-29 PROCEDURE — 1159F MED LIST DOCD IN RCRD: CPT | Mod: CPTII,S$GLB,, | Performed by: OBSTETRICS & GYNECOLOGY

## 2021-09-29 PROCEDURE — 3075F SYST BP GE 130 - 139MM HG: CPT | Mod: CPTII,S$GLB,, | Performed by: OBSTETRICS & GYNECOLOGY

## 2021-09-29 PROCEDURE — 3008F PR BODY MASS INDEX (BMI) DOCUMENTED: ICD-10-PCS | Mod: CPTII,S$GLB,, | Performed by: OBSTETRICS & GYNECOLOGY

## 2021-09-29 PROCEDURE — 3079F PR MOST RECENT DIASTOLIC BLOOD PRESSURE 80-89 MM HG: ICD-10-PCS | Mod: CPTII,S$GLB,, | Performed by: OBSTETRICS & GYNECOLOGY

## 2021-09-29 PROCEDURE — 3079F DIAST BP 80-89 MM HG: CPT | Mod: CPTII,S$GLB,, | Performed by: OBSTETRICS & GYNECOLOGY

## 2021-09-29 PROCEDURE — 99396 PR PREVENTIVE VISIT,EST,40-64: ICD-10-PCS | Mod: S$GLB,,, | Performed by: OBSTETRICS & GYNECOLOGY

## 2021-09-29 PROCEDURE — 1159F PR MEDICATION LIST DOCUMENTED IN MEDICAL RECORD: ICD-10-PCS | Mod: CPTII,S$GLB,, | Performed by: OBSTETRICS & GYNECOLOGY

## 2021-09-29 PROCEDURE — 99396 PREV VISIT EST AGE 40-64: CPT | Mod: S$GLB,,, | Performed by: OBSTETRICS & GYNECOLOGY

## 2021-09-29 PROCEDURE — 3008F BODY MASS INDEX DOCD: CPT | Mod: CPTII,S$GLB,, | Performed by: OBSTETRICS & GYNECOLOGY

## 2021-09-29 PROCEDURE — 3075F PR MOST RECENT SYSTOLIC BLOOD PRESS GE 130-139MM HG: ICD-10-PCS | Mod: CPTII,S$GLB,, | Performed by: OBSTETRICS & GYNECOLOGY

## 2021-09-29 RX ORDER — POTASSIUM CHLORIDE 750 MG/1
10 TABLET, EXTENDED RELEASE ORAL DAILY
Qty: 90 TABLET | Refills: 0 | Status: SHIPPED | OUTPATIENT
Start: 2021-09-29 | End: 2021-10-28 | Stop reason: SDUPTHER

## 2021-10-28 ENCOUNTER — IMMUNIZATION (OUTPATIENT)
Dept: INTERNAL MEDICINE | Facility: CLINIC | Age: 51
End: 2021-10-28
Payer: COMMERCIAL

## 2021-10-28 ENCOUNTER — TELEPHONE (OUTPATIENT)
Dept: ADMINISTRATIVE | Facility: OTHER | Age: 51
End: 2021-10-28
Payer: COMMERCIAL

## 2021-10-28 ENCOUNTER — LAB VISIT (OUTPATIENT)
Dept: LAB | Facility: HOSPITAL | Age: 51
End: 2021-10-28
Payer: COMMERCIAL

## 2021-10-28 ENCOUNTER — PATIENT MESSAGE (OUTPATIENT)
Dept: INTERNAL MEDICINE | Facility: CLINIC | Age: 51
End: 2021-10-28

## 2021-10-28 ENCOUNTER — OFFICE VISIT (OUTPATIENT)
Dept: INTERNAL MEDICINE | Facility: CLINIC | Age: 51
End: 2021-10-28
Payer: COMMERCIAL

## 2021-10-28 ENCOUNTER — PATIENT MESSAGE (OUTPATIENT)
Dept: ADMINISTRATIVE | Facility: OTHER | Age: 51
End: 2021-10-28
Payer: COMMERCIAL

## 2021-10-28 VITALS
WEIGHT: 168.44 LBS | HEART RATE: 65 BPM | SYSTOLIC BLOOD PRESSURE: 115 MMHG | BODY MASS INDEX: 31.8 KG/M2 | DIASTOLIC BLOOD PRESSURE: 72 MMHG | OXYGEN SATURATION: 99 % | HEIGHT: 61 IN

## 2021-10-28 DIAGNOSIS — Z00.00 PREVENTATIVE HEALTH CARE: ICD-10-CM

## 2021-10-28 DIAGNOSIS — I10 ESSENTIAL HYPERTENSION: ICD-10-CM

## 2021-10-28 DIAGNOSIS — E87.6 HYPOKALEMIA: ICD-10-CM

## 2021-10-28 DIAGNOSIS — F32.81 PMDD (PREMENSTRUAL DYSPHORIC DISORDER): ICD-10-CM

## 2021-10-28 DIAGNOSIS — M54.2 CERVICAL PAIN (NECK): ICD-10-CM

## 2021-10-28 DIAGNOSIS — D56.3 ALPHA THALASSEMIA TRAIT: ICD-10-CM

## 2021-10-28 DIAGNOSIS — K76.0 FATTY LIVER: ICD-10-CM

## 2021-10-28 DIAGNOSIS — Z00.00 PREVENTATIVE HEALTH CARE: Primary | ICD-10-CM

## 2021-10-28 DIAGNOSIS — D75.839 THROMBOCYTOSIS: ICD-10-CM

## 2021-10-28 DIAGNOSIS — M47.812 CERVICAL SPONDYLOSIS: ICD-10-CM

## 2021-10-28 DIAGNOSIS — M54.12 CERVICAL RADICULOPATHY: ICD-10-CM

## 2021-10-28 LAB
25(OH)D3+25(OH)D2 SERPL-MCNC: 45 NG/ML (ref 30–96)
ALBUMIN SERPL BCP-MCNC: 3.5 G/DL (ref 3.5–5.2)
ALP SERPL-CCNC: 62 U/L (ref 55–135)
ALT SERPL W/O P-5'-P-CCNC: 15 U/L (ref 10–44)
ANION GAP SERPL CALC-SCNC: 11 MMOL/L (ref 8–16)
AST SERPL-CCNC: 16 U/L (ref 10–40)
BASOPHILS # BLD AUTO: 0.02 K/UL (ref 0–0.2)
BASOPHILS NFR BLD: 0.2 % (ref 0–1.9)
BILIRUB SERPL-MCNC: 0.4 MG/DL (ref 0.1–1)
BUN SERPL-MCNC: 11 MG/DL (ref 6–20)
CALCIUM SERPL-MCNC: 9.8 MG/DL (ref 8.7–10.5)
CHLORIDE SERPL-SCNC: 98 MMOL/L (ref 95–110)
CHOLEST SERPL-MCNC: 175 MG/DL (ref 120–199)
CHOLEST/HDLC SERPL: 3.7 {RATIO} (ref 2–5)
CO2 SERPL-SCNC: 28 MMOL/L (ref 23–29)
CREAT SERPL-MCNC: 0.8 MG/DL (ref 0.5–1.4)
DIFFERENTIAL METHOD: ABNORMAL
EOSINOPHIL # BLD AUTO: 0.1 K/UL (ref 0–0.5)
EOSINOPHIL NFR BLD: 1.1 % (ref 0–8)
ERYTHROCYTE [DISTWIDTH] IN BLOOD BY AUTOMATED COUNT: 15.1 % (ref 11.5–14.5)
EST. GFR  (AFRICAN AMERICAN): >60 ML/MIN/1.73 M^2
EST. GFR  (NON AFRICAN AMERICAN): >60 ML/MIN/1.73 M^2
ESTIMATED AVG GLUCOSE: 128 MG/DL (ref 68–131)
GLUCOSE SERPL-MCNC: 114 MG/DL (ref 70–110)
HBA1C MFR BLD: 6.1 % (ref 4–5.6)
HCT VFR BLD AUTO: 38.1 % (ref 37–48.5)
HCV AB SERPL QL IA: NEGATIVE
HDLC SERPL-MCNC: 47 MG/DL (ref 40–75)
HDLC SERPL: 26.9 % (ref 20–50)
HGB BLD-MCNC: 11.7 G/DL (ref 12–16)
IMM GRANULOCYTES # BLD AUTO: 0.01 K/UL (ref 0–0.04)
IMM GRANULOCYTES NFR BLD AUTO: 0.1 % (ref 0–0.5)
LDLC SERPL CALC-MCNC: 104 MG/DL (ref 63–159)
LYMPHOCYTES # BLD AUTO: 2.6 K/UL (ref 1–4.8)
LYMPHOCYTES NFR BLD: 27.3 % (ref 18–48)
MCH RBC QN AUTO: 22.8 PG (ref 27–31)
MCHC RBC AUTO-ENTMCNC: 30.7 G/DL (ref 32–36)
MCV RBC AUTO: 74 FL (ref 82–98)
MONOCYTES # BLD AUTO: 0.8 K/UL (ref 0.3–1)
MONOCYTES NFR BLD: 8.4 % (ref 4–15)
NEUTROPHILS # BLD AUTO: 5.9 K/UL (ref 1.8–7.7)
NEUTROPHILS NFR BLD: 62.9 % (ref 38–73)
NONHDLC SERPL-MCNC: 128 MG/DL
NRBC BLD-RTO: 0 /100 WBC
PLATELET # BLD AUTO: 401 K/UL (ref 150–450)
PMV BLD AUTO: 9.1 FL (ref 9.2–12.9)
POTASSIUM SERPL-SCNC: 3.6 MMOL/L (ref 3.5–5.1)
PROT SERPL-MCNC: 6.8 G/DL (ref 6–8.4)
RBC # BLD AUTO: 5.14 M/UL (ref 4–5.4)
SODIUM SERPL-SCNC: 137 MMOL/L (ref 136–145)
TRIGL SERPL-MCNC: 120 MG/DL (ref 30–150)
TSH SERPL DL<=0.005 MIU/L-ACNC: 2.66 UIU/ML (ref 0.4–4)
WBC # BLD AUTO: 9.39 K/UL (ref 3.9–12.7)

## 2021-10-28 PROCEDURE — 1160F PR REVIEW ALL MEDS BY PRESCRIBER/CLIN PHARMACIST DOCUMENTED: ICD-10-PCS | Mod: CPTII,S$GLB,, | Performed by: NURSE PRACTITIONER

## 2021-10-28 PROCEDURE — 84443 ASSAY THYROID STIM HORMONE: CPT | Performed by: NURSE PRACTITIONER

## 2021-10-28 PROCEDURE — 1160F RVW MEDS BY RX/DR IN RCRD: CPT | Mod: CPTII,S$GLB,, | Performed by: NURSE PRACTITIONER

## 2021-10-28 PROCEDURE — 85025 COMPLETE CBC W/AUTO DIFF WBC: CPT | Performed by: NURSE PRACTITIONER

## 2021-10-28 PROCEDURE — 90686 FLU VACCINE (QUAD) GREATER THAN OR EQUAL TO 3YO PRESERVATIVE FREE IM: ICD-10-PCS | Mod: S$GLB,,, | Performed by: INTERNAL MEDICINE

## 2021-10-28 PROCEDURE — 36415 COLL VENOUS BLD VENIPUNCTURE: CPT | Performed by: NURSE PRACTITIONER

## 2021-10-28 PROCEDURE — 3078F DIAST BP <80 MM HG: CPT | Mod: CPTII,S$GLB,, | Performed by: NURSE PRACTITIONER

## 2021-10-28 PROCEDURE — 80061 LIPID PANEL: CPT | Performed by: NURSE PRACTITIONER

## 2021-10-28 PROCEDURE — 3078F PR MOST RECENT DIASTOLIC BLOOD PRESSURE < 80 MM HG: ICD-10-PCS | Mod: CPTII,S$GLB,, | Performed by: NURSE PRACTITIONER

## 2021-10-28 PROCEDURE — 3008F BODY MASS INDEX DOCD: CPT | Mod: CPTII,S$GLB,, | Performed by: NURSE PRACTITIONER

## 2021-10-28 PROCEDURE — 90471 FLU VACCINE (QUAD) GREATER THAN OR EQUAL TO 3YO PRESERVATIVE FREE IM: ICD-10-PCS | Mod: S$GLB,,, | Performed by: INTERNAL MEDICINE

## 2021-10-28 PROCEDURE — 83036 HEMOGLOBIN GLYCOSYLATED A1C: CPT | Performed by: NURSE PRACTITIONER

## 2021-10-28 PROCEDURE — 99396 PREV VISIT EST AGE 40-64: CPT | Mod: S$GLB,,, | Performed by: NURSE PRACTITIONER

## 2021-10-28 PROCEDURE — 3074F PR MOST RECENT SYSTOLIC BLOOD PRESSURE < 130 MM HG: ICD-10-PCS | Mod: CPTII,S$GLB,, | Performed by: NURSE PRACTITIONER

## 2021-10-28 PROCEDURE — 1159F MED LIST DOCD IN RCRD: CPT | Mod: CPTII,S$GLB,, | Performed by: NURSE PRACTITIONER

## 2021-10-28 PROCEDURE — 80053 COMPREHEN METABOLIC PANEL: CPT | Performed by: NURSE PRACTITIONER

## 2021-10-28 PROCEDURE — 99396 PR PREVENTIVE VISIT,EST,40-64: ICD-10-PCS | Mod: S$GLB,,, | Performed by: NURSE PRACTITIONER

## 2021-10-28 PROCEDURE — 3008F PR BODY MASS INDEX (BMI) DOCUMENTED: ICD-10-PCS | Mod: CPTII,S$GLB,, | Performed by: NURSE PRACTITIONER

## 2021-10-28 PROCEDURE — 1159F PR MEDICATION LIST DOCUMENTED IN MEDICAL RECORD: ICD-10-PCS | Mod: CPTII,S$GLB,, | Performed by: NURSE PRACTITIONER

## 2021-10-28 PROCEDURE — 99999 PR PBB SHADOW E&M-EST. PATIENT-LVL IV: ICD-10-PCS | Mod: PBBFAC,,, | Performed by: NURSE PRACTITIONER

## 2021-10-28 PROCEDURE — 99999 PR PBB SHADOW E&M-EST. PATIENT-LVL IV: CPT | Mod: PBBFAC,,, | Performed by: NURSE PRACTITIONER

## 2021-10-28 PROCEDURE — 90686 IIV4 VACC NO PRSV 0.5 ML IM: CPT | Mod: S$GLB,,, | Performed by: INTERNAL MEDICINE

## 2021-10-28 PROCEDURE — 82306 VITAMIN D 25 HYDROXY: CPT | Performed by: NURSE PRACTITIONER

## 2021-10-28 PROCEDURE — 3074F SYST BP LT 130 MM HG: CPT | Mod: CPTII,S$GLB,, | Performed by: NURSE PRACTITIONER

## 2021-10-28 PROCEDURE — 90471 IMMUNIZATION ADMIN: CPT | Mod: S$GLB,,, | Performed by: INTERNAL MEDICINE

## 2021-10-28 PROCEDURE — 86803 HEPATITIS C AB TEST: CPT | Performed by: NURSE PRACTITIONER

## 2021-10-28 RX ORDER — ATENOLOL AND CHLORTHALIDONE TABLET 50; 25 MG/1; MG/1
TABLET ORAL
Qty: 45 TABLET | Refills: 3 | Status: SHIPPED | OUTPATIENT
Start: 2021-10-28 | End: 2022-07-25

## 2021-10-28 RX ORDER — POTASSIUM CHLORIDE 750 MG/1
10 TABLET, EXTENDED RELEASE ORAL DAILY
Qty: 90 TABLET | Refills: 3 | Status: SHIPPED | OUTPATIENT
Start: 2021-10-28 | End: 2022-12-28 | Stop reason: SDUPTHER

## 2021-10-29 ENCOUNTER — TELEPHONE (OUTPATIENT)
Dept: INTERNAL MEDICINE | Facility: CLINIC | Age: 51
End: 2021-10-29
Payer: COMMERCIAL

## 2021-10-29 ENCOUNTER — PATIENT MESSAGE (OUTPATIENT)
Dept: INTERNAL MEDICINE | Facility: CLINIC | Age: 51
End: 2021-10-29
Payer: COMMERCIAL

## 2021-10-29 ENCOUNTER — TELEPHONE (OUTPATIENT)
Dept: ADMINISTRATIVE | Facility: OTHER | Age: 51
End: 2021-10-29
Payer: COMMERCIAL

## 2021-10-29 DIAGNOSIS — K76.0 FATTY LIVER: Primary | ICD-10-CM

## 2021-10-29 DIAGNOSIS — R73.03 PRE-DIABETES: Primary | ICD-10-CM

## 2021-11-01 ENCOUNTER — DOCUMENTATION ONLY (OUTPATIENT)
Dept: TRANSPLANT | Facility: CLINIC | Age: 51
End: 2021-11-01
Payer: COMMERCIAL

## 2021-11-04 ENCOUNTER — PATIENT MESSAGE (OUTPATIENT)
Dept: PAIN MEDICINE | Facility: OTHER | Age: 51
End: 2021-11-04
Payer: COMMERCIAL

## 2021-11-09 ENCOUNTER — TELEPHONE (OUTPATIENT)
Dept: HEPATOLOGY | Facility: CLINIC | Age: 51
End: 2021-11-09
Payer: COMMERCIAL

## 2021-11-12 ENCOUNTER — TELEPHONE (OUTPATIENT)
Dept: PAIN MEDICINE | Facility: CLINIC | Age: 51
End: 2021-11-12
Payer: COMMERCIAL

## 2021-11-15 ENCOUNTER — OFFICE VISIT (OUTPATIENT)
Dept: SPINE | Facility: CLINIC | Age: 51
End: 2021-11-15
Attending: ANESTHESIOLOGY
Payer: COMMERCIAL

## 2021-11-15 ENCOUNTER — HOSPITAL ENCOUNTER (OUTPATIENT)
Dept: RADIOLOGY | Facility: OTHER | Age: 51
Discharge: HOME | End: 2021-11-15
Attending: ANESTHESIOLOGY
Payer: COMMERCIAL

## 2021-11-15 VITALS
WEIGHT: 168.44 LBS | HEART RATE: 68 BPM | SYSTOLIC BLOOD PRESSURE: 136 MMHG | DIASTOLIC BLOOD PRESSURE: 81 MMHG | HEIGHT: 61 IN | BODY MASS INDEX: 31.8 KG/M2

## 2021-11-15 DIAGNOSIS — M50.30 DDD (DEGENERATIVE DISC DISEASE), CERVICAL: ICD-10-CM

## 2021-11-15 DIAGNOSIS — M54.12 CERVICAL RADICULOPATHY: Primary | ICD-10-CM

## 2021-11-15 DIAGNOSIS — M54.12 CERVICAL RADICULOPATHY: ICD-10-CM

## 2021-11-15 DIAGNOSIS — M47.9 OSTEOARTHRITIS OF SPINE, UNSPECIFIED SPINAL OSTEOARTHRITIS COMPLICATION STATUS, UNSPECIFIED SPINAL REGION: ICD-10-CM

## 2021-11-15 PROCEDURE — 73502 X-RAY EXAM HIP UNI 2-3 VIEWS: CPT | Mod: TC,FY,LT

## 2021-11-15 PROCEDURE — 72110 X-RAY EXAM L-2 SPINE 4/>VWS: CPT | Mod: TC,FY

## 2021-11-15 PROCEDURE — 99214 OFFICE O/P EST MOD 30 MIN: CPT | Mod: S$GLB,,, | Performed by: ANESTHESIOLOGY

## 2021-11-15 PROCEDURE — 73502 X-RAY EXAM HIP UNI 2-3 VIEWS: CPT | Mod: 26,LT,, | Performed by: RADIOLOGY

## 2021-11-15 PROCEDURE — 99999 PR PBB SHADOW E&M-EST. PATIENT-LVL III: ICD-10-PCS | Mod: PBBFAC,,, | Performed by: ANESTHESIOLOGY

## 2021-11-15 PROCEDURE — 99214 PR OFFICE/OUTPT VISIT, EST, LEVL IV, 30-39 MIN: ICD-10-PCS | Mod: S$GLB,,, | Performed by: ANESTHESIOLOGY

## 2021-11-15 PROCEDURE — 73502 XR HIP WITH PELVIS WHEN PERFORMED, 2 OR 3 VIEWS LEFT: ICD-10-PCS | Mod: 26,LT,, | Performed by: RADIOLOGY

## 2021-11-15 PROCEDURE — 72110 X-RAY EXAM L-2 SPINE 4/>VWS: CPT | Mod: 26,,, | Performed by: RADIOLOGY

## 2021-11-15 PROCEDURE — 99999 PR PBB SHADOW E&M-EST. PATIENT-LVL III: CPT | Mod: PBBFAC,,, | Performed by: ANESTHESIOLOGY

## 2021-11-15 PROCEDURE — 72110 XR LUMBAR SPINE 5 VIEW WITH FLEX AND EXT: ICD-10-PCS | Mod: 26,,, | Performed by: RADIOLOGY

## 2021-11-15 RX ORDER — AMITRIPTYLINE HYDROCHLORIDE 25 MG/1
25 TABLET, FILM COATED ORAL NIGHTLY PRN
Qty: 30 TABLET | Refills: 0 | Status: SHIPPED | OUTPATIENT
Start: 2021-11-15 | End: 2021-11-22

## 2021-11-15 RX ORDER — MELOXICAM 15 MG/1
15 TABLET ORAL
Qty: 30 TABLET | Refills: 0 | Status: SHIPPED | OUTPATIENT
Start: 2021-11-15 | End: 2021-11-22

## 2021-11-16 ENCOUNTER — TELEPHONE (OUTPATIENT)
Dept: ADMINISTRATIVE | Facility: OTHER | Age: 51
End: 2021-11-16
Payer: COMMERCIAL

## 2021-11-17 ENCOUNTER — PATIENT MESSAGE (OUTPATIENT)
Dept: HEPATOLOGY | Facility: CLINIC | Age: 51
End: 2021-11-17

## 2021-11-17 ENCOUNTER — TELEPHONE (OUTPATIENT)
Dept: HEPATOLOGY | Facility: CLINIC | Age: 51
End: 2021-11-17

## 2021-11-17 ENCOUNTER — PATIENT MESSAGE (OUTPATIENT)
Dept: PAIN MEDICINE | Facility: OTHER | Age: 51
End: 2021-11-17
Payer: COMMERCIAL

## 2021-11-17 ENCOUNTER — OFFICE VISIT (OUTPATIENT)
Dept: HEPATOLOGY | Facility: CLINIC | Age: 51
End: 2021-11-17
Payer: COMMERCIAL

## 2021-11-17 DIAGNOSIS — R73.03 PRE-DIABETES: ICD-10-CM

## 2021-11-17 DIAGNOSIS — K76.0 FATTY LIVER: Primary | ICD-10-CM

## 2021-11-17 DIAGNOSIS — E66.3 OVERWEIGHT: ICD-10-CM

## 2021-11-17 PROCEDURE — 1159F MED LIST DOCD IN RCRD: CPT | Mod: CPTII,95,, | Performed by: NURSE PRACTITIONER

## 2021-11-17 PROCEDURE — 99204 OFFICE O/P NEW MOD 45 MIN: CPT | Mod: 95,,, | Performed by: NURSE PRACTITIONER

## 2021-11-17 PROCEDURE — 3044F PR MOST RECENT HEMOGLOBIN A1C LEVEL <7.0%: ICD-10-PCS | Mod: CPTII,95,, | Performed by: NURSE PRACTITIONER

## 2021-11-17 PROCEDURE — 1159F PR MEDICATION LIST DOCUMENTED IN MEDICAL RECORD: ICD-10-PCS | Mod: CPTII,95,, | Performed by: NURSE PRACTITIONER

## 2021-11-17 PROCEDURE — 1160F PR REVIEW ALL MEDS BY PRESCRIBER/CLIN PHARMACIST DOCUMENTED: ICD-10-PCS | Mod: CPTII,95,, | Performed by: NURSE PRACTITIONER

## 2021-11-17 PROCEDURE — 99204 PR OFFICE/OUTPT VISIT, NEW, LEVL IV, 45-59 MIN: ICD-10-PCS | Mod: 95,,, | Performed by: NURSE PRACTITIONER

## 2021-11-17 PROCEDURE — 1160F RVW MEDS BY RX/DR IN RCRD: CPT | Mod: CPTII,95,, | Performed by: NURSE PRACTITIONER

## 2021-11-17 PROCEDURE — 3044F HG A1C LEVEL LT 7.0%: CPT | Mod: CPTII,95,, | Performed by: NURSE PRACTITIONER

## 2021-12-15 ENCOUNTER — PATIENT MESSAGE (OUTPATIENT)
Dept: HEPATOLOGY | Facility: CLINIC | Age: 51
End: 2021-12-15
Payer: COMMERCIAL

## 2021-12-17 ENCOUNTER — PROCEDURE VISIT (OUTPATIENT)
Dept: HEPATOLOGY | Facility: CLINIC | Age: 51
End: 2021-12-17
Payer: COMMERCIAL

## 2021-12-17 ENCOUNTER — OFFICE VISIT (OUTPATIENT)
Dept: HEPATOLOGY | Facility: CLINIC | Age: 51
End: 2021-12-17
Payer: COMMERCIAL

## 2021-12-17 ENCOUNTER — TELEPHONE (OUTPATIENT)
Dept: HEPATOLOGY | Facility: CLINIC | Age: 51
End: 2021-12-17

## 2021-12-17 VITALS
SYSTOLIC BLOOD PRESSURE: 148 MMHG | HEART RATE: 69 BPM | HEIGHT: 61 IN | DIASTOLIC BLOOD PRESSURE: 75 MMHG | BODY MASS INDEX: 32.46 KG/M2 | RESPIRATION RATE: 18 BRPM | OXYGEN SATURATION: 97 % | WEIGHT: 171.94 LBS | TEMPERATURE: 98 F

## 2021-12-17 DIAGNOSIS — K76.0 FATTY LIVER: Primary | ICD-10-CM

## 2021-12-17 DIAGNOSIS — K76.0 FATTY LIVER: ICD-10-CM

## 2021-12-17 DIAGNOSIS — K74.01 HEPATIC FIBROSIS, EARLY FIBROSIS: ICD-10-CM

## 2021-12-17 PROCEDURE — 91200 FIBROSCAN (VIBRATION CONTROLLED TRANSIENT ELASTOGRAPHY): ICD-10-PCS | Mod: S$GLB,,, | Performed by: NURSE PRACTITIONER

## 2021-12-17 PROCEDURE — 99214 PR OFFICE/OUTPT VISIT, EST, LEVL IV, 30-39 MIN: ICD-10-PCS | Mod: S$GLB,,, | Performed by: NURSE PRACTITIONER

## 2021-12-17 PROCEDURE — 99999 PR PBB SHADOW E&M-EST. PATIENT-LVL III: CPT | Mod: PBBFAC,,, | Performed by: NURSE PRACTITIONER

## 2021-12-17 PROCEDURE — 99999 PR PBB SHADOW E&M-EST. PATIENT-LVL III: ICD-10-PCS | Mod: PBBFAC,,, | Performed by: NURSE PRACTITIONER

## 2021-12-17 PROCEDURE — 99214 OFFICE O/P EST MOD 30 MIN: CPT | Mod: S$GLB,,, | Performed by: NURSE PRACTITIONER

## 2021-12-17 PROCEDURE — 91200 LIVER ELASTOGRAPHY: CPT | Mod: S$GLB,,, | Performed by: NURSE PRACTITIONER

## 2021-12-22 ENCOUNTER — PATIENT MESSAGE (OUTPATIENT)
Dept: INTERNAL MEDICINE | Facility: CLINIC | Age: 51
End: 2021-12-22
Payer: COMMERCIAL

## 2021-12-22 ENCOUNTER — HOSPITAL ENCOUNTER (OUTPATIENT)
Facility: OTHER | Age: 51
Discharge: HOME | End: 2021-12-22
Attending: ANESTHESIOLOGY | Admitting: ANESTHESIOLOGY
Payer: COMMERCIAL

## 2021-12-22 VITALS
OXYGEN SATURATION: 100 % | HEART RATE: 68 BPM | DIASTOLIC BLOOD PRESSURE: 68 MMHG | RESPIRATION RATE: 16 BRPM | BODY MASS INDEX: 30.21 KG/M2 | WEIGHT: 160 LBS | SYSTOLIC BLOOD PRESSURE: 118 MMHG | TEMPERATURE: 98 F | HEIGHT: 61 IN

## 2021-12-22 DIAGNOSIS — G89.29 CHRONIC PAIN: ICD-10-CM

## 2021-12-22 DIAGNOSIS — M54.12 CERVICAL RADICULOPATHY: Primary | ICD-10-CM

## 2021-12-22 PROCEDURE — 25000003 PHARM REV CODE 250: Performed by: ANESTHESIOLOGY

## 2021-12-22 PROCEDURE — 62321 NJX INTERLAMINAR CRV/THRC: CPT | Performed by: ANESTHESIOLOGY

## 2021-12-22 PROCEDURE — 25500020 PHARM REV CODE 255: Performed by: ANESTHESIOLOGY

## 2021-12-22 PROCEDURE — 62321 NJX INTERLAMINAR CRV/THRC: CPT | Mod: ,,, | Performed by: ANESTHESIOLOGY

## 2021-12-22 PROCEDURE — 62321 PR INJ CERV/THORAC, W/GUIDANCE: ICD-10-PCS | Mod: ,,, | Performed by: ANESTHESIOLOGY

## 2021-12-22 PROCEDURE — 63600175 PHARM REV CODE 636 W HCPCS: Performed by: ANESTHESIOLOGY

## 2021-12-22 PROCEDURE — 25000003 PHARM REV CODE 250: Performed by: STUDENT IN AN ORGANIZED HEALTH CARE EDUCATION/TRAINING PROGRAM

## 2021-12-22 RX ORDER — LIDOCAINE HYDROCHLORIDE 10 MG/ML
INJECTION, SOLUTION EPIDURAL; INFILTRATION; INTRACAUDAL; PERINEURAL
Status: DISCONTINUED | OUTPATIENT
Start: 2021-12-22 | End: 2021-12-22 | Stop reason: HOSPADM

## 2021-12-22 RX ORDER — LIDOCAINE HYDROCHLORIDE 20 MG/ML
INJECTION, SOLUTION INFILTRATION; PERINEURAL
Status: DISCONTINUED | OUTPATIENT
Start: 2021-12-22 | End: 2021-12-22 | Stop reason: HOSPADM

## 2021-12-22 RX ORDER — MIDAZOLAM HYDROCHLORIDE 1 MG/ML
INJECTION INTRAMUSCULAR; INTRAVENOUS
Status: DISCONTINUED | OUTPATIENT
Start: 2021-12-22 | End: 2021-12-22 | Stop reason: HOSPADM

## 2021-12-22 RX ORDER — SODIUM CHLORIDE 9 MG/ML
INJECTION, SOLUTION INTRAVENOUS CONTINUOUS
Status: DISCONTINUED | OUTPATIENT
Start: 2021-12-22 | End: 2021-12-22 | Stop reason: HOSPADM

## 2021-12-22 RX ORDER — DEXAMETHASONE SODIUM PHOSPHATE 10 MG/ML
INJECTION INTRAMUSCULAR; INTRAVENOUS
Status: DISCONTINUED | OUTPATIENT
Start: 2021-12-22 | End: 2021-12-22 | Stop reason: HOSPADM

## 2022-01-05 ENCOUNTER — TELEPHONE (OUTPATIENT)
Dept: PAIN MEDICINE | Facility: CLINIC | Age: 52
End: 2022-01-05
Payer: COMMERCIAL

## 2022-01-06 ENCOUNTER — OFFICE VISIT (OUTPATIENT)
Dept: PAIN MEDICINE | Facility: CLINIC | Age: 52
End: 2022-01-06
Attending: ANESTHESIOLOGY
Payer: COMMERCIAL

## 2022-01-06 ENCOUNTER — PATIENT MESSAGE (OUTPATIENT)
Dept: PAIN MEDICINE | Facility: CLINIC | Age: 52
End: 2022-01-06

## 2022-01-06 VITALS
HEIGHT: 61 IN | DIASTOLIC BLOOD PRESSURE: 84 MMHG | BODY MASS INDEX: 31.91 KG/M2 | HEART RATE: 97 BPM | WEIGHT: 169 LBS | SYSTOLIC BLOOD PRESSURE: 135 MMHG | TEMPERATURE: 97 F

## 2022-01-06 DIAGNOSIS — M50.30 DDD (DEGENERATIVE DISC DISEASE), CERVICAL: ICD-10-CM

## 2022-01-06 DIAGNOSIS — M54.12 CERVICAL RADICULOPATHY: Primary | ICD-10-CM

## 2022-01-06 DIAGNOSIS — M47.9 OSTEOARTHRITIS OF SPINE, UNSPECIFIED SPINAL OSTEOARTHRITIS COMPLICATION STATUS, UNSPECIFIED SPINAL REGION: ICD-10-CM

## 2022-01-06 PROCEDURE — 99214 OFFICE O/P EST MOD 30 MIN: CPT | Mod: S$GLB,,, | Performed by: ANESTHESIOLOGY

## 2022-01-06 PROCEDURE — 99214 PR OFFICE/OUTPT VISIT, EST, LEVL IV, 30-39 MIN: ICD-10-PCS | Mod: S$GLB,,, | Performed by: ANESTHESIOLOGY

## 2022-01-06 PROCEDURE — 99999 PR PBB SHADOW E&M-EST. PATIENT-LVL III: ICD-10-PCS | Mod: PBBFAC,,, | Performed by: ANESTHESIOLOGY

## 2022-01-06 PROCEDURE — 1159F MED LIST DOCD IN RCRD: CPT | Mod: CPTII,S$GLB,, | Performed by: ANESTHESIOLOGY

## 2022-01-06 PROCEDURE — 1160F RVW MEDS BY RX/DR IN RCRD: CPT | Mod: CPTII,S$GLB,, | Performed by: ANESTHESIOLOGY

## 2022-01-06 PROCEDURE — 3079F DIAST BP 80-89 MM HG: CPT | Mod: CPTII,S$GLB,, | Performed by: ANESTHESIOLOGY

## 2022-01-06 PROCEDURE — 3075F SYST BP GE 130 - 139MM HG: CPT | Mod: CPTII,S$GLB,, | Performed by: ANESTHESIOLOGY

## 2022-01-06 PROCEDURE — 1160F PR REVIEW ALL MEDS BY PRESCRIBER/CLIN PHARMACIST DOCUMENTED: ICD-10-PCS | Mod: CPTII,S$GLB,, | Performed by: ANESTHESIOLOGY

## 2022-01-06 PROCEDURE — 1159F PR MEDICATION LIST DOCUMENTED IN MEDICAL RECORD: ICD-10-PCS | Mod: CPTII,S$GLB,, | Performed by: ANESTHESIOLOGY

## 2022-01-06 PROCEDURE — 99999 PR PBB SHADOW E&M-EST. PATIENT-LVL III: CPT | Mod: PBBFAC,,, | Performed by: ANESTHESIOLOGY

## 2022-01-06 PROCEDURE — 3079F PR MOST RECENT DIASTOLIC BLOOD PRESSURE 80-89 MM HG: ICD-10-PCS | Mod: CPTII,S$GLB,, | Performed by: ANESTHESIOLOGY

## 2022-01-06 PROCEDURE — 3008F PR BODY MASS INDEX (BMI) DOCUMENTED: ICD-10-PCS | Mod: CPTII,S$GLB,, | Performed by: ANESTHESIOLOGY

## 2022-01-06 PROCEDURE — 3075F PR MOST RECENT SYSTOLIC BLOOD PRESS GE 130-139MM HG: ICD-10-PCS | Mod: CPTII,S$GLB,, | Performed by: ANESTHESIOLOGY

## 2022-01-06 PROCEDURE — 3008F BODY MASS INDEX DOCD: CPT | Mod: CPTII,S$GLB,, | Performed by: ANESTHESIOLOGY
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
## 2022-01-26 ENCOUNTER — OFFICE VISIT (OUTPATIENT)
Dept: INTERNAL MEDICINE | Facility: CLINIC | Age: 52
End: 2022-01-26
Payer: COMMERCIAL

## 2022-01-26 VITALS
SYSTOLIC BLOOD PRESSURE: 130 MMHG | WEIGHT: 174 LBS | DIASTOLIC BLOOD PRESSURE: 70 MMHG | HEIGHT: 61 IN | HEART RATE: 81 BPM | OXYGEN SATURATION: 98 % | BODY MASS INDEX: 32.85 KG/M2

## 2022-01-26 DIAGNOSIS — R73.03 PREDIABETES: ICD-10-CM

## 2022-01-26 DIAGNOSIS — Z76.89 ENCOUNTER TO ESTABLISH CARE WITH NEW DOCTOR: Primary | ICD-10-CM

## 2022-01-26 DIAGNOSIS — I10 ESSENTIAL HYPERTENSION: ICD-10-CM

## 2022-01-26 DIAGNOSIS — M47.812 CERVICAL SPONDYLOSIS: ICD-10-CM

## 2022-01-26 DIAGNOSIS — D56.3 ALPHA THALASSEMIA TRAIT: ICD-10-CM

## 2022-01-26 DIAGNOSIS — K76.0 FATTY LIVER: ICD-10-CM

## 2022-01-26 DIAGNOSIS — E66.09 CLASS 1 OBESITY DUE TO EXCESS CALORIES WITH SERIOUS COMORBIDITY AND BODY MASS INDEX (BMI) OF 32.0 TO 32.9 IN ADULT: ICD-10-CM

## 2022-01-26 PROCEDURE — 3075F SYST BP GE 130 - 139MM HG: CPT | Mod: CPTII,S$GLB,, | Performed by: INTERNAL MEDICINE

## 2022-01-26 PROCEDURE — 1159F PR MEDICATION LIST DOCUMENTED IN MEDICAL RECORD: ICD-10-PCS | Mod: CPTII,S$GLB,, | Performed by: INTERNAL MEDICINE

## 2022-01-26 PROCEDURE — 1160F PR REVIEW ALL MEDS BY PRESCRIBER/CLIN PHARMACIST DOCUMENTED: ICD-10-PCS | Mod: CPTII,S$GLB,, | Performed by: INTERNAL MEDICINE

## 2022-01-26 PROCEDURE — 3078F DIAST BP <80 MM HG: CPT | Mod: CPTII,S$GLB,, | Performed by: INTERNAL MEDICINE

## 2022-01-26 PROCEDURE — 3075F PR MOST RECENT SYSTOLIC BLOOD PRESS GE 130-139MM HG: ICD-10-PCS | Mod: CPTII,S$GLB,, | Performed by: INTERNAL MEDICINE

## 2022-01-26 PROCEDURE — 3008F PR BODY MASS INDEX (BMI) DOCUMENTED: ICD-10-PCS | Mod: CPTII,S$GLB,, | Performed by: INTERNAL MEDICINE

## 2022-01-26 PROCEDURE — 1159F MED LIST DOCD IN RCRD: CPT | Mod: CPTII,S$GLB,, | Performed by: INTERNAL MEDICINE

## 2022-01-26 PROCEDURE — 3008F BODY MASS INDEX DOCD: CPT | Mod: CPTII,S$GLB,, | Performed by: INTERNAL MEDICINE

## 2022-01-26 PROCEDURE — 99213 PR OFFICE/OUTPT VISIT, EST, LEVL III, 20-29 MIN: ICD-10-PCS | Mod: S$GLB,,, | Performed by: INTERNAL MEDICINE

## 2022-01-26 PROCEDURE — 3078F PR MOST RECENT DIASTOLIC BLOOD PRESSURE < 80 MM HG: ICD-10-PCS | Mod: CPTII,S$GLB,, | Performed by: INTERNAL MEDICINE

## 2022-01-26 PROCEDURE — 99213 OFFICE O/P EST LOW 20 MIN: CPT | Mod: S$GLB,,, | Performed by: INTERNAL MEDICINE

## 2022-01-26 PROCEDURE — 1160F RVW MEDS BY RX/DR IN RCRD: CPT | Mod: CPTII,S$GLB,, | Performed by: INTERNAL MEDICINE

## 2022-01-26 PROCEDURE — 99999 PR PBB SHADOW E&M-EST. PATIENT-LVL III: CPT | Mod: PBBFAC,,, | Performed by: INTERNAL MEDICINE

## 2022-01-26 PROCEDURE — 99999 PR PBB SHADOW E&M-EST. PATIENT-LVL III: ICD-10-PCS | Mod: PBBFAC,,, | Performed by: INTERNAL MEDICINE

## 2022-01-26 RX ORDER — METFORMIN HYDROCHLORIDE 500 MG/1
500 TABLET, EXTENDED RELEASE ORAL
Qty: 30 TABLET | Refills: 3 | Status: SHIPPED | OUTPATIENT
Start: 2022-01-26 | End: 2022-05-03

## 2022-01-26 RX ORDER — CHOLECALCIFEROL (VITAMIN D3) 25 MCG
1000 TABLET ORAL DAILY
COMMUNITY

## 2022-01-26 NOTE — PROGRESS NOTES
"INTERNAL MEDICINE INITIAL VISIT NOTE      CHIEF COMPLAINT     Chief Complaint   Patient presents with    Establish Care       HPI     Sheila Costa is a 51 y.o. female with cervical spondylosis, HTN, alpha thalassemia, adenomatous colon polyp, fatty liver, prediabetes here today to establish care. Concerned about new diagnosis of prediabetes 10/2021. Requesting additional information regarding diagnosis and management.     Past Medical History:  Past Medical History:   Diagnosis Date    Alpha thalassemia     Cervical spondylosis 12/30/2019    Colon polyp     Fatty liver     Hypertension     Premenstrual symptom        Review of Systems:  Review of Systems   Constitutional: Negative for chills and fever.   HENT: Negative for congestion.    Respiratory: Negative for cough and shortness of breath.    Cardiovascular: Negative for chest pain.   Gastrointestinal: Negative for constipation, nausea and vomiting.   Genitourinary: Negative for hematuria and urgency.   Musculoskeletal: Negative for falls.   Skin: Negative for rash.   Neurological: Negative for dizziness and loss of consciousness.       Health Maintenance:   Immunizations:   Influenza - complete  Tdap - 2/2016  Covid 19 - complete  HPV  Prevnar rec at 65  Shingrix rec at 49 - complete    Cancer Screening:  PAP: 4/2019 NILM  Mammogram:  6/2021 BIRAD 1  Colonoscopy:  7/2021 1 TVA; repeat 7/2024  DEXA:  n/a      PHYSICAL EXAM     /70 (BP Location: Left arm, Patient Position: Sitting, BP Method: Large (Manual))   Pulse 81   Ht 5' 1" (1.549 m)   Wt 78.9 kg (174 lb)   SpO2 98%   BMI 32.88 kg/m²     GEN - A+OX4, NAD, obese   HEENT - PERRL, EOMI,   Neck - No thyromegaly or thyroid masses felt.  No cervical lymphadenopathy appreciated.  CV - RRR, no m/r/g  Chest - CTAB, no wheezing, crackles, or rhonchi  Abd - S/NT/ND/+BS.   Ext - 2+BDP. No C/C/E.  Skin - Normal color and texture, no rash, no skin lesions.    ASSESSMENT/PLAN     Sheila " Igor is a 51 y.o. female with conditions as above.     Encounter to establish care with new doctor  Labs reviewed    Prediabetes  10/2021 HgbA1C 6.1  Provided with educational handout, discussed lifestyle modifications  -     metFORMIN (GLUCOPHAGE-XR) 500 MG ER 24hr tablet; Take 1 tablet (500 mg total) by mouth daily with breakfast.  Dispense: 30 tablet; Refill: 3    Fatty liver  Established with Hepatology, last seen 12/2021  FibroScan with significant steatosis and mild fibrosis (F1-2)    Essential hypertension  Controlled, continue med    Alpha thalassemia trait  Stable    Cervical spondylosis  Follows with Pain Medicine, s/p MICHELLE; stable    Class 1 obesity due to excess calories with serious comorbidity and body mass index (BMI) of 32.0 to 32.9 in adult  Counseled on lifestyle modifications including diet, exercise    HM as above    RTC in 3 mo, sooner if needed and depending on labs.    Padmini Moore MD  Department of Internal Medicine - [facility]  01/26/2022

## 2022-01-26 NOTE — PATIENT INSTRUCTIONS
Patient Education       Prediabetes   About this topic   When you eat, your body breaks down all sugars and starches into glucose. Your body needs insulin to use glucose for energy. The insulin takes the glucose from your blood into your cells. Your body may not have enough insulin or your body may not use your insulin properly. Then, the glucose or sugar stays in your blood instead of going into your cells. This causes your blood sugar levels to be too high.  Prediabetes happens when there is too much sugar in your blood. Your blood sugar is higher than normal but is not high enough to be called diabetes.  When you have diabetes, you have too much sugar in your blood and:  · The cells can no longer respond to insulin. Then, sugar enters the cells too slowly. This is how type 2 diabetes develops.  · The pancreas does not make enough insulin. The sugar cannot get into your cells. This is how type 1 diabetes develops.  Over time, having too much sugar in your blood can cause serious problems. You may not feel sick, but the high sugar can damage your nerves, kidneys, and eyes. You may also have heart problems or stroke. It is important to treat prediabetes. Once you have diabetes, it does not go away.  What are the causes?   · Genes passed down from your family may make you more likely to have prediabetes  · Your body does not make enough insulin or does not use it very well  · A build-up of sugar in your blood  · Eating foods that have a lot of sugar and starch  What can make this more likely to happen?   Many things can raise your risk of prediabetes. Your age and weight may put you at a higher risk. So does having your extra weight in your belly. Smoking and lack of physical activity raise your risk. Having a family history of diabetes raises your risk. It is also higher if you have had gestational diabetes or a baby weighing over 9 pounds (40.5 kg) at birth. Ethnicity can also factor into your risk level, which is  higher for the following groups: , , ,  American, /, , or  American.  What are the main signs?   Most often, prediabetes has no signs. You may see signs like:  · Feeling very thirsty  · Passing urine more often than usual  · Feeling very hungry  · Tiredness  · Blurred eyesight  · Slow healing cuts and wounds  How does the doctor diagnose this health problem?   Talk to your doctor to see if you should be tested. Your doctor will take your history and do an exam. This will include checking your blood pressure and listening to your heart and lungs. Your doctor will also look for any skin changes and check your eyes. You will need to have special blood tests to check your blood sugar. Your doctor may order:  · Fasting blood glucose test ? You will be asked not to eat anything overnight. Your doctor will take a blood sample and will check your blood sugar first thing in the morning.  · Oral glucose tolerance test ? You will be asked not to eat anything overnight. Your doctor will take a blood sample and measure your blood sugar level. Your doctor will check it again 2 hours after you drink a sugar-rich solution.  · A1C test ? Measures the average amount of sugar in your blood over the past 3 to 4 months.  How does the doctor treat this health problem?   Your doctor will work to help you get your blood sugar levels back to normal. You may need to make changes to your lifestyle like diet and exercise. You will need to check your blood sugars each day or a few times a day. Your doctor will tell you what blood sugar results are normal for you. You may also need to take some drugs to lower your blood sugar. This may help keep you from getting type 2 diabetes.  What drugs may be needed?   The doctor may order drugs to:  · Lower your blood sugar  · Control high blood pressure  · Control your cholesterol level  What problems could  happen?   · High levels of acids called ketones build up in the blood. This is a very serious problem.  · Infection  · Injury to blood vessels and nerves  · Diabetes  What can be done to prevent this health problem?   · Talk with your doctor about changes in your diet and exercises that are right for you.  · Control your weight.  · Know your blood sugar goals. Check your blood sugar levels at regular times of the day. Keep notes on foods and drinks that increase your blood sugar.   · Know the signs of high blood sugar and call your doctor when your blood sugar is out of range.  Where can I learn more?   American Academy of Nutrition and Dietetics  https://www.eatright.org/health/diseases-and-conditions/diabetes/what-is-prediabetes   Center for Disease Control and Prevention  https://www.cdc.gov/diabetes/basics/prediabetes.html   National Diabetes Information Clearinghouse  https://www.niddk.nih.gov/health-information/diabetes/overview/what-is-diabetes/prediabetes-insulin-resistance   Last Reviewed Date   2021-04-28  Consumer Information Use and Disclaimer   This information is not specific medical advice and does not replace information you receive from your health care provider. This is only a brief summary of general information. It does NOT include all information about conditions, illnesses, injuries, tests, procedures, treatments, therapies, discharge instructions or life-style choices that may apply to you. You must talk with your health care provider for complete information about your health and treatment options. This information should not be used to decide whether or not to accept your health care providers advice, instructions or recommendations. Only your health care provider has the knowledge and training to provide advice that is right for you.  Copyright   Copyright © 2021 [employer] and its affiliates and/or licensors. All rights reserved.  Patient Education       Heart Healthy Diet   General    With a heart healthy food plan, you will learn to make better food choices. This diet may help you lower your blood cholesterol level, manage your blood pressure, and lower your risk for heart problems. Smaller portions may also be helpful.  Sodium is a type of mineral found in many foods. It helps keep the balance of fluids in your body. Too much sodium can raise your blood pressure. It can also make you take on extra water. This is called edema. Pay careful attention to how much salt or sodium is in your food. You may need to avoid salt or eat foods with less sodium.  Cholesterol is a fat-like, waxy substance in your blood. It is normal to have some cholesterol in your blood because your body makes it. You also get extra cholesterol from all animal products. These are foods like meats, eggs, and dairy products. Too much cholesterol in your blood can block or damage your blood vessels. This can lead to a heart attack or stroke.  Fats in your food have calories which give energy. Not all fats are bad. Some fats are healthy, like the fat found in fish, nuts, and olive oil. These are called unsaturated fats. They help manage body functions and lower cholesterol levels. Learn about the best fats to use in your diet and where to use them. Eating too much fat may make you more likely to weigh more than is healthy. This raises your risk of many heart problems.  Fiber is found in plants. Meat and dairy products do not have fiber in them. Fiber can help you lower your unhealthy cholesterol level. You may need more water as you eat more fiber so you do not get hard stools.  What lifestyle changes are needed?   Eat a healthy diet and workout often. Try to use as many calories as you take in each day.  What changes to diet are needed?   · Eat oily fish at least 2 times a week. These are fish like tuna, salmon, and mackerel.  · Limit sodium to no more than 2,300 mg of sodium per day. This is about 1 teaspoon (5 grams) of  table salt. Use little or no salt when making food. Try other spices or seasoning instead.  · Limit how much cholesterol you eat to less than 300 mg per day. You can do this by having lean meats. Also eat lots of fruits, vegetables, and fat-free and low-fat dairy products.  · Limit how much trans fats you eat. Trans fats are found in many processed foods like stick margarine, shortening, and some fried foods. Also, lower how much hydrogenated fats you eat. They are used to make pastries, biscuits, cookies, crackers, chips, and many snack foods.  · Have no more than 1 drink per day of beer, wine, and mixed drinks (alcohol).  Who should use this diet?   A heart healthy diet is good for everyone.  What foods are good to eat?   · Grains: Try to eat 6 to 8 servings of whole grain, high fiber foods each day. These are whole grain bread, cereals, brown rice, or pasta.  · Fruits and vegetables: Eat 4 to 5 servings each day. Try to pick many kinds and colors. Try to eat more that are fresh or frozen. Look for low sodium or salt-free if you choose canned. Rinse canned items before cooking or eating. Dried peas, beans, and lentils are also good.  · Dairy: Choose low fat (1%) or fat-free milk. Eat nonfat or low-fat products.  · Protein: Try to eat more low fat or lean meats like chicken and turkey. Eat less red meat and eat more fish, eggs, egg whites, and beans instead.  · Fats: Use good fats found in fish, nuts, and avocados. Try using olive oil, canola oil, and low-sodium and low-fat salad dressing and mayonnaise. Use corn, safflower, sunflower, and soybean oils.  · Condiments: Use low-sodium or salt-free broths, soups, soy sauce, and condiments. Pepper, herbs, spices, vinegar, lemon or lime juices are great for seasoning. Sugar, cocoa powder, honey, syrup, and jams may be eaten in small amounts.  · Sweets: Low-fat, trans fat-free cookies, cakes, and pies; nandini crackers; animal crackers; low-fat fig bars; and samia  snaps.     What foods should be limited or avoided?   · Grains: Salted breads, rolls, crackers, quick breads, self-rising flours, biscuit mixes, regular bread crumbs, instant hot cereals, commercially-prepared rice, pasta, stuffing mixes  · Fruits and vegetables: Commercially-prepared potatoes and vegetable mixes, regular canned vegetables and juices, vegetables frozen with sauce or pickled vegetables, processed fruits with added sugar or salt  · Dairy: Whole milk, malted milk, chocolate milk, buttermilk  · Protein: Smoked, cured, salted, or canned meat, fish, or poultry such as parker and sausages  · Fats: Cut back on solid fats like butter, lard, and margarine.  · Condiments and snacks: Salted and canned peas, beans, and olives; salted snack foods; fried foods; soda, juices, or other sweetened drinks; commercially-softened water. Miso, salsa, ketchup, barbecue sauce, Worcestershire sauce, soy sauce, and teriyaki sauce are also high in salt.  · Sweets: High-fat baked goods such as muffins, donuts, pastries, commercial baked goods  Helpful tips   · When you go to a grocery store, have a list or a meal plan. Do not shop when you are hungry to avoid cravings for foods.  · You need to know about the sodium and fat content of the food you eat. Read food labels with care. They will show you how much of each is in a serving. This amount is given as a percentage of the total amount you need each day. Reading the labels will help you make healthy food choices.     · Avoid fast foods.  · Watch your portions when eating out. Split an order or bring home half for another meal.     · Talk to a dietitian for help.  Where can I learn more?   American Academy of Family Physicians  https://familydoctor.org/diet-and-exercise-for-a-healthy-heart/   American Heart Association  https://www.heart.org/en/healthy-living/healthy-eating/eat-smart/nutrition-basics/aha-diet-and-lifestyle-recommendations    NHS  https://www.nhs.uk/live-well/eat-well/   Last Reviewed Date   2020-03-27  Consumer Information Use and Disclaimer   This information is not specific medical advice and does not replace information you receive from your health care provider. This is only a brief summary of general information. It does NOT include all information about conditions, illnesses, injuries, tests, procedures, treatments, therapies, discharge instructions or life-style choices that may apply to you. You must talk with your health care provider for complete information about your health and treatment options. This information should not be used to decide whether or not to accept your health care providers advice, instructions or recommendations. Only your health care provider has the knowledge and training to provide advice that is right for you.  Copyright   Copyright © 2021 [employer] and its affiliates and/or licensors. All rights reserved.  Patient Education       Mediterranean Diet   About this topic   This is a heart healthy diet. It is based on widely used foods and cooking styles from many countries around the [place]. The main pattern for the diet is more plant foods and monounsaturated fats, or good fats, like olive oil. Protein in this diet comes from seafood, legumes, nuts, seeds, and poultry and eggs in lowered amounts. You will also eat more whole grains, vegetables, and fruits and moderate amounts of alcohol are also included. This diet has less red meats, dairy products, and processed foods.       What will the results be?   Your diet will have less saturated fat, cholesterol, calories, sodium, and added sugars. Your diet will be higher in fiber. This will help to keep your blood sugar steady. This diet lowers the chance of heart disease and other health problems.  What lifestyle changes are needed?   If you do not often eat this way, you will need to change your eating habits. Be sure to get regular  exercise. It is believed to help the health benefits of this diet.  What changes to diet are needed?   You may need to limit the amount of red meat and processed foods in your diet. Ask your dietitian for help planning meals that are right for you.  What foods are good to eat?   · Plenty of fish and other seafood  · Fresh, frozen, or canned fruits and vegetables  · Nuts and nut butters and dried beans, lentils, or peas  · Foods high in fiber like whole grains and whole grain products  · Olive oil (good fat), peanut or canola oil, margarine, or spreads that list vegetable oil as the first ingredient and do not  contain trans fat or partially hydrogenated oil  · Small amounts of poultry and eggs  What foods should be limited or avoided?   · Red meats  · Sweets, desserts, and processed foods  · Butter, oils, and fats that contain trans fats or are hydrogenated or partially hydrogenated  · Gravies and sauces  What problems could happen?   · Your weight may rise because your diet will be higher in fat from olive oil and nuts.  · You may have lower iron levels. Be sure to eat foods rich in iron. Also, eat foods rich in vitamin C. This will help your body take in iron.  · You may have lower calcium levels because you are eating less dairy products. Ask your doctor if you need to take a calcium supplement.  · Wine is often thought of as part of a Mediterranean diet. It is not needed and you may choose not to include it. Avoid wine if you are prone to alcohol abuse or are pregnant. Also, avoid it if you are at risk for breast cancer, have liver problems, or have other illnesses that make it important for you to not have alcohol.  When do I need to call the doctor?   · If you have any concerns about your diet  Where can I learn more?   Academy of Nutrition and Dietetics  http://www.eatright.org/resource/food/planning-and-prep/cooking-tips-and-trends/make-it-mediterranean   American Heart  Association  https://www.heart.org/en/healthy-living/healthy-eating/eat-smart/nutrition-basics/mediterranean-diet   Last Reviewed Date   2021-10-11  Consumer Information Use and Disclaimer   This information is not specific medical advice and does not replace information you receive from your health care provider. This is only a brief summary of general information. It does NOT include all information about conditions, illnesses, injuries, tests, procedures, treatments, therapies, discharge instructions or life-style choices that may apply to you. You must talk with your health care provider for complete information about your health and treatment options. This information should not be used to decide whether or not to accept your health care providers advice, instructions or recommendations. Only your health care provider has the knowledge and training to provide advice that is right for you.  Copyright   Copyright © 2021 [employer], [employer]. and its affiliates and/or licensors. All rights reserved.

## 2022-01-27 ENCOUNTER — TELEPHONE (OUTPATIENT)
Dept: INTERNAL MEDICINE | Facility: CLINIC | Age: 52
End: 2022-01-27
Payer: COMMERCIAL

## 2022-01-27 NOTE — TELEPHONE ENCOUNTER
Patient missed her A1c on the follow up. Has already been seen by Dr. Moore and est'd care on 1/26/2022, whom has arranged for her to be seen in 3 months with repeat labs and with starting her on Metformin. Will have to respectfully defer to her PCP at this time.     ANGELITO

## 2022-02-01 ENCOUNTER — PATIENT MESSAGE (OUTPATIENT)
Dept: PAIN MEDICINE | Facility: CLINIC | Age: 52
End: 2022-02-01
Payer: COMMERCIAL

## 2022-02-03 ENCOUNTER — TELEPHONE (OUTPATIENT)
Dept: PAIN MEDICINE | Facility: CLINIC | Age: 52
End: 2022-02-03
Payer: COMMERCIAL

## 2022-02-03 NOTE — TELEPHONE ENCOUNTER
Staff spoke with patient in regards to rescheduling appt on 2/24/22 due to  not being available. Staff offered the next open time slot which was 3/10/22  @ 3:45 pm. Patient accepted and verbalized understanding.

## 2022-03-09 ENCOUNTER — TELEPHONE (OUTPATIENT)
Dept: PAIN MEDICINE | Facility: CLINIC | Age: 52
End: 2022-03-09
Payer: COMMERCIAL

## 2022-03-09 NOTE — TELEPHONE ENCOUNTER
"This message is for patient in regards to his/her appointment 3/10/22 at 3:30 pm.      [facility] Policy: For the safety of all patients and staff members.     Patient Visitor policy: Due to social distancing and limited seating staff are requesting patient to arrive to their schedule appointments alone. If patient do not need assistance with their visit, we're asking all visitors to remain outside the waiting area.    Upon arriving to facility; patient will have temperature taking and are required to wear a face mask, if patient do not have a face mask one will be provided. Upon arriving patient we ask patients to contact clinic at this number [phone] to notify staff that they have arrived or they may do do by utilizing the Mobile checked in [facility](if patient have patient portal; clinical staff will send a message through there letting them know it's okay to proceed to their visit). Staff will give the patient the "okay" to come up or wait inside their vehicle until clinic is ready for patient to be seen by  If you have any questions or concerns please contact [phone]        "

## 2022-03-10 ENCOUNTER — PATIENT MESSAGE (OUTPATIENT)
Dept: PAIN MEDICINE | Facility: CLINIC | Age: 52
End: 2022-03-10

## 2022-03-10 ENCOUNTER — OFFICE VISIT (OUTPATIENT)
Dept: PAIN MEDICINE | Facility: CLINIC | Age: 52
End: 2022-03-10
Attending: ANESTHESIOLOGY
Payer: COMMERCIAL

## 2022-03-10 ENCOUNTER — TELEPHONE (OUTPATIENT)
Dept: PAIN MEDICINE | Facility: CLINIC | Age: 52
End: 2022-03-10
Payer: COMMERCIAL

## 2022-03-10 VITALS
SYSTOLIC BLOOD PRESSURE: 167 MMHG | HEART RATE: 85 BPM | WEIGHT: 173.94 LBS | DIASTOLIC BLOOD PRESSURE: 87 MMHG | RESPIRATION RATE: 18 BRPM | HEIGHT: 61 IN | BODY MASS INDEX: 32.84 KG/M2

## 2022-03-10 DIAGNOSIS — M47.812 SPONDYLOSIS WITHOUT MYELOPATHY OR RADICULOPATHY, CERVICAL REGION: ICD-10-CM

## 2022-03-10 DIAGNOSIS — M50.30 DDD (DEGENERATIVE DISC DISEASE), CERVICAL: ICD-10-CM

## 2022-03-10 DIAGNOSIS — M47.892 OTHER SPONDYLOSIS, CERVICAL REGION: Primary | ICD-10-CM

## 2022-03-10 PROCEDURE — 1159F PR MEDICATION LIST DOCUMENTED IN MEDICAL RECORD: ICD-10-PCS | Mod: CPTII,S$GLB,, | Performed by: ANESTHESIOLOGY

## 2022-03-10 PROCEDURE — 3008F BODY MASS INDEX DOCD: CPT | Mod: CPTII,S$GLB,, | Performed by: ANESTHESIOLOGY

## 2022-03-10 PROCEDURE — 99999 PR PBB SHADOW E&M-EST. PATIENT-LVL III: CPT | Mod: PBBFAC,,, | Performed by: ANESTHESIOLOGY

## 2022-03-10 PROCEDURE — 99214 OFFICE O/P EST MOD 30 MIN: CPT | Mod: S$GLB,,, | Performed by: ANESTHESIOLOGY

## 2022-03-10 PROCEDURE — 3077F SYST BP >= 140 MM HG: CPT | Mod: CPTII,S$GLB,, | Performed by: ANESTHESIOLOGY

## 2022-03-10 PROCEDURE — 3079F DIAST BP 80-89 MM HG: CPT | Mod: CPTII,S$GLB,, | Performed by: ANESTHESIOLOGY

## 2022-03-10 PROCEDURE — 1159F MED LIST DOCD IN RCRD: CPT | Mod: CPTII,S$GLB,, | Performed by: ANESTHESIOLOGY

## 2022-03-10 PROCEDURE — 99999 PR PBB SHADOW E&M-EST. PATIENT-LVL III: ICD-10-PCS | Mod: PBBFAC,,, | Performed by: ANESTHESIOLOGY

## 2022-03-10 PROCEDURE — 99214 PR OFFICE/OUTPT VISIT, EST, LEVL IV, 30-39 MIN: ICD-10-PCS | Mod: S$GLB,,, | Performed by: ANESTHESIOLOGY

## 2022-03-10 PROCEDURE — 3079F PR MOST RECENT DIASTOLIC BLOOD PRESSURE 80-89 MM HG: ICD-10-PCS | Mod: CPTII,S$GLB,, | Performed by: ANESTHESIOLOGY

## 2022-03-10 PROCEDURE — 3008F PR BODY MASS INDEX (BMI) DOCUMENTED: ICD-10-PCS | Mod: CPTII,S$GLB,, | Performed by: ANESTHESIOLOGY

## 2022-03-10 PROCEDURE — 3077F PR MOST RECENT SYSTOLIC BLOOD PRESSURE >= 140 MM HG: ICD-10-PCS | Mod: CPTII,S$GLB,, | Performed by: ANESTHESIOLOGY

## 2022-03-10 RX ORDER — TIZANIDINE 4 MG/1
4 TABLET ORAL NIGHTLY PRN
Qty: 30 TABLET | Refills: 0 | Status: SHIPPED | OUTPATIENT
Start: 2022-03-10 | End: 2022-10-31 | Stop reason: SDUPTHER

## 2022-03-10 NOTE — TELEPHONE ENCOUNTER
Left message for patient to call back. Need to reschedule appointment to an earlier time or different day.
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
## 2022-03-11 ENCOUNTER — TELEPHONE (OUTPATIENT)
Dept: PAIN MEDICINE | Facility: OTHER | Age: 52
End: 2022-03-11
Payer: COMMERCIAL

## 2022-03-14 ENCOUNTER — PATIENT MESSAGE (OUTPATIENT)
Dept: PAIN MEDICINE | Facility: OTHER | Age: 52
End: 2022-03-14
Payer: COMMERCIAL

## 2022-03-14 ENCOUNTER — TELEPHONE (OUTPATIENT)
Dept: PAIN MEDICINE | Facility: OTHER | Age: 52
End: 2022-03-14
Payer: COMMERCIAL

## 2022-03-14 DIAGNOSIS — M47.812 SPONDYLOSIS WITHOUT MYELOPATHY OR RADICULOPATHY, CERVICAL REGION: Primary | ICD-10-CM

## 2022-03-17 ENCOUNTER — TELEPHONE (OUTPATIENT)
Dept: PAIN MEDICINE | Facility: CLINIC | Age: 52
End: 2022-03-17
Payer: COMMERCIAL

## 2022-03-17 NOTE — TELEPHONE ENCOUNTER
Called [facility] @[phone], spoke to Griselda Portillo stated that they don't schedule P2P appointments. They just warm transfer. Provider is not available right now to do P2P. P2P must be done before 03/25. Will call back 03/22 when Dr. Kirkland is available.

## 2022-03-22 ENCOUNTER — PATIENT MESSAGE (OUTPATIENT)
Dept: PAIN MEDICINE | Facility: OTHER | Age: 52
End: 2022-03-22
Payer: COMMERCIAL

## 2022-03-23 ENCOUNTER — HOSPITAL ENCOUNTER (OUTPATIENT)
Facility: OTHER | Age: 52
Discharge: HOME | End: 2022-03-23
Attending: ANESTHESIOLOGY | Admitting: ANESTHESIOLOGY
Payer: COMMERCIAL

## 2022-03-23 VITALS
HEIGHT: 61 IN | OXYGEN SATURATION: 97 % | RESPIRATION RATE: 12 BRPM | HEART RATE: 57 BPM | SYSTOLIC BLOOD PRESSURE: 175 MMHG | TEMPERATURE: 99 F | WEIGHT: 160 LBS | DIASTOLIC BLOOD PRESSURE: 86 MMHG | BODY MASS INDEX: 30.21 KG/M2

## 2022-03-23 DIAGNOSIS — G89.29 CHRONIC PAIN: ICD-10-CM

## 2022-03-23 DIAGNOSIS — M47.812 CERVICAL SPONDYLOSIS: Primary | ICD-10-CM

## 2022-03-23 DIAGNOSIS — M54.2 CERVICAL PAIN (NECK): ICD-10-CM

## 2022-03-23 PROCEDURE — 63600175 PHARM REV CODE 636 W HCPCS: Performed by: ANESTHESIOLOGY

## 2022-03-23 PROCEDURE — 64633 DESTROY CERV/THOR FACET JNT: CPT | Mod: RT | Performed by: ANESTHESIOLOGY

## 2022-03-23 PROCEDURE — 64634 DESTROY C/TH FACET JNT ADDL: CPT | Mod: RT,,, | Performed by: ANESTHESIOLOGY

## 2022-03-23 PROCEDURE — 64633 DESTROY CERV/THOR FACET JNT: CPT | Mod: RT,,, | Performed by: ANESTHESIOLOGY

## 2022-03-23 PROCEDURE — 99152 MOD SED SAME PHYS/QHP 5/>YRS: CPT | Mod: ,,, | Performed by: ANESTHESIOLOGY

## 2022-03-23 PROCEDURE — 25000003 PHARM REV CODE 250: Performed by: ANESTHESIOLOGY

## 2022-03-23 PROCEDURE — 99152 MOD SED SAME PHYS/QHP 5/>YRS: CPT | Performed by: ANESTHESIOLOGY

## 2022-03-23 PROCEDURE — 64634 DESTROY C/TH FACET JNT ADDL: CPT | Mod: RT | Performed by: ANESTHESIOLOGY

## 2022-03-23 PROCEDURE — 64634 PR DESTROY C/TH FACET JNT ADDL: ICD-10-PCS | Mod: RT,,, | Performed by: ANESTHESIOLOGY

## 2022-03-23 PROCEDURE — 64633 PR DESTROY CERV/THOR FACET JNT: ICD-10-PCS | Mod: RT,,, | Performed by: ANESTHESIOLOGY

## 2022-03-23 PROCEDURE — 99152 PR MOD CONSCIOUS SEDATION, SAME PHYS, 5+ YRS, FIRST 15 MIN: ICD-10-PCS | Mod: ,,, | Performed by: ANESTHESIOLOGY

## 2022-03-23 RX ORDER — FENTANYL CITRATE 50 UG/ML
INJECTION, SOLUTION INTRAMUSCULAR; INTRAVENOUS
Status: DISCONTINUED | OUTPATIENT
Start: 2022-03-23 | End: 2022-03-23 | Stop reason: HOSPADM

## 2022-03-23 RX ORDER — SODIUM CHLORIDE 9 MG/ML
500 INJECTION, SOLUTION INTRAVENOUS CONTINUOUS
Status: DISCONTINUED | OUTPATIENT
Start: 2022-03-23 | End: 2022-03-23 | Stop reason: HOSPADM

## 2022-03-23 RX ORDER — MIDAZOLAM HYDROCHLORIDE 1 MG/ML
INJECTION INTRAMUSCULAR; INTRAVENOUS
Status: DISCONTINUED | OUTPATIENT
Start: 2022-03-23 | End: 2022-03-23 | Stop reason: HOSPADM

## 2022-03-23 RX ORDER — LIDOCAINE HYDROCHLORIDE 20 MG/ML
INJECTION, SOLUTION INFILTRATION; PERINEURAL
Status: DISCONTINUED | OUTPATIENT
Start: 2022-03-23 | End: 2022-03-23 | Stop reason: HOSPADM

## 2022-03-23 RX ORDER — DEXAMETHASONE SODIUM PHOSPHATE 10 MG/ML
INJECTION INTRAMUSCULAR; INTRAVENOUS
Status: DISCONTINUED | OUTPATIENT
Start: 2022-03-23 | End: 2022-03-23 | Stop reason: HOSPADM

## 2022-03-23 RX ORDER — BUPIVACAINE HYDROCHLORIDE 2.5 MG/ML
INJECTION, SOLUTION EPIDURAL; INFILTRATION; INTRACAUDAL
Status: DISCONTINUED | OUTPATIENT
Start: 2022-03-23 | End: 2022-03-23 | Stop reason: HOSPADM

## 2022-03-23 NOTE — OP NOTE
Therapeutic Cervical Medial Branch Radiofrequency Ablation Under Fluoroscopy     The procedure, risks, benefits, and options were discussed with the patient. There are no contraindications to the procedure. The patent expressed understanding and agreed to the procedure. Informed written consent was obtained prior to the start of the procedure and can be found in the patient's chart.        PATIENT NAME: Sheila Costa   MRN: [id number]     DATE OF PROCEDURE: 03/23/2022       PROCEDURE: Therapeutic Right C4, C5 and C6 Cervical Radiofrequency Ablation under Fluoroscopy    PRE-OP DIAGNOSIS: Spondylosis without myelopathy or radiculopathy, cervical region [M47.812]  OTHER SPONDYLOSIS CERVICAL     POST-OP DIAGNOSIS: Same    PHYSICIAN: Cosme Kirkland MD    ASSISTANTS: Dr. Lovell     MEDICATIONS INJECTED:  Preservative-free Decadron 10mg with 9cc of Bupivicaine 0.25%    LOCAL ANESTHETIC INJECTED:   Xylocaine 2%    SEDATION: Versed 2mg and Fentanyl 100mcg                                                                                                                                                                                     Conscious sedation ordered by M.D. Patient re-evaluation prior to administration of conscious sedation. No changes noted in patient's status from initial evaluation. The patient's vital signs were monitored by RN and patient remained hemodynamically stable throughout the procedure.    Event Time In   Sedation Start 1439   Sedation End 1452       ESTIMATED BLOOD LOSS:  None    COMPLICATIONS:  None.     INTERVAL HISTORY: Patient has clinical and imaging findings suggestive of facet mediated pain. Patients has completed 2 previous diagnostic medial branch blocks at specified levels with at least 80% relief for the expected duration of the local anesthetic utilized.    TECHNIQUE: Time-out was performed to identify the patient and procedure to be performed. With the patient laying in a prone  position, the surgical area was prepped and draped in the usual sterile fashion using ChloraPrep and fenestrated drape. The levels were determined under fluoroscopic guidance. Skin anesthesia was achieved by injecting Lidocaine 2% over the injection sites. A 20 gauge 10mm curved active tip needle was introduced to the anatomic local of the medial branch at each of the above levels using AP, lateral and/or contralateral oblique fluoroscopic imaging. Then the sensory and motor testing was performed to confirm that the needle tips were in the correct location. After negative aspiration for blood or CSF was confirmed, 1 mL of the lidocaine 2% listed above was injected slowly at each site. This was followed by thermal lesioning at 80 degrees celsius for 90 seconds. That was followed by slowly injecting 1 mL of the medication mixture listed above at each site. The needles were removed and bleeding was nil. A sterile dressing was applied. No specimens collected. The patient tolerated the procedure well and did not have any procedure related motor deficit at the conclusion of the procedure.    The patient was monitored after the procedure in the recovery area. They were given post-procedure and discharge instructions to follow at home. The patient was discharged in a stable condition.     I reviewed and edited the fellow's note. I conducted my own interview and physical examination. I agree with the findings. I was present and supervising all critical portions of the procedure.      Cosme Kirkland MD

## 2022-03-23 NOTE — DISCHARGE SUMMARY
Discharge Note  Short Stay      SUMMARY     Admit Date: 3/23/2022    Attending Physician: Dejon Lovell      Discharge Physician: Dejon Lovell      Discharge Date: 3/23/2022 2:49 PM    Procedure(s) (LRB):  Radiofrequency Ablation RIGHT C4,5,6 (Right)    Final Diagnosis: Spondylosis without myelopathy or radiculopathy, cervical region [M47.812]    Disposition: Home or self care    Patient Instructions:   Current Discharge Medication List      CONTINUE these medications which have NOT CHANGED    Details   amitriptyline (ELAVIL) 25 MG tablet TAKE 1 TABLET(25 MG) BY MOUTH EVERY NIGHT AS NEEDED FOR INSOMNIA OR PAIN  Qty: 30 tablet, Refills: 0    Comments: ZERO refills remain on this prescription. Your patient is requesting advance approval of refills for this medication to PREVENT ANY MISSED DOSES  Associated Diagnoses: Cervical radiculopathy; DDD (degenerative disc disease), cervical      ascorbic acid, vitamin C, (VITAMIN C) 100 MG tablet Take 100 mg by mouth once daily.      atenoloL-chlorthalidone (TENORETIC) 50-25 mg Tab Take 1/2 tablet by mouth once daily  Qty: 45 tablet, Refills: 3    Comments: Please inactivate all prior scripts with same name and strength including any scripts on hold.  Associated Diagnoses: Essential hypertension      FLUoxetine 20 MG capsule Take 1 capsule (20 mg total) by mouth once daily.  Qty: 30 capsule, Refills: 2    Comments: ZERO refills remain on this prescription. Your patient is requesting advance approval of refills for this medication to PREVENT ANY MISSED DOSES  Associated Diagnoses: PMDD (premenstrual dysphoric disorder)      levonorgestrel (MIRENA) 20 mcg/24 hr (5 years) IUD 1 Intra Uterine Device by Intrauterine route once. for 1 dose  Qty: 1 Intra Uterine Device, Refills: 0    Associated Diagnoses: Contraception, device intrauterine      loratadine (CLARITIN) 10 mg tablet Take 10 mg by mouth every morning.       metFORMIN (GLUCOPHAGE-XR) 500 MG ER 24hr tablet Take 1 tablet  (500 mg total) by mouth daily with breakfast.  Qty: 30 tablet, Refills: 3    Associated Diagnoses: Prediabetes      multivitamin capsule Take 1 capsule by mouth once daily.      potassium chloride SA (K-DUR,KLOR-CON) 10 MEQ tablet Take 1 tablet (10 mEq total) by mouth once daily.  Qty: 90 tablet, Refills: 3    Associated Diagnoses: Hypokalemia      tiZANidine (ZANAFLEX) 4 MG tablet Take 1 tablet (4 mg total) by mouth nightly as needed.  Qty: 30 tablet, Refills: 0    Associated Diagnoses: Other spondylosis, cervical region; Spondylosis without myelopathy or radiculopathy, cervical region      vitamin D (VITAMIN D3) 1000 units Tab Take 1,000 Units by mouth once daily.      vitamin E 100 UNIT capsule Take 100 Units by mouth once daily.                 Discharge Diagnosis: Spondylosis without myelopathy or radiculopathy, cervical region [M47.812]  Condition on Discharge: Stable with no complications to procedure   Diet on Discharge: Same as before.  Activity: as per instruction sheet.  Discharge to: Home with a responsible adult.  Follow up: 2-4 weeks       Please call my office or pager at [phone] if experienced any weakness or loss of sensation, fever > 101.5, pain uncontrolled with oral medications, persistent nausea/vomiting/or diarrhea, redness or drainage from the incisions, or any other worrisome concerns. If physician on call was not reached or could not communicate with our office for any reason please go to the nearest emergency department

## 2022-03-23 NOTE — DISCHARGE INSTRUCTIONS

## 2022-04-04 ENCOUNTER — TELEPHONE (OUTPATIENT)
Dept: PAIN MEDICINE | Facility: CLINIC | Age: 52
End: 2022-04-04
Payer: COMMERCIAL

## 2022-04-04 NOTE — TELEPHONE ENCOUNTER
Left message for patient to call back. Need to reschedule appointment from 3:45 to 3:30 on 04/28.

## 2022-04-06 ENCOUNTER — HOSPITAL ENCOUNTER (OUTPATIENT)
Facility: OTHER | Age: 52
Discharge: HOME | End: 2022-04-06
Attending: ANESTHESIOLOGY | Admitting: ANESTHESIOLOGY
Payer: COMMERCIAL

## 2022-04-06 VITALS
HEART RATE: 68 BPM | RESPIRATION RATE: 16 BRPM | WEIGHT: 160 LBS | OXYGEN SATURATION: 99 % | HEIGHT: 64 IN | DIASTOLIC BLOOD PRESSURE: 68 MMHG | BODY MASS INDEX: 27.31 KG/M2 | SYSTOLIC BLOOD PRESSURE: 142 MMHG | TEMPERATURE: 98 F

## 2022-04-06 DIAGNOSIS — G89.29 CHRONIC PAIN: ICD-10-CM

## 2022-04-06 DIAGNOSIS — M47.812 OSTEOARTHRITIS OF CERVICAL SPINE, UNSPECIFIED SPINAL OSTEOARTHRITIS COMPLICATION STATUS: Primary | ICD-10-CM

## 2022-04-06 DIAGNOSIS — M47.819 SPONDYLOSIS WITHOUT MYELOPATHY: ICD-10-CM

## 2022-04-06 PROCEDURE — 64634 DESTROY C/TH FACET JNT ADDL: CPT | Mod: LT,,, | Performed by: ANESTHESIOLOGY

## 2022-04-06 PROCEDURE — 64633 DESTROY CERV/THOR FACET JNT: CPT | Mod: LT,,, | Performed by: ANESTHESIOLOGY

## 2022-04-06 PROCEDURE — 25000003 PHARM REV CODE 250: Performed by: ANESTHESIOLOGY

## 2022-04-06 PROCEDURE — 99152 MOD SED SAME PHYS/QHP 5/>YRS: CPT | Mod: ,,, | Performed by: ANESTHESIOLOGY

## 2022-04-06 PROCEDURE — 99152 MOD SED SAME PHYS/QHP 5/>YRS: CPT | Performed by: ANESTHESIOLOGY

## 2022-04-06 PROCEDURE — 64634 PR DESTROY C/TH FACET JNT ADDL: ICD-10-PCS | Mod: LT,,, | Performed by: ANESTHESIOLOGY

## 2022-04-06 PROCEDURE — 63600175 PHARM REV CODE 636 W HCPCS: Performed by: ANESTHESIOLOGY

## 2022-04-06 PROCEDURE — 99152 PR MOD CONSCIOUS SEDATION, SAME PHYS, 5+ YRS, FIRST 15 MIN: ICD-10-PCS | Mod: ,,, | Performed by: ANESTHESIOLOGY

## 2022-04-06 PROCEDURE — 64634 DESTROY C/TH FACET JNT ADDL: CPT | Mod: LT | Performed by: ANESTHESIOLOGY

## 2022-04-06 PROCEDURE — 64633 PR DESTROY CERV/THOR FACET JNT: ICD-10-PCS | Mod: LT,,, | Performed by: ANESTHESIOLOGY

## 2022-04-06 PROCEDURE — 64633 DESTROY CERV/THOR FACET JNT: CPT | Mod: LT | Performed by: ANESTHESIOLOGY

## 2022-04-06 RX ORDER — SODIUM CHLORIDE 9 MG/ML
INJECTION, SOLUTION INTRAVENOUS CONTINUOUS
Status: DISCONTINUED | OUTPATIENT
Start: 2022-04-06 | End: 2022-04-06 | Stop reason: HOSPADM

## 2022-04-06 RX ORDER — MIDAZOLAM HYDROCHLORIDE 1 MG/ML
INJECTION INTRAMUSCULAR; INTRAVENOUS
Status: DISCONTINUED | OUTPATIENT
Start: 2022-04-06 | End: 2022-04-06 | Stop reason: HOSPADM

## 2022-04-06 RX ORDER — LIDOCAINE HYDROCHLORIDE 20 MG/ML
INJECTION, SOLUTION INFILTRATION; PERINEURAL
Status: DISCONTINUED | OUTPATIENT
Start: 2022-04-06 | End: 2022-04-06 | Stop reason: HOSPADM

## 2022-04-06 RX ORDER — DEXAMETHASONE SODIUM PHOSPHATE 10 MG/ML
INJECTION INTRAMUSCULAR; INTRAVENOUS
Status: DISCONTINUED | OUTPATIENT
Start: 2022-04-06 | End: 2022-04-06 | Stop reason: HOSPADM

## 2022-04-06 RX ORDER — FENTANYL CITRATE 50 UG/ML
INJECTION, SOLUTION INTRAMUSCULAR; INTRAVENOUS
Status: DISCONTINUED | OUTPATIENT
Start: 2022-04-06 | End: 2022-04-06 | Stop reason: HOSPADM

## 2022-04-06 RX ORDER — BUPIVACAINE HYDROCHLORIDE 2.5 MG/ML
INJECTION, SOLUTION EPIDURAL; INFILTRATION; INTRACAUDAL
Status: DISCONTINUED | OUTPATIENT
Start: 2022-04-06 | End: 2022-04-06 | Stop reason: HOSPADM

## 2022-04-06 NOTE — DISCHARGE INSTRUCTIONS

## 2022-04-06 NOTE — OP NOTE
Therapeutic Cervical Medial Branch Radiofrequency Ablation Under Fluoroscopy     The procedure, risks, benefits, and options were discussed with the patient. There are no contraindications to the procedure. The patent expressed understanding and agreed to the procedure. Informed written consent was obtained prior to the start of the procedure and can be found in the patient's chart.        PATIENT NAME: Sheila Costa   MRN: [id number]     DATE OF PROCEDURE: 04/06/2022       PROCEDURE: Therapeutic Left C4, C5 and C6 Cervical Radiofrequency Ablation under Fluoroscopy    PRE-OP DIAGNOSIS: Spondylosis without myelopathy or radiculopathy, cervical region [M47.812]  Other spondylosis cervical     POST-OP DIAGNOSIS: Same    PHYSICIAN: Cosme Kirkland MD    ASSISTANTS: Maurilio Somers MD     MEDICATIONS INJECTED:  Preservative-free Decadron 10mg with 9cc of Bupivicaine 0.25%    LOCAL ANESTHETIC INJECTED:   Xylocaine 2%    SEDATION:   Versed 2mg and Fentanyl 100mcg                                                                                                                                                                                     Conscious sedation ordered by M.LSIA. Patient re-evaluation prior to administration of conscious sedation. No changes noted in patient's status from initial evaluation. The patient's vital signs were monitored by RN and patient remained hemodynamically stable throughout the procedure.    Event Time In   Sedation Start 1435   Sedation End 1447       ESTIMATED BLOOD LOSS:  None    COMPLICATIONS:  None.     INTERVAL HISTORY: Patient has clinical and imaging findings suggestive of recurrent facet mediated pain. Patient has undergone a previous RFA at specified levels with at least 50% relief for at least 6 months. Successful diagnostic medial branch blocks have been completed within the past 2 years.    TECHNIQUE: Time-out was performed to identify the patient and procedure to be  performed. With the patient laying in a prone position, the surgical area was prepped and draped in the usual sterile fashion using ChloraPrep and fenestrated drape. The levels were determined under fluoroscopic guidance. Skin anesthesia was achieved by injecting Lidocaine 2% over the injection sites. A 20 gauge 10mm curved active tip needle was introduced to the anatomic local of the medial branch at each of the above levels using AP, lateral and/or contralateral oblique fluoroscopic imaging. Then the sensory and motor testing was performed to confirm that the needle tips were in the correct location. After negative aspiration for blood or CSF was confirmed, 1 mL of the lidocaine 2% listed above was injected slowly at each site. This was followed by thermal lesioning at 80 degrees celsius for 90 seconds. That was followed by slowly injecting 1 mL of the medication mixture listed above at each site. The needles were removed and bleeding was nil. A sterile dressing was applied. No specimens collected. The patient tolerated the procedure well and did not have any procedure related motor deficit at the conclusion of the procedure.    The patient was monitored after the procedure in the recovery area. They were given post-procedure and discharge instructions to follow at home. The patient was discharged in a stable condition.     I reviewed and edited the fellow's note. I conducted my own interview and physical examination. I agree with the findings. I was present and supervising all critical portions of the procedure.    Cosme Kirkland MD

## 2022-04-06 NOTE — DISCHARGE SUMMARY
Discharge Note  Short Stay      SUMMARY     Admit Date: 4/6/2022    Attending Physician: Cosme Kirkland MD      Discharge Physician: Cosme Kirkland MD      Discharge Date: 4/6/2022 2:51 PM    Procedure(s) (LRB):  Radiofrequency Ablation LEFT C4,5,6 (Left)    Final Diagnosis: Spondylosis without myelopathy or radiculopathy, cervical region [M47.812]    Disposition: Home or self care    Patient Instructions:   Current Discharge Medication List      CONTINUE these medications which have NOT CHANGED    Details   amitriptyline (ELAVIL) 25 MG tablet TAKE 1 TABLET(25 MG) BY MOUTH EVERY NIGHT AS NEEDED FOR INSOMNIA OR PAIN  Qty: 30 tablet, Refills: 0    Comments: ZERO refills remain on this prescription. Your patient is requesting advance approval of refills for this medication to PREVENT ANY MISSED DOSES  Associated Diagnoses: Cervical radiculopathy; DDD (degenerative disc disease), cervical      ascorbic acid, vitamin C, (VITAMIN C) 100 MG tablet Take 100 mg by mouth once daily.      atenoloL-chlorthalidone (TENORETIC) 50-25 mg Tab Take 1/2 tablet by mouth once daily  Qty: 45 tablet, Refills: 3    Comments: Please inactivate all prior scripts with same name and strength including any scripts on hold.  Associated Diagnoses: Essential hypertension      FLUoxetine 20 MG capsule Take 1 capsule (20 mg total) by mouth once daily.  Qty: 30 capsule, Refills: 2    Comments: ZERO refills remain on this prescription. Your patient is requesting advance approval of refills for this medication to PREVENT ANY MISSED DOSES  Associated Diagnoses: PMDD (premenstrual dysphoric disorder)      levonorgestrel (MIRENA) 20 mcg/24 hr (5 years) IUD 1 Intra Uterine Device by Intrauterine route once. for 1 dose  Qty: 1 Intra Uterine Device, Refills: 0    Associated Diagnoses: Contraception, device intrauterine      loratadine (CLARITIN) 10 mg tablet Take 10 mg by mouth every morning.       metFORMIN (GLUCOPHAGE-XR) 500 MG ER 24hr tablet Take 1  tablet (500 mg total) by mouth daily with breakfast.  Qty: 30 tablet, Refills: 3    Associated Diagnoses: Prediabetes      multivitamin capsule Take 1 capsule by mouth once daily.      potassium chloride SA (K-DUR,KLOR-CON) 10 MEQ tablet Take 1 tablet (10 mEq total) by mouth once daily.  Qty: 90 tablet, Refills: 3    Associated Diagnoses: Hypokalemia      tiZANidine (ZANAFLEX) 4 MG tablet Take 1 tablet (4 mg total) by mouth nightly as needed.  Qty: 30 tablet, Refills: 0    Associated Diagnoses: Other spondylosis, cervical region; Spondylosis without myelopathy or radiculopathy, cervical region      vitamin D (VITAMIN D3) 1000 units Tab Take 1,000 Units by mouth once daily.      vitamin E 100 UNIT capsule Take 100 Units by mouth once daily.                 Discharge Diagnosis: Spondylosis without myelopathy or radiculopathy, cervical region [M47.812]  Condition on Discharge: Stable with no complications to procedure   Diet on Discharge: Same as before.  Activity: as per instruction sheet.  Discharge to: Home with a responsible adult.  Follow up: 2-4 weeks       Please call my office or pager at [phone] if experienced any weakness or loss of sensation, fever > 101.5, pain uncontrolled with oral medications, persistent nausea/vomiting/or diarrhea, redness or drainage from the incisions, or any other worrisome concerns. If physician on call was not reached or could not communicate with our office for any reason please go to the nearest emergency department        Maurilio Somers MD

## 2022-04-06 NOTE — INTERVAL H&P NOTE
The patient has been examined and the H&P has been reviewed:    I concur with the findings and no changes have occurred since H&P was written.    Procedure risks, benefits and alternative options discussed and understood by patient/family.      Maurilio Somers MD  [facility], PGY-3

## 2022-04-27 ENCOUNTER — TELEPHONE (OUTPATIENT)
Dept: PAIN MEDICINE | Facility: CLINIC | Age: 52
End: 2022-04-27
Payer: COMMERCIAL

## 2022-04-27 ENCOUNTER — PATIENT OUTREACH (OUTPATIENT)
Dept: ADMINISTRATIVE | Facility: OTHER | Age: 52
End: 2022-04-27
Payer: COMMERCIAL

## 2022-04-28 ENCOUNTER — TELEPHONE (OUTPATIENT)
Dept: INTERNAL MEDICINE | Facility: CLINIC | Age: 52
End: 2022-04-28
Payer: COMMERCIAL

## 2022-04-28 ENCOUNTER — OFFICE VISIT (OUTPATIENT)
Dept: PAIN MEDICINE | Facility: CLINIC | Age: 52
End: 2022-04-28
Attending: ANESTHESIOLOGY
Payer: COMMERCIAL

## 2022-04-28 ENCOUNTER — LAB VISIT (OUTPATIENT)
Dept: LAB | Facility: OTHER | Age: 52
End: 2022-04-28
Attending: INTERNAL MEDICINE
Payer: COMMERCIAL

## 2022-04-28 VITALS
SYSTOLIC BLOOD PRESSURE: 136 MMHG | HEART RATE: 72 BPM | HEIGHT: 64 IN | WEIGHT: 167 LBS | RESPIRATION RATE: 18 BRPM | OXYGEN SATURATION: 98 % | DIASTOLIC BLOOD PRESSURE: 81 MMHG | TEMPERATURE: 96 F | BODY MASS INDEX: 28.51 KG/M2

## 2022-04-28 DIAGNOSIS — R73.03 PREDIABETES: ICD-10-CM

## 2022-04-28 DIAGNOSIS — M47.812 SPONDYLOSIS WITHOUT MYELOPATHY OR RADICULOPATHY, CERVICAL REGION: ICD-10-CM

## 2022-04-28 DIAGNOSIS — M46.1 SACROILIITIS: Primary | ICD-10-CM

## 2022-04-28 DIAGNOSIS — R73.03 PREDIABETES: Primary | ICD-10-CM

## 2022-04-28 DIAGNOSIS — M54.2 CERVICAL PAIN (NECK): ICD-10-CM

## 2022-04-28 DIAGNOSIS — M70.60 GREATER TROCHANTERIC BURSITIS, UNSPECIFIED LATERALITY: ICD-10-CM

## 2022-04-28 PROCEDURE — 3075F SYST BP GE 130 - 139MM HG: CPT | Mod: CPTII,S$GLB,, | Performed by: ANESTHESIOLOGY

## 2022-04-28 PROCEDURE — 3079F PR MOST RECENT DIASTOLIC BLOOD PRESSURE 80-89 MM HG: ICD-10-PCS | Mod: CPTII,S$GLB,, | Performed by: ANESTHESIOLOGY

## 2022-04-28 PROCEDURE — 3008F PR BODY MASS INDEX (BMI) DOCUMENTED: ICD-10-PCS | Mod: CPTII,S$GLB,, | Performed by: ANESTHESIOLOGY

## 2022-04-28 PROCEDURE — 3044F PR MOST RECENT HEMOGLOBIN A1C LEVEL <7.0%: ICD-10-PCS | Mod: CPTII,S$GLB,, | Performed by: ANESTHESIOLOGY

## 2022-04-28 PROCEDURE — 3044F HG A1C LEVEL LT 7.0%: CPT | Mod: CPTII,S$GLB,, | Performed by: ANESTHESIOLOGY

## 2022-04-28 PROCEDURE — 3079F DIAST BP 80-89 MM HG: CPT | Mod: CPTII,S$GLB,, | Performed by: ANESTHESIOLOGY

## 2022-04-28 PROCEDURE — 99999 PR PBB SHADOW E&M-EST. PATIENT-LVL IV: CPT | Mod: PBBFAC,,, | Performed by: ANESTHESIOLOGY

## 2022-04-28 PROCEDURE — 83036 HEMOGLOBIN GLYCOSYLATED A1C: CPT | Performed by: INTERNAL MEDICINE

## 2022-04-28 PROCEDURE — 3075F PR MOST RECENT SYSTOLIC BLOOD PRESS GE 130-139MM HG: ICD-10-PCS | Mod: CPTII,S$GLB,, | Performed by: ANESTHESIOLOGY

## 2022-04-28 PROCEDURE — 99999 PR PBB SHADOW E&M-EST. PATIENT-LVL IV: ICD-10-PCS | Mod: PBBFAC,,, | Performed by: ANESTHESIOLOGY

## 2022-04-28 PROCEDURE — 99214 OFFICE O/P EST MOD 30 MIN: CPT | Mod: S$GLB,,, | Performed by: ANESTHESIOLOGY

## 2022-04-28 PROCEDURE — 99214 PR OFFICE/OUTPT VISIT, EST, LEVL IV, 30-39 MIN: ICD-10-PCS | Mod: S$GLB,,, | Performed by: ANESTHESIOLOGY

## 2022-04-28 PROCEDURE — 3008F BODY MASS INDEX DOCD: CPT | Mod: CPTII,S$GLB,, | Performed by: ANESTHESIOLOGY

## 2022-04-28 PROCEDURE — 36415 COLL VENOUS BLD VENIPUNCTURE: CPT | Performed by: INTERNAL MEDICINE

## 2022-04-28 RX ORDER — GABAPENTIN 300 MG/1
300 CAPSULE ORAL 3 TIMES DAILY
Qty: 90 CAPSULE | Refills: 0 | Status: SHIPPED | OUTPATIENT
Start: 2022-04-28 | End: 2022-05-03

## 2022-04-28 NOTE — TELEPHONE ENCOUNTER
Lab called- no order in for A1c that was due in January. Put in order per written order guideline
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
## 2022-04-29 LAB
ESTIMATED AVG GLUCOSE: 128 MG/DL (ref 68–131)
HBA1C MFR BLD: 6.1 % (ref 4–5.6)

## 2022-05-02 ENCOUNTER — TELEPHONE (OUTPATIENT)
Dept: ADMINISTRATIVE | Facility: OTHER | Age: 52
End: 2022-05-02
Payer: COMMERCIAL

## 2022-05-03 RX ORDER — GABAPENTIN 300 MG/1
CAPSULE ORAL
Qty: 90 CAPSULE | Refills: 0 | Status: SHIPPED | OUTPATIENT
Start: 2022-05-03 | End: 2022-06-13

## 2022-05-10 ENCOUNTER — PATIENT MESSAGE (OUTPATIENT)
Dept: INTERNAL MEDICINE | Facility: CLINIC | Age: 52
End: 2022-05-10
Payer: COMMERCIAL

## 2022-05-17 ENCOUNTER — PATIENT MESSAGE (OUTPATIENT)
Dept: PAIN MEDICINE | Facility: OTHER | Age: 52
End: 2022-05-17
Payer: COMMERCIAL

## 2022-05-18 ENCOUNTER — HOSPITAL ENCOUNTER (OUTPATIENT)
Facility: OTHER | Age: 52
Discharge: HOME | End: 2022-05-18
Attending: ANESTHESIOLOGY | Admitting: ANESTHESIOLOGY
Payer: COMMERCIAL

## 2022-05-18 VITALS
DIASTOLIC BLOOD PRESSURE: 72 MMHG | RESPIRATION RATE: 14 BRPM | HEIGHT: 61 IN | HEART RATE: 65 BPM | WEIGHT: 160 LBS | TEMPERATURE: 99 F | SYSTOLIC BLOOD PRESSURE: 150 MMHG | BODY MASS INDEX: 30.21 KG/M2 | OXYGEN SATURATION: 100 %

## 2022-05-18 DIAGNOSIS — M46.1 SACROILIITIS: Primary | ICD-10-CM

## 2022-05-18 PROCEDURE — 25000003 PHARM REV CODE 250: Performed by: ANESTHESIOLOGY

## 2022-05-18 PROCEDURE — 20610 DRAIN/INJ JOINT/BURSA W/O US: CPT | Mod: 59,LT | Performed by: ANESTHESIOLOGY

## 2022-05-18 PROCEDURE — 25500020 PHARM REV CODE 255: Performed by: ANESTHESIOLOGY

## 2022-05-18 PROCEDURE — 20610 PR DRAIN/INJECT LARGE JOINT/BURSA: ICD-10-PCS | Mod: 59,LT,, | Performed by: ANESTHESIOLOGY

## 2022-05-18 PROCEDURE — 27096 PR INJECTION,SACROILIAC JOINT: ICD-10-PCS | Mod: LT,,, | Performed by: ANESTHESIOLOGY

## 2022-05-18 PROCEDURE — 63600175 PHARM REV CODE 636 W HCPCS: Performed by: ANESTHESIOLOGY

## 2022-05-18 PROCEDURE — 27096 INJECT SACROILIAC JOINT: CPT | Mod: LT | Performed by: ANESTHESIOLOGY

## 2022-05-18 PROCEDURE — 27096 INJECT SACROILIAC JOINT: CPT | Mod: LT,,, | Performed by: ANESTHESIOLOGY

## 2022-05-18 PROCEDURE — 20610 DRAIN/INJ JOINT/BURSA W/O US: CPT | Mod: 59,LT,, | Performed by: ANESTHESIOLOGY

## 2022-05-18 RX ORDER — BUPIVACAINE HYDROCHLORIDE 2.5 MG/ML
INJECTION, SOLUTION EPIDURAL; INFILTRATION; INTRACAUDAL
Status: DISCONTINUED | OUTPATIENT
Start: 2022-05-18 | End: 2022-05-18 | Stop reason: HOSPADM

## 2022-05-18 RX ORDER — SODIUM CHLORIDE 9 MG/ML
500 INJECTION, SOLUTION INTRAVENOUS CONTINUOUS
Status: DISCONTINUED | OUTPATIENT
Start: 2022-05-18 | End: 2022-05-18 | Stop reason: HOSPADM

## 2022-05-18 RX ORDER — DEXAMETHASONE SODIUM PHOSPHATE 10 MG/ML
INJECTION INTRAMUSCULAR; INTRAVENOUS
Status: DISCONTINUED | OUTPATIENT
Start: 2022-05-18 | End: 2022-05-18 | Stop reason: HOSPADM

## 2022-05-18 RX ORDER — TRIAMCINOLONE ACETONIDE 40 MG/ML
INJECTION, SUSPENSION INTRA-ARTICULAR; INTRAMUSCULAR
Status: DISCONTINUED | OUTPATIENT
Start: 2022-05-18 | End: 2022-05-18 | Stop reason: HOSPADM

## 2022-05-18 NOTE — H&P
HPI  Patient presenting for Procedure(s) (LRB):  INJECTION, JOINT, LEFT SI and GTB *LORI PT* (Left)     Patient on Anti-coagulation No    No health changes since previous encounter    Past Medical History:   Diagnosis Date    Alpha thalassemia     Cervical spondylosis 12/30/2019    Colon polyp     Fatty liver     Hypertension     Premenstrual symptom      Past Surgical History:   Procedure Laterality Date    COLONOSCOPY N/A 6/18/2020    Procedure: COLONOSCOPY;  Surgeon: Eliot Hill MD;  Location: [facility] ENDO (4TH FLR);  Service: Endoscopy;  Laterality: N/A;  [facility]    COLONOSCOPY N/A 7/6/2020    Procedure: COLONOSCOPY;  Surgeon: Kang Guzmán MD;  Location: [facility] OR 2ND FLR;  Service: Colon and Rectal;  Laterality: N/A;    COLONOSCOPY N/A 7/9/2021    Procedure: COLONOSCOPY;  Surgeon: GREGG Guzmán MD;  Location: [facility] ENDO (4TH FLR);  Service: Endoscopy;  Laterality: N/A;  in July 2021  covid test [place] 7/6    EPIDURAL STEROID INJECTION N/A 10/2/2019    Procedure: INJECTION, STEROID, EPIDURAL, C7-T1;  Surgeon: Cosme Kirkland MD;  Location: [facility] [place] PAIN MGT;  Service: Pain Management;  Laterality: N/A;    EPIDURAL STEROID INJECTION N/A 12/22/2021    Procedure: INJECTION, STEROID, EPIDURAL, C7-T1 NEED CONSENT;  Surgeon: Cosme Kirkland MD;  Location: [facility] [place] PAIN MGT;  Service: Pain Management;  Laterality: N/A;    EYE SURGERY      INJECTION OF ANESTHETIC AGENT AROUND NERVE Bilateral 1/8/2020    Procedure: BLOCK, NERVE C4,5,6;  Surgeon: Cosme Kirkland MD;  Location: [facility] [place] PAIN MGT;  Service: Pain Management;  Laterality: Bilateral;  B/L MBB C4,5,6    INJECTION OF ANESTHETIC AGENT AROUND NERVE Bilateral 1/29/2020    Procedure: BLOCK, NERVE, Repeat C4-C5-C6 MEDIAL BRANCH;  Surgeon: Cosme Kirkland MD;  Location: [facility] [place] PAIN MGT;  Service: Pain Management;  Laterality: Bilateral;    LAPAROSCOPIC SURGICAL REMOVAL OF CECUM N/A 7/6/2020    Procedure:  EXCISION, CECUM, LAPAROSCOPIC, colonoscopy to start, CRISTHIAN low;  Surgeon: Kang Guzmán MD;  Location: [facility] OR 2ND FLR;  Service: Colon and Rectal;  Laterality: N/A;    RADIOFREQUENCY ABLATION Left 3/4/2020    Procedure: RADIOFREQUENCY ABLATION, C4-C5-C6 ME DIAL BRANCH 1 OF 2 need consent;  Surgeon: Cosme Kirkland MD;  Location: [facility] PAIN MGT;  Service: Pain Management;  Laterality: Left;    RADIOFREQUENCY ABLATION Right 6/3/2020    Procedure: RADIOFREQUENCY ABLATION, C4-C5-C6 MEDIAL BRANCH 2 OF 2 need consent;  Surgeon: Cosme Kirkland MD;  Location: [facility] PAIN MGT;  Service: Pain Management;  Laterality: Right;    RADIOFREQUENCY ABLATION Right 3/23/2022    Procedure: Radiofrequency Ablation RIGHT C4,5,6;  Surgeon: Cosme Kirkland MD;  Location: [facility] PAIN MGT;  Service: Pain Management;  Laterality: Right;    RADIOFREQUENCY ABLATION Left 4/6/2022    Procedure: Radiofrequency Ablation LEFT C4,5,6;  Surgeon: Cosme Kirkland MD;  Location: [facility] PAIN MGT;  Service: Pain Management;  Laterality: Left;    REFRACTIVE SURGERY  2008    SMALL INTESTINE SURGERY  7/6/20     Review of patient's allergies indicates:   Allergen Reactions    Amoxicillin Hives      No current facility-administered medications for this encounter.       PMHx, PSHx, Allergies, Medications reviewed in epic    ROS negative except pain complaints in HPI    OBJECTIVE:    There were no vitals taken for this visit.    PHYSICAL EXAMINATION:    GENERAL: Well appearing, in no acute distress, alert and oriented x3.  PSYCH:  Mood and affect appropriate.  SKIN: Skin color, texture, turgor normal, no rashes or lesions which will impact the procedure.  CV: RRR with palpation of the radial artery.  PULM: No evidence of respiratory difficulty, symmetric chest rise. Clear to auscultation.  NEURO: Cranial nerves grossly intact.    Plan:    Proceed with procedure as planned Procedure(s) (LRB):  INJECTION, JOINT, LEFT SI and GTB *LORI PT*  (Left)    Yoni Roberson  05/18/2022

## 2022-05-18 NOTE — DISCHARGE INSTRUCTIONS

## 2022-05-18 NOTE — OP NOTE
Sacroiliac Joint and Greater Trochanteric Bursa Injection under Fluoroscopic Guidance    The procedure, risks, benefits, and options were discussed with the patient. There are no contraindications to the procedure. The patent expressed understanding and agreed to the procedure. Informed written consent was obtained prior to the start of the procedure and can be found in the patient's chart.    PATIENT NAME: Sheila Costa   MRN: [id number]     DATE OF PROCEDURE: 05/18/2022    PROCEDURE:   1. Left Sacroiliac Joint Injection under Fluoroscopic Guidance  2. Left Greater Trochanteric Bursa Injection under Fluoroscopic Guidance    PRE-OP DIAGNOSIS: Sacroiliitis [M46.1]    POST-OP DIAGNOSIS: Same    PHYSICIAN: Jed Phillips MD    ASSISTANTS: None    MEDICATIONS INJECTED: Preservative-free Kenalog 40mg with 7cc of Bupivacine 0.25%     LOCAL ANESTHETIC INJECTED: Xylocaine 2%     SEDATION: None    ESTIMATED BLOOD LOSS: None    COMPLICATIONS: None    TECHNIQUE: Time-out was performed to identify the patient and procedure to be performed. With the patient laying in a prone position, the surgical area was prepped and draped in the usual sterile fashion using ChloraPrep and a fenestrated drape. The sacroiliac joint was determined under fluoroscopy guidance. Skin anesthesia was achieved by injecting Lidocaine 2% over the injection site. The sacroiliac joint was then approached with a 25 gauge,  3.5 inch spinal quinke needle that was introduced under fluoroscopic guidance in the AP and Lateral views. Once the needle tip was in the area of the joint, and there was no blood aspiration, contrast dye contrast dye Omnipaque (300mg/mL) was injected to confirm placement and there was no vascular runoff. Fluoroscopic imaging in the AP and lateral views revealed a clear outline of the joint space. 4 mL of the medication mixture listed above was injected slowly intraarticular and emani-articular. Displacement of the radio opaque  contrast after injection of the medication confirmed that the medication went into the area of the joint. The needles were removed and bleeding was nil. A sterile dressing was applied. No specimens collected. The patient tolerated the procedure well.     TECHNIQUE: Time-out was performed to identify the patient and procedure to be performed. With the patient laying in a prone position, the surgical area was prepped and draped in the usual sterile fashion using ChloraPrep and a fenestrated drape. The area overlying the greater trochanteric bursa was determined under fluoroscopy guidance. Skin anesthesia was achieved by injecting Lidocaine 2% over the injection site. The greater trochanteric bursa was then approached with a 25 gauge, 3.5 inch spinal quinke needle that was introduced under fluoroscopic guidance in the AP view. Once the needle tip was in the area of the bursa, and there was no blood aspiration,  Contrast dye  Omnipaque (300mg/mL) was injected to confirm placement and there was no vascular runoff. 4 mL of the medication mixture listed above was injected slowly. The needles were removed and bleeding was nil. A sterile dressing was applied. No specimens collected. The patient tolerated the procedure well.    The patient was monitored after the procedure in the recovery area. They were given post-procedure and discharge instructions to follow at home. The patient was discharged in a stable condition.    I reviewed and edited the fellow's note. I conducted my own interview and physical examination. I agree with the findings. I was present and supervising all critical portions of the procedure.    Jed Phillips MD

## 2022-05-18 NOTE — DISCHARGE SUMMARY
Discharge Note  Short Stay      SUMMARY     Admit Date: 5/18/2022    Attending Physician: Jed Phillips      Discharge Physician: Jed Phillips      Discharge Date: 5/18/2022 3:13 PM    Procedure(s) (LRB):  INJECTION, JOINT, LEFT SI and GTB *LORI PT* (Left)    Final Diagnosis: Sacroiliitis [M46.1]    Disposition: Home or self care    Patient Instructions:   Current Discharge Medication List      CONTINUE these medications which have NOT CHANGED    Details   amitriptyline (ELAVIL) 25 MG tablet TAKE 1 TABLET(25 MG) BY MOUTH EVERY NIGHT AS NEEDED FOR INSOMNIA OR PAIN  Qty: 30 tablet, Refills: 0    Comments: ZERO refills remain on this prescription. Your patient is requesting advance approval of refills for this medication to PREVENT ANY MISSED DOSES  Associated Diagnoses: Cervical radiculopathy; DDD (degenerative disc disease), cervical      ascorbic acid, vitamin C, (VITAMIN C) 100 MG tablet Take 100 mg by mouth once daily.      atenoloL-chlorthalidone (TENORETIC) 50-25 mg Tab Take 1/2 tablet by mouth once daily  Qty: 45 tablet, Refills: 3    Comments: Please inactivate all prior scripts with same name and strength including any scripts on hold.  Associated Diagnoses: Essential hypertension      azithromycin (ZITHROMAX Z-ROCAEL) 250 MG tablet Take 2 tabs on day one and 1 tab daily on days two to five.  Qty: 6 tablet, Refills: 0      FLUoxetine 20 MG capsule Take 1 capsule (20 mg total) by mouth once daily.  Qty: 30 capsule, Refills: 2    Comments: ZERO refills remain on this prescription. Your patient is requesting advance approval of refills for this medication to PREVENT ANY MISSED DOSES  Associated Diagnoses: PMDD (premenstrual dysphoric disorder)      gabapentin (NEURONTIN) 300 MG capsule TAKE 1 CAPSULE(300 MG) BY MOUTH THREE TIMES DAILY  Qty: 90 capsule, Refills: 0    Comments: ZERO refills remain on this prescription. Your patient is requesting advance approval of refills for this medication to PREVENT ANY  MISSED DOSES      levonorgestrel (MIRENA) 20 mcg/24 hr (5 years) IUD 1 Intra Uterine Device by Intrauterine route once. for 1 dose  Qty: 1 Intra Uterine Device, Refills: 0    Associated Diagnoses: Contraception, device intrauterine      loratadine (CLARITIN) 10 mg tablet Take 10 mg by mouth every morning.       metFORMIN (GLUCOPHAGE-XR) 500 MG ER 24hr tablet TAKE 1 TABLET(500 MG) BY MOUTH DAILY WITH BREAKFAST  Qty: 30 tablet, Refills: 0    Comments: ZERO refills remain on this prescription. Your patient is requesting advance approval of refills for this medication to PREVENT ANY MISSED DOSES  Associated Diagnoses: Prediabetes      multivitamin capsule Take 1 capsule by mouth once daily.      potassium chloride SA (K-DUR,KLOR-CON) 10 MEQ tablet Take 1 tablet (10 mEq total) by mouth once daily.  Qty: 90 tablet, Refills: 3    Associated Diagnoses: Hypokalemia      tiZANidine (ZANAFLEX) 4 MG tablet Take 1 tablet (4 mg total) by mouth nightly as needed.  Qty: 30 tablet, Refills: 0    Associated Diagnoses: Other spondylosis, cervical region; Spondylosis without myelopathy or radiculopathy, cervical region      vitamin D (VITAMIN D3) 1000 units Tab Take 1,000 Units by mouth once daily.      vitamin E 100 UNIT capsule Take 100 Units by mouth once daily.                 Discharge Diagnosis: Sacroiliitis [M46.1]  Condition on Discharge: Stable with no complications to procedure   Diet on Discharge: Same as before.  Activity: as per instruction sheet.  Discharge to: Home with a responsible adult.  Follow up: 2-4 weeks

## 2022-05-22 ENCOUNTER — PATIENT MESSAGE (OUTPATIENT)
Dept: PAIN MEDICINE | Facility: CLINIC | Age: 52
End: 2022-05-22
Payer: COMMERCIAL

## 2022-06-10 ENCOUNTER — TELEPHONE (OUTPATIENT)
Dept: PAIN MEDICINE | Facility: CLINIC | Age: 52
End: 2022-06-10
Payer: COMMERCIAL

## 2022-06-10 ENCOUNTER — PATIENT MESSAGE (OUTPATIENT)
Dept: PAIN MEDICINE | Facility: CLINIC | Age: 52
End: 2022-06-10
Payer: COMMERCIAL

## 2022-06-13 ENCOUNTER — OFFICE VISIT (OUTPATIENT)
Dept: SPINE | Facility: CLINIC | Age: 52
End: 2022-06-13
Attending: ANESTHESIOLOGY
Payer: COMMERCIAL

## 2022-06-13 DIAGNOSIS — M54.12 CERVICAL RADICULOPATHY: ICD-10-CM

## 2022-06-13 DIAGNOSIS — M50.30 DDD (DEGENERATIVE DISC DISEASE), CERVICAL: ICD-10-CM

## 2022-06-13 DIAGNOSIS — M47.812 CERVICAL SPONDYLOSIS WITHOUT MYELOPATHY: Primary | ICD-10-CM

## 2022-06-13 PROCEDURE — 1160F RVW MEDS BY RX/DR IN RCRD: CPT | Mod: CPTII,95,, | Performed by: ANESTHESIOLOGY

## 2022-06-13 PROCEDURE — 3044F HG A1C LEVEL LT 7.0%: CPT | Mod: CPTII,95,, | Performed by: ANESTHESIOLOGY

## 2022-06-13 PROCEDURE — 99214 OFFICE O/P EST MOD 30 MIN: CPT | Mod: 95,,, | Performed by: ANESTHESIOLOGY

## 2022-06-13 PROCEDURE — 3044F PR MOST RECENT HEMOGLOBIN A1C LEVEL <7.0%: ICD-10-PCS | Mod: CPTII,95,, | Performed by: ANESTHESIOLOGY

## 2022-06-13 PROCEDURE — 1159F PR MEDICATION LIST DOCUMENTED IN MEDICAL RECORD: ICD-10-PCS | Mod: CPTII,95,, | Performed by: ANESTHESIOLOGY

## 2022-06-13 PROCEDURE — 99214 PR OFFICE/OUTPT VISIT, EST, LEVL IV, 30-39 MIN: ICD-10-PCS | Mod: 95,,, | Performed by: ANESTHESIOLOGY

## 2022-06-13 PROCEDURE — 1160F PR REVIEW ALL MEDS BY PRESCRIBER/CLIN PHARMACIST DOCUMENTED: ICD-10-PCS | Mod: CPTII,95,, | Performed by: ANESTHESIOLOGY

## 2022-06-13 PROCEDURE — 1159F MED LIST DOCD IN RCRD: CPT | Mod: CPTII,95,, | Performed by: ANESTHESIOLOGY

## 2022-06-13 RX ORDER — PREGABALIN 75 MG/1
75 CAPSULE ORAL 2 TIMES DAILY
Qty: 60 CAPSULE | Refills: 2 | Status: SHIPPED | OUTPATIENT
Start: 2022-06-13 | End: 2023-02-01

## 2022-06-13 RX ORDER — CELECOXIB 100 MG/1
100 CAPSULE ORAL 2 TIMES DAILY
Qty: 60 CAPSULE | Refills: 2 | Status: SHIPPED | OUTPATIENT
Start: 2022-06-13 | End: 2022-10-09 | Stop reason: SDUPTHER
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
## 2022-06-14 ENCOUNTER — TELEPHONE (OUTPATIENT)
Dept: ADMINISTRATIVE | Facility: OTHER | Age: 52
End: 2022-06-14
Payer: COMMERCIAL

## 2022-06-16 ENCOUNTER — TELEPHONE (OUTPATIENT)
Dept: OBSTETRICS AND GYNECOLOGY | Facility: CLINIC | Age: 52
End: 2022-06-16
Payer: COMMERCIAL

## 2022-06-16 ENCOUNTER — PATIENT MESSAGE (OUTPATIENT)
Dept: OBSTETRICS AND GYNECOLOGY | Facility: CLINIC | Age: 52
End: 2022-06-16
Payer: COMMERCIAL

## 2022-06-16 DIAGNOSIS — Z12.31 ENCOUNTER FOR SCREENING MAMMOGRAM FOR MALIGNANT NEOPLASM OF BREAST: Primary | ICD-10-CM

## 2022-06-21 ENCOUNTER — TELEPHONE (OUTPATIENT)
Dept: ADMINISTRATIVE | Facility: OTHER | Age: 52
End: 2022-06-21
Payer: COMMERCIAL

## 2022-06-23 DIAGNOSIS — R73.03 PREDIABETES: ICD-10-CM

## 2022-06-23 RX ORDER — METFORMIN HYDROCHLORIDE 500 MG/1
TABLET, EXTENDED RELEASE ORAL
Qty: 90 TABLET | Refills: 3 | Status: SHIPPED | OUTPATIENT
Start: 2022-06-23 | End: 2022-09-14 | Stop reason: SDUPTHER

## 2022-06-23 NOTE — TELEPHONE ENCOUNTER
Refill Routing Note   Medication(s) are not appropriate for processing by [facility] for the following reason(s):      - Drug-Disease Interaction (metFORMIN and Fatty liver)    [facility] action(s):  Defer Medication-related problems identified: Drug-disease interaction        Medication reconciliation completed: No     Appointments  past 12m or future 3m with PCP    Date Provider   Last Visit   1/26/2022 Jose Moore MD   Next Visit   10/4/2022 Jose Moore MD   ED visits in past 90 days: 0        Note composed:5:51 PM 06/23/2022

## 2022-06-23 NOTE — TELEPHONE ENCOUNTER
No new care gaps identified.  [facility] Embedded Care Gaps. Reference number: [id number]. 6/23/2022   8:09:28 AM FREDAT

## 2022-06-24 ENCOUNTER — PATIENT MESSAGE (OUTPATIENT)
Dept: PAIN MEDICINE | Facility: OTHER | Age: 52
End: 2022-06-24
Payer: COMMERCIAL

## 2022-07-06 ENCOUNTER — HOSPITAL ENCOUNTER (OUTPATIENT)
Facility: OTHER | Age: 52
Discharge: HOME | End: 2022-07-06
Attending: ANESTHESIOLOGY | Admitting: ANESTHESIOLOGY
Payer: COMMERCIAL

## 2022-07-06 VITALS
OXYGEN SATURATION: 97 % | SYSTOLIC BLOOD PRESSURE: 138 MMHG | DIASTOLIC BLOOD PRESSURE: 65 MMHG | TEMPERATURE: 98 F | HEIGHT: 61 IN | BODY MASS INDEX: 30.21 KG/M2 | HEART RATE: 62 BPM | WEIGHT: 160 LBS | RESPIRATION RATE: 16 BRPM

## 2022-07-06 DIAGNOSIS — M50.30 DDD (DEGENERATIVE DISC DISEASE), CERVICAL: Primary | ICD-10-CM

## 2022-07-06 DIAGNOSIS — M54.12 CERVICAL RADICULOPATHY: ICD-10-CM

## 2022-07-06 DIAGNOSIS — G89.29 CHRONIC PAIN: ICD-10-CM

## 2022-07-06 PROCEDURE — 25000003 PHARM REV CODE 250: Performed by: STUDENT IN AN ORGANIZED HEALTH CARE EDUCATION/TRAINING PROGRAM

## 2022-07-06 PROCEDURE — 25000003 PHARM REV CODE 250: Performed by: ANESTHESIOLOGY

## 2022-07-06 PROCEDURE — 63600175 PHARM REV CODE 636 W HCPCS: Performed by: ANESTHESIOLOGY

## 2022-07-06 PROCEDURE — 62321 PR INJ CERV/THORAC, W/GUIDANCE: ICD-10-PCS | Mod: ,,, | Performed by: ANESTHESIOLOGY

## 2022-07-06 PROCEDURE — A4216 STERILE WATER/SALINE, 10 ML: HCPCS | Performed by: ANESTHESIOLOGY

## 2022-07-06 PROCEDURE — 62321 NJX INTERLAMINAR CRV/THRC: CPT | Performed by: ANESTHESIOLOGY

## 2022-07-06 PROCEDURE — 25500020 PHARM REV CODE 255: Performed by: ANESTHESIOLOGY

## 2022-07-06 PROCEDURE — 62321 NJX INTERLAMINAR CRV/THRC: CPT | Mod: ,,, | Performed by: ANESTHESIOLOGY

## 2022-07-06 RX ORDER — LIDOCAINE HYDROCHLORIDE 10 MG/ML
INJECTION, SOLUTION EPIDURAL; INFILTRATION; INTRACAUDAL; PERINEURAL
Status: DISCONTINUED | OUTPATIENT
Start: 2022-07-06 | End: 2022-07-06 | Stop reason: HOSPADM

## 2022-07-06 RX ORDER — LIDOCAINE HYDROCHLORIDE 20 MG/ML
INJECTION, SOLUTION INFILTRATION; PERINEURAL
Status: DISCONTINUED | OUTPATIENT
Start: 2022-07-06 | End: 2022-07-06 | Stop reason: HOSPADM

## 2022-07-06 RX ORDER — MIDAZOLAM HYDROCHLORIDE 1 MG/ML
INJECTION INTRAMUSCULAR; INTRAVENOUS
Status: DISCONTINUED | OUTPATIENT
Start: 2022-07-06 | End: 2022-07-06 | Stop reason: HOSPADM

## 2022-07-06 RX ORDER — DEXAMETHASONE SODIUM PHOSPHATE 10 MG/ML
INJECTION INTRAMUSCULAR; INTRAVENOUS
Status: DISCONTINUED | OUTPATIENT
Start: 2022-07-06 | End: 2022-07-06 | Stop reason: HOSPADM

## 2022-07-06 RX ORDER — SODIUM CHLORIDE 9 MG/ML
INJECTION, SOLUTION INTRAMUSCULAR; INTRAVENOUS; SUBCUTANEOUS
Status: DISCONTINUED | OUTPATIENT
Start: 2022-07-06 | End: 2022-07-06 | Stop reason: HOSPADM

## 2022-07-06 RX ORDER — SODIUM CHLORIDE 9 MG/ML
500 INJECTION, SOLUTION INTRAVENOUS CONTINUOUS
Status: DISCONTINUED | OUTPATIENT
Start: 2022-07-06 | End: 2022-07-06 | Stop reason: HOSPADM

## 2022-07-06 RX ORDER — FENTANYL CITRATE 50 UG/ML
INJECTION, SOLUTION INTRAMUSCULAR; INTRAVENOUS
Status: DISCONTINUED | OUTPATIENT
Start: 2022-07-06 | End: 2022-07-06 | Stop reason: HOSPADM

## 2022-07-06 NOTE — DISCHARGE INSTRUCTIONS

## 2022-07-06 NOTE — DISCHARGE SUMMARY
Discharge Note  Short Stay      SUMMARY     Admit Date: 7/6/2022    Attending Physician: Cosme Kirkland      Discharge Physician: Cosme Kirkland      Discharge Date: 7/6/2022     Procedure(s) (LRB):  INJECTION, STEROID, EPIDURAL, C7-T1 IL  CONTRAST (N/A)    Final Diagnosis: Cervical radiculopathy [M54.12]  DDD (degenerative disc disease), cervical [M50.30]    Disposition: Home or self care    Patient Instructions:   Current Discharge Medication List        CONTINUE these medications which have NOT CHANGED    Details   amitriptyline (ELAVIL) 25 MG tablet TAKE 1 TABLET(25 MG) BY MOUTH EVERY NIGHT AS NEEDED FOR INSOMNIA OR PAIN  Qty: 30 tablet, Refills: 0    Comments: ZERO refills remain on this prescription. Your patient is requesting advance approval of refills for this medication to PREVENT ANY MISSED DOSES  Associated Diagnoses: Cervical radiculopathy; DDD (degenerative disc disease), cervical      ascorbic acid, vitamin C, (VITAMIN C) 100 MG tablet Take 100 mg by mouth once daily.      atenoloL-chlorthalidone (TENORETIC) 50-25 mg Tab Take 1/2 tablet by mouth once daily  Qty: 45 tablet, Refills: 3    Comments: Please inactivate all prior scripts with same name and strength including any scripts on hold.  Associated Diagnoses: Essential hypertension      azithromycin (ZITHROMAX Z-ROCAEL) 250 MG tablet Take 2 tabs on day one and 1 tab daily on days two to five.  Qty: 6 tablet, Refills: 0      celecoxib (CELEBREX) 100 MG capsule Take 1 capsule (100 mg total) by mouth 2 (two) times daily.  Qty: 60 capsule, Refills: 2    Associated Diagnoses: Cervical spondylosis without myelopathy      FLUoxetine 20 MG capsule TAKE 1 CAPSULE(20 MG) BY MOUTH EVERY DAY  Qty: 30 capsule, Refills: 2    Comments: ZERO refills remain on this prescription. Your patient is requesting advance approval of refills for this medication to PREVENT ANY MISSED DOSES  Associated Diagnoses: PMDD (premenstrual dysphoric disorder)      levonorgestrel  (MIRENA) 20 mcg/24 hr (5 years) IUD 1 Intra Uterine Device by Intrauterine route once. for 1 dose  Qty: 1 Intra Uterine Device, Refills: 0    Associated Diagnoses: Contraception, device intrauterine      loratadine (CLARITIN) 10 mg tablet Take 10 mg by mouth every morning.       metFORMIN (GLUCOPHAGE-XR) 500 MG ER 24hr tablet TAKE 1 TABLET(500 MG) BY MOUTH DAILY WITH BREAKFAST  Qty: 90 tablet, Refills: 3    Associated Diagnoses: Prediabetes      multivitamin capsule Take 1 capsule by mouth once daily.      potassium chloride SA (K-DUR,KLOR-CON) 10 MEQ tablet Take 1 tablet (10 mEq total) by mouth once daily.  Qty: 90 tablet, Refills: 3    Associated Diagnoses: Hypokalemia      pregabalin (LYRICA) 75 MG capsule Take 1 capsule (75 mg total) by mouth 2 (two) times daily.  Qty: 60 capsule, Refills: 2      tiZANidine (ZANAFLEX) 4 MG tablet Take 1 tablet (4 mg total) by mouth nightly as needed.  Qty: 30 tablet, Refills: 0    Associated Diagnoses: Other spondylosis, cervical region; Spondylosis without myelopathy or radiculopathy, cervical region      vitamin D (VITAMIN D3) 1000 units Tab Take 1,000 Units by mouth once daily.      vitamin E 100 UNIT capsule Take 100 Units by mouth once daily.                 Discharge Diagnosis: Cervical radiculopathy [M54.12]  DDD (degenerative disc disease), cervical [M50.30]  Condition on Discharge: Stable with no complications to procedure   Diet on Discharge: Same as before.  Activity: as per instruction sheet.  Discharge to: Home with a responsible adult.  Follow up: 2-4 weeks       Please call my office or pager at [phone] if experienced any weakness or loss of sensation, fever > 101.5, pain uncontrolled with oral medications, persistent nausea/vomiting/or diarrhea, redness or drainage from the incisions, or any other worrisome concerns. If physician on call was not reached or could not communicate with our office for any reason please go to the nearest emergency department         Cosme Kirkland MD  s

## 2022-07-06 NOTE — OP NOTE
Cervical Interlaminar Epidural Steroid Injection under Fluoroscopic Guidance    The procedure, risks, benefits, and options were discussed with the patient. There are no contraindications to the procedure. The patent expressed understanding and agreed to the procedure. Informed written consent was obtained prior to the start of the procedure and can be found in the patient's chart.     PATIENT NAME: Sheila Costa   MRN: [id number]     DATE OF PROCEDURE: 07/06/2022    PROCEDURE: Cervical Interlaminar Epidural Steroid Injection C7/T1 under Fluoroscopic Guidance    PRE-OP DIAGNOSIS: Cervical radiculopathy [M54.12]  DDD (degenerative disc disease), cervical [M50.30] Cervical radiculopathy [M54.12]    POST-OP DIAGNOSIS: Same    PHYSICIAN: Cosme Kirkland MD     ASSISTANTS: Kwabena    MEDICATIONS INJECTED: Preservative-free Decadron 10mg with 1cc of Lidocaine 1% MPF and preservative free normal saline    LOCAL ANESTHETIC INJECTED: Xylocaine 2%     SEDATION:   Versed 2mg and Fentanyl 50mcg                                                                                                                                                                                     Conscious sedation ordered by M.D. Patient re-evaluation prior to administration of conscious sedation. No changes noted in patient's status from initial evaluation. The patient's vital signs were monitored by RN and patient remained hemodynamically stable throughout the procedure.    Event Time In   Sedation Start 1510   Sedation End 1513       ESTIMATED BLOOD LOSS: None    COMPLICATIONS: None    TECHNIQUE: Time-out was performed to identify the patient and procedure to be performed. With the patient laying in a prone position, the surgical area was prepped and draped in the usual sterile fashion using ChloraPrep and a fenestrated drape. The level was determined under fluoroscopy guidance. Skin anesthesia was achieved by injecting Lidocaine 2%  over the injection site.  The interlaminar space was then approached with a 20 gauge, 3.5 inch Tuohy needle that was introduced under fluoroscopic guidance with AP, lateral and/or contralateral oblique imaging. Once the Ligamentum flavum was encountered loss of resistance to saline was used to enter the epidural space. With positive loss of resistance and negative aspiration for CSF or Blood, contrast dye  Omnipaque (240mg/mL) was injected to confirm placement and there was no vascular runoff. Then 10 mL of the medication mixture listed above was then injected slowly. Displacement of the radio opaque contrast after injection of the medication confirmed that the medication went into the area of the epidural space. The needles were removed, and bleeding was nil. A sterile dressing was applied. No specimens collected. The patient tolerated the procedure well.       The patient was monitored after the procedure in the recovery area. They were given post-procedure and discharge instructions to follow at home. The patient was discharged in a stable condition.    I reviewed and edited the fellow's note. I conducted my own interview and physical examination. I agree with the findings. I was present and supervising all critical portions of the procedure.      Cosme Kirkland MD

## 2022-07-07 ENCOUNTER — PATIENT MESSAGE (OUTPATIENT)
Dept: PAIN MEDICINE | Facility: OTHER | Age: 52
End: 2022-07-07
Payer: COMMERCIAL

## 2022-07-20 ENCOUNTER — PATIENT MESSAGE (OUTPATIENT)
Dept: PAIN MEDICINE | Facility: CLINIC | Age: 52
End: 2022-07-20
Payer: COMMERCIAL

## 2022-07-21 ENCOUNTER — TELEPHONE (OUTPATIENT)
Dept: PAIN MEDICINE | Facility: CLINIC | Age: 52
End: 2022-07-21
Payer: COMMERCIAL

## 2022-07-21 NOTE — TELEPHONE ENCOUNTER
Staff lvm in regards to rescheduling patient appt as requested from 7/21/22, but there was no answer. Staff informed patient appt can be rescheduled through my [facility] portal as well.

## 2022-07-21 NOTE — TELEPHONE ENCOUNTER
----- Message from Radha Lacy sent at 7/21/2022 12:31 PM CDT -----  Regarding: reschedule  Name of Who is Calling: Sheila           What is the request in detail: Patient is requesting a call back to reschedule her virtual.  [place]. She said she need an early morning or late afternoon before September.           Can the clinic reply by [facility]: No           What Number to Call Back if not in [facility]: [phone]

## 2022-08-10 ENCOUNTER — HOSPITAL ENCOUNTER (OUTPATIENT)
Dept: RADIOLOGY | Facility: OTHER | Age: 52
Discharge: HOME | End: 2022-08-10
Attending: OBSTETRICS & GYNECOLOGY
Payer: COMMERCIAL

## 2022-08-10 DIAGNOSIS — Z12.31 ENCOUNTER FOR SCREENING MAMMOGRAM FOR MALIGNANT NEOPLASM OF BREAST: ICD-10-CM

## 2022-08-10 PROCEDURE — 77067 SCR MAMMO BI INCL CAD: CPT | Mod: 26,,, | Performed by: RADIOLOGY

## 2022-08-10 PROCEDURE — 77063 BREAST TOMOSYNTHESIS BI: CPT | Mod: TC

## 2022-08-10 PROCEDURE — 77067 MAMMO DIGITAL SCREENING BILAT WITH TOMO: ICD-10-PCS | Mod: 26,,, | Performed by: RADIOLOGY

## 2022-08-10 PROCEDURE — 77067 SCR MAMMO BI INCL CAD: CPT | Mod: TC

## 2022-08-10 PROCEDURE — 77063 MAMMO DIGITAL SCREENING BILAT WITH TOMO: ICD-10-PCS | Mod: 26,,, | Performed by: RADIOLOGY

## 2022-08-10 PROCEDURE — 77063 BREAST TOMOSYNTHESIS BI: CPT | Mod: 26,,, | Performed by: RADIOLOGY

## 2022-08-11 ENCOUNTER — PATIENT MESSAGE (OUTPATIENT)
Dept: PAIN MEDICINE | Facility: CLINIC | Age: 52
End: 2022-08-11
Payer: COMMERCIAL

## 2022-08-11 ENCOUNTER — TELEPHONE (OUTPATIENT)
Dept: PAIN MEDICINE | Facility: CLINIC | Age: 52
End: 2022-08-11
Payer: COMMERCIAL

## 2022-08-24 ENCOUNTER — OFFICE VISIT (OUTPATIENT)
Dept: HEMATOLOGY/ONCOLOGY | Facility: CLINIC | Age: 52
End: 2022-08-24
Payer: COMMERCIAL

## 2022-08-24 ENCOUNTER — LAB VISIT (OUTPATIENT)
Dept: LAB | Facility: OTHER | Age: 52
End: 2022-08-24
Attending: STUDENT IN AN ORGANIZED HEALTH CARE EDUCATION/TRAINING PROGRAM
Payer: COMMERCIAL

## 2022-08-24 VITALS
DIASTOLIC BLOOD PRESSURE: 75 MMHG | SYSTOLIC BLOOD PRESSURE: 136 MMHG | RESPIRATION RATE: 18 BRPM | WEIGHT: 168.19 LBS | HEIGHT: 61 IN | HEART RATE: 59 BPM | OXYGEN SATURATION: 100 % | BODY MASS INDEX: 31.75 KG/M2 | TEMPERATURE: 98 F

## 2022-08-24 DIAGNOSIS — D56.3 ALPHA THALASSEMIA TRAIT: ICD-10-CM

## 2022-08-24 DIAGNOSIS — D50.9 MICROCYTIC ANEMIA: ICD-10-CM

## 2022-08-24 DIAGNOSIS — D37.4 VILLOUS ADENOMA OF COLON: ICD-10-CM

## 2022-08-24 DIAGNOSIS — I10 ESSENTIAL HYPERTENSION: ICD-10-CM

## 2022-08-24 DIAGNOSIS — D56.3 ALPHA THALASSEMIA TRAIT: Primary | ICD-10-CM

## 2022-08-24 DIAGNOSIS — R53.82 CHRONIC FATIGUE: ICD-10-CM

## 2022-08-24 DIAGNOSIS — K76.0 FATTY LIVER: ICD-10-CM

## 2022-08-24 LAB
BASOPHILS # BLD AUTO: 0.05 K/UL (ref 0–0.2)
BASOPHILS NFR BLD: 0.6 % (ref 0–1.9)
DIFFERENTIAL METHOD: ABNORMAL
EOSINOPHIL # BLD AUTO: 0.1 K/UL (ref 0–0.5)
EOSINOPHIL NFR BLD: 1.1 % (ref 0–8)
ERYTHROCYTE [DISTWIDTH] IN BLOOD BY AUTOMATED COUNT: 14.7 % (ref 11.5–14.5)
FERRITIN SERPL-MCNC: 171 NG/ML (ref 20–300)
HCT VFR BLD AUTO: 38.7 % (ref 37–48.5)
HGB BLD-MCNC: 12 G/DL (ref 12–16)
IMM GRANULOCYTES # BLD AUTO: 0.02 K/UL (ref 0–0.04)
IMM GRANULOCYTES NFR BLD AUTO: 0.2 % (ref 0–0.5)
LYMPHOCYTES # BLD AUTO: 2.8 K/UL (ref 1–4.8)
LYMPHOCYTES NFR BLD: 31.7 % (ref 18–48)
MCH RBC QN AUTO: 23.4 PG (ref 27–31)
MCHC RBC AUTO-ENTMCNC: 31 G/DL (ref 32–36)
MCV RBC AUTO: 76 FL (ref 82–98)
MONOCYTES # BLD AUTO: 0.7 K/UL (ref 0.3–1)
MONOCYTES NFR BLD: 7.9 % (ref 4–15)
NEUTROPHILS # BLD AUTO: 5.2 K/UL (ref 1.8–7.7)
NEUTROPHILS NFR BLD: 58.5 % (ref 38–73)
NRBC BLD-RTO: 0 /100 WBC
PLATELET # BLD AUTO: 408 K/UL (ref 150–450)
PMV BLD AUTO: 9.1 FL (ref 9.2–12.9)
RBC # BLD AUTO: 5.12 M/UL (ref 4–5.4)
WBC # BLD AUTO: 8.96 K/UL (ref 3.9–12.7)

## 2022-08-24 PROCEDURE — 99213 OFFICE O/P EST LOW 20 MIN: CPT | Mod: S$GLB,,, | Performed by: STUDENT IN AN ORGANIZED HEALTH CARE EDUCATION/TRAINING PROGRAM

## 2022-08-24 PROCEDURE — 1159F PR MEDICATION LIST DOCUMENTED IN MEDICAL RECORD: ICD-10-PCS | Mod: CPTII,S$GLB,, | Performed by: STUDENT IN AN ORGANIZED HEALTH CARE EDUCATION/TRAINING PROGRAM

## 2022-08-24 PROCEDURE — 3078F DIAST BP <80 MM HG: CPT | Mod: CPTII,S$GLB,, | Performed by: STUDENT IN AN ORGANIZED HEALTH CARE EDUCATION/TRAINING PROGRAM

## 2022-08-24 PROCEDURE — 85025 COMPLETE CBC W/AUTO DIFF WBC: CPT | Performed by: STUDENT IN AN ORGANIZED HEALTH CARE EDUCATION/TRAINING PROGRAM

## 2022-08-24 PROCEDURE — 3078F PR MOST RECENT DIASTOLIC BLOOD PRESSURE < 80 MM HG: ICD-10-PCS | Mod: CPTII,S$GLB,, | Performed by: STUDENT IN AN ORGANIZED HEALTH CARE EDUCATION/TRAINING PROGRAM

## 2022-08-24 PROCEDURE — 3044F HG A1C LEVEL LT 7.0%: CPT | Mod: CPTII,S$GLB,, | Performed by: STUDENT IN AN ORGANIZED HEALTH CARE EDUCATION/TRAINING PROGRAM

## 2022-08-24 PROCEDURE — 3075F SYST BP GE 130 - 139MM HG: CPT | Mod: CPTII,S$GLB,, | Performed by: STUDENT IN AN ORGANIZED HEALTH CARE EDUCATION/TRAINING PROGRAM

## 2022-08-24 PROCEDURE — 1159F MED LIST DOCD IN RCRD: CPT | Mod: CPTII,S$GLB,, | Performed by: STUDENT IN AN ORGANIZED HEALTH CARE EDUCATION/TRAINING PROGRAM

## 2022-08-24 PROCEDURE — 99999 PR PBB SHADOW E&M-EST. PATIENT-LVL V: ICD-10-PCS | Mod: PBBFAC,,, | Performed by: STUDENT IN AN ORGANIZED HEALTH CARE EDUCATION/TRAINING PROGRAM

## 2022-08-24 PROCEDURE — 36415 COLL VENOUS BLD VENIPUNCTURE: CPT | Performed by: STUDENT IN AN ORGANIZED HEALTH CARE EDUCATION/TRAINING PROGRAM

## 2022-08-24 PROCEDURE — 3044F PR MOST RECENT HEMOGLOBIN A1C LEVEL <7.0%: ICD-10-PCS | Mod: CPTII,S$GLB,, | Performed by: STUDENT IN AN ORGANIZED HEALTH CARE EDUCATION/TRAINING PROGRAM

## 2022-08-24 PROCEDURE — 99213 PR OFFICE/OUTPT VISIT, EST, LEVL III, 20-29 MIN: ICD-10-PCS | Mod: S$GLB,,, | Performed by: STUDENT IN AN ORGANIZED HEALTH CARE EDUCATION/TRAINING PROGRAM

## 2022-08-24 PROCEDURE — 82728 ASSAY OF FERRITIN: CPT | Performed by: STUDENT IN AN ORGANIZED HEALTH CARE EDUCATION/TRAINING PROGRAM

## 2022-08-24 PROCEDURE — 99999 PR PBB SHADOW E&M-EST. PATIENT-LVL V: CPT | Mod: PBBFAC,,, | Performed by: STUDENT IN AN ORGANIZED HEALTH CARE EDUCATION/TRAINING PROGRAM

## 2022-08-24 PROCEDURE — 3008F BODY MASS INDEX DOCD: CPT | Mod: CPTII,S$GLB,, | Performed by: STUDENT IN AN ORGANIZED HEALTH CARE EDUCATION/TRAINING PROGRAM

## 2022-08-24 PROCEDURE — 3008F PR BODY MASS INDEX (BMI) DOCUMENTED: ICD-10-PCS | Mod: CPTII,S$GLB,, | Performed by: STUDENT IN AN ORGANIZED HEALTH CARE EDUCATION/TRAINING PROGRAM

## 2022-08-24 PROCEDURE — 3075F PR MOST RECENT SYSTOLIC BLOOD PRESS GE 130-139MM HG: ICD-10-PCS | Mod: CPTII,S$GLB,, | Performed by: STUDENT IN AN ORGANIZED HEALTH CARE EDUCATION/TRAINING PROGRAM

## 2022-08-24 NOTE — PROGRESS NOTES
Hematology- Oncology Clinic Note :        8/24/2022    RFV / chief complaint- Anemia        HPI  Pt is a 51 y.o. female who  has a past medical history of Alpha thalassemia, Cervical spondylosis (12/30/2019), Colon polyp, Fatty liver, Hypertension, and Premenstrual symptom.   Pt presents to the clinic today for evaluation of anemia and alpha thal trait    Pt reports to be doing well. No new complains.   Continues to be fatigued.     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   Her mother also had anemia. She doesnot recall hx of sickle cell or thalassemia in other family members.   MGM with cancer in LNs. Maternal uncle with prostate cancer. Another maternal uncle with cancer to LNs.   Brother with HTN.   She doesn't smoke. Drinks a few times a week, no binge drinking.       Reviewed past medical/surgical/social history    Past Medical History:   Diagnosis Date    Alpha thalassemia     Cervical spondylosis 12/30/2019    Colon polyp     Fatty liver     Hypertension     Premenstrual symptom       Past Surgical History:   Procedure Laterality Date    COLONOSCOPY N/A 6/18/2020    Procedure: COLONOSCOPY;  Surgeon: Eliot Hill MD;  Location: [address]);  Service: Endoscopy;  Laterality: N/A;  COVID screening on 6/16/20-[facility]    COLONOSCOPY N/A 7/6/2020    Procedure: COLONOSCOPY;  Surgeon: Kang Guzmán MD;  Location: [facility] OR [address] [address];  Service: Colon and Rectal;  Laterality: N/A;    COLONOSCOPY N/A 7/9/2021    Procedure: COLONOSCOPY;  Surgeon: GREGG Guzmán MD;  Location: [facility] ([address] [place] [address]);  Service: Endoscopy;  Laterality: N/A;  in July 2021  covid test [place] 7/6    EPIDURAL STEROID INJECTION N/A 10/2/2019    Procedure: INJECTION, STEROID, EPIDURAL, C7-T1;  Surgeon: Cosme Kirkland MD;  Location: [facility];  Service: Pain Management;  Laterality: N/A;    EPIDURAL STEROID INJECTION N/A 12/22/2021    Procedure: INJECTION, STEROID, EPIDURAL, C7-T1 NEED CONSENT;  Surgeon:  Cosme Kirkland MD;  Location: [facility];  Service: Pain Management;  Laterality: N/A;    EPIDURAL STEROID INJECTION N/A 7/6/2022    Procedure: INJECTION, STEROID, EPIDURAL, C7-T1 IL  CONTRAST;  Surgeon: Cosme Kirkland MD;  Location: [facility];  Service: Pain Management;  Laterality: N/A;    EYE SURGERY      INJECTION OF ANESTHETIC AGENT AROUND NERVE Bilateral 1/8/2020    Procedure: BLOCK, NERVE C4,5,6;  Surgeon: Cosme Kirkland MD;  Location: [facility];  Service: Pain Management;  Laterality: Bilateral;  B/L MBB C4,5,6    INJECTION OF ANESTHETIC AGENT AROUND NERVE Bilateral 1/29/2020    Procedure: BLOCK, NERVE, Repeat C4-C5-C6 MEDIAL BRANCH;  Surgeon: Cosme Kirkland MD;  Location: [facility];  Service: Pain Management;  Laterality: Bilateral;    INJECTION OF JOINT Left 5/18/2022    Procedure: INJECTION, JOINT, LEFT SI and GTB *LORI PT*;  Surgeon: Jed Phlilips MD;  Location: [facility];  Service: Pain Management;  Laterality: Left;    LAPAROSCOPIC SURGICAL REMOVAL OF CECUM N/A 7/6/2020    Procedure: EXCISION, CECUM, LAPAROSCOPIC, colonoscopy to start, ERAS low;  Surgeon: Kang Guzmán MD;  Location: [facility] OR [address] [place] [address];  Service: Colon and Rectal;  Laterality: N/A;    RADIOFREQUENCY ABLATION Left 3/4/2020    Procedure: RADIOFREQUENCY ABLATION, C4-C5-C6 ME DIAL BRANCH 1 OF 2 need consent;  Surgeon: Cosme Kirkland MD;  Location: [facility];  Service: Pain Management;  Laterality: Left;    RADIOFREQUENCY ABLATION Right 6/3/2020    Procedure: RADIOFREQUENCY ABLATION, C4-C5-C6 MEDIAL BRANCH 2 OF 2 need consent;  Surgeon: Cosme Kirkland MD;  Location: [facility];  Service: Pain Management;  Laterality: Right;    RADIOFREQUENCY ABLATION Right 3/23/2022    Procedure: Radiofrequency Ablation RIGHT C4,5,6;  Surgeon: Cosme Kirkland MD;  Location: [facility];  Service: Pain Management;  Laterality: Right;    RADIOFREQUENCY ABLATION Left 4/6/2022     Procedure: Radiofrequency Ablation LEFT C4,5,6;  Surgeon: Cosme Kirkland MD;  Location: [facility];  Service: Pain Management;  Laterality: Left;    REFRACTIVE SURGERY  2008    SMALL INTESTINE SURGERY  7/6/20      (Not in a hospital admission)    Review of patient's allergies indicates:   Allergen Reactions    Amoxicillin Hives      Social History     Tobacco Use    Smoking status: Never Smoker    Smokeless tobacco: Never Used   Substance Use Topics    Alcohol use: Yes     Alcohol/week: 1.0 standard drink     Types: 1 Standard drinks or equivalent per week     Comment: 3 drinks weekly       Family History   Problem Relation Age of Onset    Hypertension Mother     Thyroid disease Mother     Rheum arthritis Mother     Hearing loss Mother     Heart disease Father         Pacemaker    Cancer Maternal Grandmother     Cataracts Maternal Grandmother     Diabetes Maternal Grandmother     Hypertension Maternal Grandmother     Thyroid disease Maternal Grandmother     Breast cancer Maternal Grandmother     Hearing loss Maternal Grandmother     Cataracts Maternal Grandfather     Diabetes Maternal Grandfather     Hypertension Maternal Grandfather     Thyroid disease Maternal Grandfather     Lupus Cousin     Cancer Maternal Uncle     No Known Problems Paternal Grandmother     No Known Problems Paternal Grandfather     Amblyopia Neg Hx     Blindness Neg Hx     Glaucoma Neg Hx     Macular degeneration Neg Hx     Retinal detachment Neg Hx     Strabismus Neg Hx     Stroke Neg Hx     Ovarian cancer Neg Hx     Colon cancer Neg Hx     Cirrhosis Neg Hx           Review of Systems :  Review of Systems   Constitutional: Positive for malaise/fatigue. Negative for chills, diaphoresis, fever and weight loss.   HENT: Negative.  Negative for congestion, hearing loss, nosebleeds, sore throat and tinnitus.    Eyes: Negative.  Negative for blurred vision and discharge.   Respiratory: Negative for cough,  "hemoptysis, sputum production, shortness of breath and wheezing.    Cardiovascular: Negative.  Negative for chest pain, palpitations and leg swelling.   Gastrointestinal: Negative.  Negative for abdominal pain, blood in stool, constipation, diarrhea, heartburn, melena, nausea and vomiting.   Genitourinary: Negative.    Musculoskeletal: Negative.  Negative for back pain, falls, joint pain and myalgias.   Skin: Negative.  Negative for itching and rash.   Neurological: Negative.  Negative for dizziness, tingling, sensory change, speech change, focal weakness, seizures, loss of consciousness, weakness and headaches.   Endo/Heme/Allergies: Negative.  Does not bruise/bleed easily.   Psychiatric/Behavioral: Negative.  Negative for depression. The patient is not nervous/anxious and does not have insomnia.                Physical Exam :  /75 (BP Location: Right arm, Patient Position: Sitting, BP Method: Medium (Automatic))   Pulse (!) 59   Temp 98.4 °F (36.9 °C) (Oral)   Resp 18   Ht 5' 1" (1.549 m)   Wt 76.3 kg (168 lb 3.4 oz)   SpO2 100%   BMI 31.78 kg/m²   Wt Readings from Last 3 Encounters:   08/24/22 76.3 kg (168 lb 3.4 oz)   07/06/22 72.6 kg (160 lb)   05/18/22 72.6 kg (160 lb)       Body mass index is 31.78 kg/m².      Physical Exam  Constitutional:       General: She is not in acute distress.     Appearance: Normal appearance. She is not ill-appearing.      Comments: pleasant   HENT:      Head: Normocephalic and atraumatic.      Right Ear: External ear normal.      Left Ear: External ear normal.   Eyes:      General: No scleral icterus.  Pulmonary:      Effort: Pulmonary effort is normal.   Skin:     Coloration: Skin is not jaundiced.   Neurological:      Mental Status: She is alert and oriented to person, place, and time.   Psychiatric:         Mood and Affect: Mood normal.         Speech: Speech normal.         Behavior: Behavior normal.             Current Outpatient Medications   Medication Sig " Dispense Refill    ascorbic acid, vitamin C, (VITAMIN C) 100 MG tablet Take 100 mg by mouth once daily.      atenoloL-chlorthalidone (TENORETIC) 50-25 mg Tab TAKE 1/2 TABLET BY MOUTH ONCE EVERY DAY 45 tablet 3    celecoxib (CELEBREX) 100 MG capsule Take 1 capsule (100 mg total) by mouth 2 (two) times daily. 60 capsule 2    FLUoxetine 20 MG capsule TAKE 1 CAPSULE(20 MG) BY MOUTH EVERY DAY 30 capsule 2    loratadine (CLARITIN) 10 mg tablet Take 10 mg by mouth every morning.       multivitamin capsule Take 1 capsule by mouth once daily.      potassium chloride SA (K-DUR,KLOR-CON) 10 MEQ tablet Take 1 tablet (10 mEq total) by mouth once daily. 90 tablet 3    tiZANidine (ZANAFLEX) 4 MG tablet Take 1 tablet (4 mg total) by mouth nightly as needed. 30 tablet 0    vitamin D (VITAMIN D3) 1000 units Tab Take 1,000 Units by mouth once daily.      vitamin E 100 UNIT capsule Take 100 Units by mouth once daily.      amitriptyline (ELAVIL) 25 MG tablet TAKE 1 TABLET(25 MG) BY MOUTH EVERY NIGHT AS NEEDED FOR INSOMNIA OR PAIN (Patient not taking: Reported on 8/24/2022) 30 tablet 0    azithromycin (ZITHROMAX Z-ROCAEL) 250 MG tablet Take 2 tabs on day one and 1 tab daily on days two to five. 6 tablet 0    levonorgestrel (MIRENA) 20 mcg/24 hr (5 years) IUD 1 Intra Uterine Device by Intrauterine route once. for 1 dose 1 Intra Uterine Device 0    metFORMIN (GLUCOPHAGE-XR) 500 MG ER 24hr tablet TAKE 1 TABLET(500 MG) BY MOUTH DAILY WITH BREAKFAST (Patient not taking: Reported on 8/24/2022) 90 tablet 3    pregabalin (LYRICA) 75 MG capsule Take 1 capsule (75 mg total) by mouth 2 (two) times daily. (Patient not taking: Reported on 8/24/2022) 60 capsule 2     No current facility-administered medications for this visit.       Pertinent Diagnostic studies:      Lab Visit on 08/24/2022   Component Date Value Ref Range Status    WBC 08/24/2022 8.96  3.90 - 12.70 K/uL Final    RBC 08/24/2022 5.12  4.00 - 5.40 M/uL Final    Hemoglobin  08/24/2022 12.0  12.0 - 16.0 g/dL Final    Hematocrit 08/24/2022 38.7  37.0 - 48.5 % Final    MCV 08/24/2022 76 (A) 82 - 98 fL Final    MCH 08/24/2022 23.4 (A) 27.0 - 31.0 pg Final    MCHC 08/24/2022 31.0 (A) 32.0 - 36.0 g/dL Final    RDW 08/24/2022 14.7 (A) 11.5 - 14.5 % Final    Platelets 08/24/2022 408  150 - 450 K/uL Final    MPV 08/24/2022 9.1 (A) 9.2 - 12.9 fL Final    Immature Granulocytes 08/24/2022 0.2  0.0 - 0.5 % Final    Gran # (ANC) 08/24/2022 5.2  1.8 - 7.7 K/uL Final    Immature Grans (Abs) 08/24/2022 0.02  0.00 - 0.04 K/uL Final    Comment: Mild elevation in immature granulocytes is non specific and   can be seen in a variety of conditions including stress response,   acute inflammation, trauma and pregnancy. Correlation with other   laboratory and clinical findings is essential.      Lymph # 08/24/2022 2.8  1.0 - 4.8 K/uL Final    Mono # 08/24/2022 0.7  0.3 - 1.0 K/uL Final    Eos # 08/24/2022 0.1  0.0 - 0.5 K/uL Final    Baso # 08/24/2022 0.05  0.00 - 0.20 K/uL Final    nRBC 08/24/2022 0  0 /100 WBC Final    Gran % 08/24/2022 58.5  38.0 - 73.0 % Final    Lymph % 08/24/2022 31.7  18.0 - 48.0 % Final    Mono % 08/24/2022 7.9  4.0 - 15.0 % Final    Eosinophil % 08/24/2022 1.1  0.0 - 8.0 % Final    Basophil % 08/24/2022 0.6  0.0 - 1.9 % Final    Differential Method 08/24/2022 Automated   Final         Patient Active Problem List    Diagnosis Date Noted    Alpha thalassemia trait 01/17/2021    Right upper quadrant abdominal pain 01/17/2021    Microcytic anemia 01/17/2021    Chronic fatigue 01/17/2021    History of colon polyps 07/09/2021    Villous adenoma of colon 03/14/2021    Fatty liver 03/14/2021    Thrombocytosis 01/17/2021    Cecal polyp 07/06/2020    Colon cancer screening 06/18/2020    Cervical spondylosis 12/30/2019    Chronic pain 10/02/2019    Cervical pain (neck) 03/06/2019    Upper extremity pain 03/06/2019    Poor posture 03/06/2019    Travel advice  encounter 02/05/2018    Overweight 01/07/2015    Sinus congestion 01/07/2015    Premenstrual symptom     Essential hypertension 12/23/2013     Active Problem List with Overview Notes    Diagnosis Date Noted    Alpha thalassemia trait 01/17/2021    Right upper quadrant abdominal pain 01/17/2021    Microcytic anemia 01/17/2021    Chronic fatigue 01/17/2021    History of colon polyps 07/09/2021    Villous adenoma of colon 03/14/2021    Fatty liver 03/14/2021    Thrombocytosis 01/17/2021    Cecal polyp 07/06/2020    Colon cancer screening 06/18/2020    Cervical spondylosis 12/30/2019    Chronic pain 10/02/2019    Cervical pain (neck) 03/06/2019    Upper extremity pain 03/06/2019    Poor posture 03/06/2019    Travel advice encounter 02/05/2018    Overweight 01/07/2015    Sinus congestion 01/07/2015    Premenstrual symptom     Essential hypertension 12/23/2013         Oncology History    No history exists.     Assessment :   ECOG 1    1. Alpha thalassemia trait    2. Villous adenoma of colon    3. Chronic fatigue    4. Microcytic anemia    5. Fatty liver    6. 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  Pt has been educated about the diagnosis. She has completed genetic testing and counseling.   Her CBC findings of mild microcytic anemia are due to alpha thal minor and it usually does not cause any other major issues clinically .  It does not need require any treatment or intervention at this time.   No co inheritance of beta thal or sickle cell.   Advised patient to avoid iron supplements unless advised by hematologist.   Folate levels- normal. May need folic acid supplementation during times of stress.     Microcytic anemia  Likely due to alpha thal minor/ trait  3/2021- Tsat 17 , ferritin >100. Ferritin from  this is pending. Hb wnl.     Chronic fatigue  Extensive work up was done by pcp in 9/2020  With hx of snoring , treatment of HTN , chronic fatigue- sleep study advised   Continue f/u pcp     Fatty liver  US RUQ Jan 2021 - fatty liver  Per hepatology and pcp     Villous adenoma 2020  C scope 7/2021- One 12 mm polyp in the sigmoid colon, removed with a hot snare.   repeat in 3 years    HTN- on meds, per pcp   High on presentation. Repeat after 15 mins of relaxation wnl.         No problem-specific Assessment & Plan notes found for this encounter.      Problem List Items Addressed This Visit     Alpha thalassemia trait - Primary    Chronic fatigue    Essential hypertension    Fatty liver    Microcytic anemia    Relevant Orders    Vitamin B12    Methylmalonic Acid, Serum    Iron and TIBC    Ferritin    CBC Auto Differential    Reticulocytes    Comprehensive Metabolic Panel    Villous adenoma of colon            Electronically signed by Gabby Venegas        RTC 1 year with cbc and ferritin prior  Future Appointments   Date Time Provider Department Center   9/14/2022  8:00 AM Jose Moore MD [facility]  [facility]           This note was created with voice recognition software.  Grammatical, syntax and spelling errors may be inevitable.

## 2022-09-14 ENCOUNTER — LAB VISIT (OUTPATIENT)
Dept: LAB | Facility: HOSPITAL | Age: 52
End: 2022-09-14
Attending: STUDENT IN AN ORGANIZED HEALTH CARE EDUCATION/TRAINING PROGRAM
Payer: COMMERCIAL

## 2022-09-14 ENCOUNTER — OFFICE VISIT (OUTPATIENT)
Dept: INTERNAL MEDICINE | Facility: CLINIC | Age: 52
End: 2022-09-14
Payer: COMMERCIAL

## 2022-09-14 VITALS
OXYGEN SATURATION: 99 % | WEIGHT: 169.19 LBS | HEART RATE: 60 BPM | DIASTOLIC BLOOD PRESSURE: 80 MMHG | SYSTOLIC BLOOD PRESSURE: 120 MMHG | HEIGHT: 61 IN | BODY MASS INDEX: 31.94 KG/M2

## 2022-09-14 DIAGNOSIS — K76.0 FATTY LIVER: ICD-10-CM

## 2022-09-14 DIAGNOSIS — R73.03 PRE-DIABETES: ICD-10-CM

## 2022-09-14 DIAGNOSIS — M47.812 CERVICAL SPONDYLOSIS: ICD-10-CM

## 2022-09-14 DIAGNOSIS — R53.82 CHRONIC FATIGUE: ICD-10-CM

## 2022-09-14 DIAGNOSIS — D75.839 THROMBOCYTOSIS: ICD-10-CM

## 2022-09-14 DIAGNOSIS — D50.9 MICROCYTIC ANEMIA: ICD-10-CM

## 2022-09-14 DIAGNOSIS — I10 ESSENTIAL HYPERTENSION: ICD-10-CM

## 2022-09-14 DIAGNOSIS — E66.09 CLASS 1 OBESITY DUE TO EXCESS CALORIES WITH SERIOUS COMORBIDITY AND BODY MASS INDEX (BMI) OF 32.0 TO 32.9 IN ADULT: ICD-10-CM

## 2022-09-14 DIAGNOSIS — D56.0 ALPHA THALASSEMIA: ICD-10-CM

## 2022-09-14 DIAGNOSIS — Z00.00 VISIT FOR ANNUAL HEALTH EXAMINATION: ICD-10-CM

## 2022-09-14 DIAGNOSIS — Z86.010 HISTORY OF COLON POLYPS: ICD-10-CM

## 2022-09-14 DIAGNOSIS — F32.81 PMDD (PREMENSTRUAL DYSPHORIC DISORDER): ICD-10-CM

## 2022-09-14 DIAGNOSIS — Z00.00 VISIT FOR ANNUAL HEALTH EXAMINATION: Primary | ICD-10-CM

## 2022-09-14 LAB
ALBUMIN SERPL BCP-MCNC: 3.5 G/DL (ref 3.5–5.2)
ALP SERPL-CCNC: 55 U/L (ref 55–135)
ALT SERPL W/O P-5'-P-CCNC: 18 U/L (ref 10–44)
ANION GAP SERPL CALC-SCNC: 7 MMOL/L (ref 8–16)
AST SERPL-CCNC: 17 U/L (ref 10–40)
BILIRUB SERPL-MCNC: 0.4 MG/DL (ref 0.1–1)
BUN SERPL-MCNC: 12 MG/DL (ref 6–20)
CALCIUM SERPL-MCNC: 9.6 MG/DL (ref 8.7–10.5)
CHLORIDE SERPL-SCNC: 100 MMOL/L (ref 95–110)
CHOLEST SERPL-MCNC: 192 MG/DL (ref 120–199)
CHOLEST/HDLC SERPL: 3.8 {RATIO} (ref 2–5)
CO2 SERPL-SCNC: 29 MMOL/L (ref 23–29)
CREAT SERPL-MCNC: 0.7 MG/DL (ref 0.5–1.4)
EST. GFR  (NO RACE VARIABLE): >60 ML/MIN/1.73 M^2
ESTIMATED AVG GLUCOSE: 128 MG/DL (ref 68–131)
GLUCOSE SERPL-MCNC: 106 MG/DL (ref 70–110)
HBA1C MFR BLD: 6.1 % (ref 4–5.6)
HDLC SERPL-MCNC: 50 MG/DL (ref 40–75)
HDLC SERPL: 26 % (ref 20–50)
IRON SERPL-MCNC: 67 UG/DL (ref 30–160)
LDLC SERPL CALC-MCNC: 113.8 MG/DL (ref 63–159)
NONHDLC SERPL-MCNC: 142 MG/DL
POTASSIUM SERPL-SCNC: 3.7 MMOL/L (ref 3.5–5.1)
PROT SERPL-MCNC: 6.7 G/DL (ref 6–8.4)
RETICS/RBC NFR AUTO: 1.6 % (ref 0.5–2.5)
SATURATED IRON: 18 % (ref 20–50)
SODIUM SERPL-SCNC: 136 MMOL/L (ref 136–145)
TOTAL IRON BINDING CAPACITY: 363 UG/DL (ref 250–450)
TRANSFERRIN SERPL-MCNC: 245 MG/DL (ref 200–375)
TRIGL SERPL-MCNC: 141 MG/DL (ref 30–150)
TSH SERPL DL<=0.005 MIU/L-ACNC: 3.47 UIU/ML (ref 0.4–4)
VIT B12 SERPL-MCNC: 769 PG/ML (ref 210–950)

## 2022-09-14 PROCEDURE — 3074F PR MOST RECENT SYSTOLIC BLOOD PRESSURE < 130 MM HG: ICD-10-PCS | Mod: CPTII,S$GLB,, | Performed by: INTERNAL MEDICINE

## 2022-09-14 PROCEDURE — 83921 ORGANIC ACID SINGLE QUANT: CPT | Performed by: STUDENT IN AN ORGANIZED HEALTH CARE EDUCATION/TRAINING PROGRAM

## 2022-09-14 PROCEDURE — 99396 PREV VISIT EST AGE 40-64: CPT | Mod: 25,S$GLB,, | Performed by: INTERNAL MEDICINE

## 2022-09-14 PROCEDURE — 90686 FLU VACCINE (QUAD) GREATER THAN OR EQUAL TO 3YO PRESERVATIVE FREE IM: ICD-10-PCS | Mod: S$GLB,,, | Performed by: INTERNAL MEDICINE

## 2022-09-14 PROCEDURE — 3079F PR MOST RECENT DIASTOLIC BLOOD PRESSURE 80-89 MM HG: ICD-10-PCS | Mod: CPTII,S$GLB,, | Performed by: INTERNAL MEDICINE

## 2022-09-14 PROCEDURE — 90471 FLU VACCINE (QUAD) GREATER THAN OR EQUAL TO 3YO PRESERVATIVE FREE IM: ICD-10-PCS | Mod: S$GLB,,, | Performed by: INTERNAL MEDICINE

## 2022-09-14 PROCEDURE — 3074F SYST BP LT 130 MM HG: CPT | Mod: CPTII,S$GLB,, | Performed by: INTERNAL MEDICINE

## 2022-09-14 PROCEDURE — 3044F PR MOST RECENT HEMOGLOBIN A1C LEVEL <7.0%: ICD-10-PCS | Mod: CPTII,S$GLB,, | Performed by: INTERNAL MEDICINE

## 2022-09-14 PROCEDURE — 85045 AUTOMATED RETICULOCYTE COUNT: CPT | Performed by: STUDENT IN AN ORGANIZED HEALTH CARE EDUCATION/TRAINING PROGRAM

## 2022-09-14 PROCEDURE — 3079F DIAST BP 80-89 MM HG: CPT | Mod: CPTII,S$GLB,, | Performed by: INTERNAL MEDICINE

## 2022-09-14 PROCEDURE — 3044F HG A1C LEVEL LT 7.0%: CPT | Mod: CPTII,S$GLB,, | Performed by: INTERNAL MEDICINE

## 2022-09-14 PROCEDURE — 3008F BODY MASS INDEX DOCD: CPT | Mod: CPTII,S$GLB,, | Performed by: INTERNAL MEDICINE

## 2022-09-14 PROCEDURE — 83036 HEMOGLOBIN GLYCOSYLATED A1C: CPT | Performed by: INTERNAL MEDICINE

## 2022-09-14 PROCEDURE — 99999 PR PBB SHADOW E&M-EST. PATIENT-LVL V: CPT | Mod: PBBFAC,,, | Performed by: INTERNAL MEDICINE

## 2022-09-14 PROCEDURE — 90686 IIV4 VACC NO PRSV 0.5 ML IM: CPT | Mod: S$GLB,,, | Performed by: INTERNAL MEDICINE

## 2022-09-14 PROCEDURE — 1159F MED LIST DOCD IN RCRD: CPT | Mod: CPTII,S$GLB,, | Performed by: INTERNAL MEDICINE

## 2022-09-14 PROCEDURE — 1159F PR MEDICATION LIST DOCUMENTED IN MEDICAL RECORD: ICD-10-PCS | Mod: CPTII,S$GLB,, | Performed by: INTERNAL MEDICINE

## 2022-09-14 PROCEDURE — 1160F RVW MEDS BY RX/DR IN RCRD: CPT | Mod: CPTII,S$GLB,, | Performed by: INTERNAL MEDICINE

## 2022-09-14 PROCEDURE — 90471 IMMUNIZATION ADMIN: CPT | Mod: S$GLB,,, | Performed by: INTERNAL MEDICINE

## 2022-09-14 PROCEDURE — 80061 LIPID PANEL: CPT | Performed by: INTERNAL MEDICINE

## 2022-09-14 PROCEDURE — 82607 VITAMIN B-12: CPT | Performed by: STUDENT IN AN ORGANIZED HEALTH CARE EDUCATION/TRAINING PROGRAM

## 2022-09-14 PROCEDURE — 99396 PR PREVENTIVE VISIT,EST,40-64: ICD-10-PCS | Mod: 25,S$GLB,, | Performed by: INTERNAL MEDICINE

## 2022-09-14 PROCEDURE — 3008F PR BODY MASS INDEX (BMI) DOCUMENTED: ICD-10-PCS | Mod: CPTII,S$GLB,, | Performed by: INTERNAL MEDICINE

## 2022-09-14 PROCEDURE — 84466 ASSAY OF TRANSFERRIN: CPT | Performed by: STUDENT IN AN ORGANIZED HEALTH CARE EDUCATION/TRAINING PROGRAM

## 2022-09-14 PROCEDURE — 1160F PR REVIEW ALL MEDS BY PRESCRIBER/CLIN PHARMACIST DOCUMENTED: ICD-10-PCS | Mod: CPTII,S$GLB,, | Performed by: INTERNAL MEDICINE

## 2022-09-14 PROCEDURE — 80053 COMPREHEN METABOLIC PANEL: CPT | Performed by: INTERNAL MEDICINE

## 2022-09-14 PROCEDURE — 84443 ASSAY THYROID STIM HORMONE: CPT | Performed by: INTERNAL MEDICINE

## 2022-09-14 PROCEDURE — 99999 PR PBB SHADOW E&M-EST. PATIENT-LVL V: ICD-10-PCS | Mod: PBBFAC,,, | Performed by: INTERNAL MEDICINE

## 2022-09-14 RX ORDER — METFORMIN HYDROCHLORIDE 500 MG/1
TABLET, EXTENDED RELEASE ORAL
Qty: 90 TABLET | Refills: 3 | Status: SHIPPED | OUTPATIENT
Start: 2022-09-14 | End: 2023-09-19

## 2022-09-14 NOTE — PROGRESS NOTES
"INTERNAL MEDICINE ESTABLISHED PATIENT VISIT NOTE    Subjective:     Chief Complaint: Annual Exam       Patient ID: Sheila Costa is a 51 y.o. female with cervical spondylosis, HTN, alpha thalassemia, adenomatous colon polyp, fatty liver, prediabetes, last seen by me 1/2022, here today for annual.    3/23/22 - R RFA  4/6/22 - L RFA  5/2022 - L SI and Bursa injection    Ran out of Metformin some time ago. Requesting refill.     Chronic fatigue ongoing for many years. Sleepiness while driving and also in late afternoon. Partner reports snoring.     Past Medical History:  Past Medical History:   Diagnosis Date    Alpha thalassemia     Cervical spondylosis 12/30/2019    Colon polyp     Fatty liver     Hypertension     Premenstrual symptom           Review of Systems:  Review of Systems   Constitutional:  Positive for malaise/fatigue. Negative for chills and fever.   HENT:  Negative for congestion.    Respiratory:  Negative for cough and shortness of breath.    Cardiovascular:  Negative for chest pain.   Gastrointestinal:  Negative for constipation, nausea and vomiting.   Genitourinary:  Negative for hematuria and urgency.   Musculoskeletal:  Positive for neck pain (chronic). Negative for back pain and falls.   Skin:  Negative for rash.   Neurological:  Negative for dizziness and loss of consciousness.     Health Maintenance:   Immunizations:   Influenza - today  Tdap - 2/2016  Covid 19 - complete  HPV  Prevnar rec at 64  Shingrix rec at 49 - complete     Cancer Screening:  PAP: 4/2019 NILM  Mammogram:  8/2022 BIRAD 1  Colonoscopy:  7/2021 1 TVA; repeat 7/2024  DEXA:  n/a     Objective:   /80 (BP Location: Left arm, Patient Position: Sitting, BP Method: Medium (Manual))   Pulse 60   Ht 5' 1" (1.549 m)   Wt 76.8 kg (169 lb 3.3 oz)   SpO2 99%   BMI 31.97 kg/m²        General: AAO x3, no apparent distress  HEENT: PERRL  CV: RRR, no m/r/g  Pulm: Lungs CTAB, no crackles, no wheezes  Abd: s/NT/ND " +BS  Extremities: no c/c/e    Labs:       Assessment/Plan     Visit for annual health examination  Labs ordered  -     Comprehensive Metabolic Panel; Future; Expected date: 09/14/2022  -     Hemoglobin A1C; Future; Expected date: 09/14/2022  -     Lipid Panel; Future; Expected date: 09/14/2022  -     TSH; Future; Expected date: 09/14/2022    Essential hypertension  Controlled, continue current regimen    Cervical spondylosis  Follows with Pain Management; continue current regimen    Pre-diabetes  4/2022 HgbA1C 6.1; off Metformin, restart  Consider increasing dose based on repeat HgbA1C  -     metFORMIN (GLUCOPHAGE-XR) 500 MG ER 24hr tablet; TAKE 1 TABLET(500 MG) BY MOUTH DAILY WITH BREAKFAST Strength: 500 mg  Dispense: 90 tablet; Refill: 3    PMDD (premenstrual dysphoric disorder)    Alpha thalassemia  Follows with Hematology  To avoid iron supplements unless advised by hematologist; may need folic acid supplementation during times of stress.    Thrombocytosis  Resolved    Fatty liver    History of colon polyps  UTD on colonoscopy     Chronic fatigue  STOP BANG 3; refer to Sleep Clinic   -     Ambulatory referral/consult to Sleep Disorders; Future; Expected date: 09/21/2022    Class 1 obesity due to excess calories with serious comorbidity and body mass index (BMI) of 32.0 to 32.9 in adult    Other orders  -     Influenza - Quadrivalent *Preferred* (6 months+) (PF)     HM as above    RTC in 6 mo, sooner if needed.    Padmini Moore MD  Department of Internal Medicine - [facility]  09/14/2022

## 2022-09-18 LAB — METHYLMALONATE SERPL-SCNC: 0.13 UMOL/L

## 2022-10-10 ENCOUNTER — TELEPHONE (OUTPATIENT)
Dept: OBSTETRICS AND GYNECOLOGY | Facility: CLINIC | Age: 52
End: 2022-10-10
Payer: COMMERCIAL

## 2022-10-10 NOTE — TELEPHONE ENCOUNTER
----- Message from Jacinta Boudreaux MA sent at 10/10/2022 10:42 AM CDT -----  Pt would like an appt for her annual. # [phone]

## 2022-10-10 NOTE — TELEPHONE ENCOUNTER
----- Message from Jacinta Boudreaux MA sent at 10/10/2022  4:41 PM CDT -----  Pt returning your call

## 2022-10-31 ENCOUNTER — OFFICE VISIT (OUTPATIENT)
Dept: SPINE | Facility: CLINIC | Age: 52
End: 2022-10-31
Attending: ANESTHESIOLOGY
Payer: COMMERCIAL

## 2022-10-31 VITALS
WEIGHT: 167.56 LBS | DIASTOLIC BLOOD PRESSURE: 76 MMHG | HEART RATE: 91 BPM | HEIGHT: 61 IN | TEMPERATURE: 99 F | BODY MASS INDEX: 31.63 KG/M2 | SYSTOLIC BLOOD PRESSURE: 138 MMHG

## 2022-10-31 DIAGNOSIS — M47.897 OTHER SPONDYLOSIS, LUMBOSACRAL REGION: Primary | ICD-10-CM

## 2022-10-31 DIAGNOSIS — M46.1 SACROILIITIS: ICD-10-CM

## 2022-10-31 DIAGNOSIS — M47.812 SPONDYLOSIS WITHOUT MYELOPATHY OR RADICULOPATHY, CERVICAL REGION: ICD-10-CM

## 2022-10-31 DIAGNOSIS — M51.36 DDD (DEGENERATIVE DISC DISEASE), LUMBAR: ICD-10-CM

## 2022-10-31 DIAGNOSIS — M47.892 OTHER SPONDYLOSIS, CERVICAL REGION: ICD-10-CM

## 2022-10-31 PROCEDURE — 3078F PR MOST RECENT DIASTOLIC BLOOD PRESSURE < 80 MM HG: ICD-10-PCS | Mod: CPTII,S$GLB,, | Performed by: ANESTHESIOLOGY

## 2022-10-31 PROCEDURE — 1160F PR REVIEW ALL MEDS BY PRESCRIBER/CLIN PHARMACIST DOCUMENTED: ICD-10-PCS | Mod: CPTII,S$GLB,, | Performed by: ANESTHESIOLOGY

## 2022-10-31 PROCEDURE — 1160F RVW MEDS BY RX/DR IN RCRD: CPT | Mod: CPTII,S$GLB,, | Performed by: ANESTHESIOLOGY

## 2022-10-31 PROCEDURE — 99999 PR PBB SHADOW E&M-EST. PATIENT-LVL V: ICD-10-PCS | Mod: PBBFAC,,, | Performed by: ANESTHESIOLOGY

## 2022-10-31 PROCEDURE — 3008F PR BODY MASS INDEX (BMI) DOCUMENTED: ICD-10-PCS | Mod: CPTII,S$GLB,, | Performed by: ANESTHESIOLOGY

## 2022-10-31 PROCEDURE — 3078F DIAST BP <80 MM HG: CPT | Mod: CPTII,S$GLB,, | Performed by: ANESTHESIOLOGY

## 2022-10-31 PROCEDURE — 3075F SYST BP GE 130 - 139MM HG: CPT | Mod: CPTII,S$GLB,, | Performed by: ANESTHESIOLOGY

## 2022-10-31 PROCEDURE — 1159F MED LIST DOCD IN RCRD: CPT | Mod: CPTII,S$GLB,, | Performed by: ANESTHESIOLOGY

## 2022-10-31 PROCEDURE — 99214 PR OFFICE/OUTPT VISIT, EST, LEVL IV, 30-39 MIN: ICD-10-PCS | Mod: S$GLB,,, | Performed by: ANESTHESIOLOGY

## 2022-10-31 PROCEDURE — 99214 OFFICE O/P EST MOD 30 MIN: CPT | Mod: S$GLB,,, | Performed by: ANESTHESIOLOGY

## 2022-10-31 PROCEDURE — 1159F PR MEDICATION LIST DOCUMENTED IN MEDICAL RECORD: ICD-10-PCS | Mod: CPTII,S$GLB,, | Performed by: ANESTHESIOLOGY

## 2022-10-31 PROCEDURE — 99999 PR PBB SHADOW E&M-EST. PATIENT-LVL V: CPT | Mod: PBBFAC,,, | Performed by: ANESTHESIOLOGY

## 2022-10-31 PROCEDURE — 3008F BODY MASS INDEX DOCD: CPT | Mod: CPTII,S$GLB,, | Performed by: ANESTHESIOLOGY

## 2022-10-31 PROCEDURE — 3044F HG A1C LEVEL LT 7.0%: CPT | Mod: CPTII,S$GLB,, | Performed by: ANESTHESIOLOGY

## 2022-10-31 PROCEDURE — 3044F PR MOST RECENT HEMOGLOBIN A1C LEVEL <7.0%: ICD-10-PCS | Mod: CPTII,S$GLB,, | Performed by: ANESTHESIOLOGY

## 2022-10-31 PROCEDURE — 3075F PR MOST RECENT SYSTOLIC BLOOD PRESS GE 130-139MM HG: ICD-10-PCS | Mod: CPTII,S$GLB,, | Performed by: ANESTHESIOLOGY

## 2022-10-31 RX ORDER — TIZANIDINE 4 MG/1
4 TABLET ORAL NIGHTLY PRN
Qty: 30 TABLET | Refills: 0 | Status: SHIPPED | OUTPATIENT
Start: 2022-10-31 | End: 2022-12-29
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
## 2022-11-01 ENCOUNTER — HOSPITAL ENCOUNTER (OUTPATIENT)
Dept: RADIOLOGY | Facility: OTHER | Age: 52
Discharge: HOME | End: 2022-11-01
Attending: ANESTHESIOLOGY
Payer: COMMERCIAL

## 2022-11-01 DIAGNOSIS — M47.892 OTHER SPONDYLOSIS, CERVICAL REGION: ICD-10-CM

## 2022-11-01 DIAGNOSIS — M47.812 SPONDYLOSIS WITHOUT MYELOPATHY OR RADICULOPATHY, CERVICAL REGION: ICD-10-CM

## 2022-11-01 PROCEDURE — 72114 X-RAY EXAM L-S SPINE BENDING: CPT | Mod: TC,FY

## 2022-11-01 PROCEDURE — 73521 X-RAY EXAM HIPS BI 2 VIEWS: CPT | Mod: 26,,, | Performed by: RADIOLOGY

## 2022-11-01 PROCEDURE — 72114 XR LUMBAR SPINE 5 VIEW WITH FLEX AND EXT: ICD-10-PCS | Mod: 26,,, | Performed by: RADIOLOGY

## 2022-11-01 PROCEDURE — 72114 X-RAY EXAM L-S SPINE BENDING: CPT | Mod: 26,,, | Performed by: RADIOLOGY

## 2022-11-01 PROCEDURE — 73521 XR HIPS BILATERAL 2 VIEW INCL AP PELVIS: ICD-10-PCS | Mod: 26,,, | Performed by: RADIOLOGY

## 2022-11-01 PROCEDURE — 73521 X-RAY EXAM HIPS BI 2 VIEWS: CPT | Mod: TC,FY

## 2022-11-11 ENCOUNTER — HOSPITAL ENCOUNTER (OUTPATIENT)
Dept: RADIOLOGY | Facility: OTHER | Age: 52
Discharge: HOME | End: 2022-11-11
Attending: ANESTHESIOLOGY
Payer: COMMERCIAL

## 2022-11-11 DIAGNOSIS — M47.812 SPONDYLOSIS WITHOUT MYELOPATHY OR RADICULOPATHY, CERVICAL REGION: ICD-10-CM

## 2022-11-11 DIAGNOSIS — M47.892 OTHER SPONDYLOSIS, CERVICAL REGION: ICD-10-CM

## 2022-11-11 PROCEDURE — 72148 MRI LUMBAR SPINE WITHOUT CONTRAST: ICD-10-PCS | Mod: 26,,, | Performed by: RADIOLOGY

## 2022-11-11 PROCEDURE — 72148 MRI LUMBAR SPINE W/O DYE: CPT | Mod: 26,,, | Performed by: RADIOLOGY

## 2022-11-11 PROCEDURE — 72148 MRI LUMBAR SPINE W/O DYE: CPT | Mod: TC

## 2022-11-14 ENCOUNTER — OFFICE VISIT (OUTPATIENT)
Dept: SPINE | Facility: CLINIC | Age: 52
End: 2022-11-14
Attending: ANESTHESIOLOGY
Payer: COMMERCIAL

## 2022-11-14 ENCOUNTER — PATIENT MESSAGE (OUTPATIENT)
Dept: PAIN MEDICINE | Facility: OTHER | Age: 52
End: 2022-11-14
Payer: COMMERCIAL

## 2022-11-14 DIAGNOSIS — M47.812 SPONDYLOSIS WITHOUT MYELOPATHY OR RADICULOPATHY, CERVICAL REGION: Primary | ICD-10-CM

## 2022-11-14 DIAGNOSIS — M47.9 OSTEOARTHRITIS OF SPINE, UNSPECIFIED SPINAL OSTEOARTHRITIS COMPLICATION STATUS, UNSPECIFIED SPINAL REGION: ICD-10-CM

## 2022-11-14 PROCEDURE — 1160F RVW MEDS BY RX/DR IN RCRD: CPT | Mod: CPTII,95,, | Performed by: ANESTHESIOLOGY

## 2022-11-14 PROCEDURE — 3044F PR MOST RECENT HEMOGLOBIN A1C LEVEL <7.0%: ICD-10-PCS | Mod: CPTII,95,, | Performed by: ANESTHESIOLOGY

## 2022-11-14 PROCEDURE — 1159F MED LIST DOCD IN RCRD: CPT | Mod: CPTII,95,, | Performed by: ANESTHESIOLOGY

## 2022-11-14 PROCEDURE — 3044F HG A1C LEVEL LT 7.0%: CPT | Mod: CPTII,95,, | Performed by: ANESTHESIOLOGY

## 2022-11-14 PROCEDURE — 99214 PR OFFICE/OUTPT VISIT, EST, LEVL IV, 30-39 MIN: ICD-10-PCS | Mod: 95,,, | Performed by: ANESTHESIOLOGY

## 2022-11-14 PROCEDURE — 1159F PR MEDICATION LIST DOCUMENTED IN MEDICAL RECORD: ICD-10-PCS | Mod: CPTII,95,, | Performed by: ANESTHESIOLOGY

## 2022-11-14 PROCEDURE — 1160F PR REVIEW ALL MEDS BY PRESCRIBER/CLIN PHARMACIST DOCUMENTED: ICD-10-PCS | Mod: CPTII,95,, | Performed by: ANESTHESIOLOGY

## 2022-11-14 PROCEDURE — 99214 OFFICE O/P EST MOD 30 MIN: CPT | Mod: 95,,, | Performed by: ANESTHESIOLOGY
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
## 2022-11-27 ENCOUNTER — PATIENT MESSAGE (OUTPATIENT)
Dept: INTERNAL MEDICINE | Facility: CLINIC | Age: 52
End: 2022-11-27
Payer: COMMERCIAL

## 2022-11-28 NOTE — TELEPHONE ENCOUNTER
Pt requesting pcp's evaluation of Xray for kidney stone at request of pain management      Please review

## 2022-12-08 ENCOUNTER — TELEPHONE (OUTPATIENT)
Dept: PAIN MEDICINE | Facility: OTHER | Age: 52
End: 2022-12-08
Payer: COMMERCIAL

## 2022-12-12 ENCOUNTER — PATIENT MESSAGE (OUTPATIENT)
Dept: OBSTETRICS AND GYNECOLOGY | Facility: CLINIC | Age: 52
End: 2022-12-12
Payer: COMMERCIAL

## 2022-12-14 ENCOUNTER — HOSPITAL ENCOUNTER (OUTPATIENT)
Facility: OTHER | Age: 52
Discharge: HOME | End: 2022-12-14
Attending: ANESTHESIOLOGY | Admitting: ANESTHESIOLOGY
Payer: COMMERCIAL

## 2022-12-14 VITALS
OXYGEN SATURATION: 100 % | HEART RATE: 74 BPM | DIASTOLIC BLOOD PRESSURE: 92 MMHG | WEIGHT: 160 LBS | RESPIRATION RATE: 16 BRPM | SYSTOLIC BLOOD PRESSURE: 159 MMHG | HEIGHT: 61 IN | TEMPERATURE: 98 F | BODY MASS INDEX: 30.21 KG/M2

## 2022-12-14 DIAGNOSIS — G89.29 CHRONIC PAIN: ICD-10-CM

## 2022-12-14 DIAGNOSIS — M47.816 SPONDYLOSIS OF LUMBAR REGION WITHOUT MYELOPATHY OR RADICULOPATHY: Primary | ICD-10-CM

## 2022-12-14 PROCEDURE — 64493 INJ PARAVERT F JNT L/S 1 LEV: CPT | Mod: 50,KX,, | Performed by: ANESTHESIOLOGY

## 2022-12-14 PROCEDURE — 63600175 PHARM REV CODE 636 W HCPCS: Performed by: ANESTHESIOLOGY

## 2022-12-14 PROCEDURE — 25000003 PHARM REV CODE 250: Performed by: STUDENT IN AN ORGANIZED HEALTH CARE EDUCATION/TRAINING PROGRAM

## 2022-12-14 PROCEDURE — 64494 INJ PARAVERT F JNT L/S 2 LEV: CPT | Mod: 50,KX | Performed by: ANESTHESIOLOGY

## 2022-12-14 PROCEDURE — 25000003 PHARM REV CODE 250: Performed by: ANESTHESIOLOGY

## 2022-12-14 PROCEDURE — 64493 PR INJ DX/THER AGNT PARAVERT FACET JOINT,IMG GUIDE,LUMBAR/SAC,1ST LVL: ICD-10-PCS | Mod: 50,KX,, | Performed by: ANESTHESIOLOGY

## 2022-12-14 PROCEDURE — 64494 PR INJ DX/THER AGNT PARAVERT FACET JOINT,IMG GUIDE,LUMBAR/SAC, 2ND LEVEL: ICD-10-PCS | Mod: 50,KX,, | Performed by: ANESTHESIOLOGY

## 2022-12-14 PROCEDURE — 64493 INJ PARAVERT F JNT L/S 1 LEV: CPT | Mod: 50,KX | Performed by: ANESTHESIOLOGY

## 2022-12-14 PROCEDURE — 64494 INJ PARAVERT F JNT L/S 2 LEV: CPT | Mod: 50,KX,, | Performed by: ANESTHESIOLOGY

## 2022-12-14 RX ORDER — BUPIVACAINE HYDROCHLORIDE 2.5 MG/ML
INJECTION, SOLUTION EPIDURAL; INFILTRATION; INTRACAUDAL
Status: DISCONTINUED | OUTPATIENT
Start: 2022-12-14 | End: 2022-12-14 | Stop reason: HOSPADM

## 2022-12-14 RX ORDER — MIDAZOLAM HYDROCHLORIDE 2 MG/2ML
INJECTION, SOLUTION INTRAMUSCULAR; INTRAVENOUS
Status: DISCONTINUED | OUTPATIENT
Start: 2022-12-14 | End: 2022-12-14 | Stop reason: HOSPADM

## 2022-12-14 RX ORDER — SODIUM CHLORIDE 9 MG/ML
500 INJECTION, SOLUTION INTRAVENOUS CONTINUOUS
Status: DISCONTINUED | OUTPATIENT
Start: 2022-12-14 | End: 2022-12-14 | Stop reason: HOSPADM

## 2022-12-14 RX ORDER — LIDOCAINE HYDROCHLORIDE 20 MG/ML
INJECTION, SOLUTION INFILTRATION; PERINEURAL
Status: DISCONTINUED | OUTPATIENT
Start: 2022-12-14 | End: 2022-12-14 | Stop reason: HOSPADM

## 2022-12-14 NOTE — DISCHARGE INSTRUCTIONS
Lumbar/Cervical/Joint Medial Branch Block Pain Diary    Patient Name:  :    Pre-procedure Pain Score: _____/10    Time of injection:     Please fill out the chart below to the best of your ability. We need to know how much percentage of relief in your pain that you have while the numbing medication is active. Please be mindful that this is a diagnostic test and may not last for a long time. If you are asleep during one of the times listed below, you do not have to record that time.     Time After the   Procedure % of pain relief   achieved Pain Score (0-10)   1 hour post-procedure     2 hours post-procedure     3 hours post-procedure     4 hours post-procedure     5 hours post-procedure     6 hours post-procedure     12 hours post-procedure     24 hours post-procedure       Please make sure to return this form or information to the clinic. This information is needed to proceed with your plan of care. There are several options that you can use to send this back to us.    Form can be faxed to us at [phone]  Call us to provide the information at [phone]  Send the information to us through your [facility] Chart    If you have any questions about completing this diary, please call us at [phone].    Thank you for allowing us to care for you today. You may receive a survey about the care we provided. Your feedback is valuable and helps us provide excellent care throughout the community.     Home Care Instructions for Pain Management:    1. DIET:   You may resume your normal diet today.   2. BATHING:   You may shower with luke warm water. No tub baths or anything that will soak injection sites under water for the next 24 hours.  3. DRESSING:   You may remove your bandage today.   4. ACTIVITY LEVEL:   You may resume your normal activities 24 hrs after your procedure. Nothing strenuous today.  5. MEDICATIONS:   You may resume your normal medications today. To restart blood thinners, ask your doctor.  6.  DRIVING    If you have received any sedatives by mouth today, you may not drive for 12 hours.    If you have received any sedation through your IV, you may not drive for 24 hrs.   7. SPECIAL INSTRUCTIONS:   No heat to the injection site for 24 hrs including, hot bath or shower, heating pad, moist heat, or hot tubs.    Use ice pack to injection site for any pain or discomfort.  Apply ice packs for 20 minute intervals as needed.    IF you have diabetes, be sure to monitor your blood sugar more closely. IF your injection contained steroids your blood sugar levels may become higher than normal.    If you are still having pain upon discharge:  Your pain may improve over the next 48 hours. The anesthetic (numbing medication) works immediately to 48 hours. IF your injection contained a steroid (anti-inflammatory medication), it takes approximately 3 days to start feeling relief and 7-10 days to see your greatest results from the medication. It is possible you may need subsequent injections. This would be discussed at your follow up appointment with pain management or your referring doctor.    Please call the PAIN MANAGEMENT office at [phone] or ON CALL pager at [phone] if you experienced any:   -Weakness or loss of sensation  -Fever > 101.5  -Pain uncontrolled with oral medications   -Persistent nausea, vomiting, or diarrhea  -Redness or drainage from the injection sites, or any other worrisome concerns.   If physician on call was not reached or could not communicate with our office for any reason please go to the nearest emergency department. Adult Procedural Sedation Instructions    Recovery After Procedural Sedation (Adult)  You have been given medicine by vein to make you sleep during your surgery. This may have included both a pain medicine and sleeping medicine. Most of the effects have worn off. But you may still have some drowsiness for the next 6 to 8 hours.  Home care  Follow these guidelines when you  get home:  For the next 8 hours, you should be watched by a responsible adult. This person should make sure your condition is not getting worse.  Don't drink any alcohol for the next 24 hours.  Don't drive, operate dangerous machinery, or make important business or personal decisions during the next 24 hours.  Note: Your healthcare provider may tell you not to take any medicine by mouth for pain or sleep in the next 4 hours. These medicines may react with the medicines you were given in the hospital. This could cause a much stronger response than usual.  Follow-up care  Follow up with your healthcare provider if you are not alert and back to your usual level of activity within 12 hours.  When to seek medical advice  Call your healthcare provider right away if any of these occur:  Drowsiness gets worse  Weakness or dizziness gets worse  Repeated vomiting  You can't be awakened   Date Last Reviewed: 10/18/2016  © 8865-1507 [employer]. [address] [place] [address], [place] [address]. All rights reserved. This information is not intended as a substitute for professional medical care. Always follow your healthcare professional's instructions.

## 2022-12-14 NOTE — OP NOTE
Diagnostic Lumbar Medial Branch Block Under Fluoroscopy    The procedure, risks, benefits, and options were discussed with the patient. There are no contraindications to the procedure. The patent expressed understanding and agreed to the procedure. Informed written consent was obtained prior to the start of the procedure and can be found in the patient's chart.    PATIENT NAME: Sheila Costa   MRN: [id number]     DATE OF PROCEDURE: 12/14/2022                                           PROCEDURE:  Diagnostic Bilateral L3, L4, and L5 Lumbar Medial Branch Block under Fluoroscopy    PRE-OP DIAGNOSIS: Spondylosis without myelopathy or radiculopathy, cervical region [M47.812]  Osteoarthritis of spine, unspecified spinal osteoarthritis complication status, unspecified spinal region [M47.9] Lumbar spondylosis [M47.816]    POST-OP DIAGNOSIS: Same    PHYSICIAN: Cosme Kirkland MD    ASSISTANTS: Norma GONCALVES    MEDICATIONS INJECTED:  Bupivicaine 0.25%    LOCAL ANESTHETIC INJECTED:   Xylocaine 2%    SEDATION: Versed 2mg and Fentanyl 0mcg                                                                                                                                                                                     Conscious sedation ordered by M.D. Patient re-evaluation prior to administration of conscious sedation. No changes noted in patient's status from initial evaluation. The patient's vital signs were monitored by RN and patient remained hemodynamically stable throughout the procedure.  No case tracking events are documented in the log.    ESTIMATED BLOOD LOSS:  None    COMPLICATIONS:  None.    INTERVAL HISTORY: Patient has clinical and imaging findings suggestive of facet mediated pain.    TECHNIQUE: Time-out was performed to identify the patient and procedure to be performed. With the patient laying in a prone position, the surgical area was prepped and draped in the usual sterile fashion using ChloraPrep  and fenestrated drape. The levels were determined under fluoroscopic guidance. Skin anesthesia was achieved by injecting Lidocaine 2% over the injection sites. A 22 gauge, 3.5 inch needle was introduced into the medial branch nerves at the junctions of the superior articular process and the transverse processes of the targeted sites using AP, lateral and/or contralateral oblique fluoroscopic imaging. After negative aspiration for blood or CSF was confirmed, 3 mL of the anesthetic listed above was then slowly injected at each site. The needles were removed and bleeding was nil. A sterile dressing was applied. No specimens collected. The patient tolerated the procedure well.     The patient was monitored after the procedure in the recovery area. They were given post-procedure and discharge instructions to follow at home. The patient was discharged in a stable condition.    I reviewed and edited the fellow's note. I conducted my own interview and physical examination. I agree with the findings. I was present and supervising all critical portions of the procedure.    Cosme Kirkland MD

## 2022-12-14 NOTE — H&P
HPI  Sheila Maria FernandaLeonard presents for Bilateral L3, L4, L5      Past Medical History:   Diagnosis Date    Alpha thalassemia     Cervical spondylosis 12/30/2019    Colon polyp     Fatty liver     Hypertension     Premenstrual symptom      Past Surgical History:   Procedure Laterality Date    COLONOSCOPY N/A 6/18/2020    Procedure: COLONOSCOPY;  Surgeon: Eliot Hill MD;  Location: [facility] (4TH FLR);  Service: Endoscopy;  Laterality: N/A;  COVID screening on 6/16/20-[facility]    COLONOSCOPY N/A 7/6/2020    Procedure: COLONOSCOPY;  Surgeon: Kang Guzmán MD;  Location: [facility] OR [facility] FLR;  Service: Colon and Rectal;  Laterality: N/A;    COLONOSCOPY N/A 7/9/2021    Procedure: COLONOSCOPY;  Surgeon: GREGG Guzmán MD;  [facility]  Service: Endoscopy;  Laterality: N/A;  in July 2021  covid test [place] 7/6    EPIDURAL STEROID INJECTION N/A 10/2/2019    Procedure: INJECTION, STEROID, EPIDURAL, C7-T1;  Surgeon: Cosme Kirkland MD;  Location: [facility] PAIN MGT;  Service: Pain Management;  Laterality: N/A;    EPIDURAL STEROID INJECTION N/A 12/22/2021    Procedure: INJECTION, STEROID, EPIDURAL, C7-T1 NEED CONSENT;  Surgeon: Cosme Kirkland MD;  Location: [facility] PAIN MGT;  Service: Pain Management;  Laterality: N/A;    EPIDURAL STEROID INJECTION N/A 7/6/2022    Procedure: INJECTION, STEROID, EPIDURAL, C7-T1 IL  CONTRAST;  Surgeon: Cosme Kirkland MD;  Location: [facility] PAIN MGT;  Service: Pain Management;  Laterality: N/A;    EYE SURGERY      INJECTION OF ANESTHETIC AGENT AROUND NERVE Bilateral 1/8/2020    Procedure: BLOCK, NERVE C4,5,6;  Surgeon: Cosme Kirkland MD;  Location: [facility] PAIN MGT;  Service: Pain Management;  Laterality: Bilateral;  B/L MBB C4,5,6    INJECTION OF ANESTHETIC AGENT AROUND NERVE Bilateral 1/29/2020    Procedure: BLOCK, NERVE, Repeat C4-C5-C6 MEDIAL BRANCH;  Surgeon: Cosme Kirkland MD;  Location: [facility] PAIN MGT;  Service: Pain Management;  Laterality:  Bilateral;    INJECTION OF JOINT Left 5/18/2022    Procedure: INJECTION, JOINT, LEFT SI and GTB *LORI PT*;  Surgeon: Jed Phillips MD;  Location: [facility] PAIN MGT;  Service: Pain Management;  Laterality: Left;    LAPAROSCOPIC SURGICAL REMOVAL OF CECUM N/A 7/6/2020    Procedure: EXCISION, CECUM, LAPAROSCOPIC, colonoscopy to start, ERAS low;  Surgeon: Kang Guzmán MD;  Location: [facility] OR 2ND FLR;  Service: Colon and Rectal;  Laterality: N/A;    RADIOFREQUENCY ABLATION Left 3/4/2020    Procedure: RADIOFREQUENCY ABLATION, C4-C5-C6 ME DIAL BRANCH 1 OF 2 need consent;  Surgeon: Cosme Kirkland MD;  Location: [facility] PAIN MGT;  Service: Pain Management;  Laterality: Left;    RADIOFREQUENCY ABLATION Right 6/3/2020    Procedure: RADIOFREQUENCY ABLATION, C4-C5-C6 MEDIAL BRANCH 2 OF 2 need consent;  Surgeon: Cosme Kirkland MD;  Location: [facility] PAIN MGT;  Service: Pain Management;  Laterality: Right;    RADIOFREQUENCY ABLATION Right 3/23/2022    Procedure: Radiofrequency Ablation RIGHT C4,5,6;  Surgeon: Cosme Kirkland MD;  Location: [facility] PAIN MGT;  Service: Pain Management;  Laterality: Right;    RADIOFREQUENCY ABLATION Left 4/6/2022    Procedure: Radiofrequency Ablation LEFT C4,5,6;  Surgeon: Cosme Kirkland MD;  Location: [facility] PAIN MGT;  Service: Pain Management;  Laterality: Left;    REFRACTIVE SURGERY  2008    SMALL INTESTINE SURGERY  7/6/20     Review of patient's allergies indicates:   Allergen Reactions    Amoxicillin Hives        PMHx, PSHx, Allergies, Medications reviewed in epic      ROS negative except pain complaints in HPI    OBJECTIVE:    There were no vitals taken for this visit.    PHYSICAL EXAMINATION:    GENERAL: Well appearing, in no acute distress, alert and oriented x3.  PSYCH:  Mood and affect appropriate.  SKIN: Skin color, texture, turgor normal, no rashes or lesions.  CV: RRR with palpation of the radial artery.  PULM: No evidence of respiratory difficulty, symmetric chest rise.  Clear to auscultation.  NEURO: Cranial nerves grossly intact.    Plan:    Proceed with procedure as planned    Norma Catherine  12/14/2022

## 2022-12-14 NOTE — DISCHARGE SUMMARY
Discharge Note  Short Stay      SUMMARY     Admit Date: 12/14/2022    Attending Physician: Cosme Kirkland    Discharge Physician: Norma Catherine      Discharge Date: 12/14/2022 3:51 PM    Procedure(s) (LRB):  BLOCK, NERVE BILATERAL L3,L4,L5 MEDIAL BRANCH NEEDS CONSENT (Bilateral)    Final Diagnosis: Spondylosis without myelopathy or radiculopathy, cervical region [M47.812]  Osteoarthritis of spine, unspecified spinal osteoarthritis complication status, unspecified spinal region [M47.9]    Disposition: Home or self care    Patient Instructions:   Current Discharge Medication List        CONTINUE these medications which have NOT CHANGED    Details   amitriptyline (ELAVIL) 25 MG tablet TAKE 1 TABLET(25 MG) BY MOUTH EVERY NIGHT AS NEEDED FOR INSOMNIA OR PAIN  Qty: 30 tablet, Refills: 0    Comments: ZERO refills remain on this prescription. Your patient is requesting advance approval of refills for this medication to PREVENT ANY MISSED DOSES  Associated Diagnoses: Cervical radiculopathy; DDD (degenerative disc disease), cervical      ascorbic acid, vitamin C, (VITAMIN C) 100 MG tablet Take 100 mg by mouth once daily.      atenoloL-chlorthalidone (TENORETIC) 50-25 mg Tab TAKE 1/2 TABLET BY MOUTH ONCE EVERY DAY  Qty: 45 tablet, Refills: 3    Associated Diagnoses: Essential hypertension      celecoxib (CELEBREX) 100 MG capsule Take 1 capsule (100 mg total) by mouth 2 (two) times daily.  Qty: 60 capsule, Refills: 2    Associated Diagnoses: Cervical spondylosis without myelopathy      FLUoxetine 20 MG capsule TAKE 1 CAPSULE(20 MG) BY MOUTH EVERY DAY  Qty: 30 capsule, Refills: 2    Comments: ZERO refills remain on this prescription. Your patient is requesting advance approval of refills for this medication to PREVENT ANY MISSED DOSES  Associated Diagnoses: PMDD (premenstrual dysphoric disorder)      levonorgestrel (MIRENA) 20 mcg/24 hr (5 years) IUD 1 Intra Uterine Device by Intrauterine route once. for 1 dose  Qty: 1 Intra  Uterine Device, Refills: 0    Associated Diagnoses: Contraception, device intrauterine      loratadine (CLARITIN) 10 mg tablet Take 10 mg by mouth every morning.       metFORMIN (GLUCOPHAGE-XR) 500 MG ER 24hr tablet TAKE 1 TABLET(500 MG) BY MOUTH DAILY WITH BREAKFAST Strength: 500 mg  Qty: 90 tablet, Refills: 3    Associated Diagnoses: Pre-diabetes      multivitamin capsule Take 1 capsule by mouth once daily.      potassium chloride SA (K-DUR,KLOR-CON) 10 MEQ tablet Take 1 tablet (10 mEq total) by mouth once daily.  Qty: 90 tablet, Refills: 3    Associated Diagnoses: Hypokalemia      pregabalin (LYRICA) 75 MG capsule Take 1 capsule (75 mg total) by mouth 2 (two) times daily.  Qty: 60 capsule, Refills: 2      tiZANidine (ZANAFLEX) 4 MG tablet Take 1 tablet (4 mg total) by mouth nightly as needed.  Qty: 30 tablet, Refills: 0    Associated Diagnoses: Other spondylosis, cervical region; Spondylosis without myelopathy or radiculopathy, cervical region      vitamin D (VITAMIN D3) 1000 units Tab Take 1,000 Units by mouth once daily.                 Discharge Diagnosis: Spondylosis without myelopathy or radiculopathy, cervical region [M47.812]  Osteoarthritis of spine, unspecified spinal osteoarthritis complication status, unspecified spinal region [M47.9]  Condition on Discharge: Stable with no complications to procedure   Diet on Discharge: Same as before.  Activity: as per instruction sheet.  Discharge to: Home with a responsible adult.  Follow up: 2-4 weeks       Please call my office or pager at [phone] if experienced any weakness or loss of sensation, fever > 101.5, pain uncontrolled with oral medications, persistent nausea/vomiting/or diarrhea, redness or drainage from the incisions, or any other worrisome concerns. If physician on call was not reached or could not communicate with our office for any reason please go to the nearest emergency department        Cosme Kirkland

## 2022-12-14 NOTE — OP NOTE
Diagnostic Lumbar Medial Branch Block Under Fluoroscopy    The procedure, risks, benefits, and options were discussed with the patient. There are no contraindications to the procedure. The patent expressed understanding and agreed to the procedure. Informed written consent was obtained prior to the start of the procedure and can be found in the patient's chart.    PATIENT NAME: Sheila Costa   MRN: [id number]     DATE OF PROCEDURE: 12/14/2022                                           PROCEDURE:  Diagnostic Bilateral L3, L4, and L5 Lumbar Medial Branch Block under Fluoroscopy    PRE-OP DIAGNOSIS:   Spondylosis without myelopathy or radiculopathy, cervical region [M47.812]  Osteoarthritis of spine, unspecified spinal osteoarthritis complication status, unspecified spinal region [M47.9] Lumbar spondylosis [M47.816]    POST-OP DIAGNOSIS: Same    PHYSICIAN: Cosme Kirkland MD    ASSISTANTS: Norma Catherine MD [facility]      MEDICATIONS INJECTED:  Bupivicaine 0.25%    LOCAL ANESTHETIC INJECTED:   Xylocaine 2%    SEDATION: Versed 2mg and Fentanyl 0mcg                                                                                                                                                                                     Conscious sedation ordered by M.D. Patient re-evaluation prior to administration of conscious sedation. No changes noted in patient's status from initial evaluation. The patient's vital signs were monitored by RN and patient remained hemodynamically stable throughout the procedure.    Event Time In   Sedation Start 1539   Sedation End 1546       ESTIMATED BLOOD LOSS:  None    COMPLICATIONS:  None.    INTERVAL HISTORY: Patient has clinical and imaging findings suggestive of facet mediated pain.    TECHNIQUE: Time-out was performed to identify the patient and procedure to be performed. With the patient laying in a prone position, the surgical area was prepped and draped in the usual  sterile fashion using ChloraPrep and fenestrated drape. The levels were determined under fluoroscopic guidance. Skin anesthesia was achieved by injecting Lidocaine 2% over the injection sites. A 22 gauge, 3.5 inch needle was introduced into the medial branch nerves at the junctions of the superior articular process and the transverse processes of the targeted sites using AP, lateral and/or contralateral oblique fluoroscopic imaging. After negative aspiration for blood or CSF was confirmed, 1 mL of the anesthetic listed above was then slowly injected at each site. The needles were removed and bleeding was nil. A sterile dressing was applied. No specimens collected. The patient tolerated the procedure well.     The patient was monitored after the procedure in the recovery area. They were given post-procedure and discharge instructions to follow at home. The patient was discharged in a stable condition.    Norma Catherine MD [facility]    I reviewed and edited the fellow's note. I conducted my own interview and physical examination. I agree with the findings. I was present and supervising all critical portions of the procedure.    Cosme Kirkland MD

## 2022-12-18 PROBLEM — M51.36 DDD (DEGENERATIVE DISC DISEASE), LUMBAR: Status: ACTIVE | Noted: 2022-12-18

## 2022-12-18 PROBLEM — M47.897 OTHER SPONDYLOSIS, LUMBOSACRAL REGION: Status: ACTIVE | Noted: 2022-12-18

## 2022-12-18 PROBLEM — M47.892 OTHER SPONDYLOSIS, CERVICAL REGION: Status: ACTIVE | Noted: 2019-12-30

## 2022-12-18 PROBLEM — M51.369 DDD (DEGENERATIVE DISC DISEASE), LUMBAR: Status: ACTIVE | Noted: 2022-12-18

## 2022-12-18 PROBLEM — M46.1 SACROILIITIS: Status: ACTIVE | Noted: 2022-12-18

## 2022-12-20 ENCOUNTER — TELEPHONE (OUTPATIENT)
Dept: PAIN MEDICINE | Facility: CLINIC | Age: 52
End: 2022-12-20
Payer: COMMERCIAL

## 2022-12-20 NOTE — TELEPHONE ENCOUNTER
Staff spoke with patient in regards to her message about the pain diary to give the office a call back.

## 2022-12-20 NOTE — TELEPHONE ENCOUNTER
----- Message from Purnima Baker sent at 12/20/2022  2:24 PM CST -----  Regarding: pain diary  Name of Who is Calling: ETHAN EPSTEIN [[id number]]      What is the request in detail: Patient is requesting a call back she would like to make sure her pain diary was received she faxed it on yesterday and also what is the next step in her plan of care      Can the clinic reply by [facility]: no      What Number to Call Back if not in [facility]: [phone]

## 2022-12-21 ENCOUNTER — PATIENT MESSAGE (OUTPATIENT)
Dept: SPINE | Facility: CLINIC | Age: 52
End: 2022-12-21
Payer: COMMERCIAL

## 2022-12-28 ENCOUNTER — IMMUNIZATION (OUTPATIENT)
Dept: INTERNAL MEDICINE | Facility: CLINIC | Age: 52
End: 2022-12-28
Payer: COMMERCIAL

## 2022-12-28 DIAGNOSIS — Z23 NEED FOR VACCINATION: Primary | ICD-10-CM

## 2022-12-28 PROCEDURE — 0124A COVID-19, MRNA, LNP-S, BIVALENT BOOSTER, PF, 30 MCG/0.3 ML DOSE: CPT | Mod: CV19,PBBFAC | Performed by: INTERNAL MEDICINE

## 2022-12-28 PROCEDURE — 91312 COVID-19, MRNA, LNP-S, BIVALENT BOOSTER, PF, 30 MCG/0.3 ML DOSE: CPT | Mod: S$GLB,,, | Performed by: INTERNAL MEDICINE

## 2022-12-28 PROCEDURE — 91312 COVID-19, MRNA, LNP-S, BIVALENT BOOSTER, PF, 30 MCG/0.3 ML DOSE: ICD-10-PCS | Mod: S$GLB,,, | Performed by: INTERNAL MEDICINE

## 2022-12-29 ENCOUNTER — OFFICE VISIT (OUTPATIENT)
Dept: PAIN MEDICINE | Facility: CLINIC | Age: 52
End: 2022-12-29
Payer: COMMERCIAL

## 2022-12-29 DIAGNOSIS — M79.18 MYOFASCIAL PAIN: ICD-10-CM

## 2022-12-29 DIAGNOSIS — M51.36 DDD (DEGENERATIVE DISC DISEASE), LUMBAR: ICD-10-CM

## 2022-12-29 DIAGNOSIS — M47.816 SPONDYLOSIS OF LUMBAR REGION WITHOUT MYELOPATHY OR RADICULOPATHY: Primary | ICD-10-CM

## 2022-12-29 PROCEDURE — 3044F HG A1C LEVEL LT 7.0%: CPT | Mod: CPTII,95,, | Performed by: NURSE PRACTITIONER

## 2022-12-29 PROCEDURE — 1160F PR REVIEW ALL MEDS BY PRESCRIBER/CLIN PHARMACIST DOCUMENTED: ICD-10-PCS | Mod: CPTII,95,, | Performed by: NURSE PRACTITIONER

## 2022-12-29 PROCEDURE — 1159F PR MEDICATION LIST DOCUMENTED IN MEDICAL RECORD: ICD-10-PCS | Mod: CPTII,95,, | Performed by: NURSE PRACTITIONER

## 2022-12-29 PROCEDURE — 1159F MED LIST DOCD IN RCRD: CPT | Mod: CPTII,95,, | Performed by: NURSE PRACTITIONER

## 2022-12-29 PROCEDURE — 1160F RVW MEDS BY RX/DR IN RCRD: CPT | Mod: CPTII,95,, | Performed by: NURSE PRACTITIONER

## 2022-12-29 PROCEDURE — 3044F PR MOST RECENT HEMOGLOBIN A1C LEVEL <7.0%: ICD-10-PCS | Mod: CPTII,95,, | Performed by: NURSE PRACTITIONER

## 2022-12-29 PROCEDURE — 99213 OFFICE O/P EST LOW 20 MIN: CPT | Mod: 95,,, | Performed by: NURSE PRACTITIONER

## 2022-12-29 PROCEDURE — 99213 PR OFFICE/OUTPT VISIT, EST, LEVL III, 20-29 MIN: ICD-10-PCS | Mod: 95,,, | Performed by: NURSE PRACTITIONER

## 2022-12-29 RX ORDER — CYCLOBENZAPRINE HCL 5 MG
5 TABLET ORAL 3 TIMES DAILY PRN
Qty: 30 TABLET | Refills: 1 | Status: SHIPPED | OUTPATIENT
Start: 2022-12-29 | End: 2023-01-08
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
## 2022-12-30 ENCOUNTER — PATIENT MESSAGE (OUTPATIENT)
Dept: PAIN MEDICINE | Facility: OTHER | Age: 52
End: 2022-12-30
Payer: COMMERCIAL

## 2023-01-11 ENCOUNTER — OFFICE VISIT (OUTPATIENT)
Dept: OBSTETRICS AND GYNECOLOGY | Facility: CLINIC | Age: 53
End: 2023-01-11
Attending: OBSTETRICS & GYNECOLOGY
Payer: COMMERCIAL

## 2023-01-11 VITALS
HEIGHT: 61 IN | SYSTOLIC BLOOD PRESSURE: 150 MMHG | DIASTOLIC BLOOD PRESSURE: 86 MMHG | HEART RATE: 74 BPM | BODY MASS INDEX: 30.02 KG/M2 | WEIGHT: 159 LBS

## 2023-01-11 DIAGNOSIS — Z12.4 SCREENING FOR MALIGNANT NEOPLASM OF THE CERVIX: ICD-10-CM

## 2023-01-11 DIAGNOSIS — Z01.419 ENCOUNTER FOR GYNECOLOGICAL EXAMINATION WITHOUT ABNORMAL FINDING: Primary | ICD-10-CM

## 2023-01-11 DIAGNOSIS — Z12.31 OTHER SCREENING MAMMOGRAM: ICD-10-CM

## 2023-01-11 PROCEDURE — 3008F BODY MASS INDEX DOCD: CPT | Mod: CPTII,S$GLB,, | Performed by: OBSTETRICS & GYNECOLOGY

## 2023-01-11 PROCEDURE — 99999 PR PBB SHADOW E&M-EST. PATIENT-LVL IV: ICD-10-PCS | Mod: PBBFAC,,, | Performed by: OBSTETRICS & GYNECOLOGY

## 2023-01-11 PROCEDURE — 99396 PR PREVENTIVE VISIT,EST,40-64: ICD-10-PCS | Mod: S$GLB,,, | Performed by: OBSTETRICS & GYNECOLOGY

## 2023-01-11 PROCEDURE — 3077F SYST BP >= 140 MM HG: CPT | Mod: CPTII,S$GLB,, | Performed by: OBSTETRICS & GYNECOLOGY

## 2023-01-11 PROCEDURE — 88175 CYTOPATH C/V AUTO FLUID REDO: CPT | Performed by: PATHOLOGY

## 2023-01-11 PROCEDURE — 3079F PR MOST RECENT DIASTOLIC BLOOD PRESSURE 80-89 MM HG: ICD-10-PCS | Mod: CPTII,S$GLB,, | Performed by: OBSTETRICS & GYNECOLOGY

## 2023-01-11 PROCEDURE — 88141 PR  CYTOPATH CERV/VAG INTERPRET: ICD-10-PCS | Mod: ,,, | Performed by: PATHOLOGY

## 2023-01-11 PROCEDURE — 99999 PR PBB SHADOW E&M-EST. PATIENT-LVL IV: CPT | Mod: PBBFAC,,, | Performed by: OBSTETRICS & GYNECOLOGY

## 2023-01-11 PROCEDURE — 1159F PR MEDICATION LIST DOCUMENTED IN MEDICAL RECORD: ICD-10-PCS | Mod: CPTII,S$GLB,, | Performed by: OBSTETRICS & GYNECOLOGY

## 2023-01-11 PROCEDURE — 3077F PR MOST RECENT SYSTOLIC BLOOD PRESSURE >= 140 MM HG: ICD-10-PCS | Mod: CPTII,S$GLB,, | Performed by: OBSTETRICS & GYNECOLOGY

## 2023-01-11 PROCEDURE — 87624 HPV HI-RISK TYP POOLED RSLT: CPT | Performed by: OBSTETRICS & GYNECOLOGY

## 2023-01-11 PROCEDURE — 3079F DIAST BP 80-89 MM HG: CPT | Mod: CPTII,S$GLB,, | Performed by: OBSTETRICS & GYNECOLOGY

## 2023-01-11 PROCEDURE — 1159F MED LIST DOCD IN RCRD: CPT | Mod: CPTII,S$GLB,, | Performed by: OBSTETRICS & GYNECOLOGY

## 2023-01-11 PROCEDURE — 99396 PREV VISIT EST AGE 40-64: CPT | Mod: S$GLB,,, | Performed by: OBSTETRICS & GYNECOLOGY

## 2023-01-11 PROCEDURE — 3008F PR BODY MASS INDEX (BMI) DOCUMENTED: ICD-10-PCS | Mod: CPTII,S$GLB,, | Performed by: OBSTETRICS & GYNECOLOGY

## 2023-01-11 PROCEDURE — 88141 CYTOPATH C/V INTERPRET: CPT | Mod: ,,, | Performed by: PATHOLOGY

## 2023-01-11 RX ORDER — TIRZEPATIDE 7.5 MG/.5ML
INJECTION, SOLUTION SUBCUTANEOUS
COMMUNITY
Start: 2022-11-18 | End: 2023-10-26

## 2023-01-11 RX ORDER — TIRZEPATIDE 2.5 MG/.5ML
INJECTION, SOLUTION SUBCUTANEOUS
COMMUNITY
Start: 2022-11-17 | End: 2023-10-26

## 2023-01-11 RX ORDER — TIRZEPATIDE 5 MG/.5ML
INJECTION, SOLUTION SUBCUTANEOUS
COMMUNITY
Start: 2022-11-18 | End: 2023-10-26

## 2023-01-11 RX ORDER — ONDANSETRON 4 MG/1
4 TABLET, ORALLY DISINTEGRATING ORAL EVERY 6 HOURS PRN
COMMUNITY
Start: 2022-10-20

## 2023-01-11 NOTE — PROGRESS NOTES
SUBJECTIVE:   52 y.o. female   for annual routine Pap and checkup. No LMP recorded. Patient has had an implant..  She reports seeing [facility] and getting compounded [place]. She was on the medication but insurance would not cover it.   She has IUD.        Past Medical History:   Diagnosis Date    Alpha thalassemia     Cervical spondylosis 2019    Colon polyp     Fatty liver     Hypertension     Premenstrual symptom      Past Surgical History:   Procedure Laterality Date    COLONOSCOPY N/A 2020    Procedure: COLONOSCOPY;  Surgeon: Eliot Hill MD;  Location: [facility] ENDO (4TH FLR);  Service: Endoscopy;  Laterality: N/A;  COVID screening on 20-[place]- ERW    COLONOSCOPY N/A 2020    Procedure: COLONOSCOPY;  Surgeon: Kang Guzmán MD;  Location: [facility] OR [facility] FLR;  Service: Colon and Rectal;  Laterality: N/A;    COLONOSCOPY N/A 2021    Procedure: COLONOSCOPY;  Surgeon: GREGG Guzmán MD;  [facility]  Service: Endoscopy;  Laterality: N/A;  in 2021  covid test [place]     EPIDURAL STEROID INJECTION N/A 10/2/2019    Procedure: INJECTION, STEROID, EPIDURAL, C7-T1;  Surgeon: Cosme Kirkland MD;  Location: [facility] PAIN MGT;  Service: Pain Management;  Laterality: N/A;    EPIDURAL STEROID INJECTION N/A 2021    Procedure: INJECTION, STEROID, EPIDURAL, C7-T1 NEED CONSENT;  Surgeon: Cosme Kirkland MD;  Location: [facility] PAIN MGT;  Service: Pain Management;  Laterality: N/A;    EPIDURAL STEROID INJECTION N/A 2022    Procedure: INJECTION, STEROID, EPIDURAL, C7-T1 IL  CONTRAST;  Surgeon: Cosme Kirkland MD;  Location: [facility] PAIN MGT;  Service: Pain Management;  Laterality: N/A;    EYE SURGERY      INJECTION OF ANESTHETIC AGENT AROUND NERVE Bilateral 2020    Procedure: BLOCK, NERVE C4,5,6;  Surgeon: Cosme Kirkland MD;  Location: [facility] PAIN MGT;  Service: Pain Management;  Laterality: Bilateral;  B/L MBB C4,5,6    INJECTION OF  ANESTHETIC AGENT AROUND NERVE Bilateral 1/29/2020    Procedure: BLOCK, NERVE, Repeat C4-C5-C6 MEDIAL BRANCH;  Surgeon: Cosme Kirkland MD;  Location: [facility] PAIN MGT;  Service: Pain Management;  Laterality: Bilateral;    INJECTION OF ANESTHETIC AGENT AROUND NERVE Bilateral 12/14/2022    Procedure: BLOCK, NERVE BILATERAL L3,L4,L5 MEDIAL BRANCH NEEDS CONSENT;  Surgeon: Cosme Kirkland MD;  Location: [facility] PAIN MGT;  Service: Pain Management;  Laterality: Bilateral;    INJECTION OF JOINT Left 5/18/2022    Procedure: INJECTION, JOINT, LEFT SI and GTB *LORI PT*;  Surgeon: Jed Phillips MD;  Location: [facility] PAIN MGT;  Service: Pain Management;  Laterality: Left;    LAPAROSCOPIC SURGICAL REMOVAL OF CECUM N/A 7/6/2020    Procedure: EXCISION, CECUM, LAPAROSCOPIC, colonoscopy to start, ERAS low;  Surgeon: Kang Guzmán MD;  [facility];  Service: Colon and Rectal;  Laterality: N/A;    RADIOFREQUENCY ABLATION Left 3/4/2020    Procedure: RADIOFREQUENCY ABLATION, C4-C5-C6 ME DIAL BRANCH 1 OF 2 need consent;  Surgeon: Cosme Kirkland MD;  Location: [facility] PAIN MGT;  Service: Pain Management;  Laterality: Left;    RADIOFREQUENCY ABLATION Right 6/3/2020    Procedure: RADIOFREQUENCY ABLATION, C4-C5-C6 MEDIAL BRANCH 2 OF 2 need consent;  Surgeon: Cosme Kirkland MD;  Location: [facility] PAIN MGT;  Service: Pain Management;  Laterality: Right;    RADIOFREQUENCY ABLATION Right 3/23/2022    Procedure: Radiofrequency Ablation RIGHT C4,5,6;  Surgeon: Cosme Kirkland MD;  Location: [facility] PAIN MGT;  Service: Pain Management;  Laterality: Right;    RADIOFREQUENCY ABLATION Left 4/6/2022    Procedure: Radiofrequency Ablation LEFT C4,5,6;  Surgeon: Cosme Kirkland MD;  Location: [facility] PAIN MGT;  Service: Pain Management;  Laterality: Left;    REFRACTIVE SURGERY  2008    SMALL INTESTINE SURGERY  7/6/20     Social History     Socioeconomic History    Marital status:    Occupational History     Employer:  [employer]   Tobacco Use    Smoking status: Never    Smokeless tobacco: Never   Substance and Sexual Activity    Alcohol use: Yes     Alcohol/week: 1.0 standard drink     Types: 1 Standard drinks or equivalent per week     Comment: 3 drinks weekly     Drug use: No    Sexual activity: Yes     Partners: Male     Birth control/protection: I.U.D.   Social History Narrative     Lukas - Dolores and Florin, Graciela - Florin        Used to walk, ride bikes. Occasional uses elliptical at home.      Social Determinants of Health     Financial Resource Strain: Low Risk     Difficulty of Paying Living Expenses: Not hard at all   Food Insecurity: No Food Insecurity    Worried About Running Out of Food in the Last Year: Never true    Ran Out of Food in the Last Year: Never true   Transportation Needs: No Transportation Needs    Lack of Transportation (Medical): No    Lack of Transportation (Non-Medical): No   Physical Activity: Insufficiently Active    Days of Exercise per Week: 2 days    Minutes of Exercise per Session: 30 min   Stress: Stress Concern Present    Feeling of Stress : To some extent   Social Connections: Unknown    Frequency of Communication with Friends and Family: More than three times a week    Frequency of Social Gatherings with Friends and Family: Twice a week    Active Member of Clubs or Organizations: Yes    Attends Club or Organization Meetings: More than 4 times per year    Marital Status:    Housing Stability: Low Risk     Unable to Pay for Housing in the Last Year: No    Number of Places Lived in the Last Year: 1    Unstable Housing in the Last Year: No     Family History   Problem Relation Age of Onset    Hypertension Mother     Thyroid disease Mother     Rheum arthritis Mother     Hearing loss Mother     Heart disease Father         Pacemaker    Cancer Maternal Grandmother     Cataracts Maternal Grandmother     Diabetes Maternal Grandmother     Hypertension Maternal  Grandmother     Thyroid disease Maternal Grandmother     Breast cancer Maternal Grandmother     Hearing loss Maternal Grandmother     Cataracts Maternal Grandfather     Diabetes Maternal Grandfather     Hypertension Maternal Grandfather     Thyroid disease Maternal Grandfather     Lupus Cousin     Cancer Maternal Uncle     No Known Problems Paternal Grandmother     No Known Problems Paternal Grandfather     Amblyopia Neg Hx     Blindness Neg Hx     Glaucoma Neg Hx     Macular degeneration Neg Hx     Retinal detachment Neg Hx     Strabismus Neg Hx     Stroke Neg Hx     Ovarian cancer Neg Hx     Colon cancer Neg Hx     Cirrhosis Neg Hx      OB History    Para Term  AB Living   2 2 2         SAB IAB Ectopic Multiple Live Births                  # Outcome Date GA Lbr [place]/2nd Weight Sex Delivery Anes PTL Lv   2 Term      Vag-Spont      1 Term      Vag-Spont              Current Outpatient Medications   Medication Sig Dispense Refill    amitriptyline (ELAVIL) 25 MG tablet TAKE 1 TABLET(25 MG) BY MOUTH EVERY NIGHT AS NEEDED FOR INSOMNIA OR PAIN 30 tablet 0    ascorbic acid, vitamin C, (VITAMIN C) 100 MG tablet Take 100 mg by mouth once daily.      atenoloL-chlorthalidone (TENORETIC) 50-25 mg Tab TAKE 1/2 TABLET BY MOUTH ONCE EVERY DAY 45 tablet 3    celecoxib (CELEBREX) 100 MG capsule Take 1 capsule (100 mg total) by mouth 2 (two) times daily. 60 capsule 2    FLUoxetine 20 MG capsule TAKE 1 CAPSULE(20 MG) BY MOUTH EVERY DAY 30 capsule 2    levonorgestrel (MIRENA) 20 mcg/24 hr (5 years) IUD 1 Intra Uterine Device by Intrauterine route once. for 1 dose 1 Intra Uterine Device 0    loratadine (CLARITIN) 10 mg tablet Take 10 mg by mouth every morning.       metFORMIN (GLUCOPHAGE-XR) 500 MG ER 24hr tablet TAKE 1 TABLET(500 MG) BY MOUTH DAILY WITH BREAKFAST Strength: 500 mg 90 tablet 3    MOUNJARO 2.5 mg/0.5 mL PnIj SMARTSI Pre-Filled Pen Syringe SUB-Q Once a Week      MOUNJARO 5 mg/0.5 mL PnIj SMARTSI  Milligram(s) SUB-Q Once a Week      MOUNJARO 7.5 mg/0.5 mL PnIj SMARTSI.5 Milligram(s) SUB-Q Once a Week      multivitamin capsule Take 1 capsule by mouth once daily.      ondansetron (ZOFRAN-ODT) 4 MG TbDL Take 4 mg by mouth every 6 (six) hours as needed.      potassium chloride SA (K-DUR,KLOR-CON M) 10 MEQ tablet Take 1 tablet (10 mEq total) by mouth once daily. 90 tablet 3    pregabalin (LYRICA) 75 MG capsule Take 1 capsule (75 mg total) by mouth 2 (two) times daily. (Patient not taking: Reported on 2022) 60 capsule 2    vitamin D (VITAMIN D3) 1000 units Tab Take 1,000 Units by mouth once daily.       No current facility-administered medications for this visit.     Allergies: Amoxicillin     The 10-year ASCVD risk score (Gigi PERES, et al., 2019) is: 8.2%    Values used to calculate the score:      Age: 52 years      Sex: Female      Is Non- : Yes      Diabetic: No      Tobacco smoker: No      Systolic Blood Pressure: 150 mmHg      Is BP treated: Yes      HDL Cholesterol: 50 mg/dL      Total Cholesterol: 192 mg/dL      ROS:  Constitutional: +weight loss, weight gain, fever, fatigue  Eyes:  No vision changes, glasses/contacts  ENT/Mouth: No ulcers, sinus problems, ears ringing, headache  Cardiovascular: No inability to lie flat, chest pain, exercise intolerance, swelling, heart palpitations  Respiratory: No wheezing, coughing blood, shortness of breath, or cough  Gastrointestinal: No diarrhea, bloody stool, nausea/vomiting, constipation, gas, hemorrhoids  Genitourinary: No blood in urine, painful urination, urgency of urination, frequency of urination, incomplete emptying, incontinence, abnormal bleeding, painful periods, heavy periods, vaginal discharge, vaginal odor, painful intercourse, sexual problems, bleeding after intercourse.  Musculoskeletal: No muscle weakness  Skin/Breast: No painful breasts, nipple discharge, masses, rash, ulcers  Neurological: No passing out, seizures,  numbness, headache  Endocrine: No diabetes, hypothyroid, hyperthyroid, hot flashes, hair loss, abnormal hair growth, acne  Psychiatric: No depression, crying  Hematologic: No bruises, bleeding, swollen lymph nodes, anemia.      Physical Exam:   Constitutional: She is oriented to person, place, and time. She appears well-developed and well-nourished.      Neck: No tracheal deviation present. No thyromegaly present.    Cardiovascular:       Exam reveals no edema.        Pulmonary/Chest: Effort normal. She exhibits no mass, no tenderness, no deformity and no retraction. Right breast exhibits no inverted nipple, no mass, no nipple discharge, no skin change, no tenderness, presence, no bleeding and no swelling. Left breast exhibits no inverted nipple, no mass, no nipple discharge, no skin change, no tenderness, presence, no bleeding and no swelling. Breasts are symmetrical.        Abdominal: Soft. She exhibits no distension and no mass. There is no abdominal tenderness. There is no rebound and no guarding. No hernia. Hernia confirmed negative in the left inguinal area.     Genitourinary:    Vagina and uterus normal.   Rectum:      No external hemorrhoid.   There is no rash, tenderness or lesion on the right labia. There is no rash, tenderness or lesion on the left labia. Cervix is normal. No no adexnal prolapse. Right adnexum displays no mass, no tenderness and no fullness. Left adnexum displays no mass, no tenderness and no fullness. No  no vaginal discharge, tenderness, bleeding, rectocele, cystocele or unspecified prolapse of vaginal walls in the vagina. Cervix exhibits no motion tenderness, no discharge and no friability. Uterus is not deviated.           Musculoskeletal: Normal range of motion and moves all extremeties. No edema.       Neurological: She is alert and oriented to person, place, and time.    Skin: No rash noted. No erythema. No pallor.    Psychiatric: She has a normal mood and affect. Her behavior is  normal. Judgment and thought content normal.     IUD strings visible at os    ASSESSMENT:   well woman    PLAN:   mammogram  pap smear  return annually or prn

## 2023-01-20 LAB
FINAL PATHOLOGIC DIAGNOSIS: ABNORMAL
Lab: ABNORMAL

## 2023-01-23 LAB
HPV HR 12 DNA SPEC QL NAA+PROBE: NEGATIVE
HPV16 AG SPEC QL: NEGATIVE
HPV18 DNA SPEC QL NAA+PROBE: NEGATIVE

## 2023-01-25 ENCOUNTER — HOSPITAL ENCOUNTER (OUTPATIENT)
Facility: OTHER | Age: 53
Discharge: HOME | End: 2023-01-25
Attending: ANESTHESIOLOGY | Admitting: ANESTHESIOLOGY
Payer: COMMERCIAL

## 2023-01-25 VITALS
HEART RATE: 79 BPM | WEIGHT: 155 LBS | RESPIRATION RATE: 14 BRPM | TEMPERATURE: 98 F | BODY MASS INDEX: 29.27 KG/M2 | OXYGEN SATURATION: 99 % | HEIGHT: 61 IN | DIASTOLIC BLOOD PRESSURE: 72 MMHG | SYSTOLIC BLOOD PRESSURE: 138 MMHG

## 2023-01-25 DIAGNOSIS — G89.29 CHRONIC PAIN: ICD-10-CM

## 2023-01-25 DIAGNOSIS — M47.819 SPONDYLOSIS WITHOUT MYELOPATHY: ICD-10-CM

## 2023-01-25 DIAGNOSIS — M47.899 OTHER OSTEOARTHRITIS OF SPINE, UNSPECIFIED SPINAL REGION: ICD-10-CM

## 2023-01-25 DIAGNOSIS — M47.897 OTHER SPONDYLOSIS, LUMBOSACRAL REGION: Primary | ICD-10-CM

## 2023-01-25 PROCEDURE — 64494 PR INJ DX/THER AGNT PARAVERT FACET JOINT,IMG GUIDE,LUMBAR/SAC, 2ND LEVEL: ICD-10-PCS | Mod: 50,KX,, | Performed by: ANESTHESIOLOGY

## 2023-01-25 PROCEDURE — 64493 INJ PARAVERT F JNT L/S 1 LEV: CPT | Mod: 50,KX,, | Performed by: ANESTHESIOLOGY

## 2023-01-25 PROCEDURE — 64494 INJ PARAVERT F JNT L/S 2 LEV: CPT | Mod: 50,KX,, | Performed by: ANESTHESIOLOGY

## 2023-01-25 PROCEDURE — 63600175 PHARM REV CODE 636 W HCPCS: Performed by: ANESTHESIOLOGY

## 2023-01-25 PROCEDURE — 64493 INJ PARAVERT F JNT L/S 1 LEV: CPT | Mod: 50,KX | Performed by: ANESTHESIOLOGY

## 2023-01-25 PROCEDURE — 64494 INJ PARAVERT F JNT L/S 2 LEV: CPT | Mod: 50,KX | Performed by: ANESTHESIOLOGY

## 2023-01-25 PROCEDURE — 64493 PR INJ DX/THER AGNT PARAVERT FACET JOINT,IMG GUIDE,LUMBAR/SAC,1ST LVL: ICD-10-PCS | Mod: 50,KX,, | Performed by: ANESTHESIOLOGY

## 2023-01-25 PROCEDURE — 25000003 PHARM REV CODE 250: Performed by: ANESTHESIOLOGY

## 2023-01-25 RX ORDER — LIDOCAINE HYDROCHLORIDE 20 MG/ML
INJECTION, SOLUTION INFILTRATION; PERINEURAL
Status: DISCONTINUED | OUTPATIENT
Start: 2023-01-25 | End: 2023-01-25 | Stop reason: HOSPADM

## 2023-01-25 RX ORDER — MIDAZOLAM HYDROCHLORIDE 2 MG/2ML
INJECTION, SOLUTION INTRAMUSCULAR; INTRAVENOUS
Status: DISCONTINUED | OUTPATIENT
Start: 2023-01-25 | End: 2023-01-25 | Stop reason: HOSPADM

## 2023-01-25 RX ORDER — BUPIVACAINE HYDROCHLORIDE 2.5 MG/ML
INJECTION, SOLUTION EPIDURAL; INFILTRATION; INTRACAUDAL
Status: DISCONTINUED | OUTPATIENT
Start: 2023-01-25 | End: 2023-01-25 | Stop reason: HOSPADM

## 2023-01-25 RX ORDER — SODIUM CHLORIDE 9 MG/ML
500 INJECTION, SOLUTION INTRAVENOUS CONTINUOUS
Status: DISCONTINUED | OUTPATIENT
Start: 2023-01-25 | End: 2023-01-25 | Stop reason: HOSPADM

## 2023-01-25 NOTE — INTERVAL H&P NOTE
The patient has been examined and the H&P has been reviewed:    I concur with the findings and no changes have occurred since H&P was written.    Procedure risks, benefits and alternative options discussed and understood by patient/family.

## 2023-01-25 NOTE — OP NOTE
Diagnostic Lumbar Medial Branch Block Under Fluoroscopy    The procedure, risks, benefits, and options were discussed with the patient. There are no contraindications to the procedure. The patent expressed understanding and agreed to the procedure. Informed written consent was obtained prior to the start of the procedure and can be found in the patient's chart.    PATIENT NAME: Sheila Costa   MRN: [id number]     DATE OF PROCEDURE: 01/25/2023                                           PROCEDURE:  Diagnostic Bilateral L3, L4, and L5 Lumbar Medial Branch Block under Fluoroscopy    PRE-OP DIAGNOSIS: Spondylosis of lumbar region without myelopathy or radiculopathy [M47.816] Lumbar spondylosis [M47.816]    POST-OP DIAGNOSIS: Same    PHYSICIAN: Cosme Kirkland MD    ASSISTANTS: Maurilio Somers MD    MEDICATIONS INJECTED:  Bupivicaine 0.25%    LOCAL ANESTHETIC INJECTED:   Xylocaine 2%    SEDATION: None    ESTIMATED BLOOD LOSS:  None    COMPLICATIONS:  None.    INTERVAL HISTORY: Patient has clinical and imaging findings suggestive of facet mediated pain. Patient had a previous diagnostic block performed with at least 80% relief in pain and/or at least 50% improvement in the ability to perform previously painful movements and ADLs for the expected duration of the local anesthetic utilized.    TECHNIQUE: Time-out was performed to identify the patient and procedure to be performed. With the patient laying in a prone position, the surgical area was prepped and draped in the usual sterile fashion using ChloraPrep and fenestrated drape. The levels were determined under fluoroscopic guidance. Skin anesthesia was achieved by injecting Lidocaine 2% over the injection sites. A 22 gauge, 3.5 inch needle was introduced into the medial branch nerves at the junctions of the superior articular process and the transverse processes of the targeted sites using AP, lateral and/or contralateral oblique fluoroscopic imaging. After  negative aspiration for blood or CSF was confirmed, 1.5 mL of the anesthetic listed above was then slowly injected at each site. The needles were removed and bleeding was nil. A sterile dressing was applied. No specimens collected. The patient tolerated the procedure well.     The patient was monitored after the procedure in the recovery area. They were given post-procedure and discharge instructions to follow at home. The patient was discharged in a stable condition.    Maurilio Somers MD  [facility], PGY-4    I reviewed and edited the fellow's note. I conducted my own interview and physical examination. I agree with the findings. I was present and supervising all critical portions of the procedure.    Cosme Kirkland MD

## 2023-01-25 NOTE — DISCHARGE SUMMARY
Discharge Note  Short Stay      SUMMARY     Admit Date: 1/25/2023    Attending Physician: Cosme Kirkland    Discharge Physician: Maurilio Somers      Discharge Date: 1/25/2023 2:46 PM    Procedure(s) (LRB):  BLOCK, NERVE BILATERAL L3,L4,L5 MEDIAL BRANCH (Bilateral)    Final Diagnosis: Spondylosis of lumbar region without myelopathy or radiculopathy [M47.816]    Disposition: Home or self care    Patient Instructions:   Current Discharge Medication List        CONTINUE these medications which have NOT CHANGED    Details   atenoloL-chlorthalidone (TENORETIC) 50-25 mg Tab TAKE 1/2 TABLET BY MOUTH ONCE EVERY DAY  Qty: 45 tablet, Refills: 3    Associated Diagnoses: Essential hypertension      celecoxib (CELEBREX) 100 MG capsule TAKE 1 CAPSULE(100 MG) BY MOUTH TWICE DAILY  Qty: 60 capsule, Refills: 2    Associated Diagnoses: Cervical spondylosis without myelopathy      metFORMIN (GLUCOPHAGE-XR) 500 MG ER 24hr tablet TAKE 1 TABLET(500 MG) BY MOUTH DAILY WITH BREAKFAST Strength: 500 mg  Qty: 90 tablet, Refills: 3    Associated Diagnoses: Pre-diabetes      amitriptyline (ELAVIL) 25 MG tablet TAKE 1 TABLET(25 MG) BY MOUTH EVERY NIGHT AS NEEDED FOR INSOMNIA OR PAIN  Qty: 30 tablet, Refills: 0    Comments: ZERO refills remain on this prescription. Your patient is requesting advance approval of refills for this medication to PREVENT ANY MISSED DOSES  Associated Diagnoses: Cervical radiculopathy; DDD (degenerative disc disease), cervical      ascorbic acid, vitamin C, (VITAMIN C) 100 MG tablet Take 100 mg by mouth once daily.      FLUoxetine 20 MG capsule TAKE 1 CAPSULE(20 MG) BY MOUTH EVERY DAY  Qty: 30 capsule, Refills: 2    Associated Diagnoses: PMDD (premenstrual dysphoric disorder)      levonorgestrel (MIRENA) 20 mcg/24 hr (5 years) IUD 1 Intra Uterine Device by Intrauterine route once. for 1 dose  Qty: 1 Intra Uterine Device, Refills: 0    Associated Diagnoses: Contraception, device intrauterine      loratadine (CLARITIN)  10 mg tablet Take 10 mg by mouth every morning.       MOUNJARO 2.5 mg/0.5 mL PnIj SMARTSI Pre-Filled Pen Syringe SUB-Q Once a Week      MOUNJARO 5 mg/0.5 mL PnIj SMARTSI Milligram(s) SUB-Q Once a Week      MOUNJARO 7.5 mg/0.5 mL PnIj SMARTSI.5 Milligram(s) SUB-Q Once a Week      multivitamin capsule Take 1 capsule by mouth once daily.      ondansetron (ZOFRAN-ODT) 4 MG TbDL Take 4 mg by mouth every 6 (six) hours as needed.      potassium chloride SA (K-DUR,KLOR-CON M) 10 MEQ tablet Take 1 tablet (10 mEq total) by mouth once daily.  Qty: 90 tablet, Refills: 3    Associated Diagnoses: Hypokalemia      pregabalin (LYRICA) 75 MG capsule Take 1 capsule (75 mg total) by mouth 2 (two) times daily.  Qty: 60 capsule, Refills: 2      vitamin D (VITAMIN D3) 1000 units Tab Take 1,000 Units by mouth once daily.                 Discharge Diagnosis: Spondylosis of lumbar region without myelopathy or radiculopathy [M47.816]  Condition on Discharge: Stable with no complications to procedure   Diet on Discharge: Same as before.  Activity: as per instruction sheet.  Discharge to: Home with a responsible adult.  Follow up: 2-4 weeks       Please call my office or pager at [phone] if experienced any weakness or loss of sensation, fever > 101.5, pain uncontrolled with oral medications, persistent nausea/vomiting/or diarrhea, redness or drainage from the incisions, or any other worrisome concerns. If physician on call was not reached or could not communicate with our office for any reason please go to the nearest emergency department        Cosme Kirkland

## 2023-01-25 NOTE — DISCHARGE INSTRUCTIONS

## 2023-01-30 ENCOUNTER — PATIENT MESSAGE (OUTPATIENT)
Dept: PAIN MEDICINE | Facility: CLINIC | Age: 53
End: 2023-01-30
Payer: COMMERCIAL

## 2023-01-30 ENCOUNTER — TELEPHONE (OUTPATIENT)
Dept: PAIN MEDICINE | Facility: CLINIC | Age: 53
End: 2023-01-30
Payer: COMMERCIAL

## 2023-01-30 NOTE — TELEPHONE ENCOUNTER
----- Message from Iliana Noel sent at 1/30/2023 12:52 PM CST -----  Regarding: Pain Diary  Contact: SHEILA EPSTEIN [[id number]]  Name of Who is Calling: Sheila Epstein            What is the request in detail: [facility]   Before injection  -9  30 min after -0   3:30-4:30- 0  5:30-6:30- 1   8:30-10:30- 2   1 day  after- 5/6  2 days after - 8      Can the clinic reply by [facility]: No           What Number to Call Back if not in [facility]: Home Phone      [phone]  Work Phone      Not on file.  Mobile          [phone]

## 2023-01-31 ENCOUNTER — TELEPHONE (OUTPATIENT)
Dept: PAIN MEDICINE | Facility: CLINIC | Age: 53
End: 2023-01-31
Payer: COMMERCIAL

## 2023-02-01 ENCOUNTER — OFFICE VISIT (OUTPATIENT)
Dept: PAIN MEDICINE | Facility: CLINIC | Age: 53
End: 2023-02-01
Payer: COMMERCIAL

## 2023-02-01 DIAGNOSIS — M47.816 SPONDYLOSIS OF LUMBAR REGION WITHOUT MYELOPATHY OR RADICULOPATHY: Primary | ICD-10-CM

## 2023-02-01 DIAGNOSIS — M51.36 DDD (DEGENERATIVE DISC DISEASE), LUMBAR: ICD-10-CM

## 2023-02-01 DIAGNOSIS — M79.18 MYOFASCIAL PAIN: ICD-10-CM

## 2023-02-01 PROCEDURE — 1159F MED LIST DOCD IN RCRD: CPT | Mod: CPTII,95,, | Performed by: NURSE PRACTITIONER

## 2023-02-01 PROCEDURE — 1160F RVW MEDS BY RX/DR IN RCRD: CPT | Mod: CPTII,95,, | Performed by: NURSE PRACTITIONER

## 2023-02-01 PROCEDURE — 1159F PR MEDICATION LIST DOCUMENTED IN MEDICAL RECORD: ICD-10-PCS | Mod: CPTII,95,, | Performed by: NURSE PRACTITIONER

## 2023-02-01 PROCEDURE — 99213 OFFICE O/P EST LOW 20 MIN: CPT | Mod: 95,,, | Performed by: NURSE PRACTITIONER

## 2023-02-01 PROCEDURE — 99213 PR OFFICE/OUTPT VISIT, EST, LEVL III, 20-29 MIN: ICD-10-PCS | Mod: 95,,, | Performed by: NURSE PRACTITIONER

## 2023-02-01 PROCEDURE — 1160F PR REVIEW ALL MEDS BY PRESCRIBER/CLIN PHARMACIST DOCUMENTED: ICD-10-PCS | Mod: CPTII,95,, | Performed by: NURSE PRACTITIONER
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
## 2023-02-02 ENCOUNTER — TELEPHONE (OUTPATIENT)
Dept: PAIN MEDICINE | Facility: OTHER | Age: 53
End: 2023-02-02
Payer: COMMERCIAL

## 2023-02-03 ENCOUNTER — TELEPHONE (OUTPATIENT)
Dept: PAIN MEDICINE | Facility: OTHER | Age: 53
End: 2023-02-03
Payer: COMMERCIAL

## 2023-02-03 ENCOUNTER — PATIENT MESSAGE (OUTPATIENT)
Dept: PAIN MEDICINE | Facility: OTHER | Age: 53
End: 2023-02-03
Payer: COMMERCIAL

## 2023-02-03 NOTE — TELEPHONE ENCOUNTER
----- Message from Cosme Kirkland MD sent at 2/3/2023 11:37 AM CST -----  Yes we can do that for her but let her know her back will be very sore for about a week     MG  ----- Message -----  From: Winnie Walton  Sent: 2/3/2023  10:47 AM CST  To: Cosme Kirkland MD, Tessa Guzmán NP    Patient would like to know if she can get both sides done at the same time for lumbar RFA? Please advise.    Thanks

## 2023-02-22 ENCOUNTER — PATIENT MESSAGE (OUTPATIENT)
Dept: ADMINISTRATIVE | Facility: OTHER | Age: 53
End: 2023-02-22
Payer: COMMERCIAL

## 2023-02-27 ENCOUNTER — TELEPHONE (OUTPATIENT)
Dept: PAIN MEDICINE | Facility: CLINIC | Age: 53
End: 2023-02-27
Payer: COMMERCIAL

## 2023-02-27 NOTE — TELEPHONE ENCOUNTER
----- Message from Dora Munguia sent at 2/27/2023  3:43 PM CST -----  Type:  Patient Returning Call    Who Called:     Who Left Message for Patient:     Does the patient know what this is regarding?: missed call     Best Call Back Number:  [phone]    Additional Information:

## 2023-02-27 NOTE — PROGRESS NOTES
INTERNAL MEDICINE CLINIC - SAME DAY APPOINTMENT  Progress Note    PRESENTING HISTORY     PCP: Jose Moore MD    Chief Complaint/Reason for Visit:   No chief complaint on file.    History of Present Illness & ROS : Ms. Sheila Costa is a 52 y.o. female.    Annual today.     Review of Systems:  Eyes: denies visual changes at this time denies floaters   ENT: no nasal congestion or sore throat  Respiratory: no cough or shorness of breath  Cardiovascular: no chest pain or palpitations  Gastrointestinal: no nausea or vomiting, no abdominal pain or change in bowel habits  Genitourinary: no hematuria or dysuria; denies frequency  Hematologic/Lymphatic: no easy bruising or lymphadenopathy  Musculoskeletal: no arthralgias or myalgias  Neurological: no seizures or tremors  Endocrine: no heat or cold intolerance      PAST HISTORY:     Past Medical History:   Diagnosis Date    Alpha thalassemia     Cervical spondylosis 12/30/2019    Colon polyp     Fatty liver     Hypertension     Premenstrual symptom        Past Surgical History:   Procedure Laterality Date    COLONOSCOPY N/A 6/18/2020    Procedure: COLONOSCOPY;  Surgeon: Eliot Hill MD;  Location: [address]);  Service: Endoscopy;  Laterality: N/A;  COVID screening on 6/16/20-[facility]    COLONOSCOPY N/A 7/6/2020    Procedure: COLONOSCOPY;  Surgeon: Kang Guzmán MD;  Location: [facility] OR [address] [place] [address];  Service: Colon and Rectal;  Laterality: N/A;    COLONOSCOPY N/A 7/9/2021    Procedure: COLONOSCOPY;  Surgeon: GREGG Guzmán MD;  Location: [address]);  Service: Endoscopy;  Laterality: N/A;  in July 2021  covid test [place] 7/6    EPIDURAL STEROID INJECTION N/A 10/2/2019    Procedure: INJECTION, STEROID, EPIDURAL, C7-T1;  Surgeon: Cosme Kirkland MD;  Location: [facility] PAIN MGT;  Service: Pain Management;  Laterality: N/A;    EPIDURAL STEROID INJECTION N/A 12/22/2021    Procedure: INJECTION, STEROID, EPIDURAL, C7-T1 NEED CONSENT;   Surgeon: Cosme Kirkland MD;  Location: [facility] PAIN MGT;  Service: Pain Management;  Laterality: N/A;    EPIDURAL STEROID INJECTION N/A 7/6/2022    Procedure: INJECTION, STEROID, EPIDURAL, C7-T1 IL  CONTRAST;  Surgeon: Cosme Kirkland MD;  Location: [facility] PAIN MGT;  Service: Pain Management;  Laterality: N/A;    EYE SURGERY      INJECTION OF ANESTHETIC AGENT AROUND NERVE Bilateral 1/8/2020    Procedure: BLOCK, NERVE C4,5,6;  Surgeon: Cosme Kirkland MD;  Location: [facility] PAIN MGT;  Service: Pain Management;  Laterality: Bilateral;  B/L MBB C4,5,6    INJECTION OF ANESTHETIC AGENT AROUND NERVE Bilateral 1/29/2020    Procedure: BLOCK, NERVE, Repeat C4-C5-C6 MEDIAL BRANCH;  Surgeon: Cosme Kirkland MD;  Location: [facility] PAIN MGT;  Service: Pain Management;  Laterality: Bilateral;    INJECTION OF ANESTHETIC AGENT AROUND NERVE Bilateral 12/14/2022    Procedure: BLOCK, NERVE BILATERAL L3,L4,L5 MEDIAL BRANCH NEEDS CONSENT;  Surgeon: Cosme Kirkland MD;  Location: [facility] PAIN MGT;  Service: Pain Management;  Laterality: Bilateral;    INJECTION OF ANESTHETIC AGENT AROUND NERVE Bilateral 1/25/2023    Procedure: BLOCK, NERVE BILATERAL L3,L4,L5 MEDIAL BRANCH;  Surgeon: Cosme Kirkland MD;  Location: [facility] PAIN MGT;  Service: Pain Management;  Laterality: Bilateral;    INJECTION OF JOINT Left 5/18/2022    Procedure: INJECTION, JOINT, LEFT SI and GTB *LORI PT*;  Surgeon: Jed Phillips MD;  Location: [facility] PAIN MGT;  Service: Pain Management;  Laterality: Left;    LAPAROSCOPIC SURGICAL REMOVAL OF CECUM N/A 7/6/2020    Procedure: EXCISION, CECUM, LAPAROSCOPIC, colonoscopy to start, ERAS low;  Surgeon: Kang Guzmán MD;  Location: [facility] OR 2ND FLR;  Service: Colon and Rectal;  Laterality: N/A;    RADIOFREQUENCY ABLATION Left 3/4/2020    Procedure: RADIOFREQUENCY ABLATION, C4-C5-C6 ME DIAL BRANCH 1 OF 2 need consent;  Surgeon: Cosme Kirkland MD;  Location: [facility] PAIN MGT;  Service: Pain Management;  Laterality:  Left;    RADIOFREQUENCY ABLATION Right 6/3/2020    Procedure: RADIOFREQUENCY ABLATION, C4-C5-C6 MEDIAL BRANCH 2 OF 2 need consent;  Surgeon: Cosme Kirkland MD;  Location: [facility] PAIN MGT;  Service: Pain Management;  Laterality: Right;    RADIOFREQUENCY ABLATION Right 3/23/2022    Procedure: Radiofrequency Ablation RIGHT C4,5,6;  Surgeon: Cosme Kirkland MD;  Location: [facility] PAIN MGT;  Service: Pain Management;  Laterality: Right;    RADIOFREQUENCY ABLATION Left 4/6/2022    Procedure: Radiofrequency Ablation LEFT C4,5,6;  Surgeon: Cosme Kirkland MD;  Location: [facility] PAIN MGT;  Service: Pain Management;  Laterality: Left;    REFRACTIVE SURGERY  2008    SMALL INTESTINE SURGERY  7/6/20       Family History   Problem Relation Age of Onset    Hypertension Mother     Thyroid disease Mother     Rheum arthritis Mother     Hearing loss Mother     Heart disease Father         Pacemaker    Cancer Maternal Grandmother     Cataracts Maternal Grandmother     Diabetes Maternal Grandmother     Hypertension Maternal Grandmother     Thyroid disease Maternal Grandmother     Breast cancer Maternal Grandmother     Hearing loss Maternal Grandmother     Cataracts Maternal Grandfather     Diabetes Maternal Grandfather     Hypertension Maternal Grandfather     Thyroid disease Maternal Grandfather     Lupus Cousin     Cancer Maternal Uncle     No Known Problems Paternal Grandmother     No Known Problems Paternal Grandfather     Amblyopia Neg Hx     Blindness Neg Hx     Glaucoma Neg Hx     Macular degeneration Neg Hx     Retinal detachment Neg Hx     Strabismus Neg Hx     Stroke Neg Hx     Ovarian cancer Neg Hx     Colon cancer Neg Hx     Cirrhosis Neg Hx        Social History     Socioeconomic History    Marital status:    Occupational History     Employer: [employer]   Tobacco Use    Smoking status: Never    Smokeless tobacco: Never   Substance and Sexual Activity    Alcohol use: Yes     Alcohol/week: 1.0 standard drink      Types: 1 Standard drinks or equivalent per week     Comment: 3 drinks weekly     Drug use: No    Sexual activity: Yes     Partners: Male     Birth control/protection: I.U.D.   Social History Narrative     Lukas - Dolores and Florin, Charliescitlali - Florin        Used to walk, ride bikes. Occasional uses elliptical at home.      Social Determinants of Health     Financial Resource Strain: Low Risk     Difficulty of Paying Living Expenses: Not hard at all   Food Insecurity: No Food Insecurity    Worried About Running Out of Food in the Last Year: Never true    Ran Out of Food in the Last Year: Never true   Transportation Needs: No Transportation Needs    Lack of Transportation (Medical): No    Lack of Transportation (Non-Medical): No   Physical Activity: Insufficiently Active    Days of Exercise per Week: 2 days    Minutes of Exercise per Session: 30 min   Stress: Stress Concern Present    Feeling of Stress : To some extent   Social Connections: Unknown    Frequency of Communication with Friends and Family: More than three times a week    Frequency of Social Gatherings with Friends and Family: Twice a week    Active Member of Clubs or Organizations: Yes    Attends Club or Organization Meetings: More than 4 times per year    Marital Status:    Housing Stability: Low Risk     Unable to Pay for Housing in the Last Year: No    Number of Places Lived in the Last Year: 1    Unstable Housing in the Last Year: No       MEDICATIONS & ALLERGIES:     Current Outpatient Medications on File Prior to Visit   Medication Sig Dispense Refill    amitriptyline (ELAVIL) 25 MG tablet TAKE 1 TABLET(25 MG) BY MOUTH EVERY NIGHT AS NEEDED FOR INSOMNIA OR PAIN 30 tablet 0    ascorbic acid, vitamin C, (VITAMIN C) 100 MG tablet Take 100 mg by mouth once daily.      atenoloL-chlorthalidone (TENORETIC) 50-25 mg Tab TAKE 1/2 TABLET BY MOUTH ONCE EVERY DAY 45 tablet 3    celecoxib (CELEBREX) 100 MG capsule TAKE 1 CAPSULE(100  MG) BY MOUTH TWICE DAILY 60 capsule 2    FLUoxetine 20 MG capsule TAKE 1 CAPSULE(20 MG) BY MOUTH EVERY DAY 30 capsule 2    levonorgestrel (MIRENA) 20 mcg/24 hr (5 years) IUD 1 Intra Uterine Device by Intrauterine route once. for 1 dose 1 Intra Uterine Device 0    loratadine (CLARITIN) 10 mg tablet Take 10 mg by mouth every morning.       metFORMIN (GLUCOPHAGE-XR) 500 MG ER 24hr tablet TAKE 1 TABLET(500 MG) BY MOUTH DAILY WITH BREAKFAST Strength: 500 mg 90 tablet 3    MOUNJARO 2.5 mg/0.5 mL PnIj SMARTSI Pre-Filled Pen Syringe SUB-Q Once a Week      MOUNJARO 5 mg/0.5 mL PnIj SMARTSI Milligram(s) SUB-Q Once a Week      MOUNJARO 7.5 mg/0.5 mL PnIj SMARTSI.5 Milligram(s) SUB-Q Once a Week      multivitamin capsule Take 1 capsule by mouth once daily.      ondansetron (ZOFRAN-ODT) 4 MG TbDL Take 4 mg by mouth every 6 (six) hours as needed.      potassium chloride SA (K-DUR,KLOR-CON M) 10 MEQ tablet Take 1 tablet (10 mEq total) by mouth once daily. 90 tablet 3    vitamin D (VITAMIN D3) 1000 units Tab Take 1,000 Units by mouth once daily.       No current facility-administered medications on file prior to visit.        Review of patient's allergies indicates:   Allergen Reactions    Amoxicillin Hives       Medications Reconciliation:   I have reconciled the patient's home medications with the patient/family. I have updated all changes.  Refer to After-Visit Medication List.    OBJECTIVE:     Vital Signs:  There were no vitals filed for this visit.  Wt Readings from Last 3 Encounters:   23 1420 70.3 kg (155 lb)   23 1506 72.1 kg (159 lb)   22 1509 72.6 kg (160 lb)     There is no height or weight on file to calculate BMI.   Wt Readings from Last 3 Encounters:   23 70.5 kg (155 lb 6.8 oz)   23 68.9 kg (152 lb)   23 70.3 kg (155 lb)     Temp Readings from Last 3 Encounters:   23 97.7 °F (36.5 °C) (Oral)   23 98.2 °F (36.8 °C) (Temporal)   22 97.9 °F (36.6 °C) (Oral)      BP Readings from Last 3 Encounters:   03/03/23 (!) 150/80   03/01/23 (!) 141/76   01/25/23 138/72     Pulse Readings from Last 3 Encounters:   03/03/23 60   03/01/23 66   01/25/23 79       Physical Exam:  General: Well developed, well nourished. No distress.  HEENT: Head is normocephalic, atraumatic; ears are normal.   Eyes: Clear conjunctiva.  Neck: Supple, symmetrical neck; trachea midline.  Lungs: Clear to auscultation bilaterally and normal respiratory effort.  Cardiovascular: Heart with regular rate and rhythm. No murmurs, gallops or rubs  Extremities: No LE edema. Pulses 2+ and symmetric.   Abdomen: Abdomen is soft, non-tender non-distended with normal bowel sounds.  Skin: Skin color, texture, turgor normal. No rashes.  Musculoskeletal: Normal gait.   Lymph Nodes: No cervical or supraclavicular adenopathy.  Neurologic: Normal strength and tone. No focal numbness or weakness.   Psychiatric: Not depressed.    Laboratory  Lab Results   Component Value Date    WBC 8.96 08/24/2022    HGB 12.0 08/24/2022    HCT 38.7 08/24/2022     08/24/2022    CHOL 192 09/14/2022    TRIG 141 09/14/2022    HDL 50 09/14/2022    ALT 18 09/14/2022    AST 17 09/14/2022     09/14/2022    K 3.7 09/14/2022     09/14/2022    CREATININE 0.7 09/14/2022    BUN 12 09/14/2022    CO2 29 09/14/2022    TSH 3.472 09/14/2022    HGBA1C 6.1 (H) 09/14/2022       ASSESSMENT & PLAN:     Too early for Annual. Last was in 9/2022.   Follow up today.   Theodore per GYN.     Chronic Fatigue:   Referred to Sleep Clinic   Normal TSH and B12 levels in 9/2022; followed by Dr. Venegas for USHA.     CPS:   ` followed by Spine / Pain Mgt  ` Celebrex    HTN:   Today:   ` Tenoretic    SUKHWINDER:   Prozac  Elavil      DM, Pre:  Lab Results   Component Value Date    HGBA1C 6.1 (H) 09/14/2022   ` Metformin     Alpha Thal  Followed by Hematology         Future Appointments   Date Time Provider Department Center   3/3/2023  2:00 PM URIAH Nicholson  [facility] IM [address] PCW   3/22/2023 11:20 AM Tessa Guzmán NP [facility]       After Visit Medication List :     Medication List            Accurate as of February 27, 2023  5:06 AM. If you have any questions, ask your nurse or doctor.                CONTINUE taking these medications      amitriptyline 25 MG tablet  Commonly known as: ELAVIL  TAKE 1 TABLET(25 MG) BY MOUTH EVERY NIGHT AS NEEDED FOR INSOMNIA OR PAIN     ascorbic acid (vitamin C) 100 MG tablet  Commonly known as: VITAMIN C     atenoloL-chlorthalidone 50-25 mg Tab  Commonly known as: TENORETIC  TAKE 1/2 TABLET BY MOUTH ONCE EVERY DAY     celecoxib 100 MG capsule  Commonly known as: CeleBREX  TAKE 1 CAPSULE(100 MG) BY MOUTH TWICE DAILY     FLUoxetine 20 MG capsule  TAKE 1 CAPSULE(20 MG) BY MOUTH EVERY DAY     levonorgestreL 21 mcg/24 hours (8 yrs) 52 mg IUD  Commonly known as: MIRENA  1 Intra Uterine Device by Intrauterine route once. for 1 dose     loratadine 10 mg tablet  Commonly known as: CLARITIN     metFORMIN 500 MG ER 24hr tablet  Commonly known as: GLUCOPHAGE-XR  TAKE 1 TABLET(500 MG) BY MOUTH DAILY WITH BREAKFAST Strength: 500 mg     * MOUNJARO 2.5 mg/0.5 mL Pnij  Generic drug: tirzepatide     * MOUNJARO 5 mg/0.5 mL Pnij  Generic drug: tirzepatide     * MOUNJARO 7.5 mg/0.5 mL Pnij  Generic drug: tirzepatide     multivitamin capsule     ondansetron 4 MG Tbdl  Commonly known as: ZOFRAN-ODT     potassium chloride SA 10 MEQ tablet  Commonly known as: K-DUR,KLOR-CON M  Take 1 tablet (10 mEq total) by mouth once daily.     vitamin D 1000 units Tab  Commonly known as: VITAMIN D3           * This list has 3 medication(s) that are the same as other medications prescribed for you. Read the directions carefully, and ask your doctor or other care provider to review them with you.                  Signing Physician:  URIAH Khanna

## 2023-02-28 ENCOUNTER — PATIENT MESSAGE (OUTPATIENT)
Dept: PAIN MEDICINE | Facility: OTHER | Age: 53
End: 2023-02-28
Payer: COMMERCIAL

## 2023-03-01 ENCOUNTER — HOSPITAL ENCOUNTER (OUTPATIENT)
Facility: OTHER | Age: 53
Discharge: HOME | End: 2023-03-01
Attending: ANESTHESIOLOGY | Admitting: ANESTHESIOLOGY
Payer: COMMERCIAL

## 2023-03-01 VITALS
OXYGEN SATURATION: 98 % | HEART RATE: 66 BPM | DIASTOLIC BLOOD PRESSURE: 76 MMHG | RESPIRATION RATE: 16 BRPM | HEIGHT: 61 IN | TEMPERATURE: 98 F | WEIGHT: 152 LBS | BODY MASS INDEX: 28.7 KG/M2 | SYSTOLIC BLOOD PRESSURE: 141 MMHG

## 2023-03-01 DIAGNOSIS — M47.816 SPONDYLOSIS OF LUMBAR REGION WITHOUT MYELOPATHY OR RADICULOPATHY: Primary | ICD-10-CM

## 2023-03-01 DIAGNOSIS — M51.36 DDD (DEGENERATIVE DISC DISEASE), LUMBAR: ICD-10-CM

## 2023-03-01 DIAGNOSIS — M47.9 OSTEOARTHRITIS OF SPINE, UNSPECIFIED SPINAL OSTEOARTHRITIS COMPLICATION STATUS, UNSPECIFIED SPINAL REGION: ICD-10-CM

## 2023-03-01 DIAGNOSIS — G89.29 CHRONIC PAIN: ICD-10-CM

## 2023-03-01 PROCEDURE — 99152 MOD SED SAME PHYS/QHP 5/>YRS: CPT | Performed by: ANESTHESIOLOGY

## 2023-03-01 PROCEDURE — 25000003 PHARM REV CODE 250: Performed by: ANESTHESIOLOGY

## 2023-03-01 PROCEDURE — 64635 DESTROY LUMB/SAC FACET JNT: CPT | Mod: 50 | Performed by: ANESTHESIOLOGY

## 2023-03-01 PROCEDURE — 64635 PR DESTROY LUMB/SAC FACET JNT: ICD-10-PCS | Mod: 50,,, | Performed by: ANESTHESIOLOGY

## 2023-03-01 PROCEDURE — 99152 PR MOD CONSCIOUS SEDATION, SAME PHYS, 5+ YRS, FIRST 15 MIN: ICD-10-PCS | Mod: ,,, | Performed by: ANESTHESIOLOGY

## 2023-03-01 PROCEDURE — 63600175 PHARM REV CODE 636 W HCPCS: Performed by: ANESTHESIOLOGY

## 2023-03-01 PROCEDURE — 99152 MOD SED SAME PHYS/QHP 5/>YRS: CPT | Mod: ,,, | Performed by: ANESTHESIOLOGY

## 2023-03-01 PROCEDURE — 64636 DESTROY L/S FACET JNT ADDL: CPT | Mod: 50,,, | Performed by: ANESTHESIOLOGY

## 2023-03-01 PROCEDURE — 64636 DESTROY L/S FACET JNT ADDL: CPT | Mod: 50 | Performed by: ANESTHESIOLOGY

## 2023-03-01 PROCEDURE — 64635 DESTROY LUMB/SAC FACET JNT: CPT | Mod: 50,,, | Performed by: ANESTHESIOLOGY

## 2023-03-01 PROCEDURE — 64636 PR DESTROY L/S FACET JNT ADDL: ICD-10-PCS | Mod: 50,,, | Performed by: ANESTHESIOLOGY

## 2023-03-01 RX ORDER — MIDAZOLAM HYDROCHLORIDE 1 MG/ML
INJECTION INTRAMUSCULAR; INTRAVENOUS
Status: DISCONTINUED | OUTPATIENT
Start: 2023-03-01 | End: 2023-03-01 | Stop reason: HOSPADM

## 2023-03-01 RX ORDER — SODIUM CHLORIDE 9 MG/ML
500 INJECTION, SOLUTION INTRAVENOUS CONTINUOUS
Status: DISCONTINUED | OUTPATIENT
Start: 2023-03-01 | End: 2023-03-01 | Stop reason: HOSPADM

## 2023-03-01 RX ORDER — LIDOCAINE HYDROCHLORIDE 20 MG/ML
INJECTION, SOLUTION INFILTRATION; PERINEURAL
Status: DISCONTINUED | OUTPATIENT
Start: 2023-03-01 | End: 2023-03-01 | Stop reason: HOSPADM

## 2023-03-01 RX ORDER — BUPIVACAINE HYDROCHLORIDE 2.5 MG/ML
INJECTION, SOLUTION EPIDURAL; INFILTRATION; INTRACAUDAL
Status: DISCONTINUED | OUTPATIENT
Start: 2023-03-01 | End: 2023-03-01 | Stop reason: HOSPADM

## 2023-03-01 RX ORDER — FENTANYL CITRATE 50 UG/ML
INJECTION, SOLUTION INTRAMUSCULAR; INTRAVENOUS
Status: DISCONTINUED | OUTPATIENT
Start: 2023-03-01 | End: 2023-03-01 | Stop reason: HOSPADM

## 2023-03-01 RX ORDER — DEXAMETHASONE SODIUM PHOSPHATE 10 MG/ML
INJECTION INTRAMUSCULAR; INTRAVENOUS
Status: DISCONTINUED | OUTPATIENT
Start: 2023-03-01 | End: 2023-03-01 | Stop reason: HOSPADM

## 2023-03-01 NOTE — INTERVAL H&P NOTE
The patient was examined and no significant changes were noted from the updated H&P or last clinic note.    The risks and benefits of this procedure, including alternative therapies, were discussed with the patient.  The discussion of risks included infection, bleeding, need for additional procedures or surgery, nerve damage, paralysis, adverse medication reaction(s), stroke, and if appropriate for the procedure, death.  Questions regarding the procedure, risks, expected outcome, and possible side effects were solicited and answered to Sheila's satisfaction.  Sheila Costa wishes to proceed with the injection or procedure as confirmed by written consent.

## 2023-03-01 NOTE — DISCHARGE SUMMARY
Discharge Note  Short Stay      SUMMARY     Admit Date: 3/1/2023    Attending Physician: Cosme Sandoval    Discharge Physician: Norma Catherine      Discharge Date: 3/1/2023 3:19 PM    Procedure(s) (LRB):  RADIOFREQUENCY ABLATION BILATERAL L3,L4,L5 BOTH SIDES SAME TIME PER DR SANDOVAL (Bilateral)    Final Diagnosis: Spondylosis of lumbar region without myelopathy or radiculopathy [M47.816]    Disposition: Home or self care    Patient Instructions:   Current Discharge Medication List        CONTINUE these medications which have NOT CHANGED    Details   amitriptyline (ELAVIL) 25 MG tablet TAKE 1 TABLET(25 MG) BY MOUTH EVERY NIGHT AS NEEDED FOR INSOMNIA OR PAIN  Qty: 30 tablet, Refills: 0    Comments: ZERO refills remain on this prescription. Your patient is requesting advance approval of refills for this medication to PREVENT ANY MISSED DOSES  Associated Diagnoses: Cervical radiculopathy; DDD (degenerative disc disease), cervical      ascorbic acid, vitamin C, (VITAMIN C) 100 MG tablet Take 100 mg by mouth once daily.      atenoloL-chlorthalidone (TENORETIC) 50-25 mg Tab TAKE 1/2 TABLET BY MOUTH ONCE EVERY DAY  Qty: 45 tablet, Refills: 3    Associated Diagnoses: Essential hypertension      celecoxib (CELEBREX) 100 MG capsule TAKE 1 CAPSULE(100 MG) BY MOUTH TWICE DAILY  Qty: 60 capsule, Refills: 2    Associated Diagnoses: Cervical spondylosis without myelopathy      FLUoxetine 20 MG capsule TAKE 1 CAPSULE(20 MG) BY MOUTH EVERY DAY  Qty: 30 capsule, Refills: 2    Associated Diagnoses: PMDD (premenstrual dysphoric disorder)      levonorgestrel (MIRENA) 20 mcg/24 hr (5 years) IUD 1 Intra Uterine Device by Intrauterine route once. for 1 dose  Qty: 1 Intra Uterine Device, Refills: 0    Associated Diagnoses: Contraception, device intrauterine      loratadine (CLARITIN) 10 mg tablet Take 10 mg by mouth every morning.       metFORMIN (GLUCOPHAGE-XR) 500 MG ER 24hr tablet TAKE 1 TABLET(500 MG) BY MOUTH DAILY WITH BREAKFAST  Strength: 500 mg  Qty: 90 tablet, Refills: 3    Associated Diagnoses: Pre-diabetes      MOUNJARO 2.5 mg/0.5 mL PnIj SMARTSI Pre-Filled Pen Syringe SUB-Q Once a Week      MOUNJARO 5 mg/0.5 mL PnIj SMARTSI Milligram(s) SUB-Q Once a Week      MOUNJARO 7.5 mg/0.5 mL PnIj SMARTSI.5 Milligram(s) SUB-Q Once a Week      multivitamin capsule Take 1 capsule by mouth once daily.      ondansetron (ZOFRAN-ODT) 4 MG TbDL Take 4 mg by mouth every 6 (six) hours as needed.      potassium chloride SA (K-DUR,KLOR-CON M) 10 MEQ tablet Take 1 tablet (10 mEq total) by mouth once daily.  Qty: 90 tablet, Refills: 3    Associated Diagnoses: Hypokalemia      vitamin D (VITAMIN D3) 1000 units Tab Take 1,000 Units by mouth once daily.                 Discharge Diagnosis: Spondylosis of lumbar region without myelopathy or radiculopathy [M47.816]  Condition on Discharge: Stable with no complications to procedure   Diet on Discharge: Same as before.  Activity: as per instruction sheet.  Discharge to: Home with a responsible adult.  Follow up: 2-4 weeks       Please call my office or pager at [phone] if experienced any weakness or loss of sensation, fever > 101.5, pain uncontrolled with oral medications, persistent nausea/vomiting/or diarrhea, redness or drainage from the incisions, or any other worrisome concerns. If physician on call was not reached or could not communicate with our office for any reason please go to the nearest emergency department        Cosme Kirkland

## 2023-03-01 NOTE — DISCHARGE INSTRUCTIONS

## 2023-03-01 NOTE — OP NOTE
Therapeutic Lumbar Medial Branch Radiofrequency Ablation under Fluoroscopy     The procedure, risks, benefits, and options were discussed with the patient. There are no contraindications to the procedure. The patent expressed understanding and agreed to the procedure. Informed written consent was obtained prior to the start of the procedure and can be found in the patient's chart.        PATIENT NAME: Sheila Costa   MRN: [id number]     DATE OF PROCEDURE: 03/01/2023     PROCEDURE:  Bilateral L3, L4, and L5 Lumbar Radiofrequency Ablation under Fluoroscopy    PRE-OP DIAGNOSIS: Spondylosis of lumbar region without myelopathy or radiculopathy [M47.816] Lumbar spondylosis [M47.816]    POST-OP DIAGNOSIS: Same    PHYSICIAN: Cosme Kirkland MD    ASSISTANTS: Norma Catherine MD [facility] Pain Fellow       MEDICATIONS INJECTED:  Preservative-free Decadron 10mg with 9cc of Bupivicaine 0.25%    LOCAL ANESTHETIC INJECTED:   Xylocaine 2%    SEDATION: Versed 2mg and Fentanyl 100mcg                                                                                                                                                                                     Conscious sedation ordered by M.D. Patient re-evaluation prior to administration of conscious sedation. No changes noted in patient's status from initial evaluation. The patient's vital signs were monitored by RN and patient remained hemodynamically stable throughout the procedure.    Event Time In   Sedation Start 1502   Sedation End 1516       ESTIMATED BLOOD LOSS:  None    COMPLICATIONS:  None     INTERVAL HISTORY: Patient has clinical and imaging findings suggestive of facet mediated pain. Patients has completed 2 previous diagnostic medial branch blocks at specified levels with at least 80% relief for the expected duration of the local anesthetic utilized.    TECHNIQUE: Time-out was performed to identify the patient and procedure to be performed. With the patient  laying in a prone position, the surgical area was prepped and draped in the usual sterile fashion using ChloraPrep and fenestrated drape. The levels were determined under fluoroscopic guidance. Skin anesthesia was achieved by injecting Lidocaine 2% over the injection sites. A 20 gauge 10mm curved active tip needle was introduced to the anatomic local of the medial branch at each of the above levels using AP, lateral and/or contralateral oblique fluoroscopic imaging. Then sensory and motor testing was performed to confirm that the needle tips were in the correct location. After negative aspiration for blood or CSF was confirmed, 1 mL of the lidocaine 2% listed above was injected slowly at each site. This was followed by thermal lesioning at 80 degrees celsius for 90 seconds. That was followed by slowly injecting 1 mL of the medication mixture listed above at each site. The needles were removed and bleeding was nil. A sterile dressing was applied. No specimens collected. The patient tolerated the procedure well and did not have any procedure related motor deficit at the conclusion of the procedure.    The patient was monitored after the procedure in the recovery area. They were given post-procedure and discharge instructions to follow at home. The patient was discharged in a stable condition.    Norma Catherine MD [facility]    I reviewed and edited the fellow's note. I conducted my own interview and physical examination. I agree with the findings. I was present and supervising all critical portions of the procedure.    Cosme Kirkland MD

## 2023-03-03 ENCOUNTER — OFFICE VISIT (OUTPATIENT)
Dept: INTERNAL MEDICINE | Facility: CLINIC | Age: 53
End: 2023-03-03
Payer: COMMERCIAL

## 2023-03-03 VITALS
WEIGHT: 155.44 LBS | OXYGEN SATURATION: 97 % | HEIGHT: 61 IN | DIASTOLIC BLOOD PRESSURE: 80 MMHG | SYSTOLIC BLOOD PRESSURE: 150 MMHG | HEART RATE: 60 BPM | BODY MASS INDEX: 29.35 KG/M2 | RESPIRATION RATE: 16 BRPM

## 2023-03-03 DIAGNOSIS — Z76.89 ENCOUNTER TO ESTABLISH CARE WITH NEW DOCTOR: Primary | ICD-10-CM

## 2023-03-03 PROCEDURE — 3077F SYST BP >= 140 MM HG: CPT | Mod: CPTII,S$GLB,, | Performed by: NURSE PRACTITIONER

## 2023-03-03 PROCEDURE — 1159F MED LIST DOCD IN RCRD: CPT | Mod: CPTII,S$GLB,, | Performed by: NURSE PRACTITIONER

## 2023-03-03 PROCEDURE — 99499 NO LOS: ICD-10-PCS | Mod: S$GLB,,, | Performed by: NURSE PRACTITIONER

## 2023-03-03 PROCEDURE — 3079F PR MOST RECENT DIASTOLIC BLOOD PRESSURE 80-89 MM HG: ICD-10-PCS | Mod: CPTII,S$GLB,, | Performed by: NURSE PRACTITIONER

## 2023-03-03 PROCEDURE — 1159F PR MEDICATION LIST DOCUMENTED IN MEDICAL RECORD: ICD-10-PCS | Mod: CPTII,S$GLB,, | Performed by: NURSE PRACTITIONER

## 2023-03-03 PROCEDURE — 3077F PR MOST RECENT SYSTOLIC BLOOD PRESSURE >= 140 MM HG: ICD-10-PCS | Mod: CPTII,S$GLB,, | Performed by: NURSE PRACTITIONER

## 2023-03-03 PROCEDURE — 3008F BODY MASS INDEX DOCD: CPT | Mod: CPTII,S$GLB,, | Performed by: NURSE PRACTITIONER

## 2023-03-03 PROCEDURE — 3008F PR BODY MASS INDEX (BMI) DOCUMENTED: ICD-10-PCS | Mod: CPTII,S$GLB,, | Performed by: NURSE PRACTITIONER

## 2023-03-03 PROCEDURE — 99499 UNLISTED E&M SERVICE: CPT | Mod: S$GLB,,, | Performed by: NURSE PRACTITIONER

## 2023-03-03 PROCEDURE — 99999 PR PBB SHADOW E&M-EST. PATIENT-LVL IV: ICD-10-PCS | Mod: PBBFAC,,, | Performed by: NURSE PRACTITIONER

## 2023-03-03 PROCEDURE — 3079F DIAST BP 80-89 MM HG: CPT | Mod: CPTII,S$GLB,, | Performed by: NURSE PRACTITIONER

## 2023-03-03 PROCEDURE — 99999 PR PBB SHADOW E&M-EST. PATIENT-LVL IV: CPT | Mod: PBBFAC,,, | Performed by: NURSE PRACTITIONER

## 2023-03-03 NOTE — PROGRESS NOTES
Sheila is a pleasant lady.   She was seen on 9/14/2022 per Dr. Moore, whom was her former PCP, for an Annual PE. She thought today would have been an Annual and to establish care with new Provider.   Too early for her annual, she is having no active medical issues or concerns today. She would like to establish medical care with a new Physician provider at any location.   She understands, and has been informed, that if needs to be seen in the interim, until she is able to get in with new PCP, to reach back out to me or another available provider.     Future Appointments   Date Time Provider Department Center   3/22/2023 11:20 AM Tessa Guzmán NP [facility]   9/19/2023  8:30 AM Marj Gonzalez MD [facility] IM [place]     Appt for today has been cancelled.   NO LOS for today.     ANGELITO

## 2023-03-21 ENCOUNTER — TELEPHONE (OUTPATIENT)
Dept: PAIN MEDICINE | Facility: CLINIC | Age: 53
End: 2023-03-21
Payer: COMMERCIAL

## 2023-03-21 NOTE — TELEPHONE ENCOUNTER
Good Morning      This is an appointment reminder about your schedule Virtual Visit with Pain Management Provider JADEN Cyr.   Your appointment is for March 22, 2022 at 11:20am.     Please log into your [facility] Portal 15 min's prior to your appointment time to e-precheck, verify all corresponding pages, and troubleshoot any problems that may occur. Once your device has been verify as compatible, and you receive the green check,  you must hit/ select the start visit button, This will notify your provider that you are ready to start the Virtual Video Visit.     As [facility] is a teaching [facility], your visit may be started with a resident or a fellow that is rounding in clinic, then proceeded by your physician.    Once logged on, please allow the provider 20 -30 mins to check-in, in case running behind.       If you experience any technical issue please contact [phone]        *pt mailbox was full and staff was unable to contact her.

## 2023-03-22 ENCOUNTER — OFFICE VISIT (OUTPATIENT)
Dept: PAIN MEDICINE | Facility: CLINIC | Age: 53
End: 2023-03-22
Payer: COMMERCIAL

## 2023-03-22 DIAGNOSIS — M47.816 SPONDYLOSIS OF LUMBAR REGION WITHOUT MYELOPATHY OR RADICULOPATHY: Primary | ICD-10-CM

## 2023-03-22 DIAGNOSIS — M47.812 CERVICAL SPONDYLOSIS: ICD-10-CM

## 2023-03-22 DIAGNOSIS — M50.30 DDD (DEGENERATIVE DISC DISEASE), CERVICAL: ICD-10-CM

## 2023-03-22 DIAGNOSIS — M51.36 DDD (DEGENERATIVE DISC DISEASE), LUMBAR: ICD-10-CM

## 2023-03-22 DIAGNOSIS — M79.18 MYOFASCIAL PAIN: ICD-10-CM

## 2023-03-22 DIAGNOSIS — M53.3 SACROILIAC JOINT PAIN: ICD-10-CM

## 2023-03-22 PROCEDURE — 99213 PR OFFICE/OUTPT VISIT, EST, LEVL III, 20-29 MIN: ICD-10-PCS | Mod: 95,,, | Performed by: NURSE PRACTITIONER

## 2023-03-22 PROCEDURE — 1159F MED LIST DOCD IN RCRD: CPT | Mod: CPTII,95,, | Performed by: NURSE PRACTITIONER

## 2023-03-22 PROCEDURE — 1160F RVW MEDS BY RX/DR IN RCRD: CPT | Mod: CPTII,95,, | Performed by: NURSE PRACTITIONER

## 2023-03-22 PROCEDURE — 1159F PR MEDICATION LIST DOCUMENTED IN MEDICAL RECORD: ICD-10-PCS | Mod: CPTII,95,, | Performed by: NURSE PRACTITIONER

## 2023-03-22 PROCEDURE — 1160F PR REVIEW ALL MEDS BY PRESCRIBER/CLIN PHARMACIST DOCUMENTED: ICD-10-PCS | Mod: CPTII,95,, | Performed by: NURSE PRACTITIONER

## 2023-03-22 PROCEDURE — 99213 OFFICE O/P EST LOW 20 MIN: CPT | Mod: 95,,, | Performed by: NURSE PRACTITIONER
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
## 2023-03-24 ENCOUNTER — PATIENT MESSAGE (OUTPATIENT)
Dept: PAIN MEDICINE | Facility: OTHER | Age: 53
End: 2023-03-24
Payer: COMMERCIAL

## 2023-03-24 ENCOUNTER — TELEPHONE (OUTPATIENT)
Dept: PAIN MEDICINE | Facility: OTHER | Age: 53
End: 2023-03-24
Payer: COMMERCIAL

## 2023-03-24 NOTE — TELEPHONE ENCOUNTER
----- Message from Cosme Kirkland MD sent at 3/24/2023 12:08 PM CDT -----  Regarding: RE: Cervical RFA  Yes but it will be very painful for a couple of days after procedure     MG  ----- Message -----  From: Winnie Walton  Sent: 3/23/2023   4:40 PM CDT  To: Cosme Kirkland MD  Subject: Cervical RFA                                     Hi Dr Kirkland,    Ms Sheila would like to know if you can do both sides for cervical RFA at the same time? Please advise.     Thank you

## 2023-04-13 ENCOUNTER — PATIENT MESSAGE (OUTPATIENT)
Dept: PAIN MEDICINE | Facility: OTHER | Age: 53
End: 2023-04-13
Payer: COMMERCIAL

## 2023-04-13 ENCOUNTER — OFFICE VISIT (OUTPATIENT)
Dept: PAIN MEDICINE | Facility: CLINIC | Age: 53
End: 2023-04-13
Attending: ANESTHESIOLOGY
Payer: COMMERCIAL

## 2023-04-13 VITALS
TEMPERATURE: 98 F | BODY MASS INDEX: 28.31 KG/M2 | RESPIRATION RATE: 19 BRPM | HEART RATE: 70 BPM | HEIGHT: 61 IN | DIASTOLIC BLOOD PRESSURE: 83 MMHG | SYSTOLIC BLOOD PRESSURE: 134 MMHG | WEIGHT: 149.94 LBS

## 2023-04-13 DIAGNOSIS — M46.1 SACROILIITIS: ICD-10-CM

## 2023-04-13 DIAGNOSIS — M47.816 SPONDYLOSIS OF LUMBAR REGION WITHOUT MYELOPATHY OR RADICULOPATHY: Primary | ICD-10-CM

## 2023-04-13 DIAGNOSIS — M53.3 SACROILIAC JOINT PAIN: ICD-10-CM

## 2023-04-13 DIAGNOSIS — M51.36 DDD (DEGENERATIVE DISC DISEASE), LUMBAR: ICD-10-CM

## 2023-04-13 PROCEDURE — 3075F SYST BP GE 130 - 139MM HG: CPT | Mod: CPTII,S$GLB,, | Performed by: ANESTHESIOLOGY

## 2023-04-13 PROCEDURE — 99214 PR OFFICE/OUTPT VISIT, EST, LEVL IV, 30-39 MIN: ICD-10-PCS | Mod: S$GLB,,, | Performed by: ANESTHESIOLOGY

## 2023-04-13 PROCEDURE — 1160F PR REVIEW ALL MEDS BY PRESCRIBER/CLIN PHARMACIST DOCUMENTED: ICD-10-PCS | Mod: CPTII,S$GLB,, | Performed by: ANESTHESIOLOGY

## 2023-04-13 PROCEDURE — 3079F PR MOST RECENT DIASTOLIC BLOOD PRESSURE 80-89 MM HG: ICD-10-PCS | Mod: CPTII,S$GLB,, | Performed by: ANESTHESIOLOGY

## 2023-04-13 PROCEDURE — 3008F PR BODY MASS INDEX (BMI) DOCUMENTED: ICD-10-PCS | Mod: CPTII,S$GLB,, | Performed by: ANESTHESIOLOGY

## 2023-04-13 PROCEDURE — 99999 PR PBB SHADOW E&M-EST. PATIENT-LVL IV: CPT | Mod: PBBFAC,,, | Performed by: ANESTHESIOLOGY

## 2023-04-13 PROCEDURE — 1159F PR MEDICATION LIST DOCUMENTED IN MEDICAL RECORD: ICD-10-PCS | Mod: CPTII,S$GLB,, | Performed by: ANESTHESIOLOGY

## 2023-04-13 PROCEDURE — 3079F DIAST BP 80-89 MM HG: CPT | Mod: CPTII,S$GLB,, | Performed by: ANESTHESIOLOGY

## 2023-04-13 PROCEDURE — 99999 PR PBB SHADOW E&M-EST. PATIENT-LVL IV: ICD-10-PCS | Mod: PBBFAC,,, | Performed by: ANESTHESIOLOGY

## 2023-04-13 PROCEDURE — 1159F MED LIST DOCD IN RCRD: CPT | Mod: CPTII,S$GLB,, | Performed by: ANESTHESIOLOGY

## 2023-04-13 PROCEDURE — 99214 OFFICE O/P EST MOD 30 MIN: CPT | Mod: S$GLB,,, | Performed by: ANESTHESIOLOGY

## 2023-04-13 PROCEDURE — 1160F RVW MEDS BY RX/DR IN RCRD: CPT | Mod: CPTII,S$GLB,, | Performed by: ANESTHESIOLOGY

## 2023-04-13 PROCEDURE — 3008F BODY MASS INDEX DOCD: CPT | Mod: CPTII,S$GLB,, | Performed by: ANESTHESIOLOGY

## 2023-04-13 PROCEDURE — 3075F PR MOST RECENT SYSTOLIC BLOOD PRESS GE 130-139MM HG: ICD-10-PCS | Mod: CPTII,S$GLB,, | Performed by: ANESTHESIOLOGY
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
## 2023-05-02 ENCOUNTER — PATIENT MESSAGE (OUTPATIENT)
Dept: PAIN MEDICINE | Facility: OTHER | Age: 53
End: 2023-05-02
Payer: COMMERCIAL

## 2023-05-03 ENCOUNTER — HOSPITAL ENCOUNTER (OUTPATIENT)
Facility: OTHER | Age: 53
Discharge: HOME | End: 2023-05-03
Attending: ANESTHESIOLOGY | Admitting: STUDENT IN AN ORGANIZED HEALTH CARE EDUCATION/TRAINING PROGRAM
Payer: COMMERCIAL

## 2023-05-03 VITALS
HEIGHT: 61 IN | BODY MASS INDEX: 27.38 KG/M2 | OXYGEN SATURATION: 96 % | SYSTOLIC BLOOD PRESSURE: 136 MMHG | TEMPERATURE: 98 F | HEART RATE: 74 BPM | DIASTOLIC BLOOD PRESSURE: 74 MMHG | WEIGHT: 145 LBS | RESPIRATION RATE: 16 BRPM

## 2023-05-03 DIAGNOSIS — M47.892 OTHER SPONDYLOSIS, CERVICAL REGION: ICD-10-CM

## 2023-05-03 DIAGNOSIS — M47.812 CERVICAL SPONDYLOSIS: Primary | ICD-10-CM

## 2023-05-03 DIAGNOSIS — G89.29 CHRONIC PAIN: ICD-10-CM

## 2023-05-03 PROCEDURE — 64634 PR DESTROY C/TH FACET JNT ADDL: ICD-10-PCS | Mod: 50,,, | Performed by: ANESTHESIOLOGY

## 2023-05-03 PROCEDURE — 63600175 PHARM REV CODE 636 W HCPCS: Performed by: ANESTHESIOLOGY

## 2023-05-03 PROCEDURE — 64633 DESTROY CERV/THOR FACET JNT: CPT | Mod: 50,,, | Performed by: ANESTHESIOLOGY

## 2023-05-03 PROCEDURE — 64634 DESTROY C/TH FACET JNT ADDL: CPT | Mod: 50 | Performed by: ANESTHESIOLOGY

## 2023-05-03 PROCEDURE — 25000003 PHARM REV CODE 250: Performed by: ANESTHESIOLOGY

## 2023-05-03 PROCEDURE — 64633 PR DESTROY CERV/THOR FACET JNT: ICD-10-PCS | Mod: 50,,, | Performed by: ANESTHESIOLOGY

## 2023-05-03 PROCEDURE — 64634 DESTROY C/TH FACET JNT ADDL: CPT | Mod: 50,,, | Performed by: ANESTHESIOLOGY

## 2023-05-03 PROCEDURE — 64633 DESTROY CERV/THOR FACET JNT: CPT | Mod: 50 | Performed by: ANESTHESIOLOGY

## 2023-05-03 RX ORDER — FENTANYL CITRATE 50 UG/ML
INJECTION, SOLUTION INTRAMUSCULAR; INTRAVENOUS
Status: DISCONTINUED | OUTPATIENT
Start: 2023-05-03 | End: 2023-05-03 | Stop reason: HOSPADM

## 2023-05-03 RX ORDER — MIDAZOLAM HYDROCHLORIDE 1 MG/ML
INJECTION INTRAMUSCULAR; INTRAVENOUS
Status: DISCONTINUED | OUTPATIENT
Start: 2023-05-03 | End: 2023-05-03 | Stop reason: HOSPADM

## 2023-05-03 RX ORDER — LIDOCAINE HYDROCHLORIDE 20 MG/ML
INJECTION, SOLUTION INFILTRATION; PERINEURAL
Status: DISCONTINUED | OUTPATIENT
Start: 2023-05-03 | End: 2023-05-03 | Stop reason: HOSPADM

## 2023-05-03 RX ORDER — DEXAMETHASONE SODIUM PHOSPHATE 10 MG/ML
INJECTION INTRAMUSCULAR; INTRAVENOUS
Status: DISCONTINUED | OUTPATIENT
Start: 2023-05-03 | End: 2023-05-03 | Stop reason: HOSPADM

## 2023-05-03 RX ORDER — SODIUM CHLORIDE 9 MG/ML
500 INJECTION, SOLUTION INTRAVENOUS CONTINUOUS
Status: DISCONTINUED | OUTPATIENT
Start: 2023-05-03 | End: 2023-05-03 | Stop reason: HOSPADM

## 2023-05-03 RX ORDER — BUPIVACAINE HYDROCHLORIDE 2.5 MG/ML
INJECTION, SOLUTION EPIDURAL; INFILTRATION; INTRACAUDAL
Status: DISCONTINUED | OUTPATIENT
Start: 2023-05-03 | End: 2023-05-03 | Stop reason: HOSPADM

## 2023-05-03 NOTE — H&P
HPI  Sheila Igor presents for Procedure(s):  RADIOFREQUENCY ABLATION BILATERAL C4,C5,C6 BOTH SIDES PER DR KIRKLAND    Recent anticoagulation/antiplatelets reviewed and verified with nursing.  Recent antibiotics/recent infections reviewed and verified with nursing.    Past Medical History:   Diagnosis Date    Alpha thalassemia     Cervical spondylosis 12/30/2019    Colon polyp     Fatty liver     Hypertension     Premenstrual symptom      Past Surgical History:   Procedure Laterality Date    COLONOSCOPY N/A 6/18/2020    Procedure: COLONOSCOPY;  Surgeon: Eliot Hill MD;  Location: [facility] ENDO (4TH FLR);  Service: Endoscopy;  Laterality: N/A;  COVID screening on 6/16/20-[place]- ERW    COLONOSCOPY N/A 7/6/2020    Procedure: COLONOSCOPY;  Surgeon: Kang Guzmán MD;  Location: [facility] OR [facility] FLR;  Service: Colon and Rectal;  Laterality: N/A;    COLONOSCOPY N/A 7/9/2021    Procedure: COLONOSCOPY;  Surgeon: GREGG Guzmán MD;  [facility]  Service: Endoscopy;  Laterality: N/A;  in July 2021  covid test [place] 7/6    EPIDURAL STEROID INJECTION N/A 10/2/2019    Procedure: INJECTION, STEROID, EPIDURAL, C7-T1;  Surgeon: Cosme Kirkland MD;  Location: [facility] PAIN MGT;  Service: Pain Management;  Laterality: N/A;    EPIDURAL STEROID INJECTION N/A 12/22/2021    Procedure: INJECTION, STEROID, EPIDURAL, C7-T1 NEED CONSENT;  Surgeon: Cosme Kirkland MD;  Location: [facility] PAIN MGT;  Service: Pain Management;  Laterality: N/A;    EPIDURAL STEROID INJECTION N/A 7/6/2022    Procedure: INJECTION, STEROID, EPIDURAL, C7-T1 IL  CONTRAST;  Surgeon: Cosme Kirkland MD;  Location: [facility] PAIN MGT;  Service: Pain Management;  Laterality: N/A;    EYE SURGERY      INJECTION OF ANESTHETIC AGENT AROUND NERVE Bilateral 1/8/2020    Procedure: BLOCK, NERVE C4,5,6;  Surgeon: Cosme Kirkland MD;  Location: [facility] PAIN MGT;  Service: Pain Management;  Laterality: Bilateral;  B/L MBB C4,5,6    INJECTION OF  ANESTHETIC AGENT AROUND NERVE Bilateral 1/29/2020    Procedure: BLOCK, NERVE, Repeat C4-C5-C6 MEDIAL BRANCH;  Surgeon: Cosme Kirkland MD;  Location: [facility] [place] PAIN MGT;  Service: Pain Management;  Laterality: Bilateral;    INJECTION OF ANESTHETIC AGENT AROUND NERVE Bilateral 12/14/2022    Procedure: BLOCK, NERVE BILATERAL L3,L4,L5 MEDIAL BRANCH NEEDS CONSENT;  Surgeon: Cosme Kirkland MD;  Location: [facility] [place] PAIN MGT;  Service: Pain Management;  Laterality: Bilateral;    INJECTION OF ANESTHETIC AGENT AROUND NERVE Bilateral 1/25/2023    Procedure: BLOCK, NERVE BILATERAL L3,L4,L5 MEDIAL BRANCH;  Surgeon: Cosme Kirkland MD;  Location: [facility] [place] PAIN MGT;  Service: Pain Management;  Laterality: Bilateral;    INJECTION OF JOINT Left 5/18/2022    Procedure: INJECTION, JOINT, LEFT SI and GTB *LORI PT*;  Surgeon: Jed Phillips MD;  Location: [facility] [place] PAIN MGT;  Service: Pain Management;  Laterality: Left;    LAPAROSCOPIC SURGICAL REMOVAL OF CECUM N/A 7/6/2020    Procedure: EXCISION, CECUM, LAPAROSCOPIC, colonoscopy to start, ERAS low;  Surgeon: Kang Guzmán MD;  Location: [facility] OR [address] [place] [address];  Service: Colon and Rectal;  Laterality: N/A;    RADIOFREQUENCY ABLATION Left 3/4/2020    Procedure: RADIOFREQUENCY ABLATION, C4-C5-C6 ME DIAL BRANCH 1 OF 2 need consent;  Surgeon: Cosme Kirkland MD;  Location: [facility] [place] PAIN MGT;  Service: Pain Management;  Laterality: Left;    RADIOFREQUENCY ABLATION Right 6/3/2020    Procedure: RADIOFREQUENCY ABLATION, C4-C5-C6 MEDIAL BRANCH 2 OF 2 need consent;  Surgeon: Cosme Kirkland MD;  Location: [facility] [place] PAIN MGT;  Service: Pain Management;  Laterality: Right;    RADIOFREQUENCY ABLATION Right 3/23/2022    Procedure: Radiofrequency Ablation RIGHT C4,5,6;  Surgeon: Cosme Kirkland MD;  Location: [facility] [place] PAIN MGT;  Service: Pain Management;  Laterality: Right;    RADIOFREQUENCY ABLATION Left 4/6/2022    Procedure: Radiofrequency Ablation LEFT C4,5,6;  Surgeon: Cosme Kirkland MD;  Location:  [facility] PAIN MGT;  Service: Pain Management;  Laterality: Left;    RADIOFREQUENCY ABLATION Bilateral 3/1/2023    Procedure: RADIOFREQUENCY ABLATION BILATERAL L3,L4,L5 BOTH SIDES SAME TIME PER DR KIRKLAND;  Surgeon: Cosme Kirkland MD;  Location: [facility] PAIN T;  Service: Pain Management;  Laterality: Bilateral;    REFRACTIVE SURGERY  2008    SMALL INTESTINE SURGERY  7/6/20     Review of patient's allergies indicates:   Allergen Reactions    Amoxicillin Hives        PMHx, PSHx, Allergies, Medications reviewed in epic      ROS negative except pain complaints in HPI    OBJECTIVE:    There were no vitals taken for this visit.    PHYSICAL EXAMINATION:    GENERAL: Well appearing, in no acute distress, alert and oriented to name, situation, location.  PSYCH:  Mood and affect appropriate.  SKIN: Skin color, texture, turgor normal, no rashes or lesions at site of procedure.  CV: regular rate with palpation of the radial artery.  PULM: No evidence of respiratory difficulty, symmetric chest rise. No audible abnormal respiratory sounds.  NEURO: Cranial nerves grossly intact.    Plan:    Proceed with procedure as planned    Norma Catherine  05/03/2023

## 2023-05-03 NOTE — DISCHARGE INSTRUCTIONS

## 2023-05-03 NOTE — DISCHARGE SUMMARY
Discharge Note  Short Stay      SUMMARY     Admit Date: 5/3/2023    Attending Physician: Cosme Sandoval    Discharge Physician: Norma Catherine      Discharge Date: 5/3/2023 3:02 PM    Procedure(s) (LRB):  RADIOFREQUENCY ABLATION BILATERAL C4,C5,C6 BOTH SIDES PER DR SANDOVAL (Bilateral)    Final Diagnosis: Cervical spondylosis [M47.812]    Disposition: Home or self care    Patient Instructions:   Current Discharge Medication List        CONTINUE these medications which have NOT CHANGED    Details   amitriptyline (ELAVIL) 25 MG tablet TAKE 1 TABLET(25 MG) BY MOUTH EVERY NIGHT AS NEEDED FOR INSOMNIA OR PAIN  Qty: 30 tablet, Refills: 0    Comments: ZERO refills remain on this prescription. Your patient is requesting advance approval of refills for this medication to PREVENT ANY MISSED DOSES  Associated Diagnoses: Cervical radiculopathy; DDD (degenerative disc disease), cervical      ascorbic acid, vitamin C, (VITAMIN C) 100 MG tablet Take 100 mg by mouth once daily.      atenoloL-chlorthalidone (TENORETIC) 50-25 mg Tab TAKE 1/2 TABLET BY MOUTH ONCE EVERY DAY  Qty: 45 tablet, Refills: 3    Associated Diagnoses: Essential hypertension      celecoxib (CELEBREX) 100 MG capsule TAKE 1 CAPSULE(100 MG) BY MOUTH TWICE DAILY  Qty: 60 capsule, Refills: 2    Associated Diagnoses: Cervical spondylosis without myelopathy      FLUoxetine 20 MG capsule TAKE 1 CAPSULE(20 MG) BY MOUTH EVERY DAY  Qty: 30 capsule, Refills: 9    Associated Diagnoses: PMDD (premenstrual dysphoric disorder)      levonorgestrel (MIRENA) 20 mcg/24 hr (5 years) IUD 1 Intra Uterine Device by Intrauterine route once. for 1 dose  Qty: 1 Intra Uterine Device, Refills: 0    Associated Diagnoses: Contraception, device intrauterine      loratadine (CLARITIN) 10 mg tablet Take 10 mg by mouth every morning.       metFORMIN (GLUCOPHAGE-XR) 500 MG ER 24hr tablet TAKE 1 TABLET(500 MG) BY MOUTH DAILY WITH BREAKFAST Strength: 500 mg  Qty: 90 tablet, Refills: 3    Associated  Diagnoses: Pre-diabetes      MOUNJARO 2.5 mg/0.5 mL PnIj SMARTSI Pre-Filled Pen Syringe SUB-Q Once a Week      MOUNJARO 5 mg/0.5 mL PnIj SMARTSI Milligram(s) SUB-Q Once a Week      MOUNJARO 7.5 mg/0.5 mL PnIj SMARTSI.5 Milligram(s) SUB-Q Once a Week      multivitamin capsule Take 1 capsule by mouth once daily.      ondansetron (ZOFRAN-ODT) 4 MG TbDL Take 4 mg by mouth every 6 (six) hours as needed.      potassium chloride SA (K-DUR,KLOR-CON M) 10 MEQ tablet Take 1 tablet (10 mEq total) by mouth once daily.  Qty: 90 tablet, Refills: 3    Associated Diagnoses: Hypokalemia      vitamin D (VITAMIN D3) 1000 units Tab Take 1,000 Units by mouth once daily.                 Discharge Diagnosis: Cervical spondylosis [M47.812]  Condition on Discharge: Stable with no complications to procedure   Diet on Discharge: Same as before.  Activity: as per instruction sheet.  Discharge to: Home with a responsible adult.  Follow up: 2-4 weeks       Please call my office or pager at [phone] if experienced any weakness or loss of sensation, fever > 101.5, pain uncontrolled with oral medications, persistent nausea/vomiting/or diarrhea, redness or drainage from the incisions, or any other worrisome concerns. If physician on call was not reached or could not communicate with our office for any reason please go to the nearest emergency department        Cosme Kirkland

## 2023-05-03 NOTE — OP NOTE
Therapeutic Cervical Medial Branch Radiofrequency Ablation Under Fluoroscopy     The procedure, risks, benefits, and options were discussed with the patient. There are no contraindications to the procedure. The patent expressed understanding and agreed to the procedure. Informed written consent was obtained prior to the start of the procedure and can be found in the patient's chart.        PATIENT NAME: Sheila Costa   MRN: [id number]     DATE OF PROCEDURE: 05/03/2023       PROCEDURE: Therapeutic Bilateral C4, C5, and C6 Cervical Radiofrequency Ablation under Fluoroscopy    PRE-OP DIAGNOSIS: Cervical spondylosis [M47.812] Cervical Spondylosis [M47.812]    POST-OP DIAGNOSIS: Same    PHYSICIAN: Cosme Kirkland MD    ASSISTANTS: Norma Catherine MD [facility] Pain Fellow      MEDICATIONS INJECTED:  Preservative-free Decadron 10mg with 9cc of Bupivicaine 0.25%    LOCAL ANESTHETIC INJECTED:   Xylocaine 2%    SEDATION: Versed 3mg and Fentanyl 100mcg                                                                                                                                                                                     Conscious sedation ordered by M.D. Patient re-evaluation prior to administration of conscious sedation. No changes noted in patient's status from initial evaluation. The patient's vital signs were monitored by RN and patient remained hemodynamically stable throughout the procedure.    Event Time In   Sedation Start 1509   Sedation End 1533       ESTIMATED BLOOD LOSS:  None    COMPLICATIONS:  None.     INTERVAL HISTORY: Patient has clinical and imaging findings suggestive of recurrent facet mediated pain. Patient has undergone a previous RFA at specified levels with at least 50% relief for at least 6 months. Successful diagnostic medial branch blocks have been completed within the past 2 years.    TECHNIQUE: Time-out was performed to identify the patient and procedure to be performed. With the  patient laying in a prone position, the surgical area was prepped and draped in the usual sterile fashion using ChloraPrep and fenestrated drape. The levels were determined under fluoroscopic guidance. Skin anesthesia was achieved by injecting Lidocaine 2% over the injection sites. A 20 gauge 10mm curved active tip needle was introduced to the anatomic local of the medial branch at each of the above levels using AP, lateral and/or contralateral oblique fluoroscopic imaging. Then the sensory and motor testing was performed to confirm that the needle tips were in the correct location. After negative aspiration for blood or CSF was confirmed, 1 mL of the lidocaine 2% listed above was injected slowly at each site. This was followed by thermal lesioning at 80 degrees celsius for 90 seconds. That was followed by slowly injecting 1 mL of the medication mixture listed above at each site. The needles were removed and bleeding was nil. A sterile dressing was applied. No specimens collected. The patient tolerated the procedure well and did not have any procedure related motor deficit at the conclusion of the procedure.    The patient was monitored after the procedure in the recovery area. They were given post-procedure and discharge instructions to follow at home. The patient was discharged in a stable condition.     Norma Catherine MD [facility]    I reviewed and edited the fellow's note. I conducted my own interview and physical examination. I agree with the findings. I was present and supervising all critical portions of the procedure.    Cosme Kirkland MD

## 2023-05-24 ENCOUNTER — HOSPITAL ENCOUNTER (OUTPATIENT)
Facility: OTHER | Age: 53
Discharge: HOME | End: 2023-05-24
Attending: ANESTHESIOLOGY | Admitting: ANESTHESIOLOGY
Payer: COMMERCIAL

## 2023-05-24 VITALS
HEART RATE: 68 BPM | SYSTOLIC BLOOD PRESSURE: 128 MMHG | WEIGHT: 147 LBS | RESPIRATION RATE: 14 BRPM | BODY MASS INDEX: 27.75 KG/M2 | DIASTOLIC BLOOD PRESSURE: 61 MMHG | HEIGHT: 61 IN | OXYGEN SATURATION: 100 % | TEMPERATURE: 98 F

## 2023-05-24 DIAGNOSIS — M46.1 SACROILIITIS: Primary | ICD-10-CM

## 2023-05-24 DIAGNOSIS — G89.29 CHRONIC PAIN: ICD-10-CM

## 2023-05-24 PROCEDURE — 27096 INJECT SACROILIAC JOINT: CPT | Mod: RT | Performed by: ANESTHESIOLOGY

## 2023-05-24 PROCEDURE — 25000003 PHARM REV CODE 250: Performed by: EMERGENCY MEDICINE

## 2023-05-24 PROCEDURE — 27096 INJECT SACROILIAC JOINT: CPT | Mod: 50,,, | Performed by: ANESTHESIOLOGY

## 2023-05-24 PROCEDURE — 63600175 PHARM REV CODE 636 W HCPCS: Performed by: ANESTHESIOLOGY

## 2023-05-24 PROCEDURE — 25000003 PHARM REV CODE 250: Performed by: ANESTHESIOLOGY

## 2023-05-24 PROCEDURE — 27096 PR INJECTION,SACROILIAC JOINT: ICD-10-PCS | Mod: 50,,, | Performed by: ANESTHESIOLOGY

## 2023-05-24 PROCEDURE — 25500020 PHARM REV CODE 255: Performed by: ANESTHESIOLOGY

## 2023-05-24 RX ORDER — SODIUM CHLORIDE 9 MG/ML
500 INJECTION, SOLUTION INTRAVENOUS CONTINUOUS
Status: DISCONTINUED | OUTPATIENT
Start: 2023-05-24 | End: 2023-05-24 | Stop reason: HOSPADM

## 2023-05-24 RX ORDER — LIDOCAINE HYDROCHLORIDE 20 MG/ML
INJECTION, SOLUTION INFILTRATION; PERINEURAL
Status: DISCONTINUED | OUTPATIENT
Start: 2023-05-24 | End: 2023-05-24 | Stop reason: HOSPADM

## 2023-05-24 RX ORDER — TRIAMCINOLONE ACETONIDE 40 MG/ML
INJECTION, SUSPENSION INTRA-ARTICULAR; INTRAMUSCULAR
Status: DISCONTINUED | OUTPATIENT
Start: 2023-05-24 | End: 2023-05-24 | Stop reason: HOSPADM

## 2023-05-24 RX ORDER — MIDAZOLAM HYDROCHLORIDE 1 MG/ML
INJECTION INTRAMUSCULAR; INTRAVENOUS
Status: DISCONTINUED | OUTPATIENT
Start: 2023-05-24 | End: 2023-05-24 | Stop reason: HOSPADM

## 2023-05-24 RX ORDER — BUPIVACAINE HYDROCHLORIDE 2.5 MG/ML
INJECTION, SOLUTION EPIDURAL; INFILTRATION; INTRACAUDAL
Status: DISCONTINUED | OUTPATIENT
Start: 2023-05-24 | End: 2023-05-24 | Stop reason: HOSPADM

## 2023-05-24 NOTE — DISCHARGE INSTRUCTIONS

## 2023-05-24 NOTE — OP NOTE
Sacroiliac Joint Injection under Fluoroscopic Guidance    The procedure, risks, benefits, and options were discussed with the patient. There are no contraindications to the procedure. The patent expressed understanding and agreed to the procedure. Informed written consent was obtained prior to the start of the procedure and can be found in the patient's chart.    PATIENT NAME: Sheila Costa   MRN: [id number]     DATE OF PROCEDURE: 05/24/2023    PROCEDURE: Bilateral Sacroiliac Joint Injection under Fluoroscopic Guidance    PRE-OP DIAGNOSIS: Sacroiliac joint pain [M53.3]  Sacroiliitis [M46.1]    POST-OP DIAGNOSIS: Same    PHYSICIAN: Cosme Kirkland MD    ASSISTANTS: Maurilio Somers MD Resident    MEDICATIONS INJECTED: Preservative-free Kenalog 40mg with 3cc of Bupivacine 0.25%     LOCAL ANESTHETIC INJECTED: Xylocaine 2%     SEDATION: None    ESTIMATED BLOOD LOSS: None    COMPLICATIONS: None    TECHNIQUE: Time-out was performed to identify the patient and procedure to be performed. With the patient laying in a prone position, the surgical area was prepped and draped in the usual sterile fashion using ChloraPrep and a fenestrated drape. The sacroiliac joint was determined under fluoroscopy guidance. Skin anesthesia was achieved by injecting Lidocaine 2% over the injection site. The sacroiliac joint was  then approached with a 22 gauge, 3.5 inch spinal quinke needle that was introduced under fluoroscopic guidance in the AP and Lateral views. Once the needle tip was in the area of the joint, and there was no blood, contrast dye Omnipaque (300mg/mL) was injected to confirm placement and there was no vascular runoff. Fluoroscopic imaging in the AP and lateral views revealed a clear outline of the joint space. 4 mL of the medication mixture listed above was injected slowly intraarticular and emani-articular. Displacement of the radio opaque contrast after injection of the medication confirmed that the medication  went into the area of the joint. The needles were removed and bleeding was nil. The same procedure was repeated on the contralateral side. A sterile dressing was applied. No specimens collected. The patient tolerated the procedure well.     The patient was monitored after the procedure in the recovery area. They were given post-procedure and discharge instructions to follow at home. The patient was discharged in a stable condition.    Maurilio Somers MD    I reviewed and edited the fellow's note. I conducted my own interview and physical examination. I agree with the findings. I was present and supervising all critical portions of the procedure.    Cosme Kirkland MD

## 2023-05-24 NOTE — DISCHARGE SUMMARY
Discharge Note  Short Stay      SUMMARY     Admit Date: 5/24/2023    Attending Physician: Cosme Kirkland    Discharge Physician: Maurilio Somers      Discharge Date: 5/24/2023 3:38 PM    Procedure(s) (LRB):  INJECTION,SACROILIAC JOINT BIALTERAL (Bilateral)    Final Diagnosis: Sacroiliac joint pain [M53.3]  Sacroiliitis [M46.1]    Disposition: Home or self care    Patient Instructions:   Current Discharge Medication List        CONTINUE these medications which have NOT CHANGED    Details   amitriptyline (ELAVIL) 25 MG tablet TAKE 1 TABLET(25 MG) BY MOUTH EVERY NIGHT AS NEEDED FOR INSOMNIA OR PAIN  Qty: 30 tablet, Refills: 0    Comments: ZERO refills remain on this prescription. Your patient is requesting advance approval of refills for this medication to PREVENT ANY MISSED DOSES  Associated Diagnoses: Cervical radiculopathy; DDD (degenerative disc disease), cervical      ascorbic acid, vitamin C, (VITAMIN C) 100 MG tablet Take 100 mg by mouth once daily.      atenoloL-chlorthalidone (TENORETIC) 50-25 mg Tab TAKE 1/2 TABLET BY MOUTH ONCE EVERY DAY  Qty: 45 tablet, Refills: 3    Associated Diagnoses: Essential hypertension      celecoxib (CELEBREX) 100 MG capsule TAKE 1 CAPSULE(100 MG) BY MOUTH TWICE DAILY  Qty: 60 capsule, Refills: 2    Associated Diagnoses: Cervical spondylosis without myelopathy      FLUoxetine 20 MG capsule TAKE 1 CAPSULE(20 MG) BY MOUTH EVERY DAY  Qty: 30 capsule, Refills: 9    Associated Diagnoses: PMDD (premenstrual dysphoric disorder)      levonorgestrel (MIRENA) 20 mcg/24 hr (5 years) IUD 1 Intra Uterine Device by Intrauterine route once. for 1 dose  Qty: 1 Intra Uterine Device, Refills: 0    Associated Diagnoses: Contraception, device intrauterine      loratadine (CLARITIN) 10 mg tablet Take 10 mg by mouth every morning.       metFORMIN (GLUCOPHAGE-XR) 500 MG ER 24hr tablet TAKE 1 TABLET(500 MG) BY MOUTH DAILY WITH BREAKFAST Strength: 500 mg  Qty: 90 tablet, Refills: 3    Associated  Diagnoses: Pre-diabetes      MOUNJARO 2.5 mg/0.5 mL PnIj SMARTSI Pre-Filled Pen Syringe SUB-Q Once a Week      MOUNJARO 5 mg/0.5 mL PnIj SMARTSI Milligram(s) SUB-Q Once a Week      MOUNJARO 7.5 mg/0.5 mL PnIj SMARTSI.5 Milligram(s) SUB-Q Once a Week      multivitamin capsule Take 1 capsule by mouth once daily.      ondansetron (ZOFRAN-ODT) 4 MG TbDL Take 4 mg by mouth every 6 (six) hours as needed.      potassium chloride SA (K-DUR,KLOR-CON M) 10 MEQ tablet Take 1 tablet (10 mEq total) by mouth once daily.  Qty: 90 tablet, Refills: 3    Associated Diagnoses: Hypokalemia      vitamin D (VITAMIN D3) 1000 units Tab Take 1,000 Units by mouth once daily.                 Discharge Diagnosis: Sacroiliac joint pain [M53.3]  Sacroiliitis [M46.1]  Condition on Discharge: Stable with no complications to procedure   Diet on Discharge: Same as before.  Activity: as per instruction sheet.  Discharge to: Home with a responsible adult.  Follow up: 2-4 weeks       Please call my office or pager at [phone] if experienced any weakness or loss of sensation, fever > 101.5, pain uncontrolled with oral medications, persistent nausea/vomiting/or diarrhea, redness or drainage from the incisions, or any other worrisome concerns. If physician on call was not reached or could not communicate with our office for any reason please go to the nearest emergency department        Cosme Kirkland

## 2023-05-24 NOTE — H&P
HPI  Patient presenting for Procedure(s) (LRB):  INJECTION,SACROILIAC JOINT BIALTERAL (Bilateral)     Patient on Anti-coagulation No    No health changes since previous encounter    Past Medical History:   Diagnosis Date    Alpha thalassemia     Cervical spondylosis 12/30/2019    Colon polyp     Fatty liver     Hypertension     Premenstrual symptom      Past Surgical History:   Procedure Laterality Date    COLONOSCOPY N/A 6/18/2020    Procedure: COLONOSCOPY;  Surgeon: Eliot Hill MD;  Location: [facility] ENDO (4TH FLR);  Service: Endoscopy;  Laterality: N/A;  COVID screening on 6/16/20-[place]- W    COLONOSCOPY N/A 7/6/2020    Procedure: COLONOSCOPY;  Surgeon: Kang Guzmán MD;  Location: [facility] OR 2ND FLR;  Service: Colon and Rectal;  Laterality: N/A;    COLONOSCOPY N/A 7/9/2021    Procedure: COLONOSCOPY;  Surgeon: GREGG Guzmán MD;  Location: [facility] ENDO (4TH FLR);  Service: Endoscopy;  Laterality: N/A;  in July 2021  covid test [place] 7/6    EPIDURAL STEROID INJECTION N/A 10/2/2019    Procedure: INJECTION, STEROID, EPIDURAL, C7-T1;  Surgeon: Cosme Kirkland MD;  Location: [facility] PAIN MGT;  Service: Pain Management;  Laterality: N/A;    EPIDURAL STEROID INJECTION N/A 12/22/2021    Procedure: INJECTION, STEROID, EPIDURAL, C7-T1 NEED CONSENT;  Surgeon: Cosme Kirkland MD;  Location: [facility] PAIN MGT;  Service: Pain Management;  Laterality: N/A;    EPIDURAL STEROID INJECTION N/A 7/6/2022    Procedure: INJECTION, STEROID, EPIDURAL, C7-T1 IL  CONTRAST;  Surgeon: Cosme Kirkland MD;  Location: [facility] PAIN MGT;  Service: Pain Management;  Laterality: N/A;    EYE SURGERY      INJECTION OF ANESTHETIC AGENT AROUND NERVE Bilateral 1/8/2020    Procedure: BLOCK, NERVE C4,5,6;  Surgeon: Cosme Kirkland MD;  Location: [facility] PAIN MGT;  Service: Pain Management;  Laterality: Bilateral;  B/L MBB C4,5,6    INJECTION OF ANESTHETIC AGENT AROUND NERVE Bilateral 1/29/2020    Procedure: BLOCK, NERVE, Repeat C4-C5-C6 MEDIAL  BRANCH;  Surgeon: Cosme Kirkland MD;  Location: [facility] PAIN MGT;  Service: Pain Management;  Laterality: Bilateral;    INJECTION OF ANESTHETIC AGENT AROUND NERVE Bilateral 12/14/2022    Procedure: BLOCK, NERVE BILATERAL L3,L4,L5 MEDIAL BRANCH NEEDS CONSENT;  Surgeon: Cosme Kirkland MD;  Location: [facility] PAIN MGT;  Service: Pain Management;  Laterality: Bilateral;    INJECTION OF ANESTHETIC AGENT AROUND NERVE Bilateral 1/25/2023    Procedure: BLOCK, NERVE BILATERAL L3,L4,L5 MEDIAL BRANCH;  Surgeon: Cosme Kirkland MD;  Location: [facility] PAIN MGT;  Service: Pain Management;  Laterality: Bilateral;    INJECTION OF JOINT Left 5/18/2022    Procedure: INJECTION, JOINT, LEFT SI and GTB *LORI PT*;  Surgeon: Jed Phillips MD;  Location: [facility] PAIN MGT;  Service: Pain Management;  Laterality: Left;    LAPAROSCOPIC SURGICAL REMOVAL OF CECUM N/A 7/6/2020    Procedure: EXCISION, CECUM, LAPAROSCOPIC, colonoscopy to start, ERAS low;  Surgeon: Kang Guzmán MD;  [facility];  Service: Colon and Rectal;  Laterality: N/A;    RADIOFREQUENCY ABLATION Left 3/4/2020    Procedure: RADIOFREQUENCY ABLATION, C4-C5-C6 ME DIAL BRANCH 1 OF 2 need consent;  Surgeon: Cosme Kirkland MD;  Location: [facility] PAIN MGT;  Service: Pain Management;  Laterality: Left;    RADIOFREQUENCY ABLATION Right 6/3/2020    Procedure: RADIOFREQUENCY ABLATION, C4-C5-C6 MEDIAL BRANCH 2 OF 2 need consent;  Surgeon: Cosme Kirkland MD;  Location: [facility] PAIN MGT;  Service: Pain Management;  Laterality: Right;    RADIOFREQUENCY ABLATION Right 3/23/2022    Procedure: Radiofrequency Ablation RIGHT C4,5,6;  Surgeon: Cosme Kirkland MD;  Location: [facility] PAIN MGT;  Service: Pain Management;  Laterality: Right;    RADIOFREQUENCY ABLATION Left 4/6/2022    Procedure: Radiofrequency Ablation LEFT C4,5,6;  Surgeon: Cosme Kirkland MD;  Location: [facility] PAIN MGT;  Service: Pain Management;  Laterality: Left;    RADIOFREQUENCY ABLATION Bilateral  "3/1/2023    Procedure: RADIOFREQUENCY ABLATION BILATERAL L3,L4,L5 BOTH SIDES SAME TIME PER DR KIRKLAND;  Surgeon: Cosme Kirkland MD;  Location: [facility] PAIN MGT;  Service: Pain Management;  Laterality: Bilateral;    RADIOFREQUENCY ABLATION Bilateral 5/3/2023    Procedure: RADIOFREQUENCY ABLATION BILATERAL C4,C5,C6 BOTH SIDES PER DR KIRKLAND;  Surgeon: Cosme Kirkland MD;  Location: [facility] PAIN MGT;  Service: Pain Management;  Laterality: Bilateral;    REFRACTIVE SURGERY  2008    SMALL INTESTINE SURGERY  7/6/20     Review of patient's allergies indicates:   Allergen Reactions    Amoxicillin Hives      Current Facility-Administered Medications   Medication    0.9%  NaCl infusion       PMHx, PSHx, Allergies, Medications reviewed in epic    ROS negative except pain complaints in HPI    OBJECTIVE:    BP (!) 128/59 (BP Location: Right arm, Patient Position: Lying)   Pulse (!) 59   Temp 97.6 °F (36.4 °C) (Oral)   Resp 16   Ht 5' 1" (1.549 m)   Wt 66.7 kg (147 lb)   SpO2 100%   Breastfeeding No   BMI 27.78 kg/m²     PHYSICAL EXAMINATION:    GENERAL: Well appearing, in no acute distress, alert and oriented x3.  PSYCH:  Mood and affect appropriate.  SKIN: Skin color, texture, turgor normal, no rashes or lesions which will impact the procedure.  CV: RRR with palpation of the radial artery.  PULM: No evidence of respiratory difficulty, symmetric chest rise. Clear to auscultation.  NEURO: Cranial nerves grossly intact.    Plan:    Proceed with procedure as planned Procedure(s) (LRB):  INJECTION,SACROILIAC JOINT BIALTERAL (Bilateral)    Maurilio Somers  05/24/2023            "

## 2023-06-09 ENCOUNTER — OFFICE VISIT (OUTPATIENT)
Dept: PAIN MEDICINE | Facility: CLINIC | Age: 53
End: 2023-06-09
Payer: COMMERCIAL

## 2023-06-09 DIAGNOSIS — G89.4 CHRONIC PAIN DISORDER: Primary | ICD-10-CM

## 2023-06-09 DIAGNOSIS — M50.30 DDD (DEGENERATIVE DISC DISEASE), CERVICAL: ICD-10-CM

## 2023-06-09 DIAGNOSIS — M53.3 SACROILIAC JOINT PAIN: ICD-10-CM

## 2023-06-09 DIAGNOSIS — M51.36 DDD (DEGENERATIVE DISC DISEASE), LUMBAR: ICD-10-CM

## 2023-06-09 DIAGNOSIS — M79.18 MYOFASCIAL PAIN: ICD-10-CM

## 2023-06-09 DIAGNOSIS — M47.812 CERVICAL SPONDYLOSIS: ICD-10-CM

## 2023-06-09 DIAGNOSIS — M47.816 SPONDYLOSIS OF LUMBAR REGION WITHOUT MYELOPATHY OR RADICULOPATHY: ICD-10-CM

## 2023-06-09 PROCEDURE — 1160F RVW MEDS BY RX/DR IN RCRD: CPT | Mod: CPTII,95,, | Performed by: NURSE PRACTITIONER

## 2023-06-09 PROCEDURE — 99213 PR OFFICE/OUTPT VISIT, EST, LEVL III, 20-29 MIN: ICD-10-PCS | Mod: 95,,, | Performed by: NURSE PRACTITIONER

## 2023-06-09 PROCEDURE — 1160F PR REVIEW ALL MEDS BY PRESCRIBER/CLIN PHARMACIST DOCUMENTED: ICD-10-PCS | Mod: CPTII,95,, | Performed by: NURSE PRACTITIONER

## 2023-06-09 PROCEDURE — 99213 OFFICE O/P EST LOW 20 MIN: CPT | Mod: 95,,, | Performed by: NURSE PRACTITIONER

## 2023-06-09 PROCEDURE — 1159F MED LIST DOCD IN RCRD: CPT | Mod: CPTII,95,, | Performed by: NURSE PRACTITIONER

## 2023-06-09 PROCEDURE — 1159F PR MEDICATION LIST DOCUMENTED IN MEDICAL RECORD: ICD-10-PCS | Mod: CPTII,95,, | Performed by: NURSE PRACTITIONER

## 2023-06-09 RX ORDER — METHOCARBAMOL 500 MG/1
500 TABLET, FILM COATED ORAL 3 TIMES DAILY PRN
Qty: 30 TABLET | Refills: 0 | Status: SHIPPED | OUTPATIENT
Start: 2023-06-09 | End: 2023-08-09

## 2023-06-09 NOTE — PROGRESS NOTES
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 reflexes are physiologic and symmetric.  Plantar response are downgoing. No loss of sensation is noted, equal bilaterally  Gait: Normal.    ASSESSMENT: 52 y.o. year old female with low back pain, consistent with      1. Chronic pain disorder        2. Spondylosis of lumbar region without myelopathy or radiculopathy        3. DDD (degenerative disc disease), lumbar        4. Sacroiliac joint pain        5. Cervical spondylosis        6. DDD (degenerative disc disease), cervical        7. Myofascial pain            PLAN:     - Previous imaging reviewed today.    - She is s/p cervical RFA with benefit.     - She is s/p bilateral SI joint injections with benefit.     - We can repeat lumbar RFA as needed.     - I have stressed the importance of physical activity and a home exercise plan to help with pain and improve health.    - Continue Celebrex.     - Trial Robaxin 500 mg TID PRN muscle pain.     - RTC in 1 month.     The above plan and management options were discussed at length with patient. Patient is in agreement with the above and verbalized understanding.    Tessa Guzmán NP  06/09/2023

## 2023-06-29 ENCOUNTER — OFFICE VISIT (OUTPATIENT)
Dept: PAIN MEDICINE | Facility: CLINIC | Age: 53
End: 2023-06-29
Attending: ANESTHESIOLOGY
Payer: COMMERCIAL

## 2023-06-29 DIAGNOSIS — M79.18 MYOFASCIAL PAIN: ICD-10-CM

## 2023-06-29 DIAGNOSIS — M47.812 CERVICAL SPONDYLOSIS: Primary | ICD-10-CM

## 2023-06-29 DIAGNOSIS — M47.816 SPONDYLOSIS OF LUMBAR REGION WITHOUT MYELOPATHY OR RADICULOPATHY: ICD-10-CM

## 2023-06-29 DIAGNOSIS — G89.4 CHRONIC PAIN DISORDER: ICD-10-CM

## 2023-06-29 DIAGNOSIS — M46.1 SACROILIITIS: ICD-10-CM

## 2023-06-29 PROCEDURE — 99214 PR OFFICE/OUTPT VISIT, EST, LEVL IV, 30-39 MIN: ICD-10-PCS | Mod: 95,,, | Performed by: ANESTHESIOLOGY

## 2023-06-29 PROCEDURE — 1160F PR REVIEW ALL MEDS BY PRESCRIBER/CLIN PHARMACIST DOCUMENTED: ICD-10-PCS | Mod: CPTII,95,, | Performed by: ANESTHESIOLOGY

## 2023-06-29 PROCEDURE — 1159F MED LIST DOCD IN RCRD: CPT | Mod: CPTII,95,, | Performed by: ANESTHESIOLOGY

## 2023-06-29 PROCEDURE — 1159F PR MEDICATION LIST DOCUMENTED IN MEDICAL RECORD: ICD-10-PCS | Mod: CPTII,95,, | Performed by: ANESTHESIOLOGY

## 2023-06-29 PROCEDURE — 99214 OFFICE O/P EST MOD 30 MIN: CPT | Mod: 95,,, | Performed by: ANESTHESIOLOGY

## 2023-06-29 PROCEDURE — 1160F RVW MEDS BY RX/DR IN RCRD: CPT | Mod: CPTII,95,, | Performed by: ANESTHESIOLOGY

## 2023-06-29 NOTE — PROGRESS NOTES
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 to tolerate it, she may increase as needed for muscular pain  - Counseled on continuation of stretches/exercises on a regular/routine basis  - follow up in 6 - 8 weeks to reassess.  If she feels her pain is inadequately controlled, we can have an in-person visit for her next appointment  - Counseled patient regarding the importance of activity modification and physical therapy.    The above plan and management options were discussed at length with patient. Patient is in agreement with the above and verbalized understanding.    Norma Catherine MD  I have personally reviewed the history and personally discussed the plan with patient through video visit and agree with the resident/fellow/NPs note as stated above.    Cosme Kirkland MD    06/29/2023

## 2023-07-05 ENCOUNTER — PATIENT MESSAGE (OUTPATIENT)
Dept: PAIN MEDICINE | Facility: CLINIC | Age: 53
End: 2023-07-05
Payer: COMMERCIAL

## 2023-07-05 ENCOUNTER — PATIENT MESSAGE (OUTPATIENT)
Dept: OBSTETRICS AND GYNECOLOGY | Facility: CLINIC | Age: 53
End: 2023-07-05
Payer: COMMERCIAL

## 2023-07-05 DIAGNOSIS — M47.812 CERVICAL SPONDYLOSIS WITHOUT MYELOPATHY: ICD-10-CM

## 2023-07-05 RX ORDER — CELECOXIB 100 MG/1
100 CAPSULE ORAL 2 TIMES DAILY
Qty: 60 CAPSULE | Refills: 2 | Status: SHIPPED | OUTPATIENT
Start: 2023-07-05 | End: 2023-10-05

## 2023-08-04 ENCOUNTER — PATIENT MESSAGE (OUTPATIENT)
Dept: PAIN MEDICINE | Facility: CLINIC | Age: 53
End: 2023-08-04
Payer: COMMERCIAL

## 2023-08-09 DIAGNOSIS — M79.18 MYOFASCIAL PAIN: ICD-10-CM

## 2023-08-09 RX ORDER — METHOCARBAMOL 500 MG/1
TABLET, FILM COATED ORAL
Qty: 30 TABLET | Refills: 0 | Status: SHIPPED | OUTPATIENT
Start: 2023-08-09 | End: 2023-08-31

## 2023-08-22 ENCOUNTER — HOSPITAL ENCOUNTER (OUTPATIENT)
Dept: RADIOLOGY | Facility: OTHER | Age: 53
Discharge: HOME | End: 2023-08-22
Attending: OBSTETRICS & GYNECOLOGY
Payer: COMMERCIAL

## 2023-08-22 DIAGNOSIS — Z12.31 OTHER SCREENING MAMMOGRAM: ICD-10-CM

## 2023-08-22 PROCEDURE — 77063 BREAST TOMOSYNTHESIS BI: CPT | Mod: 26,,, | Performed by: RADIOLOGY

## 2023-08-22 PROCEDURE — 77063 MAMMO DIGITAL SCREENING BILAT WITH TOMO: ICD-10-PCS | Mod: 26,,, | Performed by: RADIOLOGY

## 2023-08-22 PROCEDURE — 77067 MAMMO DIGITAL SCREENING BILAT WITH TOMO: ICD-10-PCS | Mod: 26,,, | Performed by: RADIOLOGY

## 2023-08-22 PROCEDURE — 77067 SCR MAMMO BI INCL CAD: CPT | Mod: TC

## 2023-08-22 PROCEDURE — 77067 SCR MAMMO BI INCL CAD: CPT | Mod: 26,,, | Performed by: RADIOLOGY

## 2023-08-30 ENCOUNTER — OFFICE VISIT (OUTPATIENT)
Dept: HEMATOLOGY/ONCOLOGY | Facility: CLINIC | Age: 53
End: 2023-08-30
Payer: COMMERCIAL

## 2023-08-30 VITALS
WEIGHT: 145.94 LBS | TEMPERATURE: 98 F | DIASTOLIC BLOOD PRESSURE: 60 MMHG | BODY MASS INDEX: 27.55 KG/M2 | HEART RATE: 62 BPM | SYSTOLIC BLOOD PRESSURE: 127 MMHG | HEIGHT: 61 IN

## 2023-08-30 DIAGNOSIS — I10 ESSENTIAL HYPERTENSION: ICD-10-CM

## 2023-08-30 DIAGNOSIS — R53.82 CHRONIC FATIGUE: ICD-10-CM

## 2023-08-30 DIAGNOSIS — D37.4 VILLOUS ADENOMA OF COLON: ICD-10-CM

## 2023-08-30 DIAGNOSIS — D56.3 ALPHA THALASSEMIA TRAIT: Primary | ICD-10-CM

## 2023-08-30 PROCEDURE — 99999 PR PBB SHADOW E&M-EST. PATIENT-LVL IV: ICD-10-PCS | Mod: PBBFAC,,, | Performed by: STUDENT IN AN ORGANIZED HEALTH CARE EDUCATION/TRAINING PROGRAM

## 2023-08-30 PROCEDURE — 99999 PR PBB SHADOW E&M-EST. PATIENT-LVL IV: CPT | Mod: PBBFAC,,, | Performed by: STUDENT IN AN ORGANIZED HEALTH CARE EDUCATION/TRAINING PROGRAM

## 2023-08-30 PROCEDURE — 3074F PR MOST RECENT SYSTOLIC BLOOD PRESSURE < 130 MM HG: ICD-10-PCS | Mod: CPTII,S$GLB,, | Performed by: STUDENT IN AN ORGANIZED HEALTH CARE EDUCATION/TRAINING PROGRAM

## 2023-08-30 PROCEDURE — 1160F PR REVIEW ALL MEDS BY PRESCRIBER/CLIN PHARMACIST DOCUMENTED: ICD-10-PCS | Mod: CPTII,S$GLB,, | Performed by: STUDENT IN AN ORGANIZED HEALTH CARE EDUCATION/TRAINING PROGRAM

## 2023-08-30 PROCEDURE — 3078F PR MOST RECENT DIASTOLIC BLOOD PRESSURE < 80 MM HG: ICD-10-PCS | Mod: CPTII,S$GLB,, | Performed by: STUDENT IN AN ORGANIZED HEALTH CARE EDUCATION/TRAINING PROGRAM

## 2023-08-30 PROCEDURE — 99214 OFFICE O/P EST MOD 30 MIN: CPT | Mod: S$GLB,,, | Performed by: STUDENT IN AN ORGANIZED HEALTH CARE EDUCATION/TRAINING PROGRAM

## 2023-08-30 PROCEDURE — 99214 PR OFFICE/OUTPT VISIT, EST, LEVL IV, 30-39 MIN: ICD-10-PCS | Mod: S$GLB,,, | Performed by: STUDENT IN AN ORGANIZED HEALTH CARE EDUCATION/TRAINING PROGRAM

## 2023-08-30 PROCEDURE — 1160F RVW MEDS BY RX/DR IN RCRD: CPT | Mod: CPTII,S$GLB,, | Performed by: STUDENT IN AN ORGANIZED HEALTH CARE EDUCATION/TRAINING PROGRAM

## 2023-08-30 PROCEDURE — 3078F DIAST BP <80 MM HG: CPT | Mod: CPTII,S$GLB,, | Performed by: STUDENT IN AN ORGANIZED HEALTH CARE EDUCATION/TRAINING PROGRAM

## 2023-08-30 PROCEDURE — 3008F BODY MASS INDEX DOCD: CPT | Mod: CPTII,S$GLB,, | Performed by: STUDENT IN AN ORGANIZED HEALTH CARE EDUCATION/TRAINING PROGRAM

## 2023-08-30 PROCEDURE — 1159F MED LIST DOCD IN RCRD: CPT | Mod: CPTII,S$GLB,, | Performed by: STUDENT IN AN ORGANIZED HEALTH CARE EDUCATION/TRAINING PROGRAM

## 2023-08-30 PROCEDURE — 3074F SYST BP LT 130 MM HG: CPT | Mod: CPTII,S$GLB,, | Performed by: STUDENT IN AN ORGANIZED HEALTH CARE EDUCATION/TRAINING PROGRAM

## 2023-08-30 PROCEDURE — 1159F PR MEDICATION LIST DOCUMENTED IN MEDICAL RECORD: ICD-10-PCS | Mod: CPTII,S$GLB,, | Performed by: STUDENT IN AN ORGANIZED HEALTH CARE EDUCATION/TRAINING PROGRAM

## 2023-08-30 PROCEDURE — 3008F PR BODY MASS INDEX (BMI) DOCUMENTED: ICD-10-PCS | Mod: CPTII,S$GLB,, | Performed by: STUDENT IN AN ORGANIZED HEALTH CARE EDUCATION/TRAINING PROGRAM

## 2023-08-30 NOTE — PROGRESS NOTES
Hematology- Oncology Clinic Note :        8/30/2023    RFV / chief complaint- Alpha thalassemia trait        HPI  Pt is a 52 y.o. female who  has a past medical history of Alpha thalassemia, Cervical spondylosis (12/30/2019), Colon polyp, Fatty liver, Hypertension, and Premenstrual symptom.   Pt presents to the clinic today for evaluation of anemia and alpha thal trait    Pt reports to be doing well. No new complains.   Continues to be fatigued. On med for weight loss.     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   Her mother also had anemia. She doesnot recall hx of sickle cell or thalassemia in other family members.   MGM with cancer in LNs. Maternal uncle with prostate cancer. Another maternal uncle with cancer to LNs.   Brother with HTN.   She doesn't smoke. Drinks a few times a week, no binge drinking.       Reviewed past medical/surgical/social history    Past Medical History:   Diagnosis Date    Alpha thalassemia     Cervical spondylosis 12/30/2019    Colon polyp     Fatty liver     Hypertension     Premenstrual symptom       Past Surgical History:   Procedure Laterality Date    COLONOSCOPY N/A 6/18/2020    Procedure: COLONOSCOPY;  Surgeon: Eliot Hill MD;  Location: [address]);  Service: Endoscopy;  Laterality: N/A;  COVID screening on 6/16/20[facility]    COLONOSCOPY N/A 7/6/2020    Procedure: COLONOSCOPY;  Surgeon: Kang Guzmán MD;  Location: [facility] OR [address] [place] [address];  Service: Colon and Rectal;  Laterality: N/A;    COLONOSCOPY N/A 7/9/2021    Procedure: COLONOSCOPY;  Surgeon: GREGG Guzmán MD;  Location: [address]);  Service: Endoscopy;  Laterality: N/A;  in July 2021  covid test [place] 7/6    EPIDURAL STEROID INJECTION N/A 10/2/2019    Procedure: INJECTION, STEROID, EPIDURAL, C7-T1;  Surgeon: Cosme Kirkland MD;  Location: [facility] [place];  Service: Pain Management;  Laterality: N/A;    EPIDURAL STEROID INJECTION N/A 12/22/2021    Procedure: INJECTION, STEROID, EPIDURAL,  C7-T1 NEED CONSENT;  Surgeon: Cosme Kirkland MD;  Location: [facility];  Service: Pain Management;  Laterality: N/A;    EPIDURAL STEROID INJECTION N/A 7/6/2022    Procedure: INJECTION, STEROID, EPIDURAL, C7-T1 IL  CONTRAST;  Surgeon: Cosme Kirkland MD;  Location: [facility];  Service: Pain Management;  Laterality: N/A;    EYE SURGERY      INJECTION OF ANESTHETIC AGENT AROUND NERVE Bilateral 1/8/2020    Procedure: BLOCK, NERVE C4,5,6;  Surgeon: Cosme Kirkland MD;  Location: [facility]IN [facility];  Service: Pain Management;  Laterality: Bilateral;  B/L MBB C4,5,6    INJECTION OF ANESTHETIC AGENT AROUND NERVE Bilateral 1/29/2020    Procedure: BLOCK, NERVE, Repeat C4-C5-C6 MEDIAL BRANCH;  Surgeon: Cosme Kirkland MD;  Location: [facility];  Service: Pain Management;  Laterality: Bilateral;    INJECTION OF ANESTHETIC AGENT AROUND NERVE Bilateral 12/14/2022    Procedure: BLOCK, NERVE BILATERAL L3,L4,L5 MEDIAL BRANCH NEEDS CONSENT;  Surgeon: Cosme Kirkland MD;  Location: [facility];  Service: Pain Management;  Laterality: Bilateral;    INJECTION OF ANESTHETIC AGENT AROUND NERVE Bilateral 1/25/2023    Procedure: BLOCK, NERVE BILATERAL L3,L4,L5 MEDIAL BRANCH;  Surgeon: Cosme Kirkland MD;  Location: [facility];  Service: Pain Management;  Laterality: Bilateral;    INJECTION OF JOINT Left 5/18/2022    Procedure: INJECTION, JOINT, LEFT SI and GTB *LORI PT*;  Surgeon: Jed Phillips MD;  Location: [facility];  Service: Pain Management;  Laterality: Left;    INJECTION, SACROILIAC JOINT Bilateral 5/24/2023    Procedure: INJECTION,SACROILIAC JOINT BIALTERAL;  Surgeon: Cosme Kirkland MD;  Location: [facility];  Service: Pain Management;  Laterality: Bilateral;    LAPAROSCOPIC SURGICAL REMOVAL OF CECUM N/A 7/6/2020    Procedure: EXCISION, CECUM, LAPAROSCOPIC, colonoscopy to start, ERAS low;  Surgeon: Kang Guzmán MD;  Location: [facility] OR [address] [place] [address];  Service: Colon and Rectal;   Laterality: N/A;    RADIOFREQUENCY ABLATION Left 3/4/2020    Procedure: RADIOFREQUENCY ABLATION, C4-C5-C6 ME DIAL BRANCH 1 OF 2 need consent;  Surgeon: Cosme Kirkland MD;  Location: [facility]IN [facility];  Service: Pain Management;  Laterality: Left;    RADIOFREQUENCY ABLATION Right 6/3/2020    Procedure: RADIOFREQUENCY ABLATION, C4-C5-C6 MEDIAL BRANCH 2 OF 2 need consent;  Surgeon: Cosme Kirkland MD;  Location: [facility];  Service: Pain Management;  Laterality: Right;    RADIOFREQUENCY ABLATION Right 3/23/2022    Procedure: Radiofrequency Ablation RIGHT C4,5,6;  Surgeon: Cosme Kirkland MD;  Location: [facility]IN [facility];  Service: Pain Management;  Laterality: Right;    RADIOFREQUENCY ABLATION Left 4/6/2022    Procedure: Radiofrequency Ablation LEFT C4,5,6;  Surgeon: Cosme Kirkland MD;  Location: [facility];  Service: Pain Management;  Laterality: Left;    RADIOFREQUENCY ABLATION Bilateral 3/1/2023    Procedure: RADIOFREQUENCY ABLATION BILATERAL L3,L4,L5 BOTH SIDES SAME TIME PER DR KIRKLAND;  Surgeon: Cosme Kirkland MD;  Location: [facility];  Service: Pain Management;  Laterality: Bilateral;    RADIOFREQUENCY ABLATION Bilateral 5/3/2023    Procedure: RADIOFREQUENCY ABLATION BILATERAL C4,C5,C6 BOTH SIDES PER DR KIRKLAND;  Surgeon: Cosme Kirkland MD;  Location: [facility];  Service: Pain Management;  Laterality: Bilateral;    REFRACTIVE SURGERY  2008    SMALL INTESTINE SURGERY  7/6/20      (Not in a hospital admission)    Review of patient's allergies indicates:   Allergen Reactions    Amoxicillin Hives      Social History     Tobacco Use    Smoking status: Never    Smokeless tobacco: Never   Substance Use Topics    Alcohol use: Yes     Alcohol/week: 1.0 standard drink of alcohol     Types: 1 Standard drinks or equivalent per week     Comment: 3 drinks weekly       Family History   Problem Relation Age of Onset    Hypertension Mother     Thyroid disease Mother     Rheum arthritis Mother      Hearing loss Mother     Heart disease Father         Pacemaker    Cancer Maternal Grandmother     Cataracts Maternal Grandmother     Diabetes Maternal Grandmother     Hypertension Maternal Grandmother     Thyroid disease Maternal Grandmother     Breast cancer Maternal Grandmother     Hearing loss Maternal Grandmother     Cataracts Maternal Grandfather     Diabetes Maternal Grandfather     Hypertension Maternal Grandfather     Thyroid disease Maternal Grandfather     Lupus Cousin     Cancer Maternal Uncle     No Known Problems Paternal Grandmother     No Known Problems Paternal Grandfather     Amblyopia Neg Hx     Blindness Neg Hx     Glaucoma Neg Hx     Macular degeneration Neg Hx     Retinal detachment Neg Hx     Strabismus Neg Hx     Stroke Neg Hx     Ovarian cancer Neg Hx     Colon cancer Neg Hx     Cirrhosis Neg Hx           Review of Systems :  Review of Systems   Constitutional:  Positive for malaise/fatigue. Negative for chills, diaphoresis, fever and weight loss.   HENT: Negative.  Negative for congestion, hearing loss, nosebleeds, sore throat and tinnitus.    Eyes: Negative.  Negative for blurred vision and discharge.   Respiratory:  Negative for cough, hemoptysis, sputum production, shortness of breath and wheezing.    Cardiovascular: Negative.  Negative for chest pain, palpitations and leg swelling.   Gastrointestinal: Negative.  Negative for abdominal pain, blood in stool, constipation, diarrhea, heartburn, melena, nausea and vomiting.   Genitourinary: Negative.    Musculoskeletal: Negative.  Negative for back pain, falls, joint pain and myalgias.   Skin: Negative.  Negative for itching and rash.   Neurological: Negative.  Negative for dizziness, tingling, sensory change, speech change, focal weakness, seizures, loss of consciousness, weakness and headaches.   Endo/Heme/Allergies: Negative.  Does not bruise/bleed easily.   Psychiatric/Behavioral: Negative.  Negative for depression. The patient is not  "nervous/anxious and does not have insomnia.                Physical Exam :  /60 (BP Location: Left arm, Patient Position: Sitting)   Pulse 62   Temp 97.8 °F (36.6 °C) (Oral)   Ht 5' 1" (1.549 m)   Wt 66.2 kg (145 lb 15.1 oz)   BMI 27.58 kg/m²   Wt Readings from Last 3 Encounters:   23 66.2 kg (145 lb 15.1 oz)   23 66.7 kg (147 lb)   23 65.8 kg (145 lb)       Body mass index is 27.58 kg/m².      Physical Exam  Constitutional:       General: She is not in acute distress.     Appearance: Normal appearance. She is not ill-appearing.      Comments: pleasant   HENT:      Head: Normocephalic and atraumatic.      Right Ear: External ear normal.      Left Ear: External ear normal.   Eyes:      General: No scleral icterus.  Pulmonary:      Effort: Pulmonary effort is normal.   Skin:     Coloration: Skin is not jaundiced.   Neurological:      Mental Status: She is alert and oriented to person, place, and time.   Psychiatric:         Mood and Affect: Mood normal.         Speech: Speech normal.         Behavior: Behavior normal.             Current Outpatient Medications   Medication Sig Dispense Refill    ascorbic acid, vitamin C, (VITAMIN C) 100 MG tablet Take 100 mg by mouth once daily.      atenoloL-chlorthalidone (TENORETIC) 50-25 mg Tab TAKE 1/2 TABLET BY MOUTH ONCE EVERY DAY 45 tablet 3    celecoxib (CELEBREX) 100 MG capsule Take 1 capsule (100 mg total) by mouth 2 (two) times daily. 60 capsule 2    FLUoxetine 20 MG capsule TAKE 1 CAPSULE(20 MG) BY MOUTH EVERY DAY 30 capsule 9    loratadine (CLARITIN) 10 mg tablet Take 10 mg by mouth every morning.       metFORMIN (GLUCOPHAGE-XR) 500 MG ER 24hr tablet TAKE 1 TABLET(500 MG) BY MOUTH DAILY WITH BREAKFAST Strength: 500 mg 90 tablet 3    methocarbamoL (ROBAXIN) 500 MG Tab TAKE 1 TABLET(500 MG) BY MOUTH THREE TIMES DAILY AS NEEDED FOR MUSCLE PAIN 30 tablet 0    MOUNJARO 2.5 mg/0.5 mL PnIj SMARTSI Pre-Filled Pen Syringe SUB-Q Once a Week      " MOUNJARO 5 mg/0.5 mL PnIj SMARTSI Milligram(s) SUB-Q Once a Week      MOUNJARO 7.5 mg/0.5 mL PnIj SMARTSI.5 Milligram(s) SUB-Q Once a Week      multivitamin capsule Take 1 capsule by mouth once daily.      ondansetron (ZOFRAN-ODT) 4 MG TbDL Take 4 mg by mouth every 6 (six) hours as needed.      potassium chloride SA (K-DUR,KLOR-CON M) 10 MEQ tablet Take 1 tablet (10 mEq total) by mouth once daily. 90 tablet 3    vitamin D (VITAMIN D3) 1000 units Tab Take 1,000 Units by mouth once daily.      levonorgestrel (MIRENA) 20 mcg/24 hr (5 years) IUD 1 Intra Uterine Device by Intrauterine route once. for 1 dose 1 Intra Uterine Device 0     No current facility-administered medications for this visit.       Pertinent Diagnostic studies:      No visits with results within 1 Week(s) from this visit.   Latest known visit with results is:   Lab Visit on 2023   Component Date Value Ref Range Status    Ferritin 2023 200  20.0 - 300.0 ng/mL Final    WBC 2023 7.49  3.90 - 12.70 K/uL Final    RBC 2023 5.12  4.00 - 5.40 M/uL Final    Hemoglobin 2023 12.0  12.0 - 16.0 g/dL Final    Hematocrit 2023 39.3  37.0 - 48.5 % Final    MCV 2023 77 (L)  82 - 98 fL Final    MCH 2023 23.4 (L)  27.0 - 31.0 pg Final    MCHC 2023 30.5 (L)  32.0 - 36.0 g/dL Final    RDW 2023 13.8  11.5 - 14.5 % Final    Platelets 2023 378  150 - 450 K/uL Final    MPV 2023 8.8 (L)  9.2 - 12.9 fL Final    Immature Granulocytes 2023 0.4  0.0 - 0.5 % Final    Gran # (ANC) 2023 4.4  1.8 - 7.7 K/uL Final    Immature Grans (Abs) 2023 0.03  0.00 - 0.04 K/uL Final    Comment: Mild elevation in immature granulocytes is non specific and   can be seen in a variety of conditions including stress response,   acute inflammation, trauma and pregnancy. Correlation with other   laboratory and clinical findings is essential.      Lymph # 2023 2.3  1.0 - 4.8 K/uL Final    Mono # 2023  0.6  0.3 - 1.0 K/uL Final    Eos # 08/22/2023 0.1  0.0 - 0.5 K/uL Final    Baso # 08/22/2023 0.03  0.00 - 0.20 K/uL Final    nRBC 08/22/2023 0  0 /100 WBC Final    Gran % 08/22/2023 59.3  38.0 - 73.0 % Final    Lymph % 08/22/2023 30.6  18.0 - 48.0 % Final    Mono % 08/22/2023 8.4  4.0 - 15.0 % Final    Eosinophil % 08/22/2023 0.9  0.0 - 8.0 % Final    Basophil % 08/22/2023 0.4  0.0 - 1.9 % Final    Differential Method 08/22/2023 Automated   Final    Sodium 08/22/2023 137  136 - 145 mmol/L Final    Potassium 08/22/2023 3.3 (L)  3.5 - 5.1 mmol/L Final    Chloride 08/22/2023 100  95 - 110 mmol/L Final    CO2 08/22/2023 28  23 - 29 mmol/L Final    Glucose 08/22/2023 101  70 - 110 mg/dL Final    BUN 08/22/2023 13  6 - 20 mg/dL Final    Creatinine 08/22/2023 0.8  0.5 - 1.4 mg/dL Final    Calcium 08/22/2023 9.3  8.7 - 10.5 mg/dL Final    Total Protein 08/22/2023 6.7  6.0 - 8.4 g/dL Final    Albumin 08/22/2023 3.7  3.5 - 5.2 g/dL Final    Total Bilirubin 08/22/2023 0.5  0.1 - 1.0 mg/dL Final    Comment: For infants and newborns, interpretation of results should be based  on gestational age, weight and in agreement with clinical  observations.    Premature Infant recommended reference ranges:  Up to 24 hours.............<8.0 mg/dL  Up to 48 hours............<12.0 mg/dL  3-5 days..................<15.0 mg/dL  6-29 days.................<15.0 mg/dL      Alkaline Phosphatase 08/22/2023 60  55 - 135 U/L Final    AST 08/22/2023 15  10 - 40 U/L Final    ALT 08/22/2023 10  10 - 44 U/L Final    eGFR 08/22/2023 >60  >60 mL/min/1.73 m^2 Final    Anion Gap 08/22/2023 9  8 - 16 mmol/L Final         Patient Active Problem List    Diagnosis Date Noted    Alpha thalassemia trait 01/17/2021    Right upper quadrant abdominal pain 01/17/2021    Microcytic anemia 01/17/2021    Chronic fatigue 01/17/2021    Other spondylosis, lumbosacral region 12/18/2022    Sacroiliitis 12/18/2022    DDD (degenerative disc disease), lumbar 12/18/2022    History  of colon polyps 07/09/2021    Villous adenoma of colon 03/14/2021    Fatty liver 03/14/2021    Thrombocytosis 01/17/2021    Cecal polyp 07/06/2020    Colon cancer screening 06/18/2020    Other spondylosis, cervical region 12/30/2019    Chronic pain 10/02/2019    Cervical pain (neck) 03/06/2019    Upper extremity pain 03/06/2019    Poor posture 03/06/2019    Travel advice encounter 02/05/2018    Overweight 01/07/2015    Sinus congestion 01/07/2015    Premenstrual symptom     Essential hypertension 12/23/2013     Active Problem List with Overview Notes    Diagnosis Date Noted    Alpha thalassemia trait 01/17/2021    Right upper quadrant abdominal pain 01/17/2021    Microcytic anemia 01/17/2021    Chronic fatigue 01/17/2021    Other spondylosis, lumbosacral region 12/18/2022    Sacroiliitis 12/18/2022    DDD (degenerative disc disease), lumbar 12/18/2022    History of colon polyps 07/09/2021    Villous adenoma of colon 03/14/2021    Fatty liver 03/14/2021    Thrombocytosis 01/17/2021    Cecal polyp 07/06/2020    Colon cancer screening 06/18/2020    Other spondylosis, cervical region 12/30/2019    Chronic pain 10/02/2019    Cervical pain (neck) 03/06/2019    Upper extremity pain 03/06/2019    Poor posture 03/06/2019    Travel advice encounter 02/05/2018    Overweight 01/07/2015    Sinus congestion 01/07/2015    Premenstrual symptom     Essential hypertension 12/23/2013         Oncology History    No history exists.       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       Assessment :   ECOG 1    1. Alpha thalassemia trait    2. Villous adenoma of colon    3. Essential hypertension    4. 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  Pt has been educated about the diagnosis. She has completed  genetic testing and counseling.   Her CBC findings of mild microcytic anemia are due to alpha thal minor and it usually does not cause any other major issues clinically .  It does not need require any treatment or intervention at this time.   No co inheritance of beta thal or sickle cell.   Advised patient to avoid iron supplements unless advised by hematologist.       Microcytic anemia  Likely due to alpha thal minor/ trait  Chronic, stable, monitor   Genetic counseling completed.     Chronic fatigue  Extensive work up was done by pcp in 9/2020  With hx of snoring , treatment of HTN , chronic fatigue- sleep study advised   Continue f/u pcp     Fatty liver  US RUQ Jan 2021 - fatty liver  Per hepatology and pcp     Villous adenoma 2020  C scope 7/2021- One 12 mm polyp in the sigmoid colon, removed with a hot snare.   repeat in 3 years7/2024    HTN- on meds, per pcp   On combination pill BB and hctz, low K, she will discuss with pcp         No problem-specific Assessment & Plan notes found for this encounter.      Problem List Items Addressed This Visit       Alpha thalassemia trait - Primary    Chronic fatigue    Essential hypertension    Villous adenoma of colon           Electronically signed by Gabby Venegas        RTC 1 year with cbc and ferritin prior  Future Appointments   Date Time Provider Department Center   9/19/2023  8:30 AM Marj Gonzalez MD [facility] [place]           This note was created with voice recognition software.  Grammatical, syntax and spelling errors may be inevitable.

## 2023-08-31 DIAGNOSIS — M79.18 MYOFASCIAL PAIN: ICD-10-CM

## 2023-08-31 RX ORDER — METHOCARBAMOL 500 MG/1
TABLET, FILM COATED ORAL
Qty: 30 TABLET | Refills: 0 | Status: SHIPPED | OUTPATIENT
Start: 2023-08-31 | End: 2023-09-20

## 2023-09-07 ENCOUNTER — PATIENT MESSAGE (OUTPATIENT)
Dept: PAIN MEDICINE | Facility: CLINIC | Age: 53
End: 2023-09-07
Payer: COMMERCIAL

## 2023-09-07 ENCOUNTER — TELEPHONE (OUTPATIENT)
Dept: PAIN MEDICINE | Facility: CLINIC | Age: 53
End: 2023-09-07
Payer: COMMERCIAL

## 2023-09-07 NOTE — TELEPHONE ENCOUNTER
----- Message from Radha Lacy sent at 9/7/2023 11:39 AM CDT -----  Regarding: status  Name of Who is Calling: Sheila           What is the request in detail: Patient is requesting a call back to find out if an order has been put in for her have procedure.            Can the clinic reply by [facility]: Yes           What Number to Call Back if not in [facility]: [phone]

## 2023-09-08 DIAGNOSIS — M43.07 LUMBOSACRAL SPONDYLOLYSIS: Primary | ICD-10-CM

## 2023-09-08 DIAGNOSIS — M46.1 SACROILIITIS: ICD-10-CM

## 2023-09-12 ENCOUNTER — TELEPHONE (OUTPATIENT)
Dept: PAIN MEDICINE | Facility: OTHER | Age: 53
End: 2023-09-12
Payer: COMMERCIAL

## 2023-09-12 NOTE — TELEPHONE ENCOUNTER
----- Message from Davina Darden MA sent at 9/11/2023  8:34 AM CDT -----  There should be an order in for this pt to get a procedure.  ----- Message -----  From: Cosme Kirkland MD  Sent: 9/8/2023   4:50 PM CDT  To: Davina Darden MA    I put it in and noticed there was a couple put in already but not sure why it stated discontinued       MG  ----- Message -----  From: Davina Darden MA  Sent: 9/8/2023   3:42 PM CDT  To: Cosme Kirkland MD    Hi Dr. Kirkland,     This pt saw you on 6/29 and was suppose to receive a nerve block but it never got scheduled. I sent a message over to [facility] and [facility] said that there was not an order in for the nerve block. Do you want to put a nerve block order in or do you want her to be seen in the clinic?    Davina Boyle MA

## 2023-09-13 ENCOUNTER — PATIENT MESSAGE (OUTPATIENT)
Dept: PAIN MEDICINE | Facility: OTHER | Age: 53
End: 2023-09-13
Payer: COMMERCIAL

## 2023-09-19 ENCOUNTER — LAB VISIT (OUTPATIENT)
Dept: LAB | Facility: HOSPITAL | Age: 53
End: 2023-09-19
Attending: HOSPITALIST
Payer: COMMERCIAL

## 2023-09-19 ENCOUNTER — OFFICE VISIT (OUTPATIENT)
Dept: INTERNAL MEDICINE | Facility: CLINIC | Age: 53
End: 2023-09-19
Payer: COMMERCIAL

## 2023-09-19 VITALS
TEMPERATURE: 98 F | RESPIRATION RATE: 20 BRPM | WEIGHT: 147.69 LBS | HEART RATE: 60 BPM | SYSTOLIC BLOOD PRESSURE: 126 MMHG | DIASTOLIC BLOOD PRESSURE: 74 MMHG | BODY MASS INDEX: 27.88 KG/M2 | HEIGHT: 61 IN | OXYGEN SATURATION: 100 %

## 2023-09-19 DIAGNOSIS — K76.0 FATTY LIVER: ICD-10-CM

## 2023-09-19 DIAGNOSIS — R73.03 PREDIABETES: ICD-10-CM

## 2023-09-19 DIAGNOSIS — Z00.00 ANNUAL PHYSICAL EXAM: Primary | ICD-10-CM

## 2023-09-19 DIAGNOSIS — E66.3 OVERWEIGHT: ICD-10-CM

## 2023-09-19 DIAGNOSIS — I10 ESSENTIAL HYPERTENSION: ICD-10-CM

## 2023-09-19 DIAGNOSIS — Z00.00 ANNUAL PHYSICAL EXAM: ICD-10-CM

## 2023-09-19 DIAGNOSIS — R53.82 CHRONIC FATIGUE: ICD-10-CM

## 2023-09-19 DIAGNOSIS — R06.83 SNORING: ICD-10-CM

## 2023-09-19 DIAGNOSIS — D56.3 ALPHA THALASSEMIA TRAIT: ICD-10-CM

## 2023-09-19 DIAGNOSIS — E87.6 HYPOKALEMIA: Primary | ICD-10-CM

## 2023-09-19 LAB
ALBUMIN SERPL BCP-MCNC: 3.7 G/DL (ref 3.5–5.2)
ALP SERPL-CCNC: 55 U/L (ref 55–135)
ALT SERPL W/O P-5'-P-CCNC: 8 U/L (ref 10–44)
ANION GAP SERPL CALC-SCNC: 9 MMOL/L (ref 8–16)
AST SERPL-CCNC: 15 U/L (ref 10–40)
BASOPHILS # BLD AUTO: 0.04 K/UL (ref 0–0.2)
BASOPHILS NFR BLD: 0.5 % (ref 0–1.9)
BILIRUB SERPL-MCNC: 0.4 MG/DL (ref 0.1–1)
BUN SERPL-MCNC: 10 MG/DL (ref 6–20)
CALCIUM SERPL-MCNC: 9.1 MG/DL (ref 8.7–10.5)
CHLORIDE SERPL-SCNC: 101 MMOL/L (ref 95–110)
CHOLEST SERPL-MCNC: 188 MG/DL (ref 120–199)
CHOLEST/HDLC SERPL: 3.5 {RATIO} (ref 2–5)
CO2 SERPL-SCNC: 27 MMOL/L (ref 23–29)
CREAT SERPL-MCNC: 0.7 MG/DL (ref 0.5–1.4)
DIFFERENTIAL METHOD: ABNORMAL
EOSINOPHIL # BLD AUTO: 0.1 K/UL (ref 0–0.5)
EOSINOPHIL NFR BLD: 0.8 % (ref 0–8)
ERYTHROCYTE [DISTWIDTH] IN BLOOD BY AUTOMATED COUNT: 14.6 % (ref 11.5–14.5)
EST. GFR  (NO RACE VARIABLE): >60 ML/MIN/1.73 M^2
ESTIMATED AVG GLUCOSE: 111 MG/DL (ref 68–131)
FOLATE SERPL-MCNC: 13.6 NG/ML (ref 4–24)
GLUCOSE SERPL-MCNC: 89 MG/DL (ref 70–110)
HBA1C MFR BLD: 5.5 % (ref 4–5.6)
HCT VFR BLD AUTO: 40 % (ref 37–48.5)
HDLC SERPL-MCNC: 53 MG/DL (ref 40–75)
HDLC SERPL: 28.2 % (ref 20–50)
HGB BLD-MCNC: 12.2 G/DL (ref 12–16)
IMM GRANULOCYTES # BLD AUTO: 0.01 K/UL (ref 0–0.04)
IMM GRANULOCYTES NFR BLD AUTO: 0.1 % (ref 0–0.5)
LDLC SERPL CALC-MCNC: 106.8 MG/DL (ref 63–159)
LYMPHOCYTES # BLD AUTO: 2.6 K/UL (ref 1–4.8)
LYMPHOCYTES NFR BLD: 29.7 % (ref 18–48)
MCH RBC QN AUTO: 23.2 PG (ref 27–31)
MCHC RBC AUTO-ENTMCNC: 30.5 G/DL (ref 32–36)
MCV RBC AUTO: 76 FL (ref 82–98)
MONOCYTES # BLD AUTO: 0.7 K/UL (ref 0.3–1)
MONOCYTES NFR BLD: 8.5 % (ref 4–15)
NEUTROPHILS # BLD AUTO: 5.2 K/UL (ref 1.8–7.7)
NEUTROPHILS NFR BLD: 60.4 % (ref 38–73)
NONHDLC SERPL-MCNC: 135 MG/DL
NRBC BLD-RTO: 0 /100 WBC
PLATELET # BLD AUTO: 456 K/UL (ref 150–450)
PMV BLD AUTO: 9.1 FL (ref 9.2–12.9)
POTASSIUM SERPL-SCNC: 3.2 MMOL/L (ref 3.5–5.1)
PROT SERPL-MCNC: 7.2 G/DL (ref 6–8.4)
RBC # BLD AUTO: 5.25 M/UL (ref 4–5.4)
SODIUM SERPL-SCNC: 137 MMOL/L (ref 136–145)
TRIGL SERPL-MCNC: 141 MG/DL (ref 30–150)
TSH SERPL DL<=0.005 MIU/L-ACNC: 2.87 UIU/ML (ref 0.4–4)
VIT B12 SERPL-MCNC: >2000 PG/ML (ref 210–950)
WBC # BLD AUTO: 8.63 K/UL (ref 3.9–12.7)

## 2023-09-19 PROCEDURE — 90677 PNEUMOCOCCAL CONJUGATE VACCINE 20-VALENT: ICD-10-PCS | Mod: S$GLB,,, | Performed by: HOSPITALIST

## 2023-09-19 PROCEDURE — 36415 COLL VENOUS BLD VENIPUNCTURE: CPT | Mod: PO | Performed by: HOSPITALIST

## 2023-09-19 PROCEDURE — 99999 PR PBB SHADOW E&M-EST. PATIENT-LVL V: CPT | Mod: PBBFAC,,, | Performed by: HOSPITALIST

## 2023-09-19 PROCEDURE — 90471 IMMUNIZATION ADMIN: CPT | Mod: S$GLB,,, | Performed by: HOSPITALIST

## 2023-09-19 PROCEDURE — 3078F PR MOST RECENT DIASTOLIC BLOOD PRESSURE < 80 MM HG: ICD-10-PCS | Mod: CPTII,S$GLB,, | Performed by: HOSPITALIST

## 2023-09-19 PROCEDURE — 3074F SYST BP LT 130 MM HG: CPT | Mod: CPTII,S$GLB,, | Performed by: HOSPITALIST

## 2023-09-19 PROCEDURE — 82746 ASSAY OF FOLIC ACID SERUM: CPT | Performed by: HOSPITALIST

## 2023-09-19 PROCEDURE — 85025 COMPLETE CBC W/AUTO DIFF WBC: CPT | Performed by: HOSPITALIST

## 2023-09-19 PROCEDURE — 1159F PR MEDICATION LIST DOCUMENTED IN MEDICAL RECORD: ICD-10-PCS | Mod: CPTII,S$GLB,, | Performed by: HOSPITALIST

## 2023-09-19 PROCEDURE — 80061 LIPID PANEL: CPT | Performed by: HOSPITALIST

## 2023-09-19 PROCEDURE — 82607 VITAMIN B-12: CPT | Performed by: HOSPITALIST

## 2023-09-19 PROCEDURE — 83036 HEMOGLOBIN GLYCOSYLATED A1C: CPT | Performed by: HOSPITALIST

## 2023-09-19 PROCEDURE — 1160F PR REVIEW ALL MEDS BY PRESCRIBER/CLIN PHARMACIST DOCUMENTED: ICD-10-PCS | Mod: CPTII,S$GLB,, | Performed by: HOSPITALIST

## 2023-09-19 PROCEDURE — 80053 COMPREHEN METABOLIC PANEL: CPT | Performed by: HOSPITALIST

## 2023-09-19 PROCEDURE — 99396 PR PREVENTIVE VISIT,EST,40-64: ICD-10-PCS | Mod: 25,S$GLB,, | Performed by: HOSPITALIST

## 2023-09-19 PROCEDURE — 90677 PCV20 VACCINE IM: CPT | Mod: S$GLB,,, | Performed by: HOSPITALIST

## 2023-09-19 PROCEDURE — 99999 PR PBB SHADOW E&M-EST. PATIENT-LVL V: ICD-10-PCS | Mod: PBBFAC,,, | Performed by: HOSPITALIST

## 2023-09-19 PROCEDURE — 99396 PREV VISIT EST AGE 40-64: CPT | Mod: 25,S$GLB,, | Performed by: HOSPITALIST

## 2023-09-19 PROCEDURE — 90471 PNEUMOCOCCAL CONJUGATE VACCINE 20-VALENT: ICD-10-PCS | Mod: S$GLB,,, | Performed by: HOSPITALIST

## 2023-09-19 PROCEDURE — 3008F PR BODY MASS INDEX (BMI) DOCUMENTED: ICD-10-PCS | Mod: CPTII,S$GLB,, | Performed by: HOSPITALIST

## 2023-09-19 PROCEDURE — 84443 ASSAY THYROID STIM HORMONE: CPT | Performed by: HOSPITALIST

## 2023-09-19 PROCEDURE — 3078F DIAST BP <80 MM HG: CPT | Mod: CPTII,S$GLB,, | Performed by: HOSPITALIST

## 2023-09-19 PROCEDURE — 3008F BODY MASS INDEX DOCD: CPT | Mod: CPTII,S$GLB,, | Performed by: HOSPITALIST

## 2023-09-19 PROCEDURE — 1159F MED LIST DOCD IN RCRD: CPT | Mod: CPTII,S$GLB,, | Performed by: HOSPITALIST

## 2023-09-19 PROCEDURE — 1160F RVW MEDS BY RX/DR IN RCRD: CPT | Mod: CPTII,S$GLB,, | Performed by: HOSPITALIST

## 2023-09-19 PROCEDURE — 3074F PR MOST RECENT SYSTOLIC BLOOD PRESSURE < 130 MM HG: ICD-10-PCS | Mod: CPTII,S$GLB,, | Performed by: HOSPITALIST

## 2023-09-19 RX ORDER — PNEUMOCOCCAL 20-VALENT CONJUGATE VACCINE 2.2; 2.2; 2.2; 2.2; 2.2; 2.2; 2.2; 2.2; 2.2; 2.2; 2.2; 2.2; 2.2; 2.2; 2.2; 2.2; 4.4; 2.2; 2.2; 2.2 UG/.5ML; UG/.5ML; UG/.5ML; UG/.5ML; UG/.5ML; UG/.5ML; UG/.5ML; UG/.5ML; UG/.5ML; UG/.5ML; UG/.5ML; UG/.5ML; UG/.5ML; UG/.5ML; UG/.5ML; UG/.5ML; UG/.5ML; UG/.5ML; UG/.5ML; UG/.5ML
0.5 INJECTION, SUSPENSION INTRAMUSCULAR ONCE
Qty: 0.5 ML | Refills: 0 | Status: CANCELLED | OUTPATIENT
Start: 2023-09-19 | End: 2023-09-19

## 2023-09-19 RX ORDER — POTASSIUM CHLORIDE 750 MG/1
20 CAPSULE, EXTENDED RELEASE ORAL DAILY
Qty: 180 CAPSULE | Refills: 3 | Status: SHIPPED | OUTPATIENT
Start: 2023-09-19

## 2023-09-20 ENCOUNTER — TELEPHONE (OUTPATIENT)
Dept: INTERNAL MEDICINE | Facility: CLINIC | Age: 53
End: 2023-09-20
Payer: COMMERCIAL

## 2023-09-20 DIAGNOSIS — M79.18 MYOFASCIAL PAIN: ICD-10-CM

## 2023-09-20 RX ORDER — METHOCARBAMOL 500 MG/1
TABLET, FILM COATED ORAL
Qty: 30 TABLET | Refills: 0 | Status: SHIPPED | OUTPATIENT
Start: 2023-09-20 | End: 2023-10-05

## 2023-09-26 ENCOUNTER — TELEPHONE (OUTPATIENT)
Dept: PAIN MEDICINE | Facility: CLINIC | Age: 53
End: 2023-09-26
Payer: COMMERCIAL

## 2023-09-26 NOTE — TELEPHONE ENCOUNTER
----- Message from Cosme Kirkland MD sent at 9/25/2023  3:02 PM CDT -----  Regarding: RE: PROCEDURE 10/4 NEED PEER TO PEER  Can we schedule a f/u with me     MG  ----- Message -----  From: Sara Li LPN  Sent: 9/25/2023   2:40 PM CDT  To: Cosme Kirkland MD; Radha Norris RN; #  Subject: PROCEDURE 10/4 NEED PEER TO PEER                   Good afternoon,    Good afternoon,     Case denied, did not receive letter with reason at the moment        Peer to peer can be done by calling  [phone]     Case# [id number]

## 2023-09-27 ENCOUNTER — PATIENT MESSAGE (OUTPATIENT)
Dept: ADMINISTRATIVE | Facility: OTHER | Age: 53
End: 2023-09-27
Payer: COMMERCIAL

## 2023-09-28 ENCOUNTER — OFFICE VISIT (OUTPATIENT)
Dept: PAIN MEDICINE | Facility: CLINIC | Age: 53
End: 2023-09-28
Attending: ANESTHESIOLOGY
Payer: COMMERCIAL

## 2023-09-28 DIAGNOSIS — M46.1 SACROILIITIS: ICD-10-CM

## 2023-09-28 DIAGNOSIS — M51.36 DDD (DEGENERATIVE DISC DISEASE), LUMBAR: ICD-10-CM

## 2023-09-28 DIAGNOSIS — M54.51 VERTEBROGENIC LOW BACK PAIN: Primary | ICD-10-CM

## 2023-09-28 PROCEDURE — 99214 PR OFFICE/OUTPT VISIT, EST, LEVL IV, 30-39 MIN: ICD-10-PCS | Mod: 95,,, | Performed by: ANESTHESIOLOGY

## 2023-09-28 PROCEDURE — 3044F HG A1C LEVEL LT 7.0%: CPT | Mod: CPTII,95,, | Performed by: ANESTHESIOLOGY

## 2023-09-28 PROCEDURE — 3044F PR MOST RECENT HEMOGLOBIN A1C LEVEL <7.0%: ICD-10-PCS | Mod: CPTII,95,, | Performed by: ANESTHESIOLOGY

## 2023-09-28 PROCEDURE — 99214 OFFICE O/P EST MOD 30 MIN: CPT | Mod: 95,,, | Performed by: ANESTHESIOLOGY

## 2023-09-28 RX ORDER — DULOXETIN HYDROCHLORIDE 60 MG/1
60 CAPSULE, DELAYED RELEASE ORAL DAILY
Qty: 30 CAPSULE | Refills: 11 | Status: SHIPPED | OUTPATIENT
Start: 2023-09-28 | End: 2024-09-27

## 2023-09-28 NOTE — PROGRESS NOTES
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 other reviewed and negative other than HPI.    OBJECTIVE:    General appearance: Well appearing, in no acute distress, alert and oriented x3.  Psych:  Mood and affect appropriate.      ASSESSMENT: 52 y.o. year old female with chronic pain, consistent with     1. Vertebrogenic low back pain  Procedure Order to Pain Management      2. DDD (degenerative disc disease), lumbar        3. Sacroiliitis          We independently reviewed and interpreted her most recent lumbar MRI.  She does have disc desiccation at the L5/S1 level with Modic changes noted at the inferior L5 and superior S1 endplates.  The images and results were reviewed with the patient.  These imaging findings combined with her history and physical exam findings are consistent with a component of vertebrogenic pain contributing to her symptoms.         PLAN:     - I have stressed the importance of physical activity and a home exercise plan to help with pain and improve health.  - Start Cymbalta 60 mg daily  - Discontinue fluoxetine  - Schedule for Intracept at L5 and S1  - We also discussed SI joint block and possible RFA to give her more longer lasting relief of her sacroiliac pain; however, we are still awaiting  insurance approval.  - Will follow up  - Counseled patient regarding the importance of activity modification, constant sleeping habits, and physical therapy.    The above plan and management options were discussed at length with patient. Patient is in agreement with the above and verbalized understanding. It will be communicated with the referring physician via electronic record, fax, or mail.    Maurilio Somers MD  [facility] Fellow, PGY-5  I have personally reviewed the history and personally discussed the plan with patient through video visit and agree with the resident/fellow/NPs note as stated above.    Cosme Kirkland MD  10/10/2034

## 2023-10-05 DIAGNOSIS — M47.812 CERVICAL SPONDYLOSIS WITHOUT MYELOPATHY: ICD-10-CM

## 2023-10-05 DIAGNOSIS — M79.18 MYOFASCIAL PAIN: ICD-10-CM

## 2023-10-05 RX ORDER — METHOCARBAMOL 500 MG/1
TABLET, FILM COATED ORAL
Qty: 30 TABLET | Refills: 1 | Status: SHIPPED | OUTPATIENT
Start: 2023-10-05

## 2023-10-05 RX ORDER — CELECOXIB 100 MG/1
100 CAPSULE ORAL 2 TIMES DAILY
Qty: 60 CAPSULE | Refills: 2 | Status: SHIPPED | OUTPATIENT
Start: 2023-10-05 | End: 2024-02-05

## 2023-10-06 DIAGNOSIS — M43.07 LUMBOSACRAL SPONDYLOLYSIS: Primary | ICD-10-CM

## 2023-10-08 ENCOUNTER — PATIENT MESSAGE (OUTPATIENT)
Dept: PAIN MEDICINE | Facility: OTHER | Age: 53
End: 2023-10-08
Payer: COMMERCIAL

## 2023-10-09 ENCOUNTER — PATIENT MESSAGE (OUTPATIENT)
Dept: INTERNAL MEDICINE | Facility: CLINIC | Age: 53
End: 2023-10-09
Payer: COMMERCIAL

## 2023-10-09 ENCOUNTER — TELEPHONE (OUTPATIENT)
Dept: PAIN MEDICINE | Facility: CLINIC | Age: 53
End: 2023-10-09
Payer: COMMERCIAL

## 2023-10-09 NOTE — TELEPHONE ENCOUNTER
----- Message from Herberth Wilkins sent at 10/9/2023 11:19 AM CDT -----  Name of Who is Calling: ETHAN EPSTEIN [[id number]]            What is the request in detail: Patient is requesting call back to ask questions involving procedure substance               Can the clinic reply by [facility]: yes              What Number to Call Back if not in [facility]: [phone]

## 2023-10-10 ENCOUNTER — PATIENT MESSAGE (OUTPATIENT)
Dept: OBSTETRICS AND GYNECOLOGY | Facility: CLINIC | Age: 53
End: 2023-10-10
Payer: COMMERCIAL

## 2023-10-10 ENCOUNTER — LAB VISIT (OUTPATIENT)
Dept: LAB | Facility: OTHER | Age: 53
End: 2023-10-10
Attending: HOSPITALIST
Payer: COMMERCIAL

## 2023-10-10 DIAGNOSIS — N95.1 MENOPAUSAL SYMPTOM: ICD-10-CM

## 2023-10-10 DIAGNOSIS — E87.6 HYPOKALEMIA: ICD-10-CM

## 2023-10-10 LAB
ESTRADIOL SERPL-MCNC: 39 PG/ML
FSH SERPL-ACNC: 59.18 MIU/ML
LH SERPL-ACNC: 15.3 MIU/ML
POTASSIUM SERPL-SCNC: 3.4 MMOL/L (ref 3.5–5.1)
TESTOST SERPL-MCNC: 34 NG/DL (ref 5–73)

## 2023-10-10 PROCEDURE — 82670 ASSAY OF TOTAL ESTRADIOL: CPT | Performed by: OBSTETRICS & GYNECOLOGY

## 2023-10-10 PROCEDURE — 83001 ASSAY OF GONADOTROPIN (FSH): CPT | Performed by: OBSTETRICS & GYNECOLOGY

## 2023-10-10 PROCEDURE — 84132 ASSAY OF SERUM POTASSIUM: CPT | Performed by: HOSPITALIST

## 2023-10-10 PROCEDURE — 83002 ASSAY OF GONADOTROPIN (LH): CPT | Performed by: OBSTETRICS & GYNECOLOGY

## 2023-10-10 PROCEDURE — 36415 COLL VENOUS BLD VENIPUNCTURE: CPT | Performed by: OBSTETRICS & GYNECOLOGY

## 2023-10-10 PROCEDURE — 84403 ASSAY OF TOTAL TESTOSTERONE: CPT | Performed by: OBSTETRICS & GYNECOLOGY

## 2023-10-10 PROCEDURE — 84402 ASSAY OF FREE TESTOSTERONE: CPT | Performed by: OBSTETRICS & GYNECOLOGY

## 2023-10-13 LAB — TESTOST FREE SERPL-MCNC: 0.8 PG/ML

## 2023-10-17 ENCOUNTER — PATIENT MESSAGE (OUTPATIENT)
Dept: INTERNAL MEDICINE | Facility: CLINIC | Age: 53
End: 2023-10-17
Payer: COMMERCIAL

## 2023-10-17 DIAGNOSIS — I10 ESSENTIAL HYPERTENSION: ICD-10-CM

## 2023-10-18 RX ORDER — ATENOLOL AND CHLORTHALIDONE TABLET 50; 25 MG/1; MG/1
TABLET ORAL
Qty: 45 TABLET | Refills: 3 | Status: SHIPPED | OUTPATIENT
Start: 2023-10-18

## 2023-10-18 NOTE — TELEPHONE ENCOUNTER
No care due was identified.  [facility] Embedded Care Due Messages. Reference number: [id number].   10/18/2023 7:52:23 AM CDT

## 2023-10-23 ENCOUNTER — PATIENT MESSAGE (OUTPATIENT)
Dept: PAIN MEDICINE | Facility: CLINIC | Age: 53
End: 2023-10-23
Payer: COMMERCIAL

## 2023-10-24 DIAGNOSIS — M43.07 LUMBOSACRAL SPONDYLOLYSIS: Primary | ICD-10-CM

## 2023-10-25 ENCOUNTER — PATIENT MESSAGE (OUTPATIENT)
Dept: PAIN MEDICINE | Facility: OTHER | Age: 53
End: 2023-10-25
Payer: COMMERCIAL

## 2023-10-25 ENCOUNTER — PATIENT MESSAGE (OUTPATIENT)
Dept: ADMINISTRATIVE | Facility: OTHER | Age: 53
End: 2023-10-25
Payer: COMMERCIAL

## 2023-10-25 ENCOUNTER — TELEPHONE (OUTPATIENT)
Dept: PAIN MEDICINE | Facility: OTHER | Age: 53
End: 2023-10-25
Payer: COMMERCIAL

## 2023-10-25 DIAGNOSIS — M43.07 LUMBOSACRAL SPONDYLOLYSIS: Primary | ICD-10-CM

## 2023-10-25 NOTE — TELEPHONE ENCOUNTER
----- Message from [facility] sent at 10/12/2023  9:55 AM CDT -----  Regarding: RE: PEER TO PEER NEEDED FOR 10/4  Good Afternoon,   As of yesterday 10/11/2023, at 10:00am I received a teams msgs from Davina Darden, stating this case has NOW BEEN APPROVED and no longer needs a P2P.    [facility]      ----- Message -----  From: Davina Darden MA  Sent: 10/10/2023  11:58 AM CDT  To: Emily Baker; [facility] [place]  Subject: FW: PEER TO PEER NEEDED FOR 10/4                 Dr. Isael Vicente submitted a new case for this patient's nerve block. We tried to set up a P2P for her last case but it was too late. Her insurance said to contact them back as soon as a new case was submitted so a P2P could be complete. Dr. Kirkland submitted a new case but when Anuel tried to call for the P2P the insurance people said there was no new case. Does a new case have to be processed? Can you track the status of this case?    Davina León  ----- Message -----  From: Cosme Kirkland MD  Sent: 10/9/2023   2:08 PM CDT  To: Sara Li LPN; Sigrid Sharif; #  Subject: RE: PEER TO PEER NEEDED FOR 10/4                 I thought that was denied ? Patient told us she received a denial   ----- Message -----  From: Sara Li LPN  Sent: 10/9/2023   1:39 PM CDT  To: Sigrid Sharif; Tania Munguia MA; #  Subject: RE: PEER TO PEER NEEDED FOR 10/4                 This was for her Nerve Block that was ordered 9/8    ----- Message -----  From: Cosme Kirkland MD  Sent: 10/9/2023  11:42 AM CDT  To: Sara Li LPN; Sigrid Sharif; #  Subject: RE: PEER TO PEER NEEDED FOR 10/4                 That is for intercept correct ?  ----- Message -----  From: Sara Li LPN  Sent: 10/6/2023   6:29 PM CDT  To: Sigrid Sharif; Tania Munguia MA; #  Subject: RE: PEER TO PEER NEEDED FOR 10/4                 New case has been dropped and id scheduled on 10/18    ----- Message -----  From: Davina Darden MA  Sent:  10/6/2023   3:06 PM CDT  To: Sara Li LPN; Sigrid Sharif; #  Subject: RE: PEER TO PEER NEEDED FOR 10/4                 Good Afternoon,    [employer] just called us back and they informed us that they need a new case to be initiated for this patient. Once the new case is initiated we can schedule the P2P right away. There is a possibility that the peer to peer can still be on the Tuesday the 10th. Please let us know when a new case is in.    Davina Boyle MA  ----- Message -----  From: Davina Darden MA  Sent: 10/6/2023   2:52 PM CDT  To: Sara Li LPN; Sigrid Sharif; #  Subject: RE: PEER TO PEER NEEDED FOR 10/4                 Good Afternoon,     P2P scheduled for 10/10 at 11 am with Davina Mckeon MA  ----- Message -----  From: Eleuterio Hernandez  Sent: 10/6/2023   8:44 AM CDT  To: Sara Li LPN; Sigrid Sharif; #  Subject: RE: PEER TO PEER NEEDED FOR 10/4                 Roel Ghosh,  Checking on the status of scheduling a P2P on this patient.  I noticed the case on 10/4 was cancelled.  Will the procedure be rescheduled?      ----- Message -----  From: Cosme Kirkland MD  Sent: 10/2/2023   2:08 PM CDT  To: Sara Li LPN; Sigrid Sharif; #  Subject: RE: PEER TO PEER NEEDED FOR 10/4                 Davina   Can you schedule a P2P for tomorrow   Please coordinate with Robby JO  ----- Message -----  From: Sara Li LPN  Sent: 10/2/2023   7:33 AM CDT  To: Sigrid Sharif; Cosme Kirkland MD; #  Subject: PEER TO PEER NEEDED FOR 10/4                       Good afternoon,    Good afternoon,     Case denied, did not receive letter with reason at the moment        Peer to peer can be done by calling  [phone]     Case# [id number]              Please advise

## 2023-10-26 ENCOUNTER — OFFICE VISIT (OUTPATIENT)
Dept: OBSTETRICS AND GYNECOLOGY | Facility: CLINIC | Age: 53
End: 2023-10-26
Attending: OBSTETRICS & GYNECOLOGY
Payer: COMMERCIAL

## 2023-10-26 ENCOUNTER — PATIENT MESSAGE (OUTPATIENT)
Dept: ADMINISTRATIVE | Facility: OTHER | Age: 53
End: 2023-10-26
Payer: COMMERCIAL

## 2023-10-26 VITALS
HEART RATE: 61 BPM | WEIGHT: 146.38 LBS | BODY MASS INDEX: 27.64 KG/M2 | SYSTOLIC BLOOD PRESSURE: 125 MMHG | HEIGHT: 61 IN | DIASTOLIC BLOOD PRESSURE: 79 MMHG

## 2023-10-26 DIAGNOSIS — N95.1 MENOPAUSAL SYMPTOM: Primary | ICD-10-CM

## 2023-10-26 PROCEDURE — 3078F PR MOST RECENT DIASTOLIC BLOOD PRESSURE < 80 MM HG: ICD-10-PCS | Mod: CPTII,S$GLB,, | Performed by: OBSTETRICS & GYNECOLOGY

## 2023-10-26 PROCEDURE — 3074F SYST BP LT 130 MM HG: CPT | Mod: CPTII,S$GLB,, | Performed by: OBSTETRICS & GYNECOLOGY

## 2023-10-26 PROCEDURE — 99999 PR PBB SHADOW E&M-EST. PATIENT-LVL III: CPT | Mod: PBBFAC,,, | Performed by: OBSTETRICS & GYNECOLOGY

## 2023-10-26 PROCEDURE — 99214 PR OFFICE/OUTPT VISIT, EST, LEVL IV, 30-39 MIN: ICD-10-PCS | Mod: S$GLB,,, | Performed by: OBSTETRICS & GYNECOLOGY

## 2023-10-26 PROCEDURE — 3044F PR MOST RECENT HEMOGLOBIN A1C LEVEL <7.0%: ICD-10-PCS | Mod: CPTII,S$GLB,, | Performed by: OBSTETRICS & GYNECOLOGY

## 2023-10-26 PROCEDURE — 3008F BODY MASS INDEX DOCD: CPT | Mod: CPTII,S$GLB,, | Performed by: OBSTETRICS & GYNECOLOGY

## 2023-10-26 PROCEDURE — 3008F PR BODY MASS INDEX (BMI) DOCUMENTED: ICD-10-PCS | Mod: CPTII,S$GLB,, | Performed by: OBSTETRICS & GYNECOLOGY

## 2023-10-26 PROCEDURE — 3078F DIAST BP <80 MM HG: CPT | Mod: CPTII,S$GLB,, | Performed by: OBSTETRICS & GYNECOLOGY

## 2023-10-26 PROCEDURE — 99999 PR PBB SHADOW E&M-EST. PATIENT-LVL III: ICD-10-PCS | Mod: PBBFAC,,, | Performed by: OBSTETRICS & GYNECOLOGY

## 2023-10-26 PROCEDURE — 3044F HG A1C LEVEL LT 7.0%: CPT | Mod: CPTII,S$GLB,, | Performed by: OBSTETRICS & GYNECOLOGY

## 2023-10-26 PROCEDURE — 3074F PR MOST RECENT SYSTOLIC BLOOD PRESSURE < 130 MM HG: ICD-10-PCS | Mod: CPTII,S$GLB,, | Performed by: OBSTETRICS & GYNECOLOGY

## 2023-10-26 PROCEDURE — 99214 OFFICE O/P EST MOD 30 MIN: CPT | Mod: S$GLB,,, | Performed by: OBSTETRICS & GYNECOLOGY

## 2023-10-26 RX ORDER — ESTRADIOL 0.03 MG/D
1 FILM, EXTENDED RELEASE TRANSDERMAL
Qty: 8 PATCH | Refills: 11 | Status: SHIPPED | OUTPATIENT
Start: 2023-10-26

## 2023-10-26 NOTE — PROGRESS NOTES
SUBJECTIVE:   53 y.o. female   today to discuss recent hormones and her fatigue. No LMP recorded. Patient has had an implant..  She reports that she is tired all the time.  She does see pain management and her medication was recently changed from fluoxetine to Cymbalta which she has not yet started.  She has been losing weight on Mounjaro.  She does exercise but does not really do much weight training she reports over the last year her diet has changed.  She eats smaller portions and he is more protein.  She typically eats only 1-2 times per day.  She does watch TV in bed and shuts down her laptop about an hour before bed her sleep is interrupted by her  at times and she does have difficulty falling back to sleep.   Labs 10/10/2023   FSH 59.1   Estradiol 39   LH15.3   free testosterone 0.8  Total testosterone 34.        Past Medical History:   Diagnosis Date    Alpha thalassemia     Cervical spondylosis 2019    Colon polyp     Fatty liver     Hypertension     Premenstrual symptom      Past Surgical History:   Procedure Laterality Date    COLONOSCOPY N/A 2020    Procedure: COLONOSCOPY;  Surgeon: Eliot Hill MD;  Location: [address]);  Service: Endoscopy;  Laterality: N/A;  COVID screening on [facility]-[place]    COLONOSCOPY N/A 2020    Procedure: COLONOSCOPY;  Surgeon: Kang Guzmán MD;  Location: [facility] OR [address] [place] [address];  Service: Colon and Rectal;  Laterality: N/A;    COLONOSCOPY N/A 2021    Procedure: COLONOSCOPY;  Surgeon: GREGG Guzmán MD;  Location: [address]);  Service: Endoscopy;  Laterality: N/A;  in 2021  covid test [place]     EPIDURAL STEROID INJECTION N/A 10/2/2019    Procedure: INJECTION, STEROID, EPIDURAL, C7-T1;  Surgeon: Cosme Kirkland MD;  Location: [facility] PAIN T;  Service: Pain Management;  Laterality: N/A;    EPIDURAL STEROID INJECTION N/A 2021    Procedure: INJECTION, STEROID, EPIDURAL, C7-T1 NEED CONSENT;  Surgeon:  Cosme Kirkland MD;  Location: [facility] PAIN MGT;  Service: Pain Management;  Laterality: N/A;    EPIDURAL STEROID INJECTION N/A 7/6/2022    Procedure: INJECTION, STEROID, EPIDURAL, C7-T1 IL  CONTRAST;  Surgeon: Cosme Kirkland MD;  Location: [facility] PAIN MGT;  Service: Pain Management;  Laterality: N/A;    EYE SURGERY      INJECTION OF ANESTHETIC AGENT AROUND NERVE Bilateral 1/8/2020    Procedure: BLOCK, NERVE C4,5,6;  Surgeon: Cosme Kirkland MD;  Location: [facility] PAIN MGT;  Service: Pain Management;  Laterality: Bilateral;  B/L MBB C4,5,6    INJECTION OF ANESTHETIC AGENT AROUND NERVE Bilateral 1/29/2020    Procedure: BLOCK, NERVE, Repeat C4-C5-C6 MEDIAL BRANCH;  Surgeon: Cosme Kirkland MD;  Location: [facility] PAIN MGT;  Service: Pain Management;  Laterality: Bilateral;    INJECTION OF ANESTHETIC AGENT AROUND NERVE Bilateral 12/14/2022    Procedure: BLOCK, NERVE BILATERAL L3,L4,L5 MEDIAL BRANCH NEEDS CONSENT;  Surgeon: Cosme Kirkland MD;  Location: [facility] PAIN MGT;  Service: Pain Management;  Laterality: Bilateral;    INJECTION OF ANESTHETIC AGENT AROUND NERVE Bilateral 1/25/2023    Procedure: BLOCK, NERVE BILATERAL L3,L4,L5 MEDIAL BRANCH;  Surgeon: Cosme Kirkland MD;  Location: [facility] PAIN MGT;  Service: Pain Management;  Laterality: Bilateral;    INJECTION OF JOINT Left 5/18/2022    Procedure: INJECTION, JOINT, LEFT SI and GTB *LORI PT*;  Surgeon: Jed Phillips MD;  Location: [facility] PAIN MGT;  Service: Pain Management;  Laterality: Left;    INJECTION, SACROILIAC JOINT Bilateral 5/24/2023    Procedure: INJECTION,SACROILIAC JOINT BIALTERAL;  Surgeon: Cosme Kirkland MD;  Location: [facility] PAIN MGT;  Service: Pain Management;  Laterality: Bilateral;    LAPAROSCOPIC SURGICAL REMOVAL OF CECUM N/A 7/6/2020    Procedure: EXCISION, CECUM, LAPAROSCOPIC, colonoscopy to start, ERAS low;  Surgeon: Kang Guzmán MD;  Location: [facility] OR 2ND FLR;  Service: Colon and Rectal;  Laterality: N/A;    RADIOFREQUENCY  ABLATION Left 3/4/2020    Procedure: RADIOFREQUENCY ABLATION, C4-C5-C6 ME DIAL BRANCH 1 OF 2 need consent;  Surgeon: Cosme Kirkland MD;  Location: [facility] PAIN MGT;  Service: Pain Management;  Laterality: Left;    RADIOFREQUENCY ABLATION Right 6/3/2020    Procedure: RADIOFREQUENCY ABLATION, C4-C5-C6 MEDIAL BRANCH 2 OF 2 need consent;  Surgeon: Cosme Kirkland MD;  Location: [facility] PAIN MGT;  Service: Pain Management;  Laterality: Right;    RADIOFREQUENCY ABLATION Right 3/23/2022    Procedure: Radiofrequency Ablation RIGHT C4,5,6;  Surgeon: Cosme Kirkland MD;  Location: [facility] PAIN MGT;  Service: Pain Management;  Laterality: Right;    RADIOFREQUENCY ABLATION Left 4/6/2022    Procedure: Radiofrequency Ablation LEFT C4,5,6;  Surgeon: Cosme Kirkland MD;  Location: [facility] PAIN MGT;  Service: Pain Management;  Laterality: Left;    RADIOFREQUENCY ABLATION Bilateral 3/1/2023    Procedure: RADIOFREQUENCY ABLATION BILATERAL L3,L4,L5 BOTH SIDES SAME TIME PER DR KIRKLAND;  Surgeon: Cosme Kirkland MD;  Location: [facility] PAIN MGT;  Service: Pain Management;  Laterality: Bilateral;    RADIOFREQUENCY ABLATION Bilateral 5/3/2023    Procedure: RADIOFREQUENCY ABLATION BILATERAL C4,C5,C6 BOTH SIDES PER DR KIRKLAND;  Surgeon: Cosme Kirkland MD;  Location: [facility] PAIN MGT;  Service: Pain Management;  Laterality: Bilateral;    REFRACTIVE SURGERY  2008    SMALL INTESTINE SURGERY  7/6/20     Social History     Socioeconomic History    Marital status:    Occupational History     Employer: [employer]   Tobacco Use    Smoking status: Never    Smokeless tobacco: Never   Substance and Sexual Activity    Alcohol use: Yes     Alcohol/week: 1.0 standard drink of alcohol     Types: 1 Standard drinks or equivalent per week     Comment: 3 drinks weekly     Drug use: No    Sexual activity: Yes     Partners: Male     Birth control/protection: I.U.D.   Social History Narrative     Lukas - Omar,  Graciela - Florin        Used to walk, ride bikes. Occasional uses elliptical at home.      Social Determinants of Health     Financial Resource Strain: Low Risk  (9/13/2023)    Overall Financial Resource Strain (CARDIA)     Difficulty of Paying Living Expenses: Not hard at all   Food Insecurity: No Food Insecurity (9/13/2023)    Hunger Vital Sign     Worried About Running Out of Food in the Last Year: Never true     Ran Out of Food in the Last Year: Never true   Transportation Needs: No Transportation Needs (9/13/2023)    PRAPARE - Transportation     Lack of Transportation (Medical): No     Lack of Transportation (Non-Medical): No   Physical Activity: Insufficiently Active (9/13/2023)    Exercise Vital Sign     Days of Exercise per Week: 1 day     Minutes of Exercise per Session: 20 min   Stress: Stress Concern Present (9/13/2023)    [employer] [place] [employer] - Occupational Stress Questionnaire     Feeling of Stress : To some extent   Social Connections: Unknown (9/13/2023)    Social Connection and Isolation Panel [NHANES]     Frequency of Communication with Friends and Family: More than three times a week     Frequency of Social Gatherings with Friends and Family: Three times a week     Active Member of Clubs or Organizations: Yes     Attends Club or Organization Meetings: More than 4 times per year     Marital Status:    Housing Stability: Low Risk  (9/13/2023)    Housing Stability Vital Sign     Unable to Pay for Housing in the Last Year: No     Number of Places Lived in the Last Year: 1     Unstable Housing in the Last Year: No     Family History   Problem Relation Age of Onset    Hypertension Mother     Thyroid disease Mother     Rheum arthritis Mother     Hearing loss Mother     Heart disease Father         Pacemaker    Cancer Maternal Grandmother     Cataracts Maternal Grandmother     Diabetes Maternal Grandmother     Hypertension Maternal Grandmother     Thyroid disease Maternal  Grandmother     Breast cancer Maternal Grandmother     Hearing loss Maternal Grandmother     Cataracts Maternal Grandfather     Diabetes Maternal Grandfather     Hypertension Maternal Grandfather     Thyroid disease Maternal Grandfather     Lupus Cousin     Cancer Maternal Uncle     No Known Problems Paternal Grandmother     No Known Problems Paternal Grandfather     Amblyopia Neg Hx     Blindness Neg Hx     Glaucoma Neg Hx     Macular degeneration Neg Hx     Retinal detachment Neg Hx     Strabismus Neg Hx     Stroke Neg Hx     Ovarian cancer Neg Hx     Colon cancer Neg Hx     Cirrhosis Neg Hx      OB History    Para Term  AB Living   2 2 2         SAB IAB Ectopic Multiple Live Births                  # Outcome Date GA Lbr Louis/2nd Weight Sex Delivery Anes PTL Lv   2 Term      Vag-Spont      1 Term      Vag-Spont              Current Outpatient Medications   Medication Sig Dispense Refill    ascorbic acid, vitamin C, (VITAMIN C) 100 MG tablet Take 100 mg by mouth once daily.      atenoloL-chlorthalidone (TENORETIC) 50-25 mg Tab TAKE 1/2 TABLET BY MOUTH ONCE EVERY DAY 45 tablet 3    celecoxib (CELEBREX) 100 MG capsule TAKE 1 CAPSULE(100 MG) BY MOUTH TWICE DAILY 60 capsule 2    DULoxetine (CYMBALTA) 60 MG capsule Take 1 capsule (60 mg total) by mouth once daily. 30 capsule 11    levonorgestrel (MIRENA) 20 mcg/24 hr (5 years) IUD 1 Intra Uterine Device by Intrauterine route once. for 1 dose 1 Intra Uterine Device 0    loratadine (CLARITIN) 10 mg tablet Take 10 mg by mouth every morning.       methocarbamoL (ROBAXIN) 500 MG Tab TAKE 1 TABLET(500 MG) BY MOUTH THREE TIMES DAILY AS NEEDED FOR MUSCLE PAIN 30 tablet 1    multivitamin capsule Take 1 capsule by mouth once daily.      ondansetron (ZOFRAN-ODT) 4 MG TbDL Take 4 mg by mouth every 6 (six) hours as needed.      potassium chloride (MICRO-K) 10 MEQ CpSR Take 2 capsules (20 mEq total) by mouth once daily. 180 capsule 3    vitamin D (VITAMIN D3) 1000 units  Tab Take 1,000 Units by mouth once daily.       No current facility-administered medications for this visit.     Allergies: Amoxicillin     The 10-year ASCVD risk score (Gigi PERES, et al., 2019) is: 4.2%    Values used to calculate the score:      Age: 53 years      Sex: Female      Is Non- : Yes      Diabetic: No      Tobacco smoker: No      Systolic Blood Pressure: 125 mmHg      Is BP treated: Yes      HDL Cholesterol: 53 mg/dL      Total Cholesterol: 188 mg/dL      ROS:  Constitutional: + weight loss, weight gain, fever, +fatigue  Eyes:  No vision changes, glasses/contacts  ENT/Mouth: No ulcers, sinus problems, ears ringing, headache  Cardiovascular: No inability to lie flat, chest pain, exercise intolerance, swelling, heart palpitations  Respiratory: No wheezing, coughing blood, shortness of breath, or cough  Gastrointestinal: No diarrhea, bloody stool, nausea/vomiting, constipation, gas, hemorrhoids  Genitourinary: No blood in urine, painful urination, urgency of urination, frequency of urination, incomplete emptying, incontinence, abnormal bleeding, painful periods, heavy periods, vaginal discharge, vaginal odor, painful intercourse, sexual problems, bleeding after intercourse.  Musculoskeletal: No muscle weakness, +neck pain  Skin/Breast: No painful breasts, nipple discharge, masses, rash, ulcers  Neurological: No passing out, seizures, numbness, headache  Endocrine: No diabetes, hypothyroid, hyperthyroid,+ hot flashes, hair loss, abnormal hair growth, acne  Psychiatric: No depression, crying  Hematologic: No bruises, bleeding, swollen lymph nodes, anemia.      Physical Exam  deferred    ASSESSMENT:   Menopausal symptoms  Fatigue    PLAN:  Total time 25 minutes-face-to-face, review of medical record and arranging follow-up  Counseled patient on lab findings.  Counseled her that she may benefit from low-dose estradiol.  Discuss using patches twice a week.  She has Mirena IUD in place so  that will serve as a progesterone to offset the Evy  Counseled her on testosterone to help with low libido.  We will send in 2% topical click 1 pump  Nightly to inner  thigh to [facility] counseled her on possible side effects of testosterone and counseled her that it is not FDA approved for women  Follow-up in 4 months

## 2023-11-01 ENCOUNTER — HOSPITAL ENCOUNTER (OUTPATIENT)
Facility: OTHER | Age: 53
Discharge: HOME | End: 2023-11-01
Attending: ANESTHESIOLOGY | Admitting: ANESTHESIOLOGY
Payer: COMMERCIAL

## 2023-11-01 VITALS
DIASTOLIC BLOOD PRESSURE: 65 MMHG | OXYGEN SATURATION: 100 % | SYSTOLIC BLOOD PRESSURE: 110 MMHG | RESPIRATION RATE: 15 BRPM | HEIGHT: 61 IN | TEMPERATURE: 98 F | HEART RATE: 55 BPM | BODY MASS INDEX: 27.66 KG/M2

## 2023-11-01 DIAGNOSIS — M51.36 DDD (DEGENERATIVE DISC DISEASE), LUMBAR: ICD-10-CM

## 2023-11-01 DIAGNOSIS — M46.1 SACROILIITIS: Primary | ICD-10-CM

## 2023-11-01 DIAGNOSIS — G89.29 CHRONIC PAIN: ICD-10-CM

## 2023-11-01 DIAGNOSIS — M47.897 OTHER SPONDYLOSIS, LUMBOSACRAL REGION: ICD-10-CM

## 2023-11-01 PROCEDURE — 64451 NJX AA&/STRD NRV NRVTG SI JT: CPT | Mod: 50 | Performed by: ANESTHESIOLOGY

## 2023-11-01 PROCEDURE — 64451 PR NERVE BLOCK INJ, ANES/STEROID, SACROILIAC JOINT, W/IMG: ICD-10-PCS | Mod: 50,,, | Performed by: ANESTHESIOLOGY

## 2023-11-01 PROCEDURE — 63600175 PHARM REV CODE 636 W HCPCS: Performed by: ANESTHESIOLOGY

## 2023-11-01 PROCEDURE — 64451 NJX AA&/STRD NRV NRVTG SI JT: CPT | Mod: 50,,, | Performed by: ANESTHESIOLOGY

## 2023-11-01 PROCEDURE — 25000003 PHARM REV CODE 250: Performed by: ANESTHESIOLOGY

## 2023-11-01 RX ORDER — SODIUM CHLORIDE 9 MG/ML
INJECTION, SOLUTION INTRAVENOUS CONTINUOUS
Status: DISCONTINUED | OUTPATIENT
Start: 2023-11-01 | End: 2023-11-01 | Stop reason: HOSPADM

## 2023-11-01 RX ORDER — ALPRAZOLAM 0.5 MG/1
1 TABLET, ORALLY DISINTEGRATING ORAL ONCE
Status: COMPLETED | OUTPATIENT
Start: 2023-11-01 | End: 2023-11-01

## 2023-11-01 RX ORDER — LIDOCAINE HYDROCHLORIDE 20 MG/ML
INJECTION, SOLUTION INFILTRATION; PERINEURAL
Status: DISCONTINUED | OUTPATIENT
Start: 2023-11-01 | End: 2023-11-01 | Stop reason: HOSPADM

## 2023-11-01 RX ORDER — BUPIVACAINE HYDROCHLORIDE 2.5 MG/ML
INJECTION, SOLUTION EPIDURAL; INFILTRATION; INTRACAUDAL
Status: DISCONTINUED | OUTPATIENT
Start: 2023-11-01 | End: 2023-11-01 | Stop reason: HOSPADM

## 2023-11-01 RX ADMIN — ALPRAZOLAM 1 MG: 0.5 TABLET, ORALLY DISINTEGRATING ORAL at 08:11

## 2023-11-01 NOTE — DISCHARGE INSTRUCTIONS

## 2023-11-01 NOTE — DISCHARGE SUMMARY
Discharge Note  Short Stay      SUMMARY     Admit Date: 11/1/2023    Attending Physician: Matt Oneil      Discharge Physician: Matt Oneil      Discharge Date: 11/1/2023 9:38 AM    Procedure(s) (LRB):  BLOCK, NERVE BILATERAL L5, S1, AND S2 LATERAL BRANCH (Bilateral)    Final Diagnosis: Lumbosacral spondylolysis [M43.07]    Disposition: Home or self care    Patient Instructions:   Current Discharge Medication List        CONTINUE these medications which have NOT CHANGED    Details   ascorbic acid, vitamin C, (VITAMIN C) 100 MG tablet Take 100 mg by mouth once daily.      atenoloL-chlorthalidone (TENORETIC) 50-25 mg Tab TAKE 1/2 TABLET BY MOUTH ONCE EVERY DAY  Qty: 45 tablet, Refills: 3    Comments: .  Associated Diagnoses: Essential hypertension      celecoxib (CELEBREX) 100 MG capsule TAKE 1 CAPSULE(100 MG) BY MOUTH TWICE DAILY  Qty: 60 capsule, Refills: 2    Associated Diagnoses: Cervical spondylosis without myelopathy      DULoxetine (CYMBALTA) 60 MG capsule Take 1 capsule (60 mg total) by mouth once daily.  Qty: 30 capsule, Refills: 11    Associated Diagnoses: Vertebrogenic low back pain; Sacroiliitis; DDD (degenerative disc disease), lumbar      estradioL (VIVELLE-DOT) 0.025 mg/24 hr Place 1 patch onto the skin twice a week.  Qty: 8 patch, Refills: 11      levonorgestrel (MIRENA) 20 mcg/24 hr (5 years) IUD 1 Intra Uterine Device by Intrauterine route once. for 1 dose  Qty: 1 Intra Uterine Device, Refills: 0    Associated Diagnoses: Contraception, device intrauterine      loratadine (CLARITIN) 10 mg tablet Take 10 mg by mouth every morning.       methocarbamoL (ROBAXIN) 500 MG Tab TAKE 1 TABLET(500 MG) BY MOUTH THREE TIMES DAILY AS NEEDED FOR MUSCLE PAIN  Qty: 30 tablet, Refills: 1    Associated Diagnoses: Myofascial pain      multivitamin capsule Take 1 capsule by mouth once daily.      ondansetron (ZOFRAN-ODT) 4 MG TbDL Take 4 mg by mouth every 6 (six) hours as needed.      potassium chloride (MICRO-K) 10  MEQ CpSR Take 2 capsules (20 mEq total) by mouth once daily.  Qty: 180 capsule, Refills: 3    Comments: Discontinue prior scripts with same name      vitamin D (VITAMIN D3) 1000 units Tab Take 1,000 Units by mouth once daily.                 Discharge Diagnosis: Lumbosacral spondylolysis [M43.07]  Condition on Discharge: Stable with no complications to procedure   Diet on Discharge: Same as before.  Activity: as per instruction sheet.  Discharge to: Home with a responsible adult.  Follow up: 2-4 weeks       Please call my office or pager at [phone] if experienced any weakness or loss of sensation, fever > 101.5, pain uncontrolled with oral medications, persistent nausea/vomiting/or diarrhea, redness or drainage from the incisions, or any other worrisome concerns. If physician on call was not reached or could not communicate with our office for any reason please go to the nearest emergency department

## 2023-11-01 NOTE — H&P (VIEW-ONLY)
HPI  Patient presenting for Procedure(s) (LRB):  BLOCK, NERVE BILATERAL L5, S1, AND S2 LATERAL BRANCH (Bilateral)     Patient on Anti-coagulation No    No health changes since previous encounter    Past Medical History:   Diagnosis Date    Alpha thalassemia     Cervical spondylosis 12/30/2019    Colon polyp     Fatty liver     Hypertension     Premenstrual symptom      Past Surgical History:   Procedure Laterality Date    COLONOSCOPY N/A 6/18/2020    Procedure: COLONOSCOPY;  Surgeon: Eliot Hill MD;  Location: [facility] ENDO (4TH FLR);  Service: Endoscopy;  Laterality: N/A;  COVID screening on 6/16/20-[place]    COLONOSCOPY N/A 7/6/2020    Procedure: COLONOSCOPY;  Surgeon: Kang Guzmán MD;  Location: [facility] OR 2ND FLR;  Service: Colon and Rectal;  Laterality: N/A;    COLONOSCOPY N/A 7/9/2021    Procedure: COLONOSCOPY;  Surgeon: GREGG Guzmán MD;  Location: [facility] ENDO (4TH FLR);  Service: Endoscopy;  Laterality: N/A;  in July 2021  covid test [place] 7/6    EPIDURAL STEROID INJECTION N/A 10/2/2019    Procedure: INJECTION, STEROID, EPIDURAL, C7-T1;  Surgeon: Cosme Kirkland MD;  Location: [facility] PAIN MGT;  Service: Pain Management;  Laterality: N/A;    EPIDURAL STEROID INJECTION N/A 12/22/2021    Procedure: INJECTION, STEROID, EPIDURAL, C7-T1 NEED CONSENT;  Surgeon: Cosme Kirkland MD;  Location: [facility] PAIN MGT;  Service: Pain Management;  Laterality: N/A;    EPIDURAL STEROID INJECTION N/A 7/6/2022    Procedure: INJECTION, STEROID, EPIDURAL, C7-T1 IL  CONTRAST;  Surgeon: Cosme Kirkland MD;  Location: [facility] PAIN MGT;  Service: Pain Management;  Laterality: N/A;    EYE SURGERY      INJECTION OF ANESTHETIC AGENT AROUND NERVE Bilateral 1/8/2020    Procedure: BLOCK, NERVE C4,5,6;  Surgeon: Cosme Kirkland MD;  Location: [facility] PAIN MGT;  Service: Pain Management;  Laterality: Bilateral;  B/L MBB C4,5,6    INJECTION OF ANESTHETIC AGENT AROUND NERVE Bilateral 1/29/2020    Procedure: BLOCK, NERVE, Repeat  C4-C5-C6 MEDIAL BRANCH;  Surgeon: Cosme Kirkland MD;  Location: [facility] PAIN MGT;  Service: Pain Management;  Laterality: Bilateral;    INJECTION OF ANESTHETIC AGENT AROUND NERVE Bilateral 12/14/2022    Procedure: BLOCK, NERVE BILATERAL L3,L4,L5 MEDIAL BRANCH NEEDS CONSENT;  Surgeon: Cosme Kirkland MD;  Location: [facility] PAIN MGT;  Service: Pain Management;  Laterality: Bilateral;    INJECTION OF ANESTHETIC AGENT AROUND NERVE Bilateral 1/25/2023    Procedure: BLOCK, NERVE BILATERAL L3,L4,L5 MEDIAL BRANCH;  Surgeon: Cosme Kirkland MD;  Location: [facility] PAIN MGT;  Service: Pain Management;  Laterality: Bilateral;    INJECTION OF JOINT Left 5/18/2022    Procedure: INJECTION, JOINT, LEFT SI and GTB *LORI PT*;  Surgeon: Jed Phillips MD;  Location: [facility] PAIN MGT;  Service: Pain Management;  Laterality: Left;    INJECTION, SACROILIAC JOINT Bilateral 5/24/2023    Procedure: INJECTION,SACROILIAC JOINT BIALTERAL;  Surgeon: Cosme Kirkland MD;  Location: [facility] PAIN MGT;  Service: Pain Management;  Laterality: Bilateral;    LAPAROSCOPIC SURGICAL REMOVAL OF CECUM N/A 7/6/2020    Procedure: EXCISION, CECUM, LAPAROSCOPIC, colonoscopy to start, ERAS low;  Surgeon: Kang Guzmán MD;  Location: [facility] OR [address] [place] [address];  Service: Colon and Rectal;  Laterality: N/A;    RADIOFREQUENCY ABLATION Left 3/4/2020    Procedure: RADIOFREQUENCY ABLATION, C4-C5-C6 ME DIAL BRANCH 1 OF 2 need consent;  Surgeon: Cosme Kirkland MD;  Location: [facility] PAIN MGT;  Service: Pain Management;  Laterality: Left;    RADIOFREQUENCY ABLATION Right 6/3/2020    Procedure: RADIOFREQUENCY ABLATION, C4-C5-C6 MEDIAL BRANCH 2 OF 2 need consent;  Surgeon: Cosme Kirkland MD;  Location: [facility] PAIN MGT;  Service: Pain Management;  Laterality: Right;    RADIOFREQUENCY ABLATION Right 3/23/2022    Procedure: Radiofrequency Ablation RIGHT C4,5,6;  Surgeon: Cosme Kirkland MD;  Location: [facility] PAIN MGT;  Service: Pain Management;  Laterality: Right;     RADIOFREQUENCY ABLATION Left 4/6/2022    Procedure: Radiofrequency Ablation LEFT C4,5,6;  Surgeon: Cosme Kirkland MD;  Location: [facility] PAIN MGT;  Service: Pain Management;  Laterality: Left;    RADIOFREQUENCY ABLATION Bilateral 3/1/2023    Procedure: RADIOFREQUENCY ABLATION BILATERAL L3,L4,L5 BOTH SIDES SAME TIME PER DR KIRKLAND;  Surgeon: Cosme Kirkland MD;  Location: [facility] PAIN MGT;  Service: Pain Management;  Laterality: Bilateral;    RADIOFREQUENCY ABLATION Bilateral 5/3/2023    Procedure: RADIOFREQUENCY ABLATION BILATERAL C4,C5,C6 BOTH SIDES PER DR KIRKLAND;  Surgeon: Cosme Kirkland MD;  Location: [facility] PAIN MGT;  Service: Pain Management;  Laterality: Bilateral;    REFRACTIVE SURGERY  2008    SMALL INTESTINE SURGERY  7/6/20     Review of patient's allergies indicates:   Allergen Reactions    Amoxicillin Hives      Current Facility-Administered Medications   Medication    0.9%  NaCl infusion    alprazolam ODT dissolvable tablet 1 mg       PMHx, PSHx, Allergies, Medications reviewed in epic    ROS negative except pain complaints in HPI    OBJECTIVE:    There were no vitals taken for this visit.    PHYSICAL EXAMINATION:    GENERAL: Well appearing, in no acute distress, alert and oriented x3.  PSYCH:  Mood and affect appropriate.  SKIN: Skin color, texture, turgor normal, no rashes or lesions which will impact the procedure.  CV: RRR with palpation of the radial artery.  PULM: No evidence of respiratory difficulty, symmetric chest rise. Clear to auscultation.  NEURO: Cranial nerves grossly intact.    Plan:    Proceed with procedure as planned Procedure(s) (LRB):  BLOCK, NERVE BILATERAL L5, S1, AND S2 LATERAL BRANCH (Bilateral)    Matt Oneil  11/01/2023

## 2023-11-01 NOTE — H&P
HPI  Patient presenting for Procedure(s) (LRB):  BLOCK, NERVE BILATERAL L5, S1, AND S2 LATERAL BRANCH (Bilateral)     Patient on Anti-coagulation No    No health changes since previous encounter    Past Medical History:   Diagnosis Date    Alpha thalassemia     Cervical spondylosis 12/30/2019    Colon polyp     Fatty liver     Hypertension     Premenstrual symptom      Past Surgical History:   Procedure Laterality Date    COLONOSCOPY N/A 6/18/2020    Procedure: COLONOSCOPY;  Surgeon: Eliot Hill MD;  Location: [facility] ENDO (4TH FLR);  Service: Endoscopy;  Laterality: N/A;  COVID screening on 6/16/20-[place]    COLONOSCOPY N/A 7/6/2020    Procedure: COLONOSCOPY;  Surgeon: Kang Guzmán MD;  Location: [facility] OR 2ND FLR;  Service: Colon and Rectal;  Laterality: N/A;    COLONOSCOPY N/A 7/9/2021    Procedure: COLONOSCOPY;  Surgeon: GREGG Guzmán MD;  Location: [facility] ENDO (4TH FLR);  Service: Endoscopy;  Laterality: N/A;  in July 2021  covid test [place] 7/6    EPIDURAL STEROID INJECTION N/A 10/2/2019    Procedure: INJECTION, STEROID, EPIDURAL, C7-T1;  Surgeon: Cosme Kirkland MD;  Location: [facility] PAIN MGT;  Service: Pain Management;  Laterality: N/A;    EPIDURAL STEROID INJECTION N/A 12/22/2021    Procedure: INJECTION, STEROID, EPIDURAL, C7-T1 NEED CONSENT;  Surgeon: Cosme Kirkland MD;  Location: [facility] PAIN MGT;  Service: Pain Management;  Laterality: N/A;    EPIDURAL STEROID INJECTION N/A 7/6/2022    Procedure: INJECTION, STEROID, EPIDURAL, C7-T1 IL  CONTRAST;  Surgeon: Cosme Kirkland MD;  Location: [facility] PAIN MGT;  Service: Pain Management;  Laterality: N/A;    EYE SURGERY      INJECTION OF ANESTHETIC AGENT AROUND NERVE Bilateral 1/8/2020    Procedure: BLOCK, NERVE C4,5,6;  Surgeon: Cosme Kirkland MD;  Location: [facility] PAIN MGT;  Service: Pain Management;  Laterality: Bilateral;  B/L MBB C4,5,6    INJECTION OF ANESTHETIC AGENT AROUND NERVE Bilateral 1/29/2020    Procedure: BLOCK, NERVE, Repeat  C4-C5-C6 MEDIAL BRANCH;  Surgeon: Cosme Kirkland MD;  Location: [facility] PAIN MGT;  Service: Pain Management;  Laterality: Bilateral;    INJECTION OF ANESTHETIC AGENT AROUND NERVE Bilateral 12/14/2022    Procedure: BLOCK, NERVE BILATERAL L3,L4,L5 MEDIAL BRANCH NEEDS CONSENT;  Surgeon: Cosme Kirkland MD;  Location: [facility] PAIN MGT;  Service: Pain Management;  Laterality: Bilateral;    INJECTION OF ANESTHETIC AGENT AROUND NERVE Bilateral 1/25/2023    Procedure: BLOCK, NERVE BILATERAL L3,L4,L5 MEDIAL BRANCH;  Surgeon: Cosme Kirkland MD;  Location: [facility] PAIN MGT;  Service: Pain Management;  Laterality: Bilateral;    INJECTION OF JOINT Left 5/18/2022    Procedure: INJECTION, JOINT, LEFT SI and GTB *LORI PT*;  Surgeon: Jed Phillips MD;  Location: [facility] PAIN MGT;  Service: Pain Management;  Laterality: Left;    INJECTION, SACROILIAC JOINT Bilateral 5/24/2023    Procedure: INJECTION,SACROILIAC JOINT BIALTERAL;  Surgeon: Cosme Kirkland MD;  Location: [facility] PAIN MGT;  Service: Pain Management;  Laterality: Bilateral;    LAPAROSCOPIC SURGICAL REMOVAL OF CECUM N/A 7/6/2020    Procedure: EXCISION, CECUM, LAPAROSCOPIC, colonoscopy to start, ERAS low;  Surgeon: Kang Guzmán MD;  Location: [facility] OR [address] [place] [address];  Service: Colon and Rectal;  Laterality: N/A;    RADIOFREQUENCY ABLATION Left 3/4/2020    Procedure: RADIOFREQUENCY ABLATION, C4-C5-C6 ME DIAL BRANCH 1 OF 2 need consent;  Surgeon: Cosme Kirkland MD;  Location: [facility] PAIN MGT;  Service: Pain Management;  Laterality: Left;    RADIOFREQUENCY ABLATION Right 6/3/2020    Procedure: RADIOFREQUENCY ABLATION, C4-C5-C6 MEDIAL BRANCH 2 OF 2 need consent;  Surgeon: Cosme Kirkland MD;  Location: [facility] PAIN MGT;  Service: Pain Management;  Laterality: Right;    RADIOFREQUENCY ABLATION Right 3/23/2022    Procedure: Radiofrequency Ablation RIGHT C4,5,6;  Surgeon: Cosme Kirkland MD;  Location: [facility] PAIN MGT;  Service: Pain Management;  Laterality: Right;     RADIOFREQUENCY ABLATION Left 4/6/2022    Procedure: Radiofrequency Ablation LEFT C4,5,6;  Surgeon: Cosme Kirkland MD;  Location: [facility] PAIN MGT;  Service: Pain Management;  Laterality: Left;    RADIOFREQUENCY ABLATION Bilateral 3/1/2023    Procedure: RADIOFREQUENCY ABLATION BILATERAL L3,L4,L5 BOTH SIDES SAME TIME PER DR KIRKLAND;  Surgeon: Cosme Kirkland MD;  Location: [facility] PAIN MGT;  Service: Pain Management;  Laterality: Bilateral;    RADIOFREQUENCY ABLATION Bilateral 5/3/2023    Procedure: RADIOFREQUENCY ABLATION BILATERAL C4,C5,C6 BOTH SIDES PER DR KIRKLAND;  Surgeon: Cosme Kirkland MD;  Location: [facility] PAIN MGT;  Service: Pain Management;  Laterality: Bilateral;    REFRACTIVE SURGERY  2008    SMALL INTESTINE SURGERY  7/6/20     Review of patient's allergies indicates:   Allergen Reactions    Amoxicillin Hives      Current Facility-Administered Medications   Medication    0.9%  NaCl infusion    alprazolam ODT dissolvable tablet 1 mg       PMHx, PSHx, Allergies, Medications reviewed in epic    ROS negative except pain complaints in HPI    OBJECTIVE:    There were no vitals taken for this visit.    PHYSICAL EXAMINATION:    GENERAL: Well appearing, in no acute distress, alert and oriented x3.  PSYCH:  Mood and affect appropriate.  SKIN: Skin color, texture, turgor normal, no rashes or lesions which will impact the procedure.  CV: RRR with palpation of the radial artery.  PULM: No evidence of respiratory difficulty, symmetric chest rise. Clear to auscultation.  NEURO: Cranial nerves grossly intact.    Plan:    Proceed with procedure as planned Procedure(s) (LRB):  BLOCK, NERVE BILATERAL L5, S1, AND S2 LATERAL BRANCH (Bilateral)    Matt Oneil  11/01/2023

## 2023-11-01 NOTE — OP NOTE
Diagnostic Lumbosacral Lateral Branch Blocks Under Fluoroscopy    The procedure, risks, benefits, and options were discussed with the patient. There are no contraindications to the procedure. The patent expressed understanding and agreed to the procedure. Informed written consent was obtained prior to the start of the procedure and can be found in the patient's chart.    PATIENT NAME: Sheila Costa   MRN: [id number]     DATE OF PROCEDURE: 11/01/2023                                           PROCEDURE:  Diagnostic nerve blocks of Bilateral DR5, and lateral branches of S1 and S2 under Fluoroscopy    PRE-OP DIAGNOSIS:   Lumbosacral spondylolysis [M43.07]   Sacroiliitis    POST-OP DIAGNOSIS: Same    PHYSICIAN: Cosme Kirkland MD    ASSISTANTS: Blake Fechtel, M.D. [facility] Fellow      MEDICATIONS INJECTED:  Bupivicaine 0.25%    LOCAL ANESTHETIC INJECTED:   Xylocaine 2%    SEDATION: None    ESTIMATED BLOOD LOSS:  None    COMPLICATIONS:  None.    INTERVAL HISTORY: Patient has clinical and imaging findings suggestive of sacroiliac  mediated pain with limited duration of relief from SI joint injections    TECHNIQUE: Time-out was performed to identify the patient and procedure to be performed. With the patient laying in a prone position, the surgical area was prepped and draped in the usual sterile fashion using ChloraPrep and fenestrated drape. The levels were determined under fluoroscopic guidance. Skin anesthesia was achieved by injecting Lidocaine 2% over the injection sites. A 22 gauge, 3.5 inch needle was introduced into the area of the DR5 at the top of the sacral ala near junction with the lumbar vertebra and the lateral branch nerves just lateral to the S1 and S2 posterior foramen using AP, lateral and/or contralateral oblique fluoroscopic imaging. After negative aspiration for blood or CSF was confirmed, 1 mL of the anesthetic listed above was then slowly injected at each site. The needles were  removed and bleeding was nil. A sterile dressing was applied. No specimens collected. The patient tolerated the procedure well.    PAIN BEFORE THE PROCEDURE: 7/10    PAIN AFTER THE PROCEDURE: 0/10    The patient was monitored after the procedure in the recovery area. They were given post-procedure and discharge instructions to follow at home. The patient was discharged in a stable condition.    Matt Oneil MD    I reviewed and edited the fellow's note. I conducted my own interview and physical examination. I agree with the findings. I was present and supervising all critical portions of the procedure.    Cosme Kirkland MD

## 2023-11-02 ENCOUNTER — PATIENT MESSAGE (OUTPATIENT)
Dept: PAIN MEDICINE | Facility: CLINIC | Age: 53
End: 2023-11-02
Payer: COMMERCIAL

## 2023-11-02 NOTE — TELEPHONE ENCOUNTER
Patient is contacting office with results from 1 MBB     Pain rating before the injection: 8/10  1st hr: 100% -0/10  2nd hr: 100% - 0/10  3rd hr: 100% - 0/10   4th hr: 100% - 0/10  5th hr: 100% - 0/10  6th hr: 100% - 0/10  12 th hr: 100% - 0/10  24 hrs: 90% - 0/10

## 2023-11-03 ENCOUNTER — TELEPHONE (OUTPATIENT)
Dept: PAIN MEDICINE | Facility: CLINIC | Age: 53
End: 2023-11-03
Payer: COMMERCIAL

## 2023-11-03 NOTE — TELEPHONE ENCOUNTER
----- Message from Cosme Kirkland MD sent at 10/24/2023 11:23 AM CDT -----  I just put another order for judge Hortamings   ----- Message -----  From: Davina Darden MA  Sent: 10/24/2023   9:14 AM CDT  To: Cosme Kirkland MD; #    Correction the procedure she said she is approved for are: 2 injections in lower or sacral spine facet joints.   ----- Message -----  From: Davina Darden MA  Sent: 10/24/2023   9:08 AM CDT  To: Cosme Kirkland MD; #    Hi Everyone,     I got a message from this patient saying that her procedures - both the intracept and the block are approved and the approval is valid from the oct. 18th - oct. 31st. She wants to know the update on scheduling and status of the procedures. She currently has no procedure scheduled. Can we get her scheduled for these two procedures within the approval dates?    Davina Boyle MA

## 2023-11-03 NOTE — TELEPHONE ENCOUNTER
----- Message from Cosme Kirkland MD sent at 10/25/2023  7:22 AM CDT -----  BLOCK  ----- Message -----  From: Davina Darden MA  Sent: 10/24/2023   4:55 PM CDT  To: Cosme Kirkland MD    Did you put an order in for a block or intracept?   ----- Message -----  From: Cosme Kirkland MD  Sent: 10/24/2023  11:24 AM CDT  To: Sara Li LPN; Chelsy Arias RN; #    I just put another order for judge Irving   ----- Message -----  From: Davina Darden MA  Sent: 10/24/2023   9:14 AM CDT  To: Cosme Kirkland MD; #    Correction the procedure she said she is approved for are: 2 injections in lower or sacral spine facet joints.   ----- Message -----  From: Davina Darden MA  Sent: 10/24/2023   9:08 AM CDT  To: Cosme Kirkland MD; #    Hi Everyone,     I got a message from this patient saying that her procedures - both the intracept and the block are approved and the approval is valid from the oct. 18th - oct. 31st. She wants to know the update on scheduling and status of the procedures. She currently has no procedure scheduled. Can we get her scheduled for these two procedures within the approval dates?    Davina Boyle MA

## 2023-11-06 DIAGNOSIS — M53.3 SACROILIAC JOINT DYSFUNCTION: Primary | ICD-10-CM

## 2023-11-06 DIAGNOSIS — M43.07 LUMBOSACRAL SPONDYLOLYSIS: ICD-10-CM

## 2023-11-06 DIAGNOSIS — M53.3 SACROILIAC JOINT DYSFUNCTION: ICD-10-CM

## 2023-11-06 DIAGNOSIS — M43.07 LUMBOSACRAL SPONDYLOLYSIS: Primary | ICD-10-CM

## 2023-11-07 ENCOUNTER — PATIENT MESSAGE (OUTPATIENT)
Dept: PAIN MEDICINE | Facility: OTHER | Age: 53
End: 2023-11-07
Payer: COMMERCIAL

## 2023-11-16 ENCOUNTER — TELEPHONE (OUTPATIENT)
Dept: ADMINISTRATIVE | Facility: OTHER | Age: 53
End: 2023-11-16
Payer: COMMERCIAL

## 2023-11-22 ENCOUNTER — HOSPITAL ENCOUNTER (OUTPATIENT)
Facility: OTHER | Age: 53
Discharge: HOME | End: 2023-11-22
Attending: ANESTHESIOLOGY | Admitting: ANESTHESIOLOGY
Payer: COMMERCIAL

## 2023-11-22 VITALS
RESPIRATION RATE: 16 BRPM | WEIGHT: 149 LBS | BODY MASS INDEX: 28.13 KG/M2 | HEART RATE: 65 BPM | OXYGEN SATURATION: 99 % | DIASTOLIC BLOOD PRESSURE: 72 MMHG | TEMPERATURE: 98 F | HEIGHT: 61 IN | SYSTOLIC BLOOD PRESSURE: 126 MMHG

## 2023-11-22 DIAGNOSIS — G89.29 CHRONIC PAIN: ICD-10-CM

## 2023-11-22 DIAGNOSIS — M46.1 SACROILIITIS: Primary | ICD-10-CM

## 2023-11-22 PROCEDURE — 25000003 PHARM REV CODE 250: Performed by: ANESTHESIOLOGY

## 2023-11-22 PROCEDURE — 64494 PR INJ DX/THER AGNT PARAVERT FACET JOINT,IMG GUIDE,LUMBAR/SAC, 2ND LEVEL: ICD-10-PCS | Mod: 50,,, | Performed by: ANESTHESIOLOGY

## 2023-11-22 PROCEDURE — 64493 INJ PARAVERT F JNT L/S 1 LEV: CPT | Mod: 50,,, | Performed by: ANESTHESIOLOGY

## 2023-11-22 PROCEDURE — 64494 INJ PARAVERT F JNT L/S 2 LEV: CPT | Mod: 50,,, | Performed by: ANESTHESIOLOGY

## 2023-11-22 PROCEDURE — 63600175 PHARM REV CODE 636 W HCPCS: Performed by: ANESTHESIOLOGY

## 2023-11-22 PROCEDURE — 64494 INJ PARAVERT F JNT L/S 2 LEV: CPT | Mod: 50 | Performed by: ANESTHESIOLOGY

## 2023-11-22 PROCEDURE — 64493 INJ PARAVERT F JNT L/S 1 LEV: CPT | Mod: 50 | Performed by: ANESTHESIOLOGY

## 2023-11-22 PROCEDURE — 64493 PR INJ DX/THER AGNT PARAVERT FACET JOINT,IMG GUIDE,LUMBAR/SAC,1ST LVL: ICD-10-PCS | Mod: 50,,, | Performed by: ANESTHESIOLOGY

## 2023-11-22 RX ORDER — ALPRAZOLAM 0.5 MG/1
1 TABLET, ORALLY DISINTEGRATING ORAL
Status: DISCONTINUED | OUTPATIENT
Start: 2023-11-22 | End: 2023-11-22 | Stop reason: HOSPADM

## 2023-11-22 RX ORDER — BUPIVACAINE HYDROCHLORIDE 2.5 MG/ML
INJECTION, SOLUTION EPIDURAL; INFILTRATION; INTRACAUDAL
Status: DISCONTINUED | OUTPATIENT
Start: 2023-11-22 | End: 2023-11-22 | Stop reason: HOSPADM

## 2023-11-22 RX ORDER — LIDOCAINE HYDROCHLORIDE 20 MG/ML
INJECTION, SOLUTION INFILTRATION; PERINEURAL
Status: DISCONTINUED | OUTPATIENT
Start: 2023-11-22 | End: 2023-11-22 | Stop reason: HOSPADM

## 2023-11-22 RX ORDER — SODIUM CHLORIDE 9 MG/ML
INJECTION, SOLUTION INTRAVENOUS CONTINUOUS
Status: DISCONTINUED | OUTPATIENT
Start: 2023-11-22 | End: 2023-11-22 | Stop reason: HOSPADM

## 2023-11-22 RX ADMIN — ALPRAZOLAM 1 MG: 0.5 TABLET, ORALLY DISINTEGRATING ORAL at 08:11

## 2023-11-22 NOTE — DISCHARGE INSTRUCTIONS

## 2023-11-22 NOTE — DISCHARGE SUMMARY
Discharge Note  Short Stay      SUMMARY     Admit Date: 11/22/2023    Attending Physician: Cosme Kirkland MD      Discharge Physician: Santos Winters      Discharge Date: 11/22/2023 9:21 AM    Procedure(s) (LRB):  BLOCK, NERVE BILATERAL L5, S1, AND S2 MEDIAL BRANCH (Bilateral)    Final Diagnosis: Sacroiliac joint dysfunction [M53.3]  Lumbosacral spondylolysis [M43.07]    Disposition: Home or self care    Patient Instructions:   Current Discharge Medication List        CONTINUE these medications which have NOT CHANGED    Details   ascorbic acid, vitamin C, (VITAMIN C) 100 MG tablet Take 100 mg by mouth once daily.      atenoloL-chlorthalidone (TENORETIC) 50-25 mg Tab TAKE 1/2 TABLET BY MOUTH ONCE EVERY DAY  Qty: 45 tablet, Refills: 3    Comments: .  Associated Diagnoses: Essential hypertension      celecoxib (CELEBREX) 100 MG capsule TAKE 1 CAPSULE(100 MG) BY MOUTH TWICE DAILY  Qty: 60 capsule, Refills: 2    Associated Diagnoses: Cervical spondylosis without myelopathy      DULoxetine (CYMBALTA) 60 MG capsule Take 1 capsule (60 mg total) by mouth once daily.  Qty: 30 capsule, Refills: 11    Associated Diagnoses: Vertebrogenic low back pain; Sacroiliitis; DDD (degenerative disc disease), lumbar      estradioL (VIVELLE-DOT) 0.025 mg/24 hr Place 1 patch onto the skin twice a week.  Qty: 8 patch, Refills: 11      levonorgestrel (MIRENA) 20 mcg/24 hr (5 years) IUD 1 Intra Uterine Device by Intrauterine route once. for 1 dose  Qty: 1 Intra Uterine Device, Refills: 0    Associated Diagnoses: Contraception, device intrauterine      loratadine (CLARITIN) 10 mg tablet Take 10 mg by mouth every morning.       methocarbamoL (ROBAXIN) 500 MG Tab TAKE 1 TABLET(500 MG) BY MOUTH THREE TIMES DAILY AS NEEDED FOR MUSCLE PAIN  Qty: 30 tablet, Refills: 1    Associated Diagnoses: Myofascial pain      multivitamin capsule Take 1 capsule by mouth once daily.      ondansetron (ZOFRAN-ODT) 4 MG TbDL Take 4 mg by mouth every 6 (six) hours as  needed.      potassium chloride (MICRO-K) 10 MEQ CpSR Take 2 capsules (20 mEq total) by mouth once daily.  Qty: 180 capsule, Refills: 3    Comments: Discontinue prior scripts with same name      vitamin D (VITAMIN D3) 1000 units Tab Take 1,000 Units by mouth once daily.                 Discharge Diagnosis: Sacroiliac joint dysfunction [M53.3]  Lumbosacral spondylolysis [M43.07]  Condition on Discharge: Stable with no complications to procedure   Diet on Discharge: Same as before.  Activity: as per instruction sheet.  Discharge to: Home with a responsible adult.  Follow up: 2-4 weeks       Please call my office or pager at [phone] if experienced any weakness or loss of sensation, fever > 101.5, pain uncontrolled with oral medications, persistent nausea/vomiting/or diarrhea, redness or drainage from the incisions, or any other worrisome concerns. If physician on call was not reached or could not communicate with our office for any reason please go to the nearest emergency department

## 2023-11-22 NOTE — OP NOTE
Diagnostic Lumbar and Sacroiliac Medial Branch Block Under Fluoroscopy    The procedure, risks, benefits, and options were discussed with the patient. There are no contraindications to the procedure. The patent expressed understanding and agreed to the procedure. Informed written consent was obtained prior to the start of the procedure and can be found in the patient's chart.    PATIENT NAME: Sheila Costa   MRN: [id number]     DATE OF PROCEDURE: 11/22/2023                                           PROCEDURE:  Diagnostic Bilateral DR5, S1, S2 Lumbar and Sacroiliac Medial Branch Block under Fluoroscopy    PRE-OP DIAGNOSIS: Sacroiliac joint dysfunction [M53.3]  Lumbosacral spondylolysis [M43.07] Lumbar spondylosis [M47.816]    POST-OP DIAGNOSIS: Same    PHYSICIAN: Cosme Kirkland MD    ASSISTANTS: Santos Winters M.D. ([facility] Fellow)    MEDICATIONS INJECTED:  Bupivicaine 0.25%    LOCAL ANESTHETIC INJECTED:   Xylocaine 2%    SEDATION: None    ESTIMATED BLOOD LOSS:  None    COMPLICATIONS:  None.    INTERVAL HISTORY: Patient has clinical and imaging findings suggestive of facet mediated pain.    TECHNIQUE: Time-out was performed to identify the patient and procedure to be performed. With the patient laying in a prone position, the surgical area was prepped and draped in the usual sterile fashion using ChloraPrep and fenestrated drape. The levels were determined under fluoroscopic guidance. Skin anesthesia was achieved by injecting Lidocaine 2% over the injection sites. A 22 gauge, 3.5 inch needle was introduced into the medial branch nerves at the junctions of the superior articular process and the transverse processes of the targeted sites using AP, lateral and/or contralateral oblique fluoroscopic imaging. After negative aspiration for blood or CSF was confirmed, 1 mL of the anesthetic listed above was then slowly injected at each site. The needles were removed and bleeding was nil. A sterile dressing  was applied. No specimens collected. The patient tolerated the procedure well.      The patient was monitored after the procedure in the recovery area. They were given post-procedure and discharge instructions to follow at home. The patient was discharged in a stable condition.      Santos Winters MD    I reviewed and edited the fellow's note. I conducted my own interview and physical examination. I agree with the findings. I was present and supervising all critical portions of the procedure.    Cosme Kirkland MD

## 2023-11-24 ENCOUNTER — HOSPITAL ENCOUNTER (OUTPATIENT)
Dept: PREADMISSION TESTING | Facility: OTHER | Age: 53
Discharge: HOME | End: 2023-11-24
Attending: ANESTHESIOLOGY
Payer: COMMERCIAL

## 2023-11-24 ENCOUNTER — PATIENT MESSAGE (OUTPATIENT)
Dept: SURGERY | Facility: OTHER | Age: 53
End: 2023-11-24
Payer: COMMERCIAL

## 2023-11-24 VITALS
SYSTOLIC BLOOD PRESSURE: 129 MMHG | WEIGHT: 149 LBS | HEIGHT: 61 IN | OXYGEN SATURATION: 98 % | HEART RATE: 72 BPM | TEMPERATURE: 98 F | DIASTOLIC BLOOD PRESSURE: 62 MMHG | BODY MASS INDEX: 28.13 KG/M2

## 2023-11-24 DIAGNOSIS — Z01.818 PREOP TESTING: Primary | ICD-10-CM

## 2023-11-24 LAB — POTASSIUM SERPL-SCNC: 3.5 MMOL/L (ref 3.5–5.1)

## 2023-11-24 PROCEDURE — 84132 ASSAY OF SERUM POTASSIUM: CPT | Performed by: ANESTHESIOLOGY

## 2023-11-24 PROCEDURE — 36415 COLL VENOUS BLD VENIPUNCTURE: CPT | Performed by: ANESTHESIOLOGY

## 2023-11-24 NOTE — DISCHARGE INSTRUCTIONS
Information to Prepare you for your Surgery    PRE-ADMIT TESTING -  [phone]    [address] [facility]          Your surgery has been scheduled at [facility]. We are pleased to have the opportunity to serve you. For Further Information please call [phone].    On the day of surgery please report to the Information Desk on the 1st floor.    CONTACT YOUR PHYSICIAN'S OFFICE THE DAY PRIOR TO YOUR SURGERY TO OBTAIN YOUR ARRIVAL TIME.     The evening before surgery do not eat anything after 9 p.m. ( this includes hard candy, chewing gum and mints).  You may only have GATORADE, POWERADE AND WATER  from 9 p.m. until you leave your home.   DO NOT DRINK ANY LIQUIDS ON THE WAY TO THE HOSPITAL.      Why does your anesthesiologist allow you to drink Gatorade/Powerade before surgery?  Gatorade/Powerade helps to increase your comfort before surgery and to decrease your nausea after surgery. The carbohydrates in Gatorade/Powerade help reduce your body's stress response to surgery.  If you are a diabetic-drink only water prior to surgery.    Outpatient Surgery- May allow 2 adult (18 and older) Support Persons (1 being the designated ) for all surgical/procedural patients. A breastfeeding mother will be allowed her infant and 2 adult Support Persons. No one under the age of 18 will be allowed in the building.      SPECIAL MEDICATION INSTRUCTIONS: TAKE medications checked off by the Anesthesiologist on your Medication List.    Angiogram Patients: Take medications as instructed by your physician, including aspirin.     Surgery Patients:    If you take ASPIRIN - Your PHYSICIAN/SURGEON will need to inform you IF/OR when you need to stop taking aspirin prior to your surgery.     The week prior to surgery do not ot take any medications containing IBUPROFEN or NSAIDS ( Advil, Motrin, Goodys, BC, Aleve, Naproxen etc) If you are not sure if you should take a medicine  please call your surgeon's office.  Ok to take Tylenol    Do Not Wear any make-up (especially eye make-up) to surgery. Please remove any false eyelashes or eyelash extensions. If you arrive the day of surgery with makeup/eyelashes on you will be required to remove prior to surgery. (There is a risk of corneal abrasions if eye makeup/eyelash extensions are not removed)      Leave all valuables at home.   Do Not wear any jewelry or watches, including any metal in body piercings. Jewelry must be removed prior to coming to the hospital.  There is a possibility that rings that are unable to be removed may be cut off if they are on the surgical extremity.    Please remove all hair extensions, wigs, clips and any other metal accessories/ ornaments from your hair.  These items may pose a flammable/fire risk in Surgery and must be removed.    Do not shave your surgical area at least 5 days prior to your surgery. The surgical prep will be performed at the hospital according to Infection Control regulations.    Contact Lens must be removed before surgery. Either do not wear the contact lens or bring a case and solution for storage.  Please bring a container for eyeglasses or dentures as required.  Bring any paperwork your physician has provided, such as consent forms,  history and physicals, doctor's orders, etc.   Bring comfortable clothes that are loose fitting to wear upon discharge. Take into consideration the type of surgery being performed.  Maintain your diet as advised per your physician the day prior to surgery.      Adequate rest the night before surgery is advised.   Park in the Parking lot behind the hospital or in the [facility] across the street from the parking lot. Parking is complimentary.  If you will be discharged the same day as your procedure, please arrange for a responsible adult to drive you home or to accompany you if traveling by taxi.   YOU WILL NOT BE PERMITTED TO DRIVE OR TO LEAVE THE  HOSPITAL ALONE AFTER SURGERY.   If you are being discharged the same day, it is strongly recommended that you arrange for someone to remain with you for the first 24 hrs following your surgery.    The Surgeon will speak to your family/visitor after your surgery regarding the outcome of your surgery and post op care.  The Surgeon may speak to you after your surgery, but there is a possibility you may not remember the details.  Please check with your family members regarding the conversation with the Surgeon.    We strongly recommend whoever is bringing you home be present for discharge instructions.  This will ensure a thorough understanding for your post op home care.          Thank you for your cooperation.  The Staff of [facility].            Bathing Instructions with Hibiclens    Shower the evening before and morning of your procedure with Chlorhexidine (Hibiclens)  do not use Chlorhexidine on your face or genitals. Do not get in your eyes.  Wash your face with water and your regular face wash/soap  Use your regular shampoo  Apply Chlorhexidine (Hibiclens) directly on your skin or on a wet washcloth and wash gently. When showering: Move away from the shower stream when applying Chlorhexidine (Hibiclens) to avoid rinsing off too soon.  Rinse thoroughly with warm water  Do not dilute Chlorhexidine (Hibiclens)   Dry off as usual, do not use any deodorant, powder, body lotions, perfume, after shave or cologne.

## 2023-11-27 DIAGNOSIS — M47.817 SPONDYLOSIS OF LUMBOSACRAL REGION WITHOUT MYELOPATHY OR RADICULOPATHY: Primary | ICD-10-CM

## 2023-11-27 NOTE — TELEPHONE ENCOUNTER
Medial Branch Block Diary   lumbar    2    Pre procedure pain level 8  Percentage of relief within 24 hours of procedure- %  Post procedure level 0  Any Improvements in ADL:

## 2023-11-29 ENCOUNTER — TELEPHONE (OUTPATIENT)
Dept: PAIN MEDICINE | Facility: CLINIC | Age: 53
End: 2023-11-29
Payer: COMMERCIAL

## 2023-11-29 ENCOUNTER — PATIENT MESSAGE (OUTPATIENT)
Dept: PAIN MEDICINE | Facility: CLINIC | Age: 53
End: 2023-11-29
Payer: COMMERCIAL

## 2023-11-29 NOTE — TELEPHONE ENCOUNTER
----- Message from Cosme Kirkland MD sent at 11/29/2023 10:27 AM CST -----  She got it all  The answer is yes to everything and if the RFA on 12/19 helped her a lot we can wait on intercept procedure     MG  ----- Message -----  From: Davina Darden MA  Sent: 11/29/2023   8:41 AM CST  To: MD Roel Russo Dr.,     Please see pt message below:   Roel Kirkland, Please clarify. I know the insurance company denied the Intracept procedure. I've given the team consent to appeal the denial. I see that the Intracept is now scheduled for January 8th - I presume to hold the spot for the procedure in hopes that the insurance will be approved.     Today, the radiofrequency ablation was scheduled for Wed., Dec. 19th. So, is the plan for me to have this procedure and then also the Intracept in January if approved?      Also, can I get my COVID shot and flu shot, as long as its 2 weeks before the radiofrequency ablation? I've been holding off getting those because of the procedures, but feel that I should just get them, as long as its 2 weeks before the next procedure. Is that okay?

## 2023-12-01 ENCOUNTER — TELEPHONE (OUTPATIENT)
Dept: PAIN MEDICINE | Facility: CLINIC | Age: 53
End: 2023-12-01
Payer: COMMERCIAL

## 2023-12-18 ENCOUNTER — OFFICE VISIT (OUTPATIENT)
Dept: SLEEP MEDICINE | Facility: CLINIC | Age: 53
End: 2023-12-18
Payer: COMMERCIAL

## 2023-12-18 VITALS — WEIGHT: 149.06 LBS | HEIGHT: 61 IN | BODY MASS INDEX: 28.14 KG/M2

## 2023-12-18 DIAGNOSIS — I10 HYPERTENSION, UNSPECIFIED TYPE: ICD-10-CM

## 2023-12-18 DIAGNOSIS — R53.82 CHRONIC FATIGUE: ICD-10-CM

## 2023-12-18 DIAGNOSIS — R06.83 SNORING: ICD-10-CM

## 2023-12-18 DIAGNOSIS — F51.09 OTHER INSOMNIA NOT DUE TO A SUBSTANCE OR KNOWN PHYSIOLOGICAL CONDITION: Primary | ICD-10-CM

## 2023-12-18 PROCEDURE — 99999 PR PBB SHADOW E&M-EST. PATIENT-LVL III: ICD-10-PCS | Mod: PBBFAC,,, | Performed by: INTERNAL MEDICINE

## 2023-12-18 PROCEDURE — 3044F HG A1C LEVEL LT 7.0%: CPT | Mod: CPTII,S$GLB,, | Performed by: INTERNAL MEDICINE

## 2023-12-18 PROCEDURE — 3008F PR BODY MASS INDEX (BMI) DOCUMENTED: ICD-10-PCS | Mod: CPTII,S$GLB,, | Performed by: INTERNAL MEDICINE

## 2023-12-18 PROCEDURE — 3044F PR MOST RECENT HEMOGLOBIN A1C LEVEL <7.0%: ICD-10-PCS | Mod: CPTII,S$GLB,, | Performed by: INTERNAL MEDICINE

## 2023-12-18 PROCEDURE — 3008F BODY MASS INDEX DOCD: CPT | Mod: CPTII,S$GLB,, | Performed by: INTERNAL MEDICINE

## 2023-12-18 PROCEDURE — 1159F PR MEDICATION LIST DOCUMENTED IN MEDICAL RECORD: ICD-10-PCS | Mod: CPTII,S$GLB,, | Performed by: INTERNAL MEDICINE

## 2023-12-18 PROCEDURE — 99204 OFFICE O/P NEW MOD 45 MIN: CPT | Mod: S$GLB,,, | Performed by: INTERNAL MEDICINE

## 2023-12-18 PROCEDURE — 1159F MED LIST DOCD IN RCRD: CPT | Mod: CPTII,S$GLB,, | Performed by: INTERNAL MEDICINE

## 2023-12-18 PROCEDURE — 99999 PR PBB SHADOW E&M-EST. PATIENT-LVL III: CPT | Mod: PBBFAC,,, | Performed by: INTERNAL MEDICINE

## 2023-12-18 PROCEDURE — 99204 PR OFFICE/OUTPT VISIT, NEW, LEVL IV, 45-59 MIN: ICD-10-PCS | Mod: S$GLB,,, | Performed by: INTERNAL MEDICINE

## 2023-12-18 NOTE — PROGRESS NOTES
"NEW PATIENT VISIT    Sheila Costa  is a pleasant 53 y.o. female  with PMH significant for HTN, alpha thalassemia, prediabetes, chronic pain who presents for evaluation of snoring, chronic fatigue, referred by PCP. She feels always tired, never feels fully energetic. This has been going on for more than 5 years. In the past couple of years she notices she is waking up more in the middle of the night and more tossing a turning. She had a referral for sleep study in the past but never got to it. Does not feel rested in the morning. She has chronic neck and lower back pain and this might also bee influencing her sleep. She needs to lay on pillow for lower back.     Prior sleep studies:   No     Trouble falling asleep?: No  Trouble staying asleep?: Yes   Hypnotic use?:  No, on duloxetine for pain but made her stay up instead.      Bed partner:    , also has history DAT and uses CPAP  Witnessed snoring ?:   yes  Snoring arousals?:  no  Witnessed apneas?  no    Sleepy if inactive?:  Maybe after a meal yes  ESS:        SLEEP SCHEDULE   Bed Time 11:00pm-12am   Sleep Latency 30minutes   Arousals 2-4 x night    Back to sleep 10 minutes   Wake time 7:15am-7:30am   Naps No if could she could take a 20 minute one    Nocturia Once a night, 3x week   Work        Vitals:    12/18/23 1509   Weight: 67.6 kg (149 lb 0.5 oz)   Height: 5' 1" (1.549 m)       Physical Exam:    GEN:   Well-appearing  Psych:  Appropriate affect, demonstrates insight  SKIN:  No rash on the face or bridge of the nose      LABS:   Lab Results   Component Value Date    HGB 12.2 09/19/2023    CO2 27 09/19/2023       RECORDS REVIEWED PREVIOUSLY:        ASSESSMENT        Presenting Complaint:    PROBLEM DESCRIPTION/ Sx on Presentation  STATUS   Screening for DAT   + snoring, no witnessed  apneas, HTN  Sleep is unrefreshing  New   Daytime Sx   + sleepiness when inactive after a meal  ESS n/a /24 on intake  New   Insomnia     Duration:   Trouble " falling asleep:   Maintenance:         tossing and turning, waking up at night    Prior hypnotics:        Current hypnotics:     New   Nocturia   x 0-1 per sleep period  New   Other issues:     PLAN     -recommend sleep testing   -discussed trial therapy if DAT present and the patient is  open to a trial of CPAP therapy    RTC          The patient was given open opportunity to ask questions and/or express concerns about treatment plan.   All questions/concerns were discussed.     Two patient identifiers used prior to evaluation.

## 2023-12-19 ENCOUNTER — PATIENT MESSAGE (OUTPATIENT)
Dept: PAIN MEDICINE | Facility: CLINIC | Age: 53
End: 2023-12-19
Payer: COMMERCIAL

## 2023-12-27 NOTE — TELEPHONE ENCOUNTER
----- Message from Marj Gonzalez MD sent at 9/19/2023  5:40 PM CDT -----  Needs potassium level in 2 weeks   
No

## 2024-01-22 ENCOUNTER — TELEPHONE (OUTPATIENT)
Dept: SLEEP MEDICINE | Facility: HOSPITAL | Age: 54
End: 2024-01-22
Payer: COMMERCIAL

## 2024-01-22 ENCOUNTER — PATIENT MESSAGE (OUTPATIENT)
Dept: PREADMISSION TESTING | Facility: OTHER | Age: 54
End: 2024-01-22
Payer: COMMERCIAL

## 2024-01-31 ENCOUNTER — PATIENT MESSAGE (OUTPATIENT)
Dept: PAIN MEDICINE | Facility: CLINIC | Age: 54
End: 2024-01-31
Payer: COMMERCIAL

## 2024-02-05 DIAGNOSIS — M47.812 CERVICAL SPONDYLOSIS WITHOUT MYELOPATHY: ICD-10-CM

## 2024-02-05 RX ORDER — CELECOXIB 100 MG/1
100 CAPSULE ORAL 2 TIMES DAILY
Qty: 60 CAPSULE | Refills: 2 | Status: SHIPPED | OUTPATIENT
Start: 2024-02-05

## 2024-02-06 ENCOUNTER — TELEPHONE (OUTPATIENT)
Dept: PAIN MEDICINE | Facility: CLINIC | Age: 54
End: 2024-02-06
Payer: COMMERCIAL

## 2024-02-07 ENCOUNTER — PATIENT MESSAGE (OUTPATIENT)
Dept: PAIN MEDICINE | Facility: CLINIC | Age: 54
End: 2024-02-07
Payer: COMMERCIAL

## 2024-02-08 DIAGNOSIS — G89.4 CHRONIC PAIN SYNDROME: ICD-10-CM

## 2024-02-08 DIAGNOSIS — M43.07 LUMBOSACRAL SPONDYLOLYSIS: ICD-10-CM

## 2024-02-08 DIAGNOSIS — M54.51 VERTEBROGENIC LOW BACK PAIN: Primary | ICD-10-CM

## 2024-02-08 DIAGNOSIS — M51.36 DDD (DEGENERATIVE DISC DISEASE), LUMBAR: ICD-10-CM

## 2024-02-15 ENCOUNTER — TELEPHONE (OUTPATIENT)
Dept: PAIN MEDICINE | Facility: CLINIC | Age: 54
End: 2024-02-15
Payer: COMMERCIAL

## 2024-02-15 NOTE — TELEPHONE ENCOUNTER
----- Message from Dora Munguia sent at 2/15/2024 12:43 PM CST -----   Name of Who is Calling:     What is the request in detail: patient request call back in reference to procedure date questions /concerns  / patient state no one  is contacting her before scheduling  / patient state she use [facility] by but only getting response to contact a number that is not the direct office  Please contact to further discuss and advise      Can the clinic reply by [facility]:     What Number to Call Back if not in [facility]:   [phone]

## 2024-02-15 NOTE — TELEPHONE ENCOUNTER
Staff let patient know we don't have any information as far as the procedure. Let the patient know that we can send messages for her to the procedure area.

## 2024-02-19 ENCOUNTER — PATIENT MESSAGE (OUTPATIENT)
Dept: SPINE | Facility: CLINIC | Age: 54
End: 2024-02-19

## 2024-02-19 ENCOUNTER — HOSPITAL ENCOUNTER (OUTPATIENT)
Dept: SLEEP MEDICINE | Facility: HOSPITAL | Age: 54
Discharge: HOME | End: 2024-02-19
Attending: INTERNAL MEDICINE
Payer: COMMERCIAL

## 2024-02-19 ENCOUNTER — OFFICE VISIT (OUTPATIENT)
Dept: SPINE | Facility: CLINIC | Age: 54
End: 2024-02-19
Attending: ANESTHESIOLOGY
Payer: COMMERCIAL

## 2024-02-19 DIAGNOSIS — F51.09 OTHER INSOMNIA NOT DUE TO A SUBSTANCE OR KNOWN PHYSIOLOGICAL CONDITION: ICD-10-CM

## 2024-02-19 DIAGNOSIS — R06.83 SNORING: ICD-10-CM

## 2024-02-19 DIAGNOSIS — R53.82 CHRONIC FATIGUE: ICD-10-CM

## 2024-02-19 DIAGNOSIS — I10 HYPERTENSION, UNSPECIFIED TYPE: ICD-10-CM

## 2024-02-19 DIAGNOSIS — M54.51 VERTEBROGENIC LOW BACK PAIN: Primary | ICD-10-CM

## 2024-02-19 PROCEDURE — 99214 OFFICE O/P EST MOD 30 MIN: CPT | Mod: 95,,, | Performed by: ANESTHESIOLOGY

## 2024-02-19 PROCEDURE — 1160F RVW MEDS BY RX/DR IN RCRD: CPT | Mod: CPTII,95,, | Performed by: ANESTHESIOLOGY

## 2024-02-19 PROCEDURE — 1159F MED LIST DOCD IN RCRD: CPT | Mod: CPTII,95,, | Performed by: ANESTHESIOLOGY

## 2024-02-19 PROCEDURE — 95800 SLP STDY UNATTENDED: CPT

## 2024-02-19 NOTE — PROGRESS NOTES
Telephone Chronic patient Established Note (Follow up visit)     Established Patient - Audio Only Telehealth Visit     The patient location is: home  The chief complaint leading to consultation is: chronic low back pain  Visit type: Virtual visit with audio only (telephone)  Total time spent with patient: 30 minutes     The reason for the audio only service rather than synchronous audio and video virtual visit was related to technical difficulties or patient preference/necessity.     Each patient to whom I provide medical services by telemedicine is:  (1) informed of the relationship between the physician and patient and the respective role of any other health care provider with respect to management of the patient; and (2) notified that they may decline to receive medical services by telemedicine and may withdraw from such care at any time. Patient verbally consented to receive this service via voice-only telephone call.     Interval History 02/19/2024:  Ms. Igor Asher is a 54 y/o f who presents for virtual follow-up of her chronic low back pain.  She is scheduled for L5 & S1 intraosseous basivertebral nerve ablation 03/11/2024. She continues to endorse axial low back consistent with lumbar vertebrogenic back pain. The patient is presenting today for further information regarding the procedure. All questions answered.      Interval History 9/28/2023:  Patient seen today for virtual follow-up of her low back pain.  Today, she reports that the bilateral SI joint injections that she had in May have worked well for her for about 6 weeks and her back pain is at baseline worse  Her main concern is the pain in her low back which is affecting her ADL .  She previously got about 50% relief with RFA but now states she feels the pain as before all her injection.  She feels that the RFA helped with 1 component of her pain, but she continues to have deep focal low back and buttock pain.  She denies radiation into  her legs.  She reports that the pain is fairly persistent but worsens significantly with standing, sitting for a long time and flexion.  She denies any bowel/bladder dysfunction or saddle anesthesia.  She has no other focal neurologic deficits.     Interval History 6/29/2023:  She presents with continued stiffness in her neck and shoulders.  She also complains of lower back pain which is still present. She does feel the SI joint injections do help.  She states that she is doing some stretches and home exercises to help with the pain.  She feels the stretching does help, but she feels she isn't doing them as often as she'd like.  She continues to state that her pain is 50-70% improved compared to before the injections.  She is inquiring as to if she should schedule her follow up injections now or if she should wait.     Interval History 6/9/2023:  The patient returns to clinic today for follow up neck and back pain via virtual visit. She is s/p bilateral C4,5,6 RFA on 5/3/2023. She is s/p bilateral SI joint injections on 5/24/2023. She reports 70% relief of her neck and back pain. She reports intermittent low back pain. She denies any radicular leg pain. She is performing a home exercise and stretching routine. She is taking Celebrex. She did not receive Robaxin discussed at last visit. She denies any other health changes.      Interval History 4/13/23:  Sheila Khushbumelissa presents to the clinic for a follow-up appointment for back pain. Since the last visit, Sheila Igor states the pain has been persistant. Current pain intensity is 7/10. S/p L3/4/5 RFA with 50% relief. Pain is still persistent. Gets better with exercise and stretching although too much movement can exacerbate the pain. She is taking celebrex and tizanidine. She endorses previous SI joint injections were helpful.      Interval History 3/22/2023:  The patient returns to clinic today for follow up of low back pain via virtual  visit. She is s/p bilateral L3,4,5 RFA on 3/1/2023. She reports 50% relief of her pain. She reports low back and buttock pain. This seems lower than injection sites. She denies any radicular leg pain. She reports increased neck pain. She describes this pain as constant and aching in nature. She denies any radicular arm pain. Her pain is worse with activity. She is taking Celebrex and Zanaflex as needed. She denies any other health changes.      Interval History 2/1/2023:  The patient returns to clinic today for follow up of low back pain via virtual visit. She is s/p bilateral L3,4,5 MBB on 1/25/2023. She reports 90% relief of her pain for one day. Since then, her pain has returned to baseline. She reports low back pain that is aching in nature. She denies any radicular leg pain. Her pain is worse with prolonged activity. She is currently taking Celebrex. She also takes Zanaflex intermittently. She denies any other health changes.      Interval History 12/29/2022:  The patient returns to clinic today for follow up of low back pain via virtual visit. She is s/p bilateral L3,4,5 MBB on 12/14/2022. She reports 95% relief of her low back pain for one day. Since then, her pain has returned to baseline. She continues to report low back pain. This is aching across the lower back. She denies any radicular leg pain. Her pain is worse with activity. She is currently taking Zanaflex with limited relief. She denies any other health changes.      Interval history 10/31/22:  Patient returns to clinic for follow up of neck and shoulder pain. She is now s/p C7/T1 cervical MICHELLE 7/6/22 which gave 75-80% relief which lasted a few months. Now with primary complaint of low back pain in a band across the low back described as aching and throbbing which radiates to lateral aspect of BLE which stops at the knees. Still taking ibuprofen, celebrex, and lyrica. Not doing home exercise or PT. Denies fever/chills, or saddle anesthesia.      Interval History 6/13/22:   She is s/p left SIJ and left GTB injection on 5/18/2022 which she reports has helped her buttock/leg pain significantly. She now would like to focus on her neck pain. She continues to have axial neck pain, bilateral. Her RUE sxs have significantly decreased s/p C7-T1 IL MICHELLE done in Dec 2021 but she does still have RUE pain. She is taking Gabapentin 300mg nightly - has not noticed a difference in her pain with this. She has tried topicals - help somewhat, Advil 800mg helps. She has gone to the chiropractor in the past - helpful. She has not done PT for her neck in ~3 years ago which she thinks helped somewhat. She denies any weakness/numbness/tingling in BUEs. Denies b/b incontinence or saddle anesthesia.      Interval History 4/28/22:   Sheila HortamingsAurymelissa presents to the clinic for a follow-up appointment for neck pain. Since the last visit, Sheila Igor states the pain has been improving. She is s/p bilateral RFA of C4/5/6 on 3/26 and 4/3. She reports >75% relief to both sides and is satisfied with the results. She does satate that she is having some numbness over the skin over the L side. She also states she is having left buttock pain that she describes as cramping/throbbing which occasionally radiates down posterior aspect of L thigh, stopping above the knee. She denies numbness/tingling in lower extremities.      Interval History 3/10/2022:   Sheila HortamingsAurymelissa presents to the clinic for a follow-up appointment for neck pain. Since the last visit, Sheila Igor states the pain has been stable. Worse in the mornings. Feels like a stiffness in the neck without radicular symptoms as her radicular symptoms had been resolved since her MICHELLE. Some paraesthesia in arms and hands with typing. Patient states that mobic is beneficial. Best relief of her axial neck pain in the past was with RFA done previously. She discontinued her amitriptyline  prescription and did not notice a difference in pain with discontinuation. Denies bowel/bladder dysfunction or difficulty with fine motor skills.     Interval History 1/6/2022:   Sheila Costa presents to the clinic for a follow-up appointment s/p Cervical Interlaminar Epidural Steroid Injection at the end of this past December. Since the last visit, Sheila Costa states the pain has been improving. Current pain intensity is 3/10 but she is still experiencing stiffness. Pain is primarly throbbing that would travel down BL arms. She has been doing well. Patient states that mobic and elavil have been helping with pain. Patient is sleeping well currently.     Interval History 11/15/2021:   Pt returns to clinic today due to resurgence of her bilateral neck and arm pain. At her last interventional pain encounter she had RFA left C4,5,6 on 03/04/2020 and the right done on 06/2020. Pt reports this provided significant relief of her pain however she has been having increased neck pain with radiation into her arms down to her hands. She has had a cervical epidural in the past with at least 50% relief of her pain. She has been utilizing OTC ibuprofen as well as a chiropractor.   Pt also reports low back pain without any radiation into the lower extremities. She states that the pain is aching and worsened with prolonged physical activity.      Interval History 11/25/2019:  The patient returns to clinic today for follow up. She reports a few days of relief with trigger point injections at last visit. She continues to report neck pain with intermittent radiating pain into both arms to her hands. She also reports mid back pain. 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 She states the previous MICHELLE helped for her arm pain but not for her neck pain. She has had limited relief with NSAIDs and physical therapy. She denies any other health changes. Her pain today is 7/10.     Interval History  10/28/2019:  The patient returns to clinic today for follow up. She is s/p C7-T1 IL MICHELLE on 10/2/2019. She reports 50% relief of her neck and arm pain. She reports intermittent neck pain that is sore in nature. She does report increased mid back pain. She describes this pain as tight and aching in nature. She denies any radicular arm pain. She denies any other health changes. Her pain today is 6/10.     Pain Disability Index Review:      4/13/2023     2:26 PM 3/10/2022     4:01 PM 1/6/2022     2:50 PM   Last 3 PDI Scores   Pain Disability Index (PDI) 51 16 29       Pain Procedures:   10/2/2019- C7-T1 IL MICHELLE- 50% pain relief  1/8/2020- Bilateral C4,5,6 MBB  1/29/2020- Bilateral C4,5,6 MBB  3/4/2020: Left C4,5,6 RFA - 80% relief  6/3/2020: Right C4,5,6 RFA - 80% relief  12/22/2021- C7/T1 IL MICHELLE  3/23/2022- Right C4,5,6 RFA -80-85% relief  4/6/2022- Left C4,5,6 RFA- 75% relief   5/8/2022- Left SI joint and GTB injection  7/6/2022- C7/T1 IL MICHELLE  12/14/2022- Bilateral L3,4,5 MBB- 95% relief  1/25/2023- Bilateral L3,4,5 MBB  3/1/2023- Bilateral L3,4,5 RFA  5/3/2023- Bilateral C4,5,6 RFA  5/24/2023- Bilateral SI joint injections     Physical Therapy/Home Exercise: yes     Imaging:   MRI Lumbar Spine 11/11/2022:  COMPARISON:  11/01/2022     FINDINGS:  Alignment: Normal.     Vertebrae: Degenerative endplate changes at L5-S1.  No fracture or marrow infiltrative process.     Discs: Mild disc height loss at L5-S1 with annular fissure.  No evidence for discitis.     Cord: Conus terminates at L1 and appears unremarkable.  Cauda equina appears unremarkable.     Degenerative findings:     T12-L1: No spinal canal stenosis or neural foraminal narrowing.     L1-L2: No spinal canal stenosis or neural foraminal narrowing.     L2-L3: No spinal canal stenosis or neural foraminal narrowing.     L3-L4: No spinal canal stenosis or neural foraminal narrowing.     L4-L5: Circumferential disc bulge and mild facet arthropathy result in mild left  neural foraminal narrowing.     L5-S1: Circumferential disc bulge and moderate facet arthropathy result in mild right, moderate left neural foraminal narrowing.     Paraspinal muscles & soft tissues: Paraspinal muscle bulk is maintained.  Small bilateral renal cysts noted.     Impression:     1. Degenerative changes of the lower lumbar spine detailed above.  Moderate left neural foraminal narrowing noted at L5-S1.     MRI Cervical Spine 11/20/2019:  COMPARISON:  MRI cervical spine without contrast 03/06/2019     FINDINGS:  Alignment: Sagittal alignment is preserved.     Vertebrae: Normal marrow signal without osseous destructive process or aggressive bone marrow replacement process.  No fracture.     Discs: There is mild disc space narrowing at C2-C3 with endplate irregularity, likely degenerative and similar to the prior examination.  Remaining discs demonstrate normal height and signal.     Cord: Normal caliber, morphology, and signal.     Degenerative findings:     C2-C3: Posterior disc osteophyte complex and left uncovertebral joint spurring contribute to mild left neural foraminal narrowing.  No spinal canal stenosis.     C3-C4: Mild posterior disc osteophyte complex results in mild effacement of the ventral thecal sac without significant spinal canal stenosis or neural foraminal narrowing.     C4-C5: Posterior disc osteophyte complex results in effacement of the anterior thecal sac without significant spinal canal stenosis or neural foraminal narrowing.     C5-C6: Left uncovertebral joint spurring contributes to mild left neural foraminal narrowing without significant spinal canal stenosis.     C6-C7: No significant disc abnormality, spinal canal stenosis, or neural foraminal narrowing.     C7-T1: No significant disc abnormality, spinal canal stenosis, or neural foraminal narrowing.     Limited sagittal evaluation of the upper thoracic spine reveals a disc protrusion at T4-T5 causing effacement of the anterior  thecal sac and cord abutment.     Paraspinal muscles & soft tissues: Unremarkable without spinal canal or paraspinal mass.     Impression:     Mild cervical degenerative changes contributing to mild left neural foraminal narrowing at C2-C3 and C5-C6.  No spinal canal stenosis.     T4-T5 disc protrusion resulting in effacement of the anterior thecal sac, abutting the cord.     Narrowing of the C2-C3 disc space with endplate irregularity, likely degenerative and stable compared to the prior examination.       Allergies:   Review of patient's allergies indicates:   Allergen Reactions    Amoxicillin Hives       Current Medications:   Current Outpatient Medications   Medication Sig Dispense Refill    ascorbic acid, vitamin C, (VITAMIN C) 100 MG tablet Take 100 mg by mouth once daily.      atenoloL-chlorthalidone (TENORETIC) 50-25 mg Tab TAKE 1/2 TABLET BY MOUTH ONCE EVERY DAY 45 tablet 3    celecoxib (CELEBREX) 100 MG capsule TAKE 1 CAPSULE(100 MG) BY MOUTH TWICE DAILY 60 capsule 2    DULoxetine (CYMBALTA) 60 MG capsule Take 1 capsule (60 mg total) by mouth once daily. 30 capsule 11    estradioL (VIVELLE-DOT) 0.025 mg/24 hr Place 1 patch onto the skin twice a week. 8 patch 11    levonorgestrel (MIRENA) 20 mcg/24 hr (5 years) IUD 1 Intra Uterine Device by Intrauterine route once. for 1 dose 1 Intra Uterine Device 0    loratadine (CLARITIN) 10 mg tablet Take 10 mg by mouth every morning.       metFORMIN (GLUCOPHAGE) 500 MG tablet Take 500 mg by mouth Daily.      methocarbamoL (ROBAXIN) 500 MG Tab TAKE 1 TABLET(500 MG) BY MOUTH THREE TIMES DAILY AS NEEDED FOR MUSCLE PAIN 30 tablet 1    multivitamin capsule Take 1 capsule by mouth once daily.      ondansetron (ZOFRAN-ODT) 4 MG TbDL Take 4 mg by mouth every 6 (six) hours as needed.      potassium chloride (MICRO-K) 10 MEQ CpSR Take 2 capsules (20 mEq total) by mouth once daily. 180 capsule 3    vitamin D (VITAMIN D3) 1000 units Tab Take 1,000 Units by mouth once daily.        No current facility-administered medications for this visit.       REVIEW OF SYSTEMS:    GENERAL:  No weight loss, malaise or fevers.  HEENT:  Negative for frequent or significant headaches.  NECK:  Negative for lumps, goiter, pain and significant neck swelling.  RESPIRATORY:  Negative for cough, wheezing or shortness of breath.  CARDIOVASCULAR:  Negative for chest pain, leg swelling or palpitations.  GI:  Negative for abdominal discomfort, blood in stools or black stools or change in bowel habits.  MUSCULOSKELETAL:  See HPI.  SKIN:  Negative for lesions, rash, and itching.  PSYCH:  +ve for sleep disturbance, mood disorder and recent psychosocial stressors.  HEMATOLOGY/LYMPHOLOGY:  Negative for prolonged bleeding, bruising easily or swollen nodes.  NEURO:   No history of headaches, syncope, paralysis, seizures or tremors.  All other reviewed and negative other than HPI.    Past Medical History:  Past Medical History:   Diagnosis Date    Alpha thalassemia     Back pain     Cervical spondylosis 12/30/2019    Colon polyp     Fatty liver     Hypertension     Prediabetes     Premenstrual symptom        Past Surgical History:  Past Surgical History:   Procedure Laterality Date    COLONOSCOPY N/A 6/18/2020    Procedure: COLONOSCOPY;  Surgeon: Eliot Hill MD;  Location: [address]);  Service: Endoscopy;  Laterality: N/A;  COVID screening on 6/16/20-[facility]    COLONOSCOPY N/A 7/6/2020    Procedure: COLONOSCOPY;  Surgeon: Kang Guzmán MD;  Location: [facility] OR [address] [place] [address];  Service: Colon and Rectal;  Laterality: N/A;    COLONOSCOPY N/A 7/9/2021    Procedure: COLONOSCOPY;  Surgeon: GREGG Guzmán MD;  Location: [address]);  Service: Endoscopy;  Laterality: N/A;  in July 2021  covid test [place] 7/6    EPIDURAL STEROID INJECTION N/A 10/2/2019    Procedure: INJECTION, STEROID, EPIDURAL, C7-T1;  Surgeon: Cosme Kirkland MD;  [place] [facility] [place] [facility];  Service: Pain Management;  Laterality:  N/A;    EPIDURAL STEROID INJECTION N/A 12/22/2021    Procedure: INJECTION, STEROID, EPIDURAL, C7-T1 NEED CONSENT;  Surgeon: Cosme Kirkland MD;  Location: [facility] PAIN [facility];  Service: Pain Management;  Laterality: N/A;    EPIDURAL STEROID INJECTION N/A 7/6/2022    Procedure: INJECTION, STEROID, EPIDURAL, C7-T1 IL  CONTRAST;  Surgeon: Cosme Kirkland MD;  Location: [facility] PAIN [facility];  Service: Pain Management;  Laterality: N/A;    EYE SURGERY      INJECTION OF ANESTHETIC AGENT AROUND NERVE Bilateral 1/8/2020    Procedure: BLOCK, NERVE C4,5,6;  Surgeon: Cosme Kirkland MD;  Location: [facility] PAIN [facility]GT;  Service: Pain Management;  Laterality: Bilateral;  B/L MBB C4,5,6    INJECTION OF ANESTHETIC AGENT AROUND NERVE Bilateral 1/29/2020    Procedure: BLOCK, NERVE, Repeat C4-C5-C6 MEDIAL BRANCH;  Surgeon: Cosme Kirkland MD;  Location: [facility] PAIN [facility];  Service: Pain Management;  Laterality: Bilateral;    INJECTION OF ANESTHETIC AGENT AROUND NERVE Bilateral 12/14/2022    Procedure: BLOCK, NERVE BILATERAL L3,L4,L5 MEDIAL BRANCH NEEDS CONSENT;  Surgeon: Cosme Kirkland MD;  Location: [facility] PAIN [facility];  Service: Pain Management;  Laterality: Bilateral;    INJECTION OF ANESTHETIC AGENT AROUND NERVE Bilateral 1/25/2023    Procedure: BLOCK, NERVE BILATERAL L3,L4,L5 MEDIAL BRANCH;  Surgeon: Cosme Kirkland MD;  Location: [facility] PAIN [facility];  Service: Pain Management;  Laterality: Bilateral;    INJECTION OF ANESTHETIC AGENT AROUND NERVE Bilateral 11/1/2023    Procedure: BLOCK, NERVE BILATERAL L5, S1, AND S2 LATERAL BRANCH;  Surgeon: Cosme Kirkland MD;  Location: [facility] PAIN [facility];  Service: Pain Management;  Laterality: Bilateral;    INJECTION OF ANESTHETIC AGENT AROUND NERVE Bilateral 11/22/2023    Procedure: BLOCK, NERVE BILATERAL L5, S1, AND S2 MEDIAL BRANCH;  Surgeon: Cosme Kirkland MD;  Location: [facility] PAIN [facility];  Service: Pain Management;  Laterality: Bilateral;    INJECTION OF JOINT Left 5/18/2022    Procedure: INJECTION,  JOINT, LEFT SI and GTB *LORI PT*;  Surgeon: Jed Phillips MD;  Location: [facility]IN [facility];  Service: Pain Management;  Laterality: Left;    INJECTION, SACROILIAC JOINT Bilateral 5/24/2023    Procedure: INJECTION,SACROILIAC JOINT BIALTERAL;  Surgeon: Cosme Sandoval MD;  Location: [facility];  Service: Pain Management;  Laterality: Bilateral;    LAPAROSCOPIC SURGICAL REMOVAL OF CECUM N/A 7/6/2020    Procedure: EXCISION, CECUM, LAPAROSCOPIC, colonoscopy to start, ERAS low;  Surgeon: Kang Gumzán MD;  Location: [facility] OR 2ND FLR;  Service: Colon and Rectal;  Laterality: N/A;    RADIOFREQUENCY ABLATION Left 3/4/2020    Procedure: RADIOFREQUENCY ABLATION, C4-C5-C6 ME DIAL BRANCH 1 OF 2 need consent;  Surgeon: Cosme Sandoval MD;  Location: [facility]IN [facility];  Service: Pain Management;  Laterality: Left;    RADIOFREQUENCY ABLATION Right 6/3/2020    Procedure: RADIOFREQUENCY ABLATION, C4-C5-C6 MEDIAL BRANCH 2 OF 2 need consent;  Surgeon: Cosme Sandoval MD;  Location: [facility] PAIN MGT;  Service: Pain Management;  Laterality: Right;    RADIOFREQUENCY ABLATION Right 3/23/2022    Procedure: Radiofrequency Ablation RIGHT C4,5,6;  Surgeon: Cosme Sandoval MD;  Location: [facility] PAIN [facility]GT;  Service: Pain Management;  Laterality: Right;    RADIOFREQUENCY ABLATION Left 4/6/2022    Procedure: Radiofrequency Ablation LEFT C4,5,6;  Surgeon: Cosme Sandoval MD;  Location: [facility] PAIN [facility]GT;  Service: Pain Management;  Laterality: Left;    RADIOFREQUENCY ABLATION Bilateral 3/1/2023    Procedure: RADIOFREQUENCY ABLATION BILATERAL L3,L4,L5 BOTH SIDES SAME TIME PER DR SANDOVAL;  Surgeon: Cosme Sandoval MD;  Location: [facility] PAIN [facility]GT;  Service: Pain Management;  Laterality: Bilateral;    RADIOFREQUENCY ABLATION Bilateral 5/3/2023    Procedure: RADIOFREQUENCY ABLATION BILATERAL C4,C5,C6 BOTH SIDES PER DR SANDOVAL;  Surgeon: Cosme Sandoval MD;  Location: [facility] PAIN [facility];  Service: Pain Management;  Laterality: Bilateral;     REFRACTIVE SURGERY  2008    SMALL INTESTINE SURGERY  7/6/20       Family History:  Family History   Problem Relation Age of Onset    Hypertension Mother     Thyroid disease Mother     Rheum arthritis Mother     Hearing loss Mother     Heart disease Father         Pacemaker    Cancer Maternal Grandmother     Cataracts Maternal Grandmother     Diabetes Maternal Grandmother     Hypertension Maternal Grandmother     Thyroid disease Maternal Grandmother     Breast cancer Maternal Grandmother     Hearing loss Maternal Grandmother     Cataracts Maternal Grandfather     Diabetes Maternal Grandfather     Hypertension Maternal Grandfather     Thyroid disease Maternal Grandfather     Lupus Cousin     Cancer Maternal Uncle     No Known Problems Paternal Grandmother     No Known Problems Paternal Grandfather     Amblyopia Neg Hx     Blindness Neg Hx     Glaucoma Neg Hx     Macular degeneration Neg Hx     Retinal detachment Neg Hx     Strabismus Neg Hx     Stroke Neg Hx     Ovarian cancer Neg Hx     Colon cancer Neg Hx     Cirrhosis Neg Hx        Social History:  Social History     Socioeconomic History    Marital status:    Occupational History     Employer: [employer]   Tobacco Use    Smoking status: Never    Smokeless tobacco: Never   Substance and Sexual Activity    Alcohol use: Yes     Alcohol/week: 1.0 standard drink of alcohol     Types: 1 Standard drinks or equivalent per week     Comment: 3 drinks weekly     Drug use: No    Sexual activity: Yes     Partners: Male     Birth control/protection: I.U.D.   Social History Narrative     Lukas - Dolores and Graciela Catherine - Florin        Used to walk, ride bikes. Occasional uses elliptical at home.      Social Determinants of Health     Financial Resource Strain: Low Risk  (9/13/2023)    Overall Financial Resource Strain (CARDIA)     Difficulty of Paying Living Expenses: Not hard at all   Food Insecurity: No Food Insecurity (9/13/2023)    Hunger  Vital Sign     Worried About Running Out of Food in the Last Year: Never true     Ran Out of Food in the Last Year: Never true   Transportation Needs: No Transportation Needs (9/13/2023)    PRAPARE - Transportation     Lack of Transportation (Medical): No     Lack of Transportation (Non-Medical): No   Physical Activity: Insufficiently Active (9/13/2023)    Exercise Vital Sign     Days of Exercise per Week: 1 day     Minutes of Exercise per Session: 20 min   Stress: Stress Concern Present (9/13/2023)    [employer] - Occupational Stress Questionnaire     Feeling of Stress : To some extent   Social Connections: Unknown (9/13/2023)    Social Connection and Isolation Panel [NHANES]     Frequency of Communication with Friends and Family: More than three times a week     Frequency of Social Gatherings with Friends and Family: Three times a week     Active Member of Clubs or Organizations: Yes     Attends Club or Organization Meetings: More than 4 times per year     Marital Status:    Housing Stability: Low Risk  (9/13/2023)    Housing Stability Vital Sign     Unable to Pay for Housing in the Last Year: No     Number of Places Lived in the Last Year: 1     Unstable Housing in the Last Year: No       OBJECTIVE:    There were no vitals taken for this visit.    Physical Exam (Telephone visit)    General appearance: Well appearing, in no acute distress, alert and oriented x3. Normal speech.     ASSESSMENT: 53 y.o. year old female with chronic low back pian. Etiology consistent with vertebrogenic low back pain.     1. Vertebrogenic low back pain            PLAN:   - All questions answered regarding upcoming L5 & S1 intraosseous basivertebral nerve ablation.  - Will proceed with L5 & S1 intraosseous basivertebral nerve ablation as scheduled.  - Counseled patient regarding the importance of activity modification, smoking cessation, alcohol cessation, constant sleeping habits, and physical  therapy.  - RTC 2 weeks after the procedure    The above plan and management options were discussed at length with patient. Patient is in agreement with the above and verbalized understanding.    Katie Bhandari I have personally reviewed the phone encounter with resident/fellow/NP and personally spoke with patient for over 11 min after addressing all questions and concerns   The phone call was initiated by patient who consented and verbalized understanding to the type of encounter not related to any office visit or other encounter in the past 7 days    Cosme Kirkland MD        02/19/2024

## 2024-02-21 ENCOUNTER — TELEPHONE (OUTPATIENT)
Dept: PAIN MEDICINE | Facility: CLINIC | Age: 54
End: 2024-02-21
Payer: COMMERCIAL

## 2024-02-21 NOTE — TELEPHONE ENCOUNTER
Pt message was forwarded to Thomas Valenzuela for scheduling.    ----- Message from Jhony Simmons sent at 2/19/2024  3:43 PM CST -----  Regarding: Patient advice  Contact: Pt  Pt returning call from office         Pt can be reached at [phone]

## 2024-02-21 NOTE — TELEPHONE ENCOUNTER
----- Message from Jhony Simmons sent at 2/19/2024  3:43 PM CST -----  Regarding: Patient advice  Contact: Pt  Pt returning call from office         Pt can be reached at [phone]

## 2024-02-22 ENCOUNTER — OFFICE VISIT (OUTPATIENT)
Dept: OBSTETRICS AND GYNECOLOGY | Facility: CLINIC | Age: 54
End: 2024-02-22
Attending: OBSTETRICS & GYNECOLOGY
Payer: COMMERCIAL

## 2024-02-22 ENCOUNTER — ANESTHESIA EVENT (OUTPATIENT)
Dept: SURGERY | Facility: OTHER | Age: 54
End: 2024-02-22
Payer: COMMERCIAL

## 2024-02-22 DIAGNOSIS — N95.1 MENOPAUSAL SYMPTOM: Primary | ICD-10-CM

## 2024-02-22 PROCEDURE — 99214 OFFICE O/P EST MOD 30 MIN: CPT | Mod: 95,,, | Performed by: OBSTETRICS & GYNECOLOGY

## 2024-02-22 RX ORDER — ESTRADIOL 0.04 MG/D
1 FILM, EXTENDED RELEASE TRANSDERMAL
Qty: 8 PATCH | Refills: 11 | Status: SHIPPED | OUTPATIENT
Start: 2024-02-22 | End: 2025-02-21

## 2024-02-23 PROBLEM — R06.83 SNORING: Status: ACTIVE | Noted: 2024-02-23

## 2024-02-23 PROCEDURE — 95806 SLEEP STUDY UNATT&RESP EFFT: CPT | Mod: 26,,, | Performed by: INTERNAL MEDICINE

## 2024-02-23 NOTE — PROGRESS NOTES
The HSAT device was received and uploaded this morning by the patient (Watch Pat device). The recorded sleep data noted to be adequate and the patient did not report issue with the device during the study.

## 2024-02-24 ENCOUNTER — PATIENT MESSAGE (OUTPATIENT)
Dept: SLEEP MEDICINE | Facility: CLINIC | Age: 54
End: 2024-02-24
Payer: COMMERCIAL

## 2024-02-26 ENCOUNTER — HOSPITAL ENCOUNTER (OUTPATIENT)
Facility: OTHER | Age: 54
Discharge: HOME | End: 2024-02-26
Attending: ANESTHESIOLOGY | Admitting: ANESTHESIOLOGY
Payer: COMMERCIAL

## 2024-02-26 ENCOUNTER — ANESTHESIA (OUTPATIENT)
Dept: SURGERY | Facility: OTHER | Age: 54
End: 2024-02-26
Payer: COMMERCIAL

## 2024-02-26 DIAGNOSIS — Z01.818 PRE-OP TESTING: ICD-10-CM

## 2024-02-26 DIAGNOSIS — G89.18 POST-OP PAIN: Primary | ICD-10-CM

## 2024-02-26 DIAGNOSIS — M54.51 VERTEBROGENIC LOW BACK PAIN: Primary | ICD-10-CM

## 2024-02-26 DIAGNOSIS — G47.33 OSA (OBSTRUCTIVE SLEEP APNEA): Primary | ICD-10-CM

## 2024-02-26 DIAGNOSIS — F51.09 OTHER INSOMNIA NOT DUE TO A SUBSTANCE OR KNOWN PHYSIOLOGICAL CONDITION: ICD-10-CM

## 2024-02-26 LAB
ANION GAP SERPL CALC-SCNC: 7 MMOL/L (ref 8–16)
B-HCG UR QL: NEGATIVE
BASOPHILS # BLD AUTO: 0.04 K/UL (ref 0–0.2)
BASOPHILS NFR BLD: 0.4 % (ref 0–1.9)
BUN SERPL-MCNC: 14 MG/DL (ref 6–20)
CALCIUM SERPL-MCNC: 9 MG/DL (ref 8.7–10.5)
CHLORIDE SERPL-SCNC: 101 MMOL/L (ref 95–110)
CO2 SERPL-SCNC: 30 MMOL/L (ref 23–29)
CREAT SERPL-MCNC: 0.8 MG/DL (ref 0.5–1.4)
CTP QC/QA: YES
DIFFERENTIAL METHOD BLD: ABNORMAL
EOSINOPHIL # BLD AUTO: 0.1 K/UL (ref 0–0.5)
EOSINOPHIL NFR BLD: 1.3 % (ref 0–8)
ERYTHROCYTE [DISTWIDTH] IN BLOOD BY AUTOMATED COUNT: 14.2 % (ref 11.5–14.5)
EST. GFR  (NO RACE VARIABLE): >60 ML/MIN/1.73 M^2
GLUCOSE SERPL-MCNC: 105 MG/DL (ref 70–110)
HCT VFR BLD AUTO: 39.6 % (ref 37–48.5)
HGB BLD-MCNC: 12.1 G/DL (ref 12–16)
IMM GRANULOCYTES # BLD AUTO: 0.02 K/UL (ref 0–0.04)
IMM GRANULOCYTES NFR BLD AUTO: 0.2 % (ref 0–0.5)
LYMPHOCYTES # BLD AUTO: 3 K/UL (ref 1–4.8)
LYMPHOCYTES NFR BLD: 30.9 % (ref 18–48)
MCH RBC QN AUTO: 23.2 PG (ref 27–31)
MCHC RBC AUTO-ENTMCNC: 30.6 G/DL (ref 32–36)
MCV RBC AUTO: 76 FL (ref 82–98)
MONOCYTES # BLD AUTO: 0.9 K/UL (ref 0.3–1)
MONOCYTES NFR BLD: 8.9 % (ref 4–15)
NEUTROPHILS # BLD AUTO: 5.7 K/UL (ref 1.8–7.7)
NEUTROPHILS NFR BLD: 58.3 % (ref 38–73)
NRBC BLD-RTO: 0 /100 WBC
PLATELET # BLD AUTO: 367 K/UL (ref 150–450)
PMV BLD AUTO: 8.7 FL (ref 9.2–12.9)
POCT GLUCOSE: 111 MG/DL (ref 70–110)
POTASSIUM SERPL-SCNC: 4 MMOL/L (ref 3.5–5.1)
RBC # BLD AUTO: 5.21 M/UL (ref 4–5.4)
SODIUM SERPL-SCNC: 138 MMOL/L (ref 136–145)
WBC # BLD AUTO: 9.78 K/UL (ref 3.9–12.7)

## 2024-02-26 PROCEDURE — 25000003 PHARM REV CODE 250: Performed by: NURSE ANESTHETIST, CERTIFIED REGISTERED

## 2024-02-26 PROCEDURE — 81025 URINE PREGNANCY TEST: CPT | Performed by: ANESTHESIOLOGY

## 2024-02-26 PROCEDURE — 36000705 HC OR TIME LEV I EA ADD 15 MIN: Performed by: ANESTHESIOLOGY

## 2024-02-26 PROCEDURE — D9220A PRA ANESTHESIA: Mod: CRNA,,, | Performed by: NURSE ANESTHETIST, CERTIFIED REGISTERED

## 2024-02-26 PROCEDURE — 36000707: Performed by: ANESTHESIOLOGY

## 2024-02-26 PROCEDURE — 71000015 HC POSTOP RECOV 1ST HR: Performed by: ANESTHESIOLOGY

## 2024-02-26 PROCEDURE — 71000033 HC RECOVERY, INTIAL HOUR: Performed by: ANESTHESIOLOGY

## 2024-02-26 PROCEDURE — 37000008 HC ANESTHESIA 1ST 15 MINUTES: Performed by: ANESTHESIOLOGY

## 2024-02-26 PROCEDURE — 64628 TRML DSTRJ IOS BVN 1ST 2 L/S: CPT | Mod: ,,, | Performed by: ANESTHESIOLOGY

## 2024-02-26 PROCEDURE — 63600175 PHARM REV CODE 636 W HCPCS: Performed by: NURSE ANESTHETIST, CERTIFIED REGISTERED

## 2024-02-26 PROCEDURE — 25000003 PHARM REV CODE 250: Performed by: ANESTHESIOLOGY

## 2024-02-26 PROCEDURE — 36000704 HC OR TIME LEV I 1ST 15 MIN: Performed by: ANESTHESIOLOGY

## 2024-02-26 PROCEDURE — 36415 COLL VENOUS BLD VENIPUNCTURE: CPT | Performed by: ANESTHESIOLOGY

## 2024-02-26 PROCEDURE — 85025 COMPLETE CBC W/AUTO DIFF WBC: CPT | Performed by: ANESTHESIOLOGY

## 2024-02-26 PROCEDURE — 80048 BASIC METABOLIC PNL TOTAL CA: CPT | Performed by: ANESTHESIOLOGY

## 2024-02-26 PROCEDURE — D9220A PRA ANESTHESIA: Mod: ANES,,, | Performed by: ANESTHESIOLOGY

## 2024-02-26 PROCEDURE — 27201423 OPTIME MED/SURG SUP & DEVICES STERILE SUPPLY: Performed by: ANESTHESIOLOGY

## 2024-02-26 PROCEDURE — 71000016 HC POSTOP RECOV ADDL HR: Performed by: ANESTHESIOLOGY

## 2024-02-26 PROCEDURE — 37000009 HC ANESTHESIA EA ADD 15 MINS: Performed by: ANESTHESIOLOGY

## 2024-02-26 PROCEDURE — 63600175 PHARM REV CODE 636 W HCPCS: Mod: JZ,JG | Performed by: STUDENT IN AN ORGANIZED HEALTH CARE EDUCATION/TRAINING PROGRAM

## 2024-02-26 PROCEDURE — 63600175 PHARM REV CODE 636 W HCPCS: Performed by: ANESTHESIOLOGY

## 2024-02-26 PROCEDURE — 82962 GLUCOSE BLOOD TEST: CPT | Performed by: ANESTHESIOLOGY

## 2024-02-26 PROCEDURE — 36000706: Performed by: ANESTHESIOLOGY

## 2024-02-26 RX ORDER — ACETAMINOPHEN 10 MG/ML
INJECTION, SOLUTION INTRAVENOUS
Status: DISCONTINUED | OUTPATIENT
Start: 2024-02-26 | End: 2024-02-26

## 2024-02-26 RX ORDER — MEPERIDINE HYDROCHLORIDE 25 MG/ML
12.5 INJECTION INTRAMUSCULAR; INTRAVENOUS; SUBCUTANEOUS ONCE AS NEEDED
Status: DISCONTINUED | OUTPATIENT
Start: 2024-02-26 | End: 2024-02-26 | Stop reason: HOSPADM

## 2024-02-26 RX ORDER — LIDOCAINE HYDROCHLORIDE AND EPINEPHRINE 10; 10 MG/ML; UG/ML
INJECTION, SOLUTION INFILTRATION; PERINEURAL
Status: DISCONTINUED | OUTPATIENT
Start: 2024-02-26 | End: 2024-02-26 | Stop reason: HOSPADM

## 2024-02-26 RX ORDER — LIDOCAINE HYDROCHLORIDE 20 MG/ML
INJECTION INTRAVENOUS
Status: DISCONTINUED | OUTPATIENT
Start: 2024-02-26 | End: 2024-02-26

## 2024-02-26 RX ORDER — HYDROCODONE BITARTRATE AND ACETAMINOPHEN 5; 325 MG/1; MG/1
1 TABLET ORAL EVERY 6 HOURS PRN
Qty: 28 TABLET | Refills: 0 | Status: SHIPPED | OUTPATIENT
Start: 2024-02-26 | End: 2024-03-04

## 2024-02-26 RX ORDER — FENTANYL CITRATE 50 UG/ML
INJECTION, SOLUTION INTRAMUSCULAR; INTRAVENOUS
Status: DISCONTINUED | OUTPATIENT
Start: 2024-02-26 | End: 2024-02-26

## 2024-02-26 RX ORDER — GENTAMICIN 40 MG/ML
INJECTION, SOLUTION INTRAMUSCULAR; INTRAVENOUS
Status: DISCONTINUED | OUTPATIENT
Start: 2024-02-26 | End: 2024-02-26 | Stop reason: HOSPADM

## 2024-02-26 RX ORDER — OXYCODONE AND ACETAMINOPHEN 5; 325 MG/1; MG/1
1 TABLET ORAL EVERY 6 HOURS PRN
Qty: 28 TABLET | Refills: 0 | Status: CANCELLED | OUTPATIENT
Start: 2024-02-26 | End: 2024-03-04

## 2024-02-26 RX ORDER — PROPOFOL 10 MG/ML
VIAL (ML) INTRAVENOUS
Status: DISCONTINUED | OUTPATIENT
Start: 2024-02-26 | End: 2024-02-26

## 2024-02-26 RX ORDER — PHENYLEPHRINE HCL IN 0.9% NACL 1 MG/10 ML
SYRINGE (ML) INTRAVENOUS
Status: DISCONTINUED | OUTPATIENT
Start: 2024-02-26 | End: 2024-02-26

## 2024-02-26 RX ORDER — DEXAMETHASONE SODIUM PHOSPHATE 4 MG/ML
INJECTION, SOLUTION INTRA-ARTICULAR; INTRALESIONAL; INTRAMUSCULAR; INTRAVENOUS; SOFT TISSUE
Status: DISCONTINUED | OUTPATIENT
Start: 2024-02-26 | End: 2024-02-26

## 2024-02-26 RX ORDER — SODIUM CHLORIDE 9 MG/ML
INJECTION, SOLUTION INTRAVENOUS CONTINUOUS
Status: DISCONTINUED | OUTPATIENT
Start: 2024-02-26 | End: 2024-02-26 | Stop reason: HOSPADM

## 2024-02-26 RX ORDER — MIDAZOLAM HYDROCHLORIDE 1 MG/ML
INJECTION INTRAMUSCULAR; INTRAVENOUS
Status: DISCONTINUED | OUTPATIENT
Start: 2024-02-26 | End: 2024-02-26

## 2024-02-26 RX ORDER — CLINDAMYCIN PHOSPHATE 900 MG/50ML
900 INJECTION, SOLUTION INTRAVENOUS
Status: COMPLETED | OUTPATIENT
Start: 2024-02-26 | End: 2024-02-26

## 2024-02-26 RX ORDER — OXYCODONE HYDROCHLORIDE 5 MG/1
5 TABLET ORAL
Status: DISCONTINUED | OUTPATIENT
Start: 2024-02-26 | End: 2024-02-26 | Stop reason: HOSPADM

## 2024-02-26 RX ORDER — BUPIVACAINE HYDROCHLORIDE 2.5 MG/ML
INJECTION, SOLUTION EPIDURAL; INFILTRATION; INTRACAUDAL
Status: DISCONTINUED | OUTPATIENT
Start: 2024-02-26 | End: 2024-02-26 | Stop reason: HOSPADM

## 2024-02-26 RX ORDER — SODIUM CHLORIDE, SODIUM LACTATE, POTASSIUM CHLORIDE, CALCIUM CHLORIDE 600; 310; 30; 20 MG/100ML; MG/100ML; MG/100ML; MG/100ML
INJECTION, SOLUTION INTRAVENOUS CONTINUOUS
Status: DISCONTINUED | OUTPATIENT
Start: 2024-02-26 | End: 2024-02-26 | Stop reason: HOSPADM

## 2024-02-26 RX ORDER — ONDANSETRON HYDROCHLORIDE 2 MG/ML
INJECTION, SOLUTION INTRAVENOUS
Status: DISCONTINUED | OUTPATIENT
Start: 2024-02-26 | End: 2024-02-26

## 2024-02-26 RX ORDER — HYDROMORPHONE HYDROCHLORIDE 2 MG/ML
0.4 INJECTION, SOLUTION INTRAMUSCULAR; INTRAVENOUS; SUBCUTANEOUS EVERY 5 MIN PRN
Status: DISCONTINUED | OUTPATIENT
Start: 2024-02-26 | End: 2024-02-26 | Stop reason: HOSPADM

## 2024-02-26 RX ORDER — PROCHLORPERAZINE EDISYLATE 5 MG/ML
5 INJECTION INTRAMUSCULAR; INTRAVENOUS EVERY 30 MIN PRN
Status: DISCONTINUED | OUTPATIENT
Start: 2024-02-26 | End: 2024-02-26 | Stop reason: HOSPADM

## 2024-02-26 RX ORDER — ROCURONIUM BROMIDE 10 MG/ML
INJECTION, SOLUTION INTRAVENOUS
Status: DISCONTINUED | OUTPATIENT
Start: 2024-02-26 | End: 2024-02-26

## 2024-02-26 RX ORDER — KETOROLAC TROMETHAMINE 30 MG/ML
INJECTION, SOLUTION INTRAMUSCULAR; INTRAVENOUS
Status: DISCONTINUED | OUTPATIENT
Start: 2024-02-26 | End: 2024-02-26

## 2024-02-26 RX ORDER — SODIUM CHLORIDE 0.9 % (FLUSH) 0.9 %
3 SYRINGE (ML) INJECTION
Status: DISCONTINUED | OUTPATIENT
Start: 2024-02-26 | End: 2024-02-26 | Stop reason: HOSPADM

## 2024-02-26 RX ADMIN — KETOROLAC TROMETHAMINE 20 MG: 30 INJECTION, SOLUTION INTRAMUSCULAR; INTRAVENOUS at 10:02

## 2024-02-26 RX ADMIN — PROPOFOL 170 MG: 10 INJECTION, EMULSION INTRAVENOUS at 07:02

## 2024-02-26 RX ADMIN — PROPOFOL 20 MG: 10 INJECTION, EMULSION INTRAVENOUS at 10:02

## 2024-02-26 RX ADMIN — Medication 100 MCG: at 08:02

## 2024-02-26 RX ADMIN — ROCURONIUM BROMIDE 50 MG: 10 INJECTION INTRAVENOUS at 07:02

## 2024-02-26 RX ADMIN — ROCURONIUM BROMIDE 10 MG: 10 INJECTION INTRAVENOUS at 09:02

## 2024-02-26 RX ADMIN — DEXAMETHASONE SODIUM PHOSPHATE 6 MG: 4 INJECTION INTRA-ARTICULAR; INTRALESIONAL; INTRAMUSCULAR; INTRAVENOUS; SOFT TISSUE at 07:02

## 2024-02-26 RX ADMIN — ROCURONIUM BROMIDE 20 MG: 10 INJECTION INTRAVENOUS at 08:02

## 2024-02-26 RX ADMIN — ONDANSETRON 4 MG: 2 INJECTION INTRAMUSCULAR; INTRAVENOUS at 10:02

## 2024-02-26 RX ADMIN — MIDAZOLAM HYDROCHLORIDE 2 MG: 1 INJECTION, SOLUTION INTRAMUSCULAR; INTRAVENOUS at 07:02

## 2024-02-26 RX ADMIN — SODIUM CHLORIDE, SODIUM LACTATE, POTASSIUM CHLORIDE, AND CALCIUM CHLORIDE: 600; 310; 30; 20 INJECTION, SOLUTION INTRAVENOUS at 06:02

## 2024-02-26 RX ADMIN — GLYCOPYRROLATE 0.1 MG: 0.2 INJECTION, SOLUTION INTRAMUSCULAR; INTRAVITREAL at 07:02

## 2024-02-26 RX ADMIN — Medication 100 MCG: at 07:02

## 2024-02-26 RX ADMIN — SUGAMMADEX 200 MG: 100 INJECTION, SOLUTION INTRAVENOUS at 10:02

## 2024-02-26 RX ADMIN — ACETAMINOPHEN 1000 MG: 10 INJECTION INTRAVENOUS at 10:02

## 2024-02-26 RX ADMIN — FENTANYL CITRATE 100 MCG: 0.05 INJECTION, SOLUTION INTRAMUSCULAR; INTRAVENOUS at 07:02

## 2024-02-26 RX ADMIN — LIDOCAINE HYDROCHLORIDE 60 MG: 20 INJECTION, SOLUTION INTRAVENOUS at 07:02

## 2024-02-26 RX ADMIN — SODIUM CHLORIDE, SODIUM LACTATE, POTASSIUM CHLORIDE, AND CALCIUM CHLORIDE: 600; 310; 30; 20 INJECTION, SOLUTION INTRAVENOUS at 09:02

## 2024-02-26 RX ADMIN — CLINDAMYCIN PHOSPHATE 900 MG: 900 INJECTION, SOLUTION INTRAVENOUS at 07:02

## 2024-02-26 NOTE — ANESTHESIA POSTPROCEDURE EVALUATION
Anesthesia Post Evaluation    Patient: Sheila IrvingNeptaliJobradha    Procedure(s) Performed: Procedure(s) (LRB):  L5 & S1 DESTRUCTION,INTRAOSSEOUS BASIVERTEBRAL NERVE,1ST TWO BODIES,LUM/SAC (Bilateral)    Final Anesthesia Type: general      Patient location during evaluation: PACU  Patient participation: Yes- Able to Participate  Level of consciousness: awake and alert  Post-procedure vital signs: reviewed and stable  Pain management: adequate  Airway patency: patent    PONV status at discharge: No PONV  Anesthetic complications: no      Cardiovascular status: blood pressure returned to baseline  Respiratory status: unassisted  Hydration status: euvolemic  Follow-up not needed.              Vitals Value Taken Time   /59 02/26/24 1207   Temp 36.7 °C (98.1 °F) 02/26/24 1137   Pulse 73 02/26/24 1207   Resp 16 02/26/24 1207   SpO2 99 % 02/26/24 1207         Event Time   Out of Recovery 11:33:00         Pain/Yadira Score: Yadira Score: 10 (2/26/2024 12:07 PM)

## 2024-02-26 NOTE — TRANSFER OF CARE
"Anesthesia Transfer of Care Note    Patient: Sheila Costa    Procedure(s) Performed: Procedure(s) (LRB):  L5 & S1 DESTRUCTION,INTRAOSSEOUS BASIVERTEBRAL NERVE,1ST TWO BODIES,LUM/SAC (Bilateral)    Patient location: PACU    Anesthesia Type: general    Transport from OR: Transported from OR on 6-10 L/min O2 by face mask with adequate spontaneous ventilation    Post pain: adequate analgesia    Post assessment: no apparent anesthetic complications and tolerated procedure well    Post vital signs: stable    Level of consciousness: awake and alert    Nausea/Vomiting: no nausea/vomiting    Complications: none    Transfer of care protocol was followed      Last vitals: Visit Vitals  /62 (BP Location: Left arm, Patient Position: Lying)   Pulse 62   Temp 36.7 °C (98 °F) (Oral)   Resp 18   Ht 5' 1" (1.549 m)   Wt 67.1 kg (148 lb)   SpO2 100%   Breastfeeding No   BMI 27.96 kg/m²     "

## 2024-02-26 NOTE — ANESTHESIA PREPROCEDURE EVALUATION
02/26/2024  Sheila Costa is a 53 y.o., female.      Pre-op Assessment    I have reviewed the Patient Summary Reports.     I have reviewed the Nursing Notes. I have reviewed the NPO Status.   I have reviewed the Medications.     Review of Systems  Anesthesia Hx:  No previous Anesthesia   History of prior surgery of interest to airway management or planning:          Denies Family Hx of Anesthesia complications.    Denies Personal Hx of Anesthesia complications.                    Social:  Non-Smoker       Hematology/Oncology:    Oncology Normal    -- Anemia:               Hematology Comments: Alpha thalassemia                    EENT/Dental:  EENT/Dental Normal           Cardiovascular:     Hypertension, well controlled                                        Pulmonary:  Pulmonary Normal                       Renal/:  Renal/ Normal                 Hepatic/GI:      Liver Disease,  Fatty liver          Musculoskeletal:  Arthritis          Spine Disorders: lumbar Disc disease, Degenerative disease and Chronic Pain           Neurological:  Neurology Normal                                      Endocrine:  Endocrine Normal            Dermatological:  Skin Normal    Psych:  Psychiatric Normal                    Physical Exam  General: Cooperative, Alert and Oriented    Airway:  Mallampati: II   Mouth Opening: Normal  Neck ROM: Normal ROM    Dental:  Intact      Anesthesia Plan  Type of Anesthesia, risks & benefits discussed:    Anesthesia Type: MAC  Intra-op Monitoring Plan: Standard ASA Monitors  Post Op Pain Control Plan: multimodal analgesia  Informed Consent: Informed consent signed with the Patient and all parties understand the risks and agree with anesthesia plan.  All questions answered.   ASA Score: 3  Anesthesia Plan Notes: Was hypokalemic now on K replacement  Plan MAC    Ready For Surgery  From Anesthesia Perspective.     .

## 2024-02-26 NOTE — H&P
HPI  Patient presenting for Procedure(s) (LRB):  L5 & S1 DESTRUCTION,INTRAOSSEOUS BASIVERTEBRAL NERVE,1ST TWO BODIES,LUM/SAC (N/A)     Patient on Anti-coagulation No    No health changes since previous encounter    Past Medical History:   Diagnosis Date    Alpha thalassemia     Back pain     Cervical spondylosis 12/30/2019    Colon polyp     Fatty liver     Hypertension     Prediabetes     Premenstrual symptom      Past Surgical History:   Procedure Laterality Date    COLONOSCOPY N/A 6/18/2020    Procedure: COLONOSCOPY;  Surgeon: Eliot Hill MD;  Location: [facility] ENDO (4TH FLR);  Service: Endoscopy;  Laterality: N/A;  COVID screening on 6/16/20-[place]- ERW    COLONOSCOPY N/A 7/6/2020    Procedure: COLONOSCOPY;  Surgeon: Kang Guzmán MD;  Location: [facility] OR [facility] FLR;  Service: Colon and Rectal;  Laterality: N/A;    COLONOSCOPY N/A 7/9/2021    Procedure: COLONOSCOPY;  Surgeon: GREGG Guzmán MD;  [facility]  Service: Endoscopy;  Laterality: N/A;  in July 2021  covid test [place] 7/6    EPIDURAL STEROID INJECTION N/A 10/2/2019    Procedure: INJECTION, STEROID, EPIDURAL, C7-T1;  Surgeon: Cosme Kirkland MD;  Location: [facility] PAIN MGT;  Service: Pain Management;  Laterality: N/A;    EPIDURAL STEROID INJECTION N/A 12/22/2021    Procedure: INJECTION, STEROID, EPIDURAL, C7-T1 NEED CONSENT;  Surgeon: Cosme Kirkland MD;  Location: [facility] PAIN MGT;  Service: Pain Management;  Laterality: N/A;    EPIDURAL STEROID INJECTION N/A 7/6/2022    Procedure: INJECTION, STEROID, EPIDURAL, C7-T1 IL  CONTRAST;  Surgeon: Cosme Kirkland MD;  Location: [facility] PAIN MGT;  Service: Pain Management;  Laterality: N/A;    EYE SURGERY      INJECTION OF ANESTHETIC AGENT AROUND NERVE Bilateral 1/8/2020    Procedure: BLOCK, NERVE C4,5,6;  Surgeon: Cosme Kirkland MD;  Location: [facility] PAIN MGT;  Service: Pain Management;  Laterality: Bilateral;  B/L MBB C4,5,6    INJECTION OF ANESTHETIC AGENT AROUND NERVE  Bilateral 1/29/2020    Procedure: BLOCK, NERVE, Repeat C4-C5-C6 MEDIAL BRANCH;  Surgeon: Cosme Kirkland MD;  Location: [facility] PAIN MGT;  Service: Pain Management;  Laterality: Bilateral;    INJECTION OF ANESTHETIC AGENT AROUND NERVE Bilateral 12/14/2022    Procedure: BLOCK, NERVE BILATERAL L3,L4,L5 MEDIAL BRANCH NEEDS CONSENT;  Surgeon: Cosme Kirkland MD;  Location: [facility] PAIN MGT;  Service: Pain Management;  Laterality: Bilateral;    INJECTION OF ANESTHETIC AGENT AROUND NERVE Bilateral 1/25/2023    Procedure: BLOCK, NERVE BILATERAL L3,L4,L5 MEDIAL BRANCH;  Surgeon: Cosme Kirkland MD;  Location: [facility] PAIN MGT;  Service: Pain Management;  Laterality: Bilateral;    INJECTION OF ANESTHETIC AGENT AROUND NERVE Bilateral 11/1/2023    Procedure: BLOCK, NERVE BILATERAL L5, S1, AND S2 LATERAL BRANCH;  Surgeon: Cosme Kirkland MD;  Location: [facility] PAIN MGT;  Service: Pain Management;  Laterality: Bilateral;    INJECTION OF ANESTHETIC AGENT AROUND NERVE Bilateral 11/22/2023    Procedure: BLOCK, NERVE BILATERAL L5, S1, AND S2 MEDIAL BRANCH;  Surgeon: Cosme Kirkland MD;  Location: [facility] PAIN MGT;  Service: Pain Management;  Laterality: Bilateral;    INJECTION OF JOINT Left 5/18/2022    Procedure: INJECTION, JOINT, LEFT SI and GTB *LORI PT*;  Surgeon: Jed Phillips MD;  Location: [facility] PAIN MGT;  Service: Pain Management;  Laterality: Left;    INJECTION, SACROILIAC JOINT Bilateral 5/24/2023    Procedure: INJECTION,SACROILIAC JOINT BIALTERAL;  Surgeon: Cosme Kirkland MD;  Location: [facility] PAIN MGT;  Service: Pain Management;  Laterality: Bilateral;    LAPAROSCOPIC SURGICAL REMOVAL OF CECUM N/A 7/6/2020    Procedure: EXCISION, CECUM, LAPAROSCOPIC, colonoscopy to start, ERAS low;  Surgeon: Kang Guzmán MD;  [facility];  Service: Colon and Rectal;  Laterality: N/A;    RADIOFREQUENCY ABLATION Left 3/4/2020    Procedure: RADIOFREQUENCY ABLATION, C4-C5-C6 ME DIAL BRANCH 1 OF 2 need consent;  " Surgeon: Cosme Kirkland MD;  Location: [facility] PAIN MGT;  Service: Pain Management;  Laterality: Left;    RADIOFREQUENCY ABLATION Right 6/3/2020    Procedure: RADIOFREQUENCY ABLATION, C4-C5-C6 MEDIAL BRANCH 2 OF 2 need consent;  Surgeon: Cosme Kirkland MD;  Location: [facility] PAIN MGT;  Service: Pain Management;  Laterality: Right;    RADIOFREQUENCY ABLATION Right 3/23/2022    Procedure: Radiofrequency Ablation RIGHT C4,5,6;  Surgeon: Cosme Kirkland MD;  Location: [facility] PAIN MGT;  Service: Pain Management;  Laterality: Right;    RADIOFREQUENCY ABLATION Left 4/6/2022    Procedure: Radiofrequency Ablation LEFT C4,5,6;  Surgeon: Cosme Kirkland MD;  Location: [facility] PAIN MGT;  Service: Pain Management;  Laterality: Left;    RADIOFREQUENCY ABLATION Bilateral 3/1/2023    Procedure: RADIOFREQUENCY ABLATION BILATERAL L3,L4,L5 BOTH SIDES SAME TIME PER DR KIRKLAND;  Surgeon: Cosme Kirkland MD;  Location: [facility] PAIN MGT;  Service: Pain Management;  Laterality: Bilateral;    RADIOFREQUENCY ABLATION Bilateral 5/3/2023    Procedure: RADIOFREQUENCY ABLATION BILATERAL C4,C5,C6 BOTH SIDES PER DR KIRKLAND;  Surgeon: Cosme Kirkland MD;  Location: [facility] PAIN MGT;  Service: Pain Management;  Laterality: Bilateral;    REFRACTIVE SURGERY  2008    SMALL INTESTINE SURGERY  7/6/20     Review of patient's allergies indicates:   Allergen Reactions    Amoxicillin Hives      Current Facility-Administered Medications   Medication    0.9%  NaCl infusion    clindamycin in D5W 900 mg/50 mL IVPB 900 mg    lactated ringers infusion       PMHx, PSHx, Allergies, Medications reviewed in epic    ROS negative except pain complaints in HPI    OBJECTIVE:    /62 (BP Location: Left arm, Patient Position: Lying)   Pulse 62   Temp 98 °F (36.7 °C) (Oral)   Resp 18   Ht 5' 1" (1.549 m)   Wt 67.1 kg (148 lb)   SpO2 100%   Breastfeeding No   BMI 27.96 kg/m²     PHYSICAL EXAMINATION:    GENERAL: Well appearing, in no acute distress, alert " and oriented x3.  PSYCH:  Mood and affect appropriate.  SKIN: Skin color, texture, turgor normal, no rashes or lesions which will impact the procedure.  CV: RRR with palpation of the radial artery.  PULM: No evidence of respiratory difficulty, symmetric chest rise. Clear to auscultation.  NEURO: Cranial nerves grossly intact.    Plan:    Proceed with procedure as planned Procedure(s) (LRB):  L5 & S1 DESTRUCTION,INTRAOSSEOUS BASIVERTEBRAL NERVE,1ST TWO BODIES,LUM/SAC (N/A)    Maurilio Somers MD  02/26/2024

## 2024-02-26 NOTE — ANESTHESIA PROCEDURE NOTES
Intubation    Date/Time: 2/26/2024 7:41 AM    Performed by: Nicole Hemphill CRNA  Authorized by: Derek Joseph MD    Intubation:     Induction:  Intravenous    Intubated:  Postinduction    Mask Ventilation:  Easy mask    Attempts:  1    Attempted By:  CRNA    Method of Intubation:  Video laryngoscopy    Blade:  Rousseau 3    Laryngeal View Grade: Grade I - full view of cords      Difficult Airway Encountered?: No      Complications:  None    Airway Device:  Oral endotracheal tube    Airway Device Size:  7.0    Style/Cuff Inflation:  Cuffed (inflated to minimal occlusive pressure)    Inflation Amount (mL):  5    Tube secured:  21    Secured at:  The lips    Placement Verified By:  Capnometry    Complicating Factors:  None    Findings Post-Intubation:  Atraumatic/condition of teeth unchanged and BS equal bilateral

## 2024-02-26 NOTE — DISCHARGE SUMMARY
Discharge Note  Short Stay      SUMMARY     Admit Date: 2/26/2024    Attending Physician: Cosme Kirkland MD      Discharge Physician: Maurilio Somers MD      Discharge Date: 2/26/2024 10:35 AM    Procedure(s) (LRB):  L5 & S1 DESTRUCTION,INTRAOSSEOUS BASIVERTEBRAL NERVE,1ST TWO BODIES,LUM/SAC (Bilateral)    Final Diagnosis: Vertebrogenic low back pain [M54.51]  DDD (degenerative disc disease), lumbar [M51.36]  Lumbosacral spondylolysis [M43.07]  Chronic pain syndrome [G89.4]    Disposition: Home or self care    Patient Instructions:   Current Discharge Medication List        CONTINUE these medications which have NOT CHANGED    Details   ascorbic acid, vitamin C, (VITAMIN C) 100 MG tablet Take 100 mg by mouth once daily.      atenoloL-chlorthalidone (TENORETIC) 50-25 mg Tab TAKE 1/2 TABLET BY MOUTH ONCE EVERY DAY  Qty: 45 tablet, Refills: 3    Comments: .  Associated Diagnoses: Essential hypertension      celecoxib (CELEBREX) 100 MG capsule TAKE 1 CAPSULE(100 MG) BY MOUTH TWICE DAILY  Qty: 60 capsule, Refills: 2    Associated Diagnoses: Cervical spondylosis without myelopathy      DULoxetine (CYMBALTA) 60 MG capsule Take 1 capsule (60 mg total) by mouth once daily.  Qty: 30 capsule, Refills: 11    Associated Diagnoses: Vertebrogenic low back pain; Sacroiliitis; DDD (degenerative disc disease), lumbar      estradioL (VIVELLE-DOT) 0.025 mg/24 hr Place 1 patch onto the skin twice a week.  Qty: 8 patch, Refills: 11      loratadine (CLARITIN) 10 mg tablet Take 10 mg by mouth every morning.       metFORMIN (GLUCOPHAGE) 500 MG tablet Take 500 mg by mouth Daily.      methocarbamoL (ROBAXIN) 500 MG Tab TAKE 1 TABLET(500 MG) BY MOUTH THREE TIMES DAILY AS NEEDED FOR MUSCLE PAIN  Qty: 30 tablet, Refills: 1    Associated Diagnoses: Myofascial pain      multivitamin capsule Take 1 capsule by mouth once daily.      potassium chloride (MICRO-K) 10 MEQ CpSR Take 2 capsules (20 mEq total) by mouth once daily.  Qty: 180 capsule,  Refills: 3    Comments: Discontinue prior scripts with same name      vitamin D (VITAMIN D3) 1000 units Tab Take 1,000 Units by mouth once daily.      estradioL (VIVELLE-DOT) 0.0375 mg/24 hr Place 1 patch onto the skin twice a week.  Qty: 8 patch, Refills: 11      levonorgestrel (MIRENA) 20 mcg/24 hr (5 years) IUD 1 Intra Uterine Device by Intrauterine route once. for 1 dose  Qty: 1 Intra Uterine Device, Refills: 0    Associated Diagnoses: Contraception, device intrauterine      ondansetron (ZOFRAN-ODT) 4 MG TbDL Take 4 mg by mouth every 6 (six) hours as needed.                 Discharge Diagnosis: Vertebrogenic low back pain [M54.51]  DDD (degenerative disc disease), lumbar [M51.36]  Lumbosacral spondylolysis [M43.07]  Chronic pain syndrome [G89.4]  Condition on Discharge: Stable with no complications to procedure   Diet on Discharge: Same as before.  Activity: as per instruction sheet.  Discharge to: Home with a responsible adult.  Follow up: 2-4 weeks    Wound care:  1. In case of sutures/staples leave underlying strips in place until follow up.  2. No soaking bath or swimming until after follow up.   3. Take your pain medication as needed.   4. Take over the counter stool softener while taking pain medication to prevent constipation.   5. If no bowel movement in 2 days take miralax twice a day.  6. Ok for light activity (light housework, walking, stair climbing) no strenuous exercise until after follow up.        Please call my office or pager at [phone] if experienced any weakness or loss of sensation, fever > 101.5, pain uncontrolled with oral medications, persistent nausea/vomiting/or diarrhea, redness or drainage from the incisions, or any other worrisome concerns. If physician on call was not reached or could not communicate with our office for any reason please go to the nearest emergency department

## 2024-02-26 NOTE — PROGRESS NOTES
The patient location is: home  The chief complaint leading to consultation is: follow up HRT    Visit type: audiovisual    Face to Face time with patient:   25 minutes of total time spent on the encounter, which includes face to face time and non-face to face time preparing to see the patient (eg, review of tests), Obtaining and/or reviewing separately obtained history, Documenting clinical information in the electronic or other health record, Independently interpreting results (not separately reported) and communicating results to the patient/family/caregiver, or Care coordination (not separately reported).         Each patient to whom he or she provides medical services by telemedicine is:  (1) informed of the relationship between the physician and patient and the respective role of any other health care provider with respect to management of the patient; and (2) notified that he or she may decline to receive medical services by telemedicine and may withdraw from such care at any time.    Notes:     SUBJECTIVE:   53 y.o. female   presents today for follow up on HRT. . No LMP recorded. Patient has had an implant..  She reports that her symptoms are better but still having hot flashes and night sweats. Her libido is better. She has sleep study scheduled. .        Past Medical History:   Diagnosis Date    Alpha thalassemia     Back pain     Cervical spondylosis 2019    Colon polyp     Fatty liver     Hypertension     Prediabetes     Premenstrual symptom      Past Surgical History:   Procedure Laterality Date    COLONOSCOPY N/A 2020    Procedure: COLONOSCOPY;  Surgeon: Eliot Hill MD;  Location: [facility]);  Service: Endoscopy;  Laterality: N/A;  COVID screening on 20-[facility]    COLONOSCOPY N/A 2020    Procedure: COLONOSCOPY;  Surgeon: Kang Guzmán MD;  Location: [address];  Service: Colon and Rectal;  Laterality: N/A;    COLONOSCOPY N/A 2021    Procedure:  COLONOSCOPY;  Surgeon: GREGG Guzmán MD;  [facility]  Service: Endoscopy;  Laterality: N/A;  in July 2021  covid test [place] 7/6    EPIDURAL STEROID INJECTION N/A 10/2/2019    Procedure: INJECTION, STEROID, EPIDURAL, C7-T1;  Surgeon: Cosme Kirkland MD;  Location: [facility], [place], operated [facility];  Service: Pain Management;  Laterality: N/A;    EPIDURAL STEROID INJECTION N/A 12/22/2021    Procedure: INJECTION, STEROID, EPIDURAL, C7-T1 NEED CONSENT;  Surgeon: Cosme Kirkland MD;  Location: [facility], [place], [facility];  Service: Pain Management;  Laterality: N/A;    EPIDURAL STEROID INJECTION N/A 7/6/2022    Procedure: INJECTION, STEROID, EPIDURAL, C7-T1 IL  CONTRAST;  Surgeon: Cosme Kirkland MD;  Location: [facility], [place], [facility];  Service: Pain Management;  Laterality: N/A;    EYE SURGERY      INJECTION OF ANESTHETIC AGENT AROUND NERVE Bilateral 1/8/2020    Procedure: BLOCK, NERVE C4,5,6;  Surgeon: Cosme Kirkland MD;  Location: [facility], [place], [facility];  Service: Pain Management;  Laterality: Bilateral;  B/L MBB C4,5,6    INJECTION OF ANESTHETIC AGENT AROUND NERVE Bilateral 1/29/2020    Procedure: BLOCK, NERVE, Repeat C4-C5-C6 MEDIAL BRANCH;  Surgeon: Cosme Kirkland MD;  Location: [facility], [place], [facility];  Service: Pain Management;  Laterality: Bilateral;    INJECTION OF ANESTHETIC AGENT AROUND NERVE Bilateral 12/14/2022    Procedure: BLOCK, NERVE BILATERAL L3,L4,L5 MEDIAL BRANCH NEEDS CONSENT;  Surgeon: Cosme Kirkland MD;  Location: [facility], [place], [facility];  Service: Pain Management;  Laterality: Bilateral;    INJECTION OF ANESTHETIC AGENT AROUND NERVE Bilateral 1/25/2023    Procedure: BLOCK, NERVE BILATERAL L3,L4,L5 MEDIAL BRANCH;  Surgeon: Cosme Kirkland MD;  Location: [facility], [place], [facility];  Service: Pain Management;  Laterality: Bilateral;    INJECTION OF ANESTHETIC AGENT AROUND NERVE Bilateral 11/1/2023    Procedure: BLOCK, NERVE BILATERAL L5, S1, AND S2 LATERAL BRANCH;  Surgeon: Cosme Kirkland MD;  Location: [facility], [place], [facility];   Service: Pain Management;  Laterality: Bilateral;    INJECTION OF ANESTHETIC AGENT AROUND NERVE Bilateral 11/22/2023    Procedure: BLOCK, NERVE BILATERAL L5, S1, AND S2 MEDIAL BRANCH;  Surgeon: Cosme Kirkland MD;  Location: [facility];  Service: Pain Management;  Laterality: Bilateral;    INJECTION OF JOINT Left 5/18/2022    Procedure: INJECTION, JOINT, LEFT SI and GTB *LORI PT*;  Surgeon: Jed Phillips MD;  Location: [facility];  Service: Pain Management;  Laterality: Left;    INJECTION, SACROILIAC JOINT Bilateral 5/24/2023    Procedure: INJECTION,SACROILIAC JOINT BIALTERAL;  Surgeon: Cosme Kirkland MD;  Location: [facility];  Service: Pain Management;  Laterality: Bilateral;    LAPAROSCOPIC SURGICAL REMOVAL OF CECUM N/A 7/6/2020    Procedure: EXCISION, CECUM, LAPAROSCOPIC, colonoscopy to start, ERAS low;  Surgeon: Kang Guzmán MD;  Location: [facility] OR [address] [place] [address];  Service: Colon and Rectal;  Laterality: N/A;    RADIOFREQUENCY ABLATION Left 3/4/2020    Procedure: RADIOFREQUENCY ABLATION, C4-C5-C6 ME DIAL BRANCH 1 OF 2 need consent;  Surgeon: Cosme Kirkland MD;  Location: [facility];  Service: Pain Management;  Laterality: Left;    RADIOFREQUENCY ABLATION Right 6/3/2020    Procedure: RADIOFREQUENCY ABLATION, C4-C5-C6 MEDIAL BRANCH 2 OF 2 need consent;  Surgeon: Cosme Kirkland MD;  Location: [facility];  Service: Pain Management;  Laterality: Right;    RADIOFREQUENCY ABLATION Right 3/23/2022    Procedure: Radiofrequency Ablation RIGHT C4,5,6;  Surgeon: Cosme Kirkland MD;  Location: [facility];  Service: Pain Management;  Laterality: Right;    RADIOFREQUENCY ABLATION Left 4/6/2022    Procedure: Radiofrequency Ablation LEFT C4,5,6;  Surgeon: Cosme Kirkland MD;  Location: [facility];  Service: Pain Management;  Laterality: Left;    RADIOFREQUENCY ABLATION Bilateral 3/1/2023    Procedure: RADIOFREQUENCY ABLATION BILATERAL L3,L4,L5 BOTH SIDES SAME TIME PER   LORI;  Surgeon: Cosme Kirkland MD;  Location: [facility];  Service: Pain Management;  Laterality: Bilateral;    RADIOFREQUENCY ABLATION Bilateral 5/3/2023    Procedure: RADIOFREQUENCY ABLATION BILATERAL C4,C5,C6 BOTH SIDES PER DR KIRKLAND;  Surgeon: Cosme Kirkland MD;  Location: [facility];  Service: Pain Management;  Laterality: Bilateral;    REFRACTIVE SURGERY  2008    SMALL INTESTINE SURGERY  7/6/20     Social History     Socioeconomic History    Marital status:    Occupational History     Employer: [employer]   Tobacco Use    Smoking status: Never    Smokeless tobacco: Never   Substance and Sexual Activity    Alcohol use: Yes     Alcohol/week: 1.0 standard drink of alcohol     Types: 1 Standard drinks or equivalent per week     Comment: 3 drinks weekly     Drug use: No    Sexual activity: Yes     Partners: Male     Birth control/protection: I.U.D.   Social History Narrative     Lukas - Dolores and FlorinGraciela - Florin        Used to walk, ride bikes. Occasional uses elliptical at home.      Social Determinants of Health     Financial Resource Strain: Low Risk  (9/13/2023)    Overall Financial Resource Strain (CARDIA)     Difficulty of Paying Living Expenses: Not hard at all   Food Insecurity: No Food Insecurity (9/13/2023)    Hunger Vital Sign     Worried About Running Out of Food in the Last Year: Never true     Ran Out of Food in the Last Year: Never true   Transportation Needs: No Transportation Needs (9/13/2023)    PRAPARE - Transportation     Lack of Transportation (Medical): No     Lack of Transportation (Non-Medical): No   Physical Activity: Insufficiently Active (9/13/2023)    Exercise Vital Sign     Days of Exercise per Week: 1 day     Minutes of Exercise per Session: 20 min   Stress: Stress Concern Present (9/13/2023)    [place] [employer] - Occupational Stress Questionnaire     Feeling of Stress : To some extent   Social  Connections: Unknown (2023)    Social Connection and Isolation Panel [NHANES]     Frequency of Communication with Friends and Family: More than three times a week     Frequency of Social Gatherings with Friends and Family: Three times a week     Active Member of Clubs or Organizations: Yes     Attends Club or Organization Meetings: More than 4 times per year     Marital Status:    Housing Stability: Low Risk  (2023)    Housing Stability Vital Sign     Unable to Pay for Housing in the Last Year: No     Number of Places Lived in the Last Year: 1     Unstable Housing in the Last Year: No     Family History   Problem Relation Age of Onset    Hypertension Mother     Thyroid disease Mother     Rheum arthritis Mother     Hearing loss Mother     Heart disease Father         Pacemaker    Cancer Maternal Grandmother     Cataracts Maternal Grandmother     Diabetes Maternal Grandmother     Hypertension Maternal Grandmother     Thyroid disease Maternal Grandmother     Breast cancer Maternal Grandmother     Hearing loss Maternal Grandmother     Cataracts Maternal Grandfather     Diabetes Maternal Grandfather     Hypertension Maternal Grandfather     Thyroid disease Maternal Grandfather     Lupus Cousin     Cancer Maternal Uncle     No Known Problems Paternal Grandmother     No Known Problems Paternal Grandfather     Amblyopia Neg Hx     Blindness Neg Hx     Glaucoma Neg Hx     Macular degeneration Neg Hx     Retinal detachment Neg Hx     Strabismus Neg Hx     Stroke Neg Hx     Ovarian cancer Neg Hx     Colon cancer Neg Hx     Cirrhosis Neg Hx      OB History    Para Term  AB Living   2 2 2         SAB IAB Ectopic Multiple Live Births                  # Outcome Date GA Lbr Louis/2nd Weight Sex Delivery Anes PTL Lv   2 Term      Vag-Spont      1 Term      Vag-Spont              Current Outpatient Medications   Medication Sig Dispense Refill    ascorbic acid, vitamin C, (VITAMIN C) 100 MG tablet Take 100  mg by mouth once daily.      atenoloL-chlorthalidone (TENORETIC) 50-25 mg Tab TAKE 1/2 TABLET BY MOUTH ONCE EVERY DAY 45 tablet 3    celecoxib (CELEBREX) 100 MG capsule TAKE 1 CAPSULE(100 MG) BY MOUTH TWICE DAILY 60 capsule 2    DULoxetine (CYMBALTA) 60 MG capsule Take 1 capsule (60 mg total) by mouth once daily. 30 capsule 11    estradioL (VIVELLE-DOT) 0.025 mg/24 hr Place 1 patch onto the skin twice a week. 8 patch 11    estradioL (VIVELLE-DOT) 0.0375 mg/24 hr Place 1 patch onto the skin twice a week. 8 patch 11    levonorgestrel (MIRENA) 20 mcg/24 hr (5 years) IUD 1 Intra Uterine Device by Intrauterine route once. for 1 dose 1 Intra Uterine Device 0    loratadine (CLARITIN) 10 mg tablet Take 10 mg by mouth every morning.       metFORMIN (GLUCOPHAGE) 500 MG tablet Take 500 mg by mouth Daily.      methocarbamoL (ROBAXIN) 500 MG Tab TAKE 1 TABLET(500 MG) BY MOUTH THREE TIMES DAILY AS NEEDED FOR MUSCLE PAIN 30 tablet 1    multivitamin capsule Take 1 capsule by mouth once daily.      ondansetron (ZOFRAN-ODT) 4 MG TbDL Take 4 mg by mouth every 6 (six) hours as needed.      potassium chloride (MICRO-K) 10 MEQ CpSR Take 2 capsules (20 mEq total) by mouth once daily. 180 capsule 3    vitamin D (VITAMIN D3) 1000 units Tab Take 1,000 Units by mouth once daily.       No current facility-administered medications for this visit.     Allergies: Amoxicillin     The 10-year ASCVD risk score (Gigi DK, et al., 2019) is: 4.6%    Values used to calculate the score:      Age: 53 years      Sex: Female      Is Non- : Yes      Diabetic: No      Tobacco smoker: No      Systolic Blood Pressure: 129 mmHg      Is BP treated: Yes      HDL Cholesterol: 53 mg/dL      Total Cholesterol: 188 mg/dL      ROS:  Constitutional: no weight loss, weight gain, fever, +fatigue  Eyes:  No vision changes, glasses/contacts  ENT/Mouth: No ulcers, sinus problems, ears ringing, headache  Cardiovascular: No inability to lie flat, chest  pain, exercise intolerance, swelling, heart palpitations  Respiratory: No wheezing, coughing blood, shortness of breath, or cough  Gastrointestinal: No diarrhea, bloody stool, nausea/vomiting, constipation, gas, hemorrhoids  Genitourinary: No blood in urine, painful urination, urgency of urination, frequency of urination, incomplete emptying, incontinence, abnormal bleeding, painful periods, heavy periods, vaginal discharge, vaginal odor, painful intercourse, sexual problems, bleeding after intercourse.  Musculoskeletal: No muscle weakness  Skin/Breast: No painful breasts, nipple discharge, masses, rash, ulcers  Neurological: No passing out, seizures, numbness, headache  Endocrine: No diabetes, hypothyroid, hyperthyroid, +hot flashes, hair loss, abnormal hair growth, acne  Psychiatric: No depression, crying  Hematologic: No bruises, bleeding, swollen lymph nodes, anemia.      Physical Exam    deferred  ASSESSMENT:   Menopausal symptoms    PLAN:   Will increase Vivelle dot to .0375mg twice weekly  Continue Testosterone - same dose  Await sleep study results. DAT may be linked to her fatigue

## 2024-02-26 NOTE — PLAN OF CARE
Sheila Costa has met all discharge criteria from Phase II. Vital Signs are stable, ambulating  without difficulty. Discharge instructions given, patient verbalized understanding. Discharged from facility via wheelchair in stable condition.

## 2024-02-26 NOTE — OP NOTE
Basivertebral Nerve Ablation (INTRACEPT)    The procedure, risks, benefits, and options were discussed with the patient. There are no contraindications to the procedure. The patent expressed understanding and agreed to the procedure. Informed written consent was obtained prior to the start of the procedure and can be found in the patient's chart.    PATIENT NAME: Sheila Costa   MRN: [id number]     DATE OF PROCEDURE: 02/26/2024    PROCEDURE:  Intracept Procedure at L5 and S1    PRE-OP DIAGNOSIS: Vertebrogenic low back pain [M54.51]  DDD (degenerative disc disease), lumbar [M51.36]  Lumbosacral spondylolysis [M43.07]  Chronic pain syndrome [G89.4]     POST-OP DIAGNOSIS: Same    PHYSICIAN: Cosme Kirkland MD    ASSISTANTS: Maurilio Somers MD Fellow and Katie Bhandari MD Fellow    PREOPERATIVE ANTIBIOTICS: 900mg Clindamycin IV    LOCAL ANESTHETIC INJECTED: 11mL of equal parts Lidocaine-Epinephrine 1%-100,000 and Bupivicaine 0.25%      ESTIMATED BLOOD LOSS: None    COMPLICATIONS: None    OPERATIVE PROCEDURE: The patient was brought to the operating room and general anesthesia with endotracheal intubation was performed. The patient was positioned prone on the table. The back was prepped and draped in the usual sterile fashion. One image intensifier (C-arm) was brought into position, and the L5 and S1 pedicles were identified and marked with a skin marker. The C-arm was then obliqued to visualize the pedicle and superior border of the vertebral body. The site was anesthestized with a mixture of Lidocaine-Epinephrine 1%-100,000 and Bupivacaine 0.25%. A 1 cm paramedian incision was made with a 10-blade scalpel. The osteointroducer was then placed and advanced through the pedicle with AP and lateral fluoroscopy imaging guidance. Once the needle was at the junction of the pedicle and the vertebral body, a lateral image was taken to insure that the cannula was positioned just past the posterior wall of the  vertebral body. Through the cannula, a curved and straight stylet was advanced into the vertebral body under fluoroscopic guidance creating a channel. The radiopaque marker bands on the stylet were identified using AP and lateral images.  After completing the entry into the vertebral body, a radiofrequency probe was inserted through the cannula and advanced under fluoroscopic guidance. The radiopaque marker bands on the probe were identified using AP and lateral images. Then radiofrequency ablation was performed for 15 minutes at the L5 level and 15 minutes at the S1 level. Once the ablation was completed, the curved needle and cannula assembly was removed in its entirety.     Post-procedure, all incisions were closed with bandages. Patient was moving lower extremities in recovery and neurological exam was unchanged from pre-procedure.    Maurilio Somers MD    I reviewed and edited the fellow's note. I conducted my own interview and physical examination. I agree with the findings. I was present and supervising all critical portions of the procedure.   Cosme Kirkland MD

## 2024-02-28 VITALS
SYSTOLIC BLOOD PRESSURE: 108 MMHG | HEIGHT: 61 IN | HEART RATE: 77 BPM | DIASTOLIC BLOOD PRESSURE: 59 MMHG | TEMPERATURE: 98 F | WEIGHT: 148 LBS | BODY MASS INDEX: 27.94 KG/M2 | OXYGEN SATURATION: 99 % | RESPIRATION RATE: 16 BRPM

## 2024-03-01 ENCOUNTER — PATIENT MESSAGE (OUTPATIENT)
Dept: PAIN MEDICINE | Facility: CLINIC | Age: 54
End: 2024-03-01
Payer: COMMERCIAL

## 2024-03-04 ENCOUNTER — OFFICE VISIT (OUTPATIENT)
Dept: PAIN MEDICINE | Facility: CLINIC | Age: 54
End: 2024-03-04
Payer: COMMERCIAL

## 2024-03-04 VITALS
HEART RATE: 74 BPM | BODY MASS INDEX: 28.84 KG/M2 | SYSTOLIC BLOOD PRESSURE: 148 MMHG | WEIGHT: 152.75 LBS | HEIGHT: 61 IN | DIASTOLIC BLOOD PRESSURE: 86 MMHG

## 2024-03-04 DIAGNOSIS — G89.18 POST-OPERATIVE PAIN: Primary | ICD-10-CM

## 2024-03-04 PROCEDURE — 1159F MED LIST DOCD IN RCRD: CPT | Mod: CPTII,S$GLB,,

## 2024-03-04 PROCEDURE — 99024 POSTOP FOLLOW-UP VISIT: CPT | Mod: S$GLB,,,

## 2024-03-04 PROCEDURE — 1160F RVW MEDS BY RX/DR IN RCRD: CPT | Mod: CPTII,S$GLB,,

## 2024-03-04 PROCEDURE — 3079F DIAST BP 80-89 MM HG: CPT | Mod: CPTII,S$GLB,,

## 2024-03-04 PROCEDURE — 3077F SYST BP >= 140 MM HG: CPT | Mod: CPTII,S$GLB,,

## 2024-03-04 PROCEDURE — 99999 PR PBB SHADOW E&M-EST. PATIENT-LVL IV: CPT | Mod: PBBFAC,,,

## 2024-03-04 RX ORDER — MUPIROCIN 20 MG/G
OINTMENT TOPICAL DAILY
Qty: 22 G | Refills: 0 | Status: SHIPPED | OUTPATIENT
Start: 2024-03-04

## 2024-03-04 NOTE — PROGRESS NOTES
Chronic Pain-Established Note  Follow up visit  SUBJECTIVE:    Interval History 3/4/2024:  Sheila Costa presents for post-op s/p Intracept L5 and S1 on  2/26/2024.  She reports 75% relief since procedure with more relief everyday.  She denies: fever, chills, drainage from the site, or additional pain.   Today's pain score is 0/10.      Interval History 02/19/2024:  Ms. Igor Asher is a 54 y/o f who presents for virtual follow-up of her chronic low back pain.  She is scheduled for L5 & S1 intraosseous basivertebral nerve ablation 03/11/2024. She continues to endorse axial low back consistent with lumbar vertebrogenic back pain. The patient is presenting today for further information regarding the procedure. All questions answered.      Interval History 9/28/2023:  Patient seen today for virtual follow-up of her low back pain.  Today, she reports that the bilateral SI joint injections that she had in May have worked well for her for about 6 weeks and her back pain is at baseline worse  Her main concern is the pain in her low back which is affecting her ADL .  She previously got about 50% relief with RFA but now states she feels the pain as before all her injection.  She feels that the RFA helped with 1 component of her pain, but she continues to have deep focal low back and buttock pain.  She denies radiation into her legs.  She reports that the pain is fairly persistent but worsens significantly with standing, sitting for a long time and flexion.  She denies any bowel/bladder dysfunction or saddle anesthesia.  She has no other focal neurologic deficits.     Interval History 6/29/2023:  She presents with continued stiffness in her neck and shoulders.  She also complains of lower back pain which is still present. She does feel the SI joint injections do help.  She states that she is doing some stretches and home exercises to help with the pain.  She feels the stretching does help, but she  feels she isn't doing them as often as she'd like.  She continues to state that her pain is 50-70% improved compared to before the injections.  She is inquiring as to if she should schedule her follow up injections now or if she should wait.     Interval History 6/9/2023:  The patient returns to clinic today for follow up neck and back pain via virtual visit. She is s/p bilateral C4,5,6 RFA on 5/3/2023. She is s/p bilateral SI joint injections on 5/24/2023. She reports 70% relief of her neck and back pain. She reports intermittent low back pain. She denies any radicular leg pain. She is performing a home exercise and stretching routine. She is taking Celebrex. She did not receive Robaxin discussed at last visit. She denies any other health changes.      Interval History 4/13/23:  Sheila HortamingsNeptaliJobclaudemelissa presents to the clinic for a follow-up appointment for back pain. Since the last visit, Sheila HortaDesiree states the pain has been persistant. Current pain intensity is 7/10. S/p L3/4/5 RFA with 50% relief. Pain is still persistent. Gets better with exercise and stretching although too much movement can exacerbate the pain. She is taking celebrex and tizanidine. She endorses previous SI joint injections were helpful.      Interval History 3/22/2023:  The patient returns to clinic today for follow up of low back pain via virtual visit. She is s/p bilateral L3,4,5 RFA on 3/1/2023. She reports 50% relief of her pain. She reports low back and buttock pain. This seems lower than injection sites. She denies any radicular leg pain. She reports increased neck pain. She describes this pain as constant and aching in nature. She denies any radicular arm pain. Her pain is worse with activity. She is taking Celebrex and Zanaflex as needed. She denies any other health changes.      Interval History 2/1/2023:  The patient returns to clinic today for follow up of low back pain via virtual visit. She is s/p bilateral L3,4,5  MBB on 1/25/2023. She reports 90% relief of her pain for one day. Since then, her pain has returned to baseline. She reports low back pain that is aching in nature. She denies any radicular leg pain. Her pain is worse with prolonged activity. She is currently taking Celebrex. She also takes Zanaflex intermittently. She denies any other health changes.      Interval History 12/29/2022:  The patient returns to clinic today for follow up of low back pain via virtual visit. She is s/p bilateral L3,4,5 MBB on 12/14/2022. She reports 95% relief of her low back pain for one day. Since then, her pain has returned to baseline. She continues to report low back pain. This is aching across the lower back. She denies any radicular leg pain. Her pain is worse with activity. She is currently taking Zanaflex with limited relief. She denies any other health changes.      Interval history 10/31/22:  Patient returns to clinic for follow up of neck and shoulder pain. She is now s/p C7/T1 cervical MICHELLE 7/6/22 which gave 75-80% relief which lasted a few months. Now with primary complaint of low back pain in a band across the low back described as aching and throbbing which radiates to lateral aspect of BLE which stops at the knees. Still taking ibuprofen, celebrex, and lyrica. Not doing home exercise or PT. Denies fever/chills, or saddle anesthesia.     Interval History 6/13/22:   She is s/p left SIJ and left GTB injection on 5/18/2022 which she reports has helped her buttock/leg pain significantly. She now would like to focus on her neck pain. She continues to have axial neck pain, bilateral. Her RUE sxs have significantly decreased s/p C7-T1 IL MICHELLE done in Dec 2021 but she does still have RUE pain. She is taking Gabapentin 300mg nightly - has not noticed a difference in her pain with this. She has tried topicals - help somewhat, Advil 800mg helps. She has gone to the chiropractor in the past - helpful. She has not done PT for her neck  in ~3 years ago which she thinks helped somewhat. She denies any weakness/numbness/tingling in BUEs. Denies b/b incontinence or saddle anesthesia.      Interval History 4/28/22:   Sheila Costa presents to the clinic for a follow-up appointment for neck pain. Since the last visit, Sheila Costa states the pain has been improving. She is s/p bilateral RFA of C4/5/6 on 3/26 and 4/3. She reports >75% relief to both sides and is satisfied with the results. She does satate that she is having some numbness over the skin over the L side. She also states she is having left buttock pain that she describes as cramping/throbbing which occasionally radiates down posterior aspect of L thigh, stopping above the knee. She denies numbness/tingling in lower extremities.      Interval History 3/10/2022:   Sheila Costa presents to the clinic for a follow-up appointment for neck pain. Since the last visit, Sheila Costa states the pain has been stable. Worse in the mornings. Feels like a stiffness in the neck without radicular symptoms as her radicular symptoms had been resolved since her MICHELLE. Some paraesthesia in arms and hands with typing. Patient states that mobic is beneficial. Best relief of her axial neck pain in the past was with RFA done previously. She discontinued her amitriptyline prescription and did not notice a difference in pain with discontinuation. Denies bowel/bladder dysfunction or difficulty with fine motor skills.     Interval History 1/6/2022:   Sheila Costa presents to the clinic for a follow-up appointment s/p Cervical Interlaminar Epidural Steroid Injection at the end of this past December. Since the last visit, Sheila Costa states the pain has been improving. Current pain intensity is 3/10 but she is still experiencing stiffness. Pain is primarly throbbing that would travel down BL arms. She has been doing well. Patient states that  mobic and elavil have been helping with pain. Patient is sleeping well currently.     Interval History 11/15/2021:   Pt returns to clinic today due to resurgence of her bilateral neck and arm pain. At her last interventional pain encounter she had RFA left C4,5,6 on 03/04/2020 and the right done on 06/2020. Pt reports this provided significant relief of her pain however she has been having increased neck pain with radiation into her arms down to her hands. She has had a cervical epidural in the past with at least 50% relief of her pain. She has been utilizing OTC ibuprofen as well as a chiropractor.   Pt also reports low back pain without any radiation into the lower extremities. She states that the pain is aching and worsened with prolonged physical activity.      Interval History 11/25/2019:  The patient returns to clinic today for follow up. She reports a few days of relief with trigger point injections at last visit. She continues to report neck pain with intermittent radiating pain into both arms to her hands. She also reports mid back pain. 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 She states the previous MICHELLE helped for her arm pain but not for her neck pain. She has had limited relief with NSAIDs and physical therapy. She denies any other health changes. Her pain today is 7/10.     Interval History 10/28/2019:  The patient returns to clinic today for follow up. She is s/p C7-T1 IL MICHELLE on 10/2/2019. She reports 50% relief of her neck and arm pain. She reports intermittent neck pain that is sore in nature. She does report increased mid back pain. She describes this pain as tight and aching in nature. She denies any radicular arm pain. She denies any other health changes. Her pain today is 6/10.     Pain Disability Index Review:      4/13/2023     2:26 PM 3/10/2022     4:01 PM 1/6/2022     2:50 PM   Last 3 PDI Scores   Pain Disability Index (PDI) 51 16 29       Pain Procedures:   10/2/2019- C7-T1 IL MICHELLE-  50% pain relief  1/8/2020- Bilateral C4,5,6 MBB  1/29/2020- Bilateral C4,5,6 MBB  3/4/2020: Left C4,5,6 RFA - 80% relief  6/3/2020: Right C4,5,6 RFA - 80% relief  12/22/2021- C7/T1 IL MICHELLE  3/23/2022- Right C4,5,6 RFA -80-85% relief  4/6/2022- Left C4,5,6 RFA- 75% relief   5/8/2022- Left SI joint and GTB injection  7/6/2022- C7/T1 IL MICHELLE  12/14/2022- Bilateral L3,4,5 MBB- 95% relief  1/25/2023- Bilateral L3,4,5 MBB  3/1/2023- Bilateral L3,4,5 RFA  5/3/2023- Bilateral C4,5,6 RFA  5/24/2023- Bilateral SI joint injections  2/26/2024 - Intracept at L5 and S1     Physical Therapy/Home Exercise: yes     Imaging:   MRI Lumbar Spine 11/11/2022:  COMPARISON:  11/01/2022     FINDINGS:  Alignment: Normal.     Vertebrae: Degenerative endplate changes at L5-S1.  No fracture or marrow infiltrative process.     Discs: Mild disc height loss at L5-S1 with annular fissure.  No evidence for discitis.     Cord: Conus terminates at L1 and appears unremarkable.  Cauda equina appears unremarkable.     Degenerative findings:     T12-L1: No spinal canal stenosis or neural foraminal narrowing.     L1-L2: No spinal canal stenosis or neural foraminal narrowing.     L2-L3: No spinal canal stenosis or neural foraminal narrowing.     L3-L4: No spinal canal stenosis or neural foraminal narrowing.     L4-L5: Circumferential disc bulge and mild facet arthropathy result in mild left neural foraminal narrowing.     L5-S1: Circumferential disc bulge and moderate facet arthropathy result in mild right, moderate left neural foraminal narrowing.     Paraspinal muscles & soft tissues: Paraspinal muscle bulk is maintained.  Small bilateral renal cysts noted.     Impression:     1. Degenerative changes of the lower lumbar spine detailed above.  Moderate left neural foraminal narrowing noted at L5-S1.     MRI Cervical Spine 11/20/2019:  COMPARISON:  MRI cervical spine without contrast 03/06/2019     FINDINGS:  Alignment: Sagittal alignment is preserved.      Vertebrae: Normal marrow signal without osseous destructive process or aggressive bone marrow replacement process.  No fracture.     Discs: There is mild disc space narrowing at C2-C3 with endplate irregularity, likely degenerative and similar to the prior examination.  Remaining discs demonstrate normal height and signal.     Cord: Normal caliber, morphology, and signal.     Degenerative findings:     C2-C3: Posterior disc osteophyte complex and left uncovertebral joint spurring contribute to mild left neural foraminal narrowing.  No spinal canal stenosis.     C3-C4: Mild posterior disc osteophyte complex results in mild effacement of the ventral thecal sac without significant spinal canal stenosis or neural foraminal narrowing.     C4-C5: Posterior disc osteophyte complex results in effacement of the anterior thecal sac without significant spinal canal stenosis or neural foraminal narrowing.     C5-C6: Left uncovertebral joint spurring contributes to mild left neural foraminal narrowing without significant spinal canal stenosis.     C6-C7: No significant disc abnormality, spinal canal stenosis, or neural foraminal narrowing.     C7-T1: No significant disc abnormality, spinal canal stenosis, or neural foraminal narrowing.     Limited sagittal evaluation of the upper thoracic spine reveals a disc protrusion at T4-T5 causing effacement of the anterior thecal sac and cord abutment.     Paraspinal muscles & soft tissues: Unremarkable without spinal canal or paraspinal mass.     Impression:     Mild cervical degenerative changes contributing to mild left neural foraminal narrowing at C2-C3 and C5-C6.  No spinal canal stenosis.     T4-T5 disc protrusion resulting in effacement of the anterior thecal sac, abutting the cord.     Narrowing of the C2-C3 disc space with endplate irregularity, likely degenerative and stable compared to the prior examination.       Allergies:   Review of patient's allergies indicates:    Allergen Reactions    Amoxicillin Hives       Current Medications:   Current Outpatient Medications   Medication Sig Dispense Refill    ascorbic acid, vitamin C, (VITAMIN C) 100 MG tablet Take 100 mg by mouth once daily.      atenoloL-chlorthalidone (TENORETIC) 50-25 mg Tab TAKE 1/2 TABLET BY MOUTH ONCE EVERY DAY 45 tablet 3    celecoxib (CELEBREX) 100 MG capsule TAKE 1 CAPSULE(100 MG) BY MOUTH TWICE DAILY 60 capsule 2    DULoxetine (CYMBALTA) 60 MG capsule Take 1 capsule (60 mg total) by mouth once daily. 30 capsule 11    estradioL (VIVELLE-DOT) 0.025 mg/24 hr Place 1 patch onto the skin twice a week. 8 patch 11    estradioL (VIVELLE-DOT) 0.0375 mg/24 hr Place 1 patch onto the skin twice a week. 8 patch 11    HYDROcodone-acetaminophen (NORCO) 5-325 mg per tablet Take 1 tablet by mouth every 6 (six) hours as needed for Pain. 28 tablet 0    loratadine (CLARITIN) 10 mg tablet Take 10 mg by mouth every morning.       metFORMIN (GLUCOPHAGE) 500 MG tablet Take 500 mg by mouth Daily.      methocarbamoL (ROBAXIN) 500 MG Tab TAKE 1 TABLET(500 MG) BY MOUTH THREE TIMES DAILY AS NEEDED FOR MUSCLE PAIN 30 tablet 1    multivitamin capsule Take 1 capsule by mouth once daily.      ondansetron (ZOFRAN-ODT) 4 MG TbDL Take 4 mg by mouth every 6 (six) hours as needed.      potassium chloride (MICRO-K) 10 MEQ CpSR Take 2 capsules (20 mEq total) by mouth once daily. 180 capsule 3    vitamin D (VITAMIN D3) 1000 units Tab Take 1,000 Units by mouth once daily.      levonorgestrel (MIRENA) 20 mcg/24 hr (5 years) IUD 1 Intra Uterine Device by Intrauterine route once. for 1 dose 1 Intra Uterine Device 0     No current facility-administered medications for this visit.       REVIEW OF SYSTEMS:    GENERAL:  No weight loss, malaise or fevers.  HEENT:  Negative for frequent or significant headaches.  NECK:  Negative for lumps, goiter, pain and significant neck swelling.  RESPIRATORY:  Negative for cough, wheezing or shortness of  breath.  CARDIOVASCULAR:  Negative for chest pain, leg swelling or palpitations.  GI:  Negative for abdominal discomfort, blood in stools or black stools or change in bowel habits.  MUSCULOSKELETAL:  See HPI.  SKIN:  Negative for lesions, rash, and itching.  PSYCH:  Negative for sleep disturbance, mood disorder and recent psychosocial stressors.  HEMATOLOGY/LYMPHOLOGY:  Negative for prolonged bleeding, bruising easily or swollen nodes.  NEURO:   No history of headaches, syncope, paralysis, seizures or tremors.  All other reviewed and negative other than HPI.    Past Medical History:  Past Medical History:   Diagnosis Date    Alpha thalassemia     Back pain     Cervical spondylosis 12/30/2019    Colon polyp     Fatty liver     Hypertension     Prediabetes     Premenstrual symptom        Past Surgical History:  Past Surgical History:   Procedure Laterality Date    COLONOSCOPY N/A 6/18/2020    Procedure: COLONOSCOPY;  Surgeon: Eliot Hill MD;  Location: [facility] ([address] [place] [address]);  Service: Endoscopy;  Laterality: N/A;  COVID screening on 6/16/20-[place]    COLONOSCOPY N/A 7/6/2020    Procedure: COLONOSCOPY;  Surgeon: Kang Guzmán MD;  [address];  Service: Colon and Rectal;  Laterality: N/A;    COLONOSCOPY N/A 7/9/2021    Procedure: COLONOSCOPY;  Surgeon: GREGG Guzmán MD;  Location: [facility] ([address] [place] [address]);  Service: Endoscopy;  Laterality: N/A;  in July 2021  covid test [place] 7/6    DESTRUCTION, INTRAOSSEOUS BASIVERTEBRAL NERVE, EACH ADD'L BODY, LUM/SAC Bilateral 2/26/2024    Procedure: DESTRUCTION,INTRAOSSEOUS BASIVERTEBRAL NERVE,EACH ADD'L BODY,LUM/SAC;  Surgeon: Cosme Kirkland MD;  Location: [facility] OR;  Service: Pain Management;  Laterality: Bilateral;  L5 & S1    EPIDURAL STEROID INJECTION N/A 10/2/2019    Procedure: INJECTION, STEROID, EPIDURAL, C7-T1;  Surgeon: Cosme Kirkland MD;  Location: [facility] PAIN MGT;  Service: Pain Management;  Laterality: N/A;    EPIDURAL STEROID  INJECTION N/A 12/22/2021    Procedure: INJECTION, STEROID, EPIDURAL, C7-T1 NEED CONSENT;  Surgeon: Cosme Kirkland MD;  Location: [facility] PAIN MGT;  Service: Pain Management;  Laterality: N/A;    EPIDURAL STEROID INJECTION N/A 7/6/2022    Procedure: INJECTION, STEROID, EPIDURAL, C7-T1 IL  CONTRAST;  Surgeon: Cosme Kirkland MD;  Location: [facility] PAIN MGT;  Service: Pain Management;  Laterality: N/A;    EYE SURGERY      INJECTION OF ANESTHETIC AGENT AROUND NERVE Bilateral 1/8/2020    Procedure: BLOCK, NERVE C4,5,6;  Surgeon: Cosme Kirkland MD;  Location: [facility] PAIN MGT;  Service: Pain Management;  Laterality: Bilateral;  B/L MBB C4,5,6    INJECTION OF ANESTHETIC AGENT AROUND NERVE Bilateral 1/29/2020    Procedure: BLOCK, NERVE, Repeat C4-C5-C6 MEDIAL BRANCH;  Surgeon: Cosme Kirkland MD;  Location: [facility] PAIN MGT;  Service: Pain Management;  Laterality: Bilateral;    INJECTION OF ANESTHETIC AGENT AROUND NERVE Bilateral 12/14/2022    Procedure: BLOCK, NERVE BILATERAL L3,L4,L5 MEDIAL BRANCH NEEDS CONSENT;  Surgeon: Cosme Kirkland MD;  Location: [facility] PAIN MGT;  Service: Pain Management;  Laterality: Bilateral;    INJECTION OF ANESTHETIC AGENT AROUND NERVE Bilateral 1/25/2023    Procedure: BLOCK, NERVE BILATERAL L3,L4,L5 MEDIAL BRANCH;  Surgeon: Cosme Kirkland MD;  Location: [facility] PAIN MGT;  Service: Pain Management;  Laterality: Bilateral;    INJECTION OF ANESTHETIC AGENT AROUND NERVE Bilateral 11/1/2023    Procedure: BLOCK, NERVE BILATERAL L5, S1, AND S2 LATERAL BRANCH;  Surgeon: Cosme Kirkland MD;  Location: [facility] PAIN MGT;  Service: Pain Management;  Laterality: Bilateral;    INJECTION OF ANESTHETIC AGENT AROUND NERVE Bilateral 11/22/2023    Procedure: BLOCK, NERVE BILATERAL L5, S1, AND S2 MEDIAL BRANCH;  Surgeon: Cosme Kirkland MD;  Location: [facility] PAIN MGT;  Service: Pain Management;  Laterality: Bilateral;    INJECTION OF JOINT Left 5/18/2022    Procedure: INJECTION, JOINT, LEFT SI and GTB  *LORI PT*;  Surgeon: Jed Phillips MD;  Location: [facility] PAIN [facility]GT;  Service: Pain Management;  Laterality: Left;    INJECTION, SACROILIAC JOINT Bilateral 5/24/2023    Procedure: INJECTION,SACROILIAC JOINT BIALTERAL;  Surgeon: Cosme Sandoval MD;  Location: [facility] PAIN [facility]GT;  Service: Pain Management;  Laterality: Bilateral;    LAPAROSCOPIC SURGICAL REMOVAL OF CECUM N/A 7/6/2020    Procedure: EXCISION, CECUM, LAPAROSCOPIC, colonoscopy to start, ERAS low;  Surgeon: Kang Guzmán MD;  Location: [facility] OR 2ND FLR;  Service: Colon and Rectal;  Laterality: N/A;    RADIOFREQUENCY ABLATION Left 3/4/2020    Procedure: RADIOFREQUENCY ABLATION, C4-C5-C6 ME DIAL BRANCH 1 OF 2 need consent;  Surgeon: Cosme Sandoval MD;  Location: [facility] PAIN [facility]GT;  Service: Pain Management;  Laterality: Left;    RADIOFREQUENCY ABLATION Right 6/3/2020    Procedure: RADIOFREQUENCY ABLATION, C4-C5-C6 MEDIAL BRANCH 2 OF 2 need consent;  Surgeon: Cosme Sandoval MD;  Location: [facility] PAIN MGT;  Service: Pain Management;  Laterality: Right;    RADIOFREQUENCY ABLATION Right 3/23/2022    Procedure: Radiofrequency Ablation RIGHT C4,5,6;  Surgeon: Cosme Sandoval MD;  Location: [facility] PAIN MGT;  Service: Pain Management;  Laterality: Right;    RADIOFREQUENCY ABLATION Left 4/6/2022    Procedure: Radiofrequency Ablation LEFT C4,5,6;  Surgeon: Cosme Sandoval MD;  Location: [facility] PAIN MGT;  Service: Pain Management;  Laterality: Left;    RADIOFREQUENCY ABLATION Bilateral 3/1/2023    Procedure: RADIOFREQUENCY ABLATION BILATERAL L3,L4,L5 BOTH SIDES SAME TIME PER DR SANDOVAL;  Surgeon: Cosme Sandoval MD;  Location: [facility] PAIN MGT;  Service: Pain Management;  Laterality: Bilateral;    RADIOFREQUENCY ABLATION Bilateral 5/3/2023    Procedure: RADIOFREQUENCY ABLATION BILATERAL C4,C5,C6 BOTH SIDES PER DR SANDOVAL;  Surgeon: Cosme Sandoval MD;  Location: [facility] PAIN MGT;  Service: Pain Management;  Laterality: Bilateral;    REFRACTIVE SURGERY   2008    SMALL INTESTINE SURGERY  7/6/20       Family History:  Family History   Problem Relation Age of Onset    Hypertension Mother     Thyroid disease Mother     Rheum arthritis Mother     Hearing loss Mother     Heart disease Father         Pacemaker    Cancer Maternal Grandmother     Cataracts Maternal Grandmother     Diabetes Maternal Grandmother     Hypertension Maternal Grandmother     Thyroid disease Maternal Grandmother     Breast cancer Maternal Grandmother     Hearing loss Maternal Grandmother     Cataracts Maternal Grandfather     Diabetes Maternal Grandfather     Hypertension Maternal Grandfather     Thyroid disease Maternal Grandfather     Lupus Cousin     Cancer Maternal Uncle     No Known Problems Paternal Grandmother     No Known Problems Paternal Grandfather     Amblyopia Neg Hx     Blindness Neg Hx     Glaucoma Neg Hx     Macular degeneration Neg Hx     Retinal detachment Neg Hx     Strabismus Neg Hx     Stroke Neg Hx     Ovarian cancer Neg Hx     Colon cancer Neg Hx     Cirrhosis Neg Hx        Social History:  Social History     Socioeconomic History    Marital status:    Occupational History     Employer: [employer]   Tobacco Use    Smoking status: Never    Smokeless tobacco: Never   Substance and Sexual Activity    Alcohol use: Yes     Alcohol/week: 1.0 standard drink of alcohol     Types: 1 Standard drinks or equivalent per week     Comment: 3 drinks weekly     Drug use: No    Sexual activity: Yes     Partners: Male     Birth control/protection: I.U.D.   Social History Narrative     Children - Dolores and Graciela Catherine - Florin        Used to walk, ride bikes. Occasional uses elliptical at home.      Social Determinants of Health     Financial Resource Strain: Low Risk  (9/13/2023)    Overall Financial Resource Strain (CARDIA)     Difficulty of Paying Living Expenses: Not hard at all   Food Insecurity: No Food Insecurity (9/13/2023)    Hunger Vital Sign     Worried  "About Running Out of Food in the Last Year: Never true     Ran Out of Food in the Last Year: Never true   Transportation Needs: No Transportation Needs (9/13/2023)    PRAPARE - Transportation     Lack of Transportation (Medical): No     Lack of Transportation (Non-Medical): No   Physical Activity: Insufficiently Active (9/13/2023)    Exercise Vital Sign     Days of Exercise per Week: 1 day     Minutes of Exercise per Session: 20 min   Stress: Stress Concern Present (9/13/2023)    [employer] - Occupational Stress Questionnaire     Feeling of Stress : To some extent   Social Connections: Unknown (9/13/2023)    Social Connection and Isolation Panel [NHANES]     Frequency of Communication with Friends and Family: More than three times a week     Frequency of Social Gatherings with Friends and Family: Three times a week     Active Member of Clubs or Organizations: Yes     Attends Club or Organization Meetings: More than 4 times per year     Marital Status:    Housing Stability: Low Risk  (9/13/2023)    Housing Stability Vital Sign     Unable to Pay for Housing in the Last Year: No     Number of Places Lived in the Last Year: 1     Unstable Housing in the Last Year: No       OBJECTIVE:    Vitals:    03/04/24 0828   BP: (!) 148/86   Pulse: 74   Weight: 69.3 kg (152 lb 12.5 oz)   Height: 5' 1" (1.549 m)   PainSc: 0-No pain       PHYSICAL EXAMINATION:    GENERAL APPEARANCE: Well appearing, in no acute distress.  PSYCH:  Mood and affect appropriate.  SKIN: Skin color, texture, turgor normal, no rashes or lesions to visible areas. Well-healing 1 cm incisions (x 3) to lower back.  No signs or symptoms of infection.  No drainage, redness, warmth or swelling.   HEAD/FACE:  Normocephalic, atraumatic.  PULM: Bilateral chest rise. No apparent respiratory distress.   GI: Non-distended.   GAIT: normal.      1. Post-operative pain            PLAN:   - Remaining dressings removed and areas cleaned " with alcohol.  Incisions re-covered with bandages.   - Continue no soaks/baths until wounds are fully healed.  - Continue wound-care until incision sites are fully healed.  - Mupirocin ointment sent in today-can apply daily and keep covered with bandage.  - Counseled patient regarding the importance of activity modification, smoking cessation, alcohol cessation, constant sleeping habits, and physical therapy.  - RTC 8 weeks to follow-up with Dr Kirkland and discuss additional interventions if appropriate.     The above plan and management options were discussed at length with patient. Patient is in agreement with the above and verbalized understanding.    Chetna Kiser NP  03/04/2024

## 2024-03-07 ENCOUNTER — PATIENT MESSAGE (OUTPATIENT)
Dept: INTERNAL MEDICINE | Facility: CLINIC | Age: 54
End: 2024-03-07
Payer: COMMERCIAL

## 2024-03-07 DIAGNOSIS — Z12.12 SCREENING FOR COLORECTAL CANCER: Primary | ICD-10-CM

## 2024-03-07 DIAGNOSIS — Z12.11 SCREENING FOR COLORECTAL CANCER: Primary | ICD-10-CM

## 2024-03-07 NOTE — TELEPHONE ENCOUNTER
Pt would like you to review labs to see if she needs to follow up on anything, a colonoscopy was recommended this year, she wants to know if she need a referral from you.

## 2024-03-12 ENCOUNTER — TELEPHONE (OUTPATIENT)
Dept: ENDOSCOPY | Facility: HOSPITAL | Age: 54
End: 2024-03-12
Payer: COMMERCIAL

## 2024-03-12 DIAGNOSIS — Z12.11 ENCOUNTER FOR SCREENING COLONOSCOPY FOR NON-HIGH-RISK PATIENT: Primary | ICD-10-CM

## 2024-03-12 NOTE — TELEPHONE ENCOUNTER
Pt called to schedule colonoscopy procedure. Last procedure done on 7/9/21 due in 3 years. July schedule is not available yet. Pt scheduled for PAT in May to schedule colonoscopy when July schedule available. She verbalized understanding.

## 2024-04-23 ENCOUNTER — TELEPHONE (OUTPATIENT)
Dept: PHARMACY | Facility: CLINIC | Age: 54
End: 2024-04-23
Payer: COMMERCIAL

## 2024-04-26 NOTE — TELEPHONE ENCOUNTER
"This message is for patient in regards to his/her appointment 1/6/22 at 2:45 pm.      [facility] Policy: For the safety of all patients and staff members.     Patient Visitor policy: Due to social distancing and limited seating staff are requesting patient to arrive to their schedule appointments alone. If patient do not need assistance with their visit, we're asking all visitors to remain outside the waiting area.    Upon arriving to facility; patient will have temperature taking and are required to wear a face mask, if patient do not have a face mask one will be provided. Upon arriving patient we ask patients to contact clinic at this number [phone] to notify staff that they have arrived or they may do do by utilizing the Mobile checked in [facility](if patient have patient portal; clinical staff will send a message through there letting them know it's okay to proceed to their visit). Staff will give the patient the "okay" to come up or wait inside their vehicle until clinic is ready for patient to be seen by  If you have any questions or concerns please contact [phone]        " Anesthesia Transfer of Care Note    Patient: James Jacobs    Procedure(s) Performed: Procedure(s) (LRB):  EGD (ESOPHAGOGASTRODUODENOSCOPY) (N/A)  COLONOSCOPY (N/A)    Patient location: [facility]    Anesthesia Type: general    Transport from OR: Transported from OR on 6-10 L/min O2 by face mask with adequate spontaneous ventilation    Post pain: adequate analgesia    Post assessment: no apparent anesthetic complications and tolerated procedure well    Post vital signs: stable    Level of consciousness: awake, alert and oriented    Nausea/Vomiting: no nausea/vomiting    Complications: none    Transfer of care protocol was followed      Last vitals: Visit Vitals  BP (!) 103/53 (BP Location: Left arm, Patient Position: Lying)   Pulse (!) 55   Temp 36.6 °C (97.9 °F) (Tympanic)   Resp 10   Ht 6' (1.829 m)   Wt 79.4 kg (175 lb)   SpO2 100%   BMI 23.73 kg/m²

## 2024-04-29 ENCOUNTER — PATIENT MESSAGE (OUTPATIENT)
Dept: OBSTETRICS AND GYNECOLOGY | Facility: CLINIC | Age: 54
End: 2024-04-29

## 2024-04-29 ENCOUNTER — OFFICE VISIT (OUTPATIENT)
Dept: OBSTETRICS AND GYNECOLOGY | Facility: CLINIC | Age: 54
End: 2024-04-29
Payer: COMMERCIAL

## 2024-04-29 VITALS
HEART RATE: 69 BPM | SYSTOLIC BLOOD PRESSURE: 137 MMHG | BODY MASS INDEX: 28.96 KG/M2 | WEIGHT: 153.38 LBS | DIASTOLIC BLOOD PRESSURE: 77 MMHG | HEIGHT: 61 IN

## 2024-04-29 DIAGNOSIS — N89.8 VAGINAL DISCHARGE: Primary | ICD-10-CM

## 2024-04-29 DIAGNOSIS — R39.9 UTI SYMPTOMS: ICD-10-CM

## 2024-04-29 LAB
BACTERIA #/AREA URNS AUTO: ABNORMAL /HPF
BILIRUB UR QL STRIP: NEGATIVE
CLARITY UR REFRACT.AUTO: ABNORMAL
COLOR UR AUTO: YELLOW
GLUCOSE UR QL STRIP: NEGATIVE
HGB UR QL STRIP: ABNORMAL
HYALINE CASTS UR QL AUTO: 0 /LPF
KETONES UR QL STRIP: ABNORMAL
LEUKOCYTE ESTERASE UR QL STRIP: ABNORMAL
MICROSCOPIC COMMENT: ABNORMAL
NITRITE UR QL STRIP: NEGATIVE
NON-SQ EPI CELLS #/AREA URNS AUTO: 3 /HPF
PH UR STRIP: 5 [PH] (ref 5–8)
PROT UR QL STRIP: ABNORMAL
RBC #/AREA URNS AUTO: >100 /HPF (ref 0–4)
SP GR UR STRIP: 1.02 (ref 1–1.03)
SQUAMOUS #/AREA URNS AUTO: 5 /HPF
URN SPEC COLLECT METH UR: ABNORMAL
WBC #/AREA URNS AUTO: >100 /HPF (ref 0–5)

## 2024-04-29 PROCEDURE — 3078F DIAST BP <80 MM HG: CPT | Mod: CPTII,S$GLB,, | Performed by: PHYSICIAN ASSISTANT

## 2024-04-29 PROCEDURE — 3075F SYST BP GE 130 - 139MM HG: CPT | Mod: CPTII,S$GLB,, | Performed by: PHYSICIAN ASSISTANT

## 2024-04-29 PROCEDURE — 1160F RVW MEDS BY RX/DR IN RCRD: CPT | Mod: CPTII,S$GLB,, | Performed by: PHYSICIAN ASSISTANT

## 2024-04-29 PROCEDURE — 81514 NFCT DS BV&VAGINITIS DNA ALG: CPT | Performed by: PHYSICIAN ASSISTANT

## 2024-04-29 PROCEDURE — 3008F BODY MASS INDEX DOCD: CPT | Mod: CPTII,S$GLB,, | Performed by: PHYSICIAN ASSISTANT

## 2024-04-29 PROCEDURE — 87186 SC STD MICRODIL/AGAR DIL: CPT | Performed by: PHYSICIAN ASSISTANT

## 2024-04-29 PROCEDURE — 1159F MED LIST DOCD IN RCRD: CPT | Mod: CPTII,S$GLB,, | Performed by: PHYSICIAN ASSISTANT

## 2024-04-29 PROCEDURE — 87088 URINE BACTERIA CULTURE: CPT | Performed by: PHYSICIAN ASSISTANT

## 2024-04-29 PROCEDURE — 99999 PR PBB SHADOW E&M-EST. PATIENT-LVL IV: CPT | Mod: PBBFAC,,, | Performed by: PHYSICIAN ASSISTANT

## 2024-04-29 PROCEDURE — 87077 CULTURE AEROBIC IDENTIFY: CPT | Mod: 59 | Performed by: PHYSICIAN ASSISTANT

## 2024-04-29 PROCEDURE — 87491 CHLMYD TRACH DNA AMP PROBE: CPT | Performed by: PHYSICIAN ASSISTANT

## 2024-04-29 PROCEDURE — 87086 URINE CULTURE/COLONY COUNT: CPT | Performed by: PHYSICIAN ASSISTANT

## 2024-04-29 PROCEDURE — 99213 OFFICE O/P EST LOW 20 MIN: CPT | Mod: S$GLB,,, | Performed by: PHYSICIAN ASSISTANT

## 2024-04-29 PROCEDURE — 81001 URINALYSIS AUTO W/SCOPE: CPT | Performed by: PHYSICIAN ASSISTANT

## 2024-04-29 RX ORDER — NITROFURANTOIN 25; 75 MG/1; MG/1
100 CAPSULE ORAL 2 TIMES DAILY
Qty: 14 CAPSULE | Refills: 0 | Status: SHIPPED | OUTPATIENT
Start: 2024-04-29 | End: 2024-05-02

## 2024-04-29 NOTE — PROGRESS NOTES
Subjective:      Sheila Costa is a 53 y.o. female who presents for dysuria. Started having vaginal burning yesterday. Last night had dysuria, urgency and frequency. When she urinated, there was possibly bloody mucus? No discharge on underwear. Denies flank pain or history of kidneys stones. She has never had a UTI before. Denies vaginal dryness. Recently increased estrdaiol patch to 0.0375mg BIW. She has the mirena IUD inserted on 9/24/2018. She would like vaginosis and agrees to GC/Chlamydia screen.    10/10/2023 LABS  Estradiol 39  FSH 59.18    Admission on 02/26/2024, Discharged on 02/26/2024   Component Date Value Ref Range Status    WBC 02/26/2024 9.78  3.90 - 12.70 K/uL Final    RBC 02/26/2024 5.21  4.00 - 5.40 M/uL Final    Hemoglobin 02/26/2024 12.1  12.0 - 16.0 g/dL Final    Hematocrit 02/26/2024 39.6  37.0 - 48.5 % Final    MCV 02/26/2024 76 (L)  82 - 98 fL Final    MCH 02/26/2024 23.2 (L)  27.0 - 31.0 pg Final    MCHC 02/26/2024 30.6 (L)  32.0 - 36.0 g/dL Final    RDW 02/26/2024 14.2  11.5 - 14.5 % Final    Platelets 02/26/2024 367  150 - 450 K/uL Final    MPV 02/26/2024 8.7 (L)  9.2 - 12.9 fL Final    Immature Granulocytes 02/26/2024 0.2  0.0 - 0.5 % Final    Gran # (ANC) 02/26/2024 5.7  1.8 - 7.7 K/uL Final    Immature Grans (Abs) 02/26/2024 0.02  0.00 - 0.04 K/uL Final    Lymph # 02/26/2024 3.0  1.0 - 4.8 K/uL Final    Mono # 02/26/2024 0.9  0.3 - 1.0 K/uL Final    Eos # 02/26/2024 0.1  0.0 - 0.5 K/uL Final    Baso # 02/26/2024 0.04  0.00 - 0.20 K/uL Final    nRBC 02/26/2024 0  0 /100 WBC Final    Gran % 02/26/2024 58.3  38.0 - 73.0 % Final    Lymph % 02/26/2024 30.9  18.0 - 48.0 % Final    Mono % 02/26/2024 8.9  4.0 - 15.0 % Final    Eosinophil % 02/26/2024 1.3  0.0 - 8.0 % Final    Basophil % 02/26/2024 0.4  0.0 - 1.9 % Final    Differential Method 02/26/2024 Automated   Final    Sodium 02/26/2024 138  136 - 145 mmol/L Final    Potassium 02/26/2024 4.0  3.5 - 5.1 mmol/L Final    Chloride  02/26/2024 101  95 - 110 mmol/L Final    CO2 02/26/2024 30 (H)  23 - 29 mmol/L Final    Glucose 02/26/2024 105  70 - 110 mg/dL Final    BUN 02/26/2024 14  6 - 20 mg/dL Final    Creatinine 02/26/2024 0.8  0.5 - 1.4 mg/dL Final    Calcium 02/26/2024 9.0  8.7 - 10.5 mg/dL Final    Anion Gap 02/26/2024 7 (L)  8 - 16 mmol/L Final    eGFR 02/26/2024 >60  >60 mL/min/1.73 m^2 Final    POC Preg Test, Ur 02/26/2024 Negative  Negative Final     Acceptable 02/26/2024 Yes   Final    POCT Glucose 02/26/2024 111 (H)  70 - 110 mg/dL Final       Past Medical History:   Diagnosis Date    Alpha thalassemia     Back pain     Cervical spondylosis 12/30/2019    Colon polyp     Fatty liver     Hypertension     Prediabetes     Premenstrual symptom      Past Surgical History:   Procedure Laterality Date    COLONOSCOPY N/A 6/18/2020    Procedure: COLONOSCOPY;  Surgeon: Eliot Hill MD;  Location: [facility] ([address] [place] [address]);  Service: Endoscopy;  Laterality: N/A;  COVID screening on 6/16/20-[facility]    COLONOSCOPY N/A 7/6/2020    Procedure: COLONOSCOPY;  Surgeon: Kang Guzmán MD;  Location: [address];  Service: Colon and Rectal;  Laterality: N/A;    COLONOSCOPY N/A 7/9/2021    Procedure: COLONOSCOPY;  Surgeon: GREGG Guzmán MD;  Location: [facility] ([address] [place] [address]);  Service: Endoscopy;  Laterality: N/A;  in July 2021  covid test [place] 7/6    DESTRUCTION, INTRAOSSEOUS BASIVERTEBRAL NERVE, EACH ADD'L BODY, LUM/SAC Bilateral 2/26/2024    Procedure: DESTRUCTION,INTRAOSSEOUS BASIVERTEBRAL NERVE,EACH ADD'L BODY,LUM/SAC;  Surgeon: Cosme Kirkland MD;  Location: [facility] OR;  Service: Pain Management;  Laterality: Bilateral;  L5 & S1    EPIDURAL STEROID INJECTION N/A 10/2/2019    Procedure: INJECTION, STEROID, EPIDURAL, C7-T1;  Surgeon: Cosme Kirkland MD;  Location: [facility] PAIN MGT;  Service: Pain Management;  Laterality: N/A;    EPIDURAL STEROID INJECTION N/A 12/22/2021    Procedure: INJECTION, STEROID, EPIDURAL,  C7-T1 NEED CONSENT;  Surgeon: Cosme Kirkland MD;  [place] [facility] [place];  Service: Pain Management;  Laterality: N/A;    EPIDURAL STEROID INJECTION N/A 7/6/2022    Procedure: INJECTION, STEROID, EPIDURAL, C7-T1 IL  CONTRAST;  Surgeon: Cosme Kirkland MD;  Location: [facility] [place];  Service: Pain Management;  Laterality: N/A;    EYE SURGERY      INJECTION OF ANESTHETIC AGENT AROUND NERVE Bilateral 1/8/2020    Procedure: BLOCK, NERVE C4,5,6;  Surgeon: Cosme Kirkland MD;  Location: [facility] [place];  Service: Pain Management;  Laterality: Bilateral;  B/L MBB C4,5,6    INJECTION OF ANESTHETIC AGENT AROUND NERVE Bilateral 1/29/2020    Procedure: BLOCK, NERVE, Repeat C4-C5-C6 MEDIAL BRANCH;  Surgeon: Cosme Kirkland MD;  Location: [facility] [place];  Service: Pain Management;  Laterality: Bilateral;    INJECTION OF ANESTHETIC AGENT AROUND NERVE Bilateral 12/14/2022    Procedure: BLOCK, NERVE BILATERAL L3,L4,L5 MEDIAL BRANCH NEEDS CONSENT;  Surgeon: Cosme Kirkland MD;  Location: [facility] [place];  Service: Pain Management;  Laterality: Bilateral;    INJECTION OF ANESTHETIC AGENT AROUND NERVE Bilateral 1/25/2023    Procedure: BLOCK, NERVE BILATERAL L3,L4,L5 MEDIAL BRANCH;  Surgeon: Cosme Kirkland MD;  Location: [facility], [place];  Service: Pain Management;  Laterality: Bilateral;    INJECTION OF ANESTHETIC AGENT AROUND NERVE Bilateral 11/1/2023    Procedure: BLOCK, NERVE BILATERAL L5, S1, AND S2 LATERAL BRANCH;  Surgeon: Cosme Kirkland MD;  Location: [facility] [place];  Service: Pain Management;  Laterality: Bilateral;    INJECTION OF ANESTHETIC AGENT AROUND NERVE Bilateral 11/22/2023    Procedure: BLOCK, NERVE BILATERAL L5, S1, AND S2 MEDIAL BRANCH;  Surgeon: Cosme Kirkland MD;  Location: [facility] [place];  Service: Pain Management;  Laterality: Bilateral;    INJECTION OF JOINT Left 5/18/2022    Procedure: INJECTION, JOINT, LEFT SI and GTB *LORI PT*;  Surgeon: Jed Phillips MD;  Location: [facility], [place];   Service: Pain Management;  Laterality: Left;    INJECTION, SACROILIAC JOINT Bilateral 5/24/2023    Procedure: INJECTION,SACROILIAC JOINT BIALTERAL;  Surgeon: Cosme Kirkland MD;  Location: [facility] PAIN MGT;  Service: Pain Management;  Laterality: Bilateral;    LAPAROSCOPIC SURGICAL REMOVAL OF CECUM N/A 7/6/2020    Procedure: EXCISION, CECUM, LAPAROSCOPIC, colonoscopy to start, ERAS low;  Surgeon: Kang Guzmán MD;  [facility];  Service: Colon and Rectal;  Laterality: N/A;    RADIOFREQUENCY ABLATION Left 3/4/2020    Procedure: RADIOFREQUENCY ABLATION, C4-C5-C6 ME DIAL BRANCH 1 OF 2 need consent;  Surgeon: Cosme Kirkland MD;  Location: [facility] PAIN MGT;  Service: Pain Management;  Laterality: Left;    RADIOFREQUENCY ABLATION Right 6/3/2020    Procedure: RADIOFREQUENCY ABLATION, C4-C5-C6 MEDIAL BRANCH 2 OF 2 need consent;  Surgeon: Cosme Kirkland MD;  Location: [facility] PAIN [facility];  Service: Pain Management;  Laterality: Right;    RADIOFREQUENCY ABLATION Right 3/23/2022    Procedure: Radiofrequency Ablation RIGHT C4,5,6;  Surgeon: Cosme Kirkland MD;  Location: [facility] PAIN [facility]GT;  Service: Pain Management;  Laterality: Right;    RADIOFREQUENCY ABLATION Left 4/6/2022    Procedure: Radiofrequency Ablation LEFT C4,5,6;  Surgeon: Cosme Kirkland MD;  Location: [facility] PAIN [facility]GT;  Service: Pain Management;  Laterality: Left;    RADIOFREQUENCY ABLATION Bilateral 3/1/2023    Procedure: RADIOFREQUENCY ABLATION BILATERAL L3,L4,L5 BOTH SIDES SAME TIME PER DR KIRKLAND;  Surgeon: Cosme Kirkland MD;  Location: [facility]IN [facility];  Service: Pain Management;  Laterality: Bilateral;    RADIOFREQUENCY ABLATION Bilateral 5/3/2023    Procedure: RADIOFREQUENCY ABLATION BILATERAL C4,C5,C6 BOTH SIDES PER DR KIRKLAND;  Surgeon: Cosme Kirkland MD;  Location: [facility] PAIN [facility]GT;  Service: Pain Management;  Laterality: Bilateral;    REFRACTIVE SURGERY  2008    SMALL INTESTINE SURGERY  7/6/20     Social History     Tobacco Use     Smoking status: Never    Smokeless tobacco: Never   Substance Use Topics    Alcohol use: Yes     Alcohol/week: 1.0 standard drink of alcohol     Types: 1 Standard drinks or equivalent per week     Comment: 3 drinks weekly     Drug use: No     Family History   Problem Relation Name Age of Onset    Hypertension Mother Celsa Hayes     Thyroid disease Mother Celsa Hayes     Rheum arthritis Mother Celsa Hayes     Hearing loss Mother Celsa Hayes     Heart disease Father Wes Irving         Pacemaker    Cancer Maternal Grandmother Katy Zuñiga Ray     Cataracts Maternal Grandmother Katy Zuñiga Ray     Diabetes Maternal Grandmother Katy Zuñiga Ray     Hypertension Maternal Grandmother Katy Zuñiga Ray     Thyroid disease Maternal Grandmother Katy Zuñiga Ray     Breast cancer Maternal Grandmother Katy Housera Ray     Hearing loss Maternal Grandmother Katy Zuñiga Ray     Cataracts Maternal Grandfather Max Garrison, Sr.     Diabetes Maternal Grandfather Max Garrison, Sr.     Hypertension Maternal Grandfather Max Garrison, Sr.     Thyroid disease Maternal Grandfather Max Garrison, Sr.     Lupus Cousin mother's 1st cousin     Cancer Maternal Uncle      No Known Problems Paternal Grandmother      No Known Problems Paternal Grandfather      Amblyopia Neg Hx      Blindness Neg Hx      Glaucoma Neg Hx      Macular degeneration Neg Hx      Retinal detachment Neg Hx      Strabismus Neg Hx      Stroke Neg Hx      Ovarian cancer Neg Hx      Colon cancer Neg Hx      Cirrhosis Neg Hx       OB History    Para Term  AB Living   2 2 2         SAB IAB Ectopic Multiple Live Births                  # Outcome Date GA Lbr Louis/2nd Weight Sex Type Anes PTL Lv   2 Term      Vag-Spont      1 Term      Vag-Spont          Current Outpatient Medications:     ascorbic acid, vitamin C, (VITAMIN C) 100 MG tablet, Take 100 mg by mouth once daily., Disp: , Rfl:      atenoloL-chlorthalidone (TENORETIC) 50-25 mg Tab, TAKE 1/2 TABLET BY MOUTH ONCE EVERY DAY, Disp: 45 tablet, Rfl: 3    celecoxib (CELEBREX) 100 MG capsule, TAKE 1 CAPSULE(100 MG) BY MOUTH TWICE DAILY, Disp: 60 capsule, Rfl: 2    DULoxetine (CYMBALTA) 60 MG capsule, Take 1 capsule (60 mg total) by mouth once daily., Disp: 30 capsule, Rfl: 11    estradioL (VIVELLE-DOT) 0.025 mg/24 hr, Place 1 patch onto the skin twice a week., Disp: 8 patch, Rfl: 11    estradioL (VIVELLE-DOT) 0.0375 mg/24 hr, Place 1 patch onto the skin twice a week., Disp: 8 patch, Rfl: 11    levonorgestrel (MIRENA) 20 mcg/24 hr (5 years) IUD, 1 Intra Uterine Device by Intrauterine route once. for 1 dose, Disp: 1 Intra Uterine Device, Rfl: 0    loratadine (CLARITIN) 10 mg tablet, Take 10 mg by mouth every morning. , Disp: , Rfl:     metFORMIN (GLUCOPHAGE) 500 MG tablet, Take 500 mg by mouth Daily., Disp: , Rfl:     methocarbamoL (ROBAXIN) 500 MG Tab, TAKE 1 TABLET(500 MG) BY MOUTH THREE TIMES DAILY AS NEEDED FOR MUSCLE PAIN, Disp: 30 tablet, Rfl: 1    multivitamin capsule, Take 1 capsule by mouth once daily., Disp: , Rfl:     mupirocin (BACTROBAN) 2 % ointment, Apply topically once daily., Disp: 22 g, Rfl: 0    nitrofurantoin, macrocrystal-monohydrate, (MACROBID) 100 MG capsule, Take 1 capsule (100 mg total) by mouth 2 (two) times daily. for 7 days, Disp: 14 capsule, Rfl: 0    ondansetron (ZOFRAN-ODT) 4 MG TbDL, Take 4 mg by mouth every 6 (six) hours as needed., Disp: , Rfl:     potassium chloride (MICRO-K) 10 MEQ CpSR, Take 2 capsules (20 mEq total) by mouth once daily., Disp: 180 capsule, Rfl: 3    vitamin D (VITAMIN D3) 1000 units Tab, Take 1,000 Units by mouth once daily., Disp: , Rfl:     Review of Systems:  General: No fever, chills, or weight loss.  Chest: No chest pain, shortness of breath, or palpitations.  Breast: No pain, masses, or nipple discharge.  Vulva: No pain, lesions, or itching.  Vagina: No relaxation, itching, discharge, or  "lesions.  Abdomen: No pain, nausea, vomiting, diarrhea, or constipation.  Urinary: +dysuria, urgency and frequency  Extremities:  No leg cramps, edema, or calf pain.  Neurologic: No headaches, dizziness, or visual changes.    Objective:     Vitals:    04/29/24 1357   BP: 137/77   Pulse: 69   Weight: 69.6 kg (153 lb 6.4 oz)   Height: 5' 1" (1.549 m)   PainSc: 0-No pain     Body mass index is 28.98 kg/m².    PHYSICAL EXAM:  APPEARANCE: Well nourished, well developed, in no acute distress.  AFFECT: WNL, alert and oriented x 3  CHEST: Good respiratory effect  ABDOMEN: Soft.  No tenderness or masses.  No hepatosplenomegaly.  No hernias. No CVA tenderness bilaterally  PELVIC: Normal external genitalia without lesions.  Normal hair distribution.  Adequate perineal body, normal urethral meatus.  Vagina moist and well rugated without lesions. +scant thin white discharge Cervix pink, without lesions, discharge or tenderness. IUD strings visualized at os. No significant cystocele or rectocele.  Bimanual exam shows uterus to be normal size, regular, mobile and nontender.  Adnexa without masses or tenderness.    EXTREMITIES: No edema.    Assessment:      Vaginal discharge  -     Vaginosis Screen by DNA Probe; Future; Expected date: 04/29/2024    UTI symptoms  -     Urinalysis  -     CULTURE, URINE  -     nitrofurantoin, macrocrystal-monohydrate, (MACROBID) 100 MG capsule; Take 1 capsule (100 mg total) by mouth 2 (two) times daily. for 7 days  Dispense: 14 capsule; Refill: 0        Plan:   Urine culture/UA  Vaginosis and GC/Chlamydia  Start Macrobid  Increase fluids  Reviewed preventative measures  Consider vaginal estradiol if needed in the future  Contact if symptoms do not improve in 24-48 hours    Instructed patient to call if she experiences any side effects or has any questions.    I spent a total of 25 minutes on the day of the visit.This includes face to face time and non-face to face time preparing to see the patient " (eg, review of tests), obtaining and/or reviewing separately obtained history, documenting clinical information in the electronic or other health record, independently interpreting results and communicating results to the patient/family/caregiver, or care coordinator.

## 2024-04-30 LAB
BACTERIAL VAGINOSIS DNA: NEGATIVE
C TRACH DNA SPEC QL NAA+PROBE: NOT DETECTED
CANDIDA GLABRATA DNA: NEGATIVE
CANDIDA KRUSEI DNA: NEGATIVE
CANDIDA RRNA VAG QL PROBE: NEGATIVE
N GONORRHOEA DNA SPEC QL NAA+PROBE: NOT DETECTED
T VAGINALIS RRNA GENITAL QL PROBE: NEGATIVE

## 2024-05-02 ENCOUNTER — PATIENT MESSAGE (OUTPATIENT)
Dept: INTERNAL MEDICINE | Facility: CLINIC | Age: 54
End: 2024-05-02
Payer: COMMERCIAL

## 2024-05-02 LAB
BACTERIA UR CULT: ABNORMAL
BACTERIA UR CULT: ABNORMAL

## 2024-05-02 RX ORDER — SULFAMETHOXAZOLE AND TRIMETHOPRIM 800; 160 MG/1; MG/1
1 TABLET ORAL 2 TIMES DAILY
Qty: 10 TABLET | Refills: 0 | Status: SHIPPED | OUTPATIENT
Start: 2024-05-02 | End: 2024-05-07

## 2024-05-03 ENCOUNTER — PATIENT MESSAGE (OUTPATIENT)
Dept: OBSTETRICS AND GYNECOLOGY | Facility: CLINIC | Age: 54
End: 2024-05-03
Payer: COMMERCIAL

## 2024-05-08 ENCOUNTER — OFFICE VISIT (OUTPATIENT)
Dept: INTERNAL MEDICINE | Facility: CLINIC | Age: 54
End: 2024-05-08
Payer: COMMERCIAL

## 2024-05-08 DIAGNOSIS — I10 ESSENTIAL HYPERTENSION: Primary | ICD-10-CM

## 2024-05-08 PROCEDURE — 1159F MED LIST DOCD IN RCRD: CPT | Mod: CPTII,95,, | Performed by: HOSPITALIST

## 2024-05-08 PROCEDURE — 1160F RVW MEDS BY RX/DR IN RCRD: CPT | Mod: CPTII,95,, | Performed by: HOSPITALIST

## 2024-05-08 PROCEDURE — 99213 OFFICE O/P EST LOW 20 MIN: CPT | Mod: 95,,, | Performed by: HOSPITALIST

## 2024-05-08 RX ORDER — ATENOLOL AND CHLORTHALIDONE TABLET 50; 25 MG/1; MG/1
TABLET ORAL
Start: 2024-05-08 | End: 2024-06-04 | Stop reason: DRUGHIGH

## 2024-05-08 NOTE — PROGRESS NOTES
Virtual Visit  The patient location is: [place]   The chief complaint leading to consultation is: htn  Visit type: Virtual visit with synchronous audio and video  Total time spent with patient: 5 min  Each patient to whom he or she provides medical services by telemedicine is:  (1) informed of the relationship between the physician and patient and the respective role of any other health care provider with respect to management of the patient; and (2) notified that he or she may decline to receive medical services by telemedicine and may withdraw from such care at any time.    Subjective:         @Patient ID: Sheila Costa is a 53 y.o. female.    Chief Complaint: Hypertension    Hypertension      54 yo F with HTN, fatty liver, prediabetes alpha thalassemia, cervical spondylosis, colon polyp, chronic fatigue, premenstrual syndrome presents bp check      HTN: 1/2 tablet of atenolol-chlorthalidone 50-25 mg qday. Reports lately her blood pressure has been elevated at appointments. She will start checking her bp soon     Review of Systems   Constitutional:  Negative for chills and fever.   Psychiatric/Behavioral:  Negative for agitation and confusion.      Past medical history, surgical history, and family medical history reviewed and updated as appropriate.    Medications and allergies reviewed.     Objective:     There were no vitals filed for this visit.  There is no height or weight on file to calculate BMI.    Physical Exam  Constitutional:       Appearance: Normal appearance.   HENT:      Head: Normocephalic and atraumatic.   Pulmonary:      Effort: Pulmonary effort is normal.   Neurological:      Mental Status: She is alert and oriented to person, place, and time.   Psychiatric:         Mood and Affect: Mood normal.         Behavior: Behavior normal.         Lab Results   Component Value Date    WBC 9.78 02/26/2024    HGB 12.1 02/26/2024    HCT 39.6 02/26/2024     02/26/2024    CHOL 188  09/19/2023    TRIG 141 09/19/2023    HDL 53 09/19/2023    ALT 8 (L) 09/19/2023    AST 15 09/19/2023     02/26/2024    K 4.0 02/26/2024     02/26/2024    CREATININE 0.8 02/26/2024    BUN 14 02/26/2024    CO2 30 (H) 02/26/2024    TSH 2.874 09/19/2023    HGBA1C 5.5 09/19/2023       Assessment:     1. Essential hypertension      Plan:   Sheila was seen today for hypertension.    Diagnoses and all orders for this visit:    Essential hypertension    - pt will take full tablet daily of atenolol-chlorthalidone and notify MD with readings in 2 weeks         Marj Gonzalez MD  Internal Medicine    5/8/2024

## 2024-05-09 ENCOUNTER — OFFICE VISIT (OUTPATIENT)
Dept: PAIN MEDICINE | Facility: CLINIC | Age: 54
End: 2024-05-09
Attending: ANESTHESIOLOGY
Payer: COMMERCIAL

## 2024-05-09 ENCOUNTER — PATIENT MESSAGE (OUTPATIENT)
Dept: PAIN MEDICINE | Facility: OTHER | Age: 54
End: 2024-05-09
Payer: COMMERCIAL

## 2024-05-09 VITALS
BODY MASS INDEX: 29.33 KG/M2 | DIASTOLIC BLOOD PRESSURE: 68 MMHG | SYSTOLIC BLOOD PRESSURE: 113 MMHG | WEIGHT: 155.19 LBS | HEART RATE: 64 BPM | TEMPERATURE: 98 F

## 2024-05-09 DIAGNOSIS — M47.819 SPONDYLOSIS WITHOUT MYELOPATHY: ICD-10-CM

## 2024-05-09 DIAGNOSIS — M47.892 OTHER SPONDYLOSIS, CERVICAL REGION: Primary | ICD-10-CM

## 2024-05-09 DIAGNOSIS — M47.819 SPONDYLOSIS WITHOUT MYELOPATHY: Primary | ICD-10-CM

## 2024-05-09 DIAGNOSIS — M50.30 DDD (DEGENERATIVE DISC DISEASE), CERVICAL: ICD-10-CM

## 2024-05-09 PROCEDURE — 3074F SYST BP LT 130 MM HG: CPT | Mod: CPTII,S$GLB,, | Performed by: ANESTHESIOLOGY

## 2024-05-09 PROCEDURE — 3008F BODY MASS INDEX DOCD: CPT | Mod: CPTII,S$GLB,, | Performed by: ANESTHESIOLOGY

## 2024-05-09 PROCEDURE — 99999 PR PBB SHADOW E&M-EST. PATIENT-LVL III: CPT | Mod: PBBFAC,,, | Performed by: ANESTHESIOLOGY

## 2024-05-09 PROCEDURE — 3078F DIAST BP <80 MM HG: CPT | Mod: CPTII,S$GLB,, | Performed by: ANESTHESIOLOGY

## 2024-05-09 PROCEDURE — 1159F MED LIST DOCD IN RCRD: CPT | Mod: CPTII,S$GLB,, | Performed by: ANESTHESIOLOGY

## 2024-05-09 PROCEDURE — 1160F RVW MEDS BY RX/DR IN RCRD: CPT | Mod: CPTII,S$GLB,, | Performed by: ANESTHESIOLOGY

## 2024-05-09 PROCEDURE — 99214 OFFICE O/P EST MOD 30 MIN: CPT | Mod: S$GLB,,, | Performed by: ANESTHESIOLOGY

## 2024-05-09 RX ORDER — ZOLPIDEM TARTRATE 10 MG/1
10 TABLET ORAL NIGHTLY
COMMUNITY
Start: 2024-03-30

## 2024-05-09 RX ORDER — FLUOXETINE HYDROCHLORIDE 20 MG/1
20 CAPSULE ORAL
COMMUNITY
Start: 2023-12-10

## 2024-05-09 NOTE — PROGRESS NOTES
Chronic Pain-Established Note  Follow up visit  SUBJECTIVE:    Interval History 5/9/24:      Interval History 3/4/2024:  Sheila Costa presents for post-op s/p Intracept L5 and S1 on  2/26/2024.  She reports 75% relief since procedure with more relief everyday.  She denies: fever, chills, drainage from the site, or additional pain.   Today's pain score is 0/10.      Interval History 02/19/2024:  Ms. Igor Asher is a 52 y/o f who presents for virtual follow-up of her chronic low back pain.  She is scheduled for L5 & S1 intraosseous basivertebral nerve ablation 03/11/2024. She continues to endorse axial low back consistent with lumbar vertebrogenic back pain. The patient is presenting today for further information regarding the procedure. All questions answered.      Interval History 9/28/2023:  Patient seen today for virtual follow-up of her low back pain.  Today, she reports that the bilateral SI joint injections that she had in May have worked well for her for about 6 weeks and her back pain is at baseline worse  Her main concern is the pain in her low back which is affecting her ADL .  She previously got about 50% relief with RFA but now states she feels the pain as before all her injection.  She feels that the RFA helped with 1 component of her pain, but she continues to have deep focal low back and buttock pain.  She denies radiation into her legs.  She reports that the pain is fairly persistent but worsens significantly with standing, sitting for a long time and flexion.  She denies any bowel/bladder dysfunction or saddle anesthesia.  She has no other focal neurologic deficits.     Interval History 6/29/2023:  She presents with continued stiffness in her neck and shoulders.  She also complains of lower back pain which is still present. She does feel the SI joint injections do help.  She states that she is doing some stretches and home exercises to help with the pain.  She feels the  stretching does help, but she feels she isn't doing them as often as she'd like.  She continues to state that her pain is 50-70% improved compared to before the injections.  She is inquiring as to if she should schedule her follow up injections now or if she should wait.     Interval History 6/9/2023:  The patient returns to clinic today for follow up neck and back pain via virtual visit. She is s/p bilateral C4,5,6 RFA on 5/3/2023. She is s/p bilateral SI joint injections on 5/24/2023. She reports 70% relief of her neck and back pain. She reports intermittent low back pain. She denies any radicular leg pain. She is performing a home exercise and stretching routine. She is taking Celebrex. She did not receive Robaxin discussed at last visit. She denies any other health changes.      Interval History 4/13/23:  Sheila HortamingsNeptaliJobclaudemelissa presents to the clinic for a follow-up appointment for back pain. Since the last visit, Sheila HortaAbdiasmelissa states the pain has been persistant. Current pain intensity is 7/10. S/p L3/4/5 RFA with 50% relief. Pain is still persistent. Gets better with exercise and stretching although too much movement can exacerbate the pain. She is taking celebrex and tizanidine. She endorses previous SI joint injections were helpful.      Interval History 3/22/2023:  The patient returns to clinic today for follow up of low back pain via virtual visit. She is s/p bilateral L3,4,5 RFA on 3/1/2023. She reports 50% relief of her pain. She reports low back and buttock pain. This seems lower than injection sites. She denies any radicular leg pain. She reports increased neck pain. She describes this pain as constant and aching in nature. She denies any radicular arm pain. Her pain is worse with activity. She is taking Celebrex and Zanaflex as needed. She denies any other health changes.      Interval History 2/1/2023:  The patient returns to clinic today for follow up of low back pain via virtual  visit. She is s/p bilateral L3,4,5 MBB on 1/25/2023. She reports 90% relief of her pain for one day. Since then, her pain has returned to baseline. She reports low back pain that is aching in nature. She denies any radicular leg pain. Her pain is worse with prolonged activity. She is currently taking Celebrex. She also takes Zanaflex intermittently. She denies any other health changes.      Interval History 12/29/2022:  The patient returns to clinic today for follow up of low back pain via virtual visit. She is s/p bilateral L3,4,5 MBB on 12/14/2022. She reports 95% relief of her low back pain for one day. Since then, her pain has returned to baseline. She continues to report low back pain. This is aching across the lower back. She denies any radicular leg pain. Her pain is worse with activity. She is currently taking Zanaflex with limited relief. She denies any other health changes.      Interval history 10/31/22:  Patient returns to clinic for follow up of neck and shoulder pain. She is now s/p C7/T1 cervical MICHELLE 7/6/22 which gave 75-80% relief which lasted a few months. Now with primary complaint of low back pain in a band across the low back described as aching and throbbing which radiates to lateral aspect of BLE which stops at the knees. Still taking ibuprofen, celebrex, and lyrica. Not doing home exercise or PT. Denies fever/chills, or saddle anesthesia.     Interval History 6/13/22:   She is s/p left SIJ and left GTB injection on 5/18/2022 which she reports has helped her buttock/leg pain significantly. She now would like to focus on her neck pain. She continues to have axial neck pain, bilateral. Her RUE sxs have significantly decreased s/p C7-T1 IL MICHELLE done in Dec 2021 but she does still have RUE pain. She is taking Gabapentin 300mg nightly - has not noticed a difference in her pain with this. She has tried topicals - help somewhat, Advil 800mg helps. She has gone to the chiropractor in the past - helpful.  She has not done PT for her neck in ~3 years ago which she thinks helped somewhat. She denies any weakness/numbness/tingling in BUEs. Denies b/b incontinence or saddle anesthesia.      Interval History 4/28/22:   Sheila Costa presents to the clinic for a follow-up appointment for neck pain. Since the last visit, Sheila Costa states the pain has been improving. She is s/p bilateral RFA of C4/5/6 on 3/26 and 4/3. She reports >75% relief to both sides and is satisfied with the results. She does satate that she is having some numbness over the skin over the L side. She also states she is having left buttock pain that she describes as cramping/throbbing which occasionally radiates down posterior aspect of L thigh, stopping above the knee. She denies numbness/tingling in lower extremities.      Interval History 3/10/2022:   Sheila Costa presents to the clinic for a follow-up appointment for neck pain. Since the last visit, Sheila Costa states the pain has been stable. Worse in the mornings. Feels like a stiffness in the neck without radicular symptoms as her radicular symptoms had been resolved since her MICHELLE. Some paraesthesia in arms and hands with typing. Patient states that mobic is beneficial. Best relief of her axial neck pain in the past was with RFA done previously. She discontinued her amitriptyline prescription and did not notice a difference in pain with discontinuation. Denies bowel/bladder dysfunction or difficulty with fine motor skills.     Interval History 1/6/2022:   Sheila Costa presents to the clinic for a follow-up appointment s/p Cervical Interlaminar Epidural Steroid Injection at the end of this past December. Since the last visit, Sheila Costa states the pain has been improving. Current pain intensity is 3/10 but she is still experiencing stiffness. Pain is primarly throbbing that would travel down BL arms. She has been  doing well. Patient states that mobic and elavil have been helping with pain. Patient is sleeping well currently.     Interval History 11/15/2021:   Pt returns to clinic today due to resurgence of her bilateral neck and arm pain. At her last interventional pain encounter she had RFA left C4,5,6 on 03/04/2020 and the right done on 06/2020. Pt reports this provided significant relief of her pain however she has been having increased neck pain with radiation into her arms down to her hands. She has had a cervical epidural in the past with at least 50% relief of her pain. She has been utilizing OTC ibuprofen as well as a chiropractor.   Pt also reports low back pain without any radiation into the lower extremities. She states that the pain is aching and worsened with prolonged physical activity.      Interval History 11/25/2019:  The patient returns to clinic today for follow up. She reports a few days of relief with trigger point injections at last visit. She continues to report neck pain with intermittent radiating pain into both arms to her hands. She also reports mid back pain. 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 She states the previous MICHELLE helped for her arm pain but not for her neck pain. She has had limited relief with NSAIDs and physical therapy. She denies any other health changes. Her pain today is 7/10.     Interval History 10/28/2019:  The patient returns to clinic today for follow up. She is s/p C7-T1 IL MICHELLE on 10/2/2019. She reports 50% relief of her neck and arm pain. She reports intermittent neck pain that is sore in nature. She does report increased mid back pain. She describes this pain as tight and aching in nature. She denies any radicular arm pain. She denies any other health changes. Her pain today is 6/10.     Pain Disability Index Review:      5/9/2024     9:43 AM 4/13/2023     2:26 PM 3/10/2022     4:01 PM   Last 3 PDI Scores   Pain Disability Index (PDI) 35 51 16       Pain  Procedures:   10/2/2019- C7-T1 IL MICHELLE- 50% pain relief  1/8/2020- Bilateral C4,5,6 MBB  1/29/2020- Bilateral C4,5,6 MBB  3/4/2020: Left C4,5,6 RFA - 80% relief  6/3/2020: Right C4,5,6 RFA - 80% relief  12/22/2021- C7/T1 IL MICHELLE  3/23/2022- Right C4,5,6 RFA -80-85% relief  4/6/2022- Left C4,5,6 RFA- 75% relief   5/8/2022- Left SI joint and GTB injection  7/6/2022- C7/T1 IL MICHELLE  12/14/2022- Bilateral L3,4,5 MBB- 95% relief  1/25/2023- Bilateral L3,4,5 MBB  3/1/2023- Bilateral L3,4,5 RFA  5/3/2023- Bilateral C4,5,6 RFA  5/24/2023- Bilateral SI joint injections  2/26/2024 - Intracept at L5 and S1     Physical Therapy/Home Exercise: yes     Imaging:   MRI Lumbar Spine 11/11/2022:  COMPARISON:  11/01/2022     FINDINGS:  Alignment: Normal.     Vertebrae: Degenerative endplate changes at L5-S1.  No fracture or marrow infiltrative process.     Discs: Mild disc height loss at L5-S1 with annular fissure.  No evidence for discitis.     Cord: Conus terminates at L1 and appears unremarkable.  Cauda equina appears unremarkable.     Degenerative findings:     T12-L1: No spinal canal stenosis or neural foraminal narrowing.     L1-L2: No spinal canal stenosis or neural foraminal narrowing.     L2-L3: No spinal canal stenosis or neural foraminal narrowing.     L3-L4: No spinal canal stenosis or neural foraminal narrowing.     L4-L5: Circumferential disc bulge and mild facet arthropathy result in mild left neural foraminal narrowing.     L5-S1: Circumferential disc bulge and moderate facet arthropathy result in mild right, moderate left neural foraminal narrowing.     Paraspinal muscles & soft tissues: Paraspinal muscle bulk is maintained.  Small bilateral renal cysts noted.     Impression:     1. Degenerative changes of the lower lumbar spine detailed above.  Moderate left neural foraminal narrowing noted at L5-S1.     MRI Cervical Spine 11/20/2019:  COMPARISON:  MRI cervical spine without contrast 03/06/2019     FINDINGS:  Alignment:  Sagittal alignment is preserved.     Vertebrae: Normal marrow signal without osseous destructive process or aggressive bone marrow replacement process.  No fracture.     Discs: There is mild disc space narrowing at C2-C3 with endplate irregularity, likely degenerative and similar to the prior examination.  Remaining discs demonstrate normal height and signal.     Cord: Normal caliber, morphology, and signal.     Degenerative findings:     C2-C3: Posterior disc osteophyte complex and left uncovertebral joint spurring contribute to mild left neural foraminal narrowing.  No spinal canal stenosis.     C3-C4: Mild posterior disc osteophyte complex results in mild effacement of the ventral thecal sac without significant spinal canal stenosis or neural foraminal narrowing.     C4-C5: Posterior disc osteophyte complex results in effacement of the anterior thecal sac without significant spinal canal stenosis or neural foraminal narrowing.     C5-C6: Left uncovertebral joint spurring contributes to mild left neural foraminal narrowing without significant spinal canal stenosis.     C6-C7: No significant disc abnormality, spinal canal stenosis, or neural foraminal narrowing.     C7-T1: No significant disc abnormality, spinal canal stenosis, or neural foraminal narrowing.     Limited sagittal evaluation of the upper thoracic spine reveals a disc protrusion at T4-T5 causing effacement of the anterior thecal sac and cord abutment.     Paraspinal muscles & soft tissues: Unremarkable without spinal canal or paraspinal mass.     Impression:     Mild cervical degenerative changes contributing to mild left neural foraminal narrowing at C2-C3 and C5-C6.  No spinal canal stenosis.     T4-T5 disc protrusion resulting in effacement of the anterior thecal sac, abutting the cord.     Narrowing of the C2-C3 disc space with endplate irregularity, likely degenerative and stable compared to the prior examination.       Allergies:   Review of  patient's allergies indicates:   Allergen Reactions    Amoxicillin Hives       Current Medications:   Current Outpatient Medications   Medication Sig Dispense Refill    FLUoxetine 20 MG capsule Take 20 mg by mouth.      zolpidem (AMBIEN) 10 mg Tab Take 10 mg by mouth every evening.      ascorbic acid, vitamin C, (VITAMIN C) 100 MG tablet Take 100 mg by mouth once daily.      atenoloL-chlorthalidone (TENORETIC) 50-25 mg Tab TAKE 1 TABLET BY MOUTH ONCE EVERY DAY      celecoxib (CELEBREX) 100 MG capsule TAKE 1 CAPSULE(100 MG) BY MOUTH TWICE DAILY 60 capsule 2    DULoxetine (CYMBALTA) 60 MG capsule Take 1 capsule (60 mg total) by mouth once daily. 30 capsule 11    estradioL (VIVELLE-DOT) 0.025 mg/24 hr Place 1 patch onto the skin twice a week. 8 patch 11    estradioL (VIVELLE-DOT) 0.0375 mg/24 hr Place 1 patch onto the skin twice a week. 8 patch 11    levonorgestrel (MIRENA) 20 mcg/24 hr (5 years) IUD 1 Intra Uterine Device by Intrauterine route once. for 1 dose 1 Intra Uterine Device 0    loratadine (CLARITIN) 10 mg tablet Take 10 mg by mouth every morning.       metFORMIN (GLUCOPHAGE) 500 MG tablet Take 500 mg by mouth Daily.      methocarbamoL (ROBAXIN) 500 MG Tab TAKE 1 TABLET(500 MG) BY MOUTH THREE TIMES DAILY AS NEEDED FOR MUSCLE PAIN 30 tablet 1    multivitamin capsule Take 1 capsule by mouth once daily.      mupirocin (BACTROBAN) 2 % ointment Apply topically once daily. 22 g 0    ondansetron (ZOFRAN-ODT) 4 MG TbDL Take 4 mg by mouth every 6 (six) hours as needed.      potassium chloride (MICRO-K) 10 MEQ CpSR Take 2 capsules (20 mEq total) by mouth once daily. 180 capsule 3    vitamin D (VITAMIN D3) 1000 units Tab Take 1,000 Units by mouth once daily.       No current facility-administered medications for this visit.       REVIEW OF SYSTEMS:    GENERAL:  No weight loss, malaise or fevers.  HEENT:  Negative for frequent or significant headaches.  NECK:  Negative for lumps, goiter, pain and significant neck  swelling.  RESPIRATORY:  Negative for cough, wheezing or shortness of breath.  CARDIOVASCULAR:  Negative for chest pain, leg swelling or palpitations.  GI:  Negative for abdominal discomfort, blood in stools or black stools or change in bowel habits.  MUSCULOSKELETAL:  See HPI.  SKIN:  Negative for lesions, rash, and itching.  PSYCH:  Negative for sleep disturbance, mood disorder and recent psychosocial stressors.  HEMATOLOGY/LYMPHOLOGY:  Negative for prolonged bleeding, bruising easily or swollen nodes.  NEURO:   No history of headaches, syncope, paralysis, seizures or tremors.  All other reviewed and negative other than HPI.    Past Medical History:  Past Medical History:   Diagnosis Date    Alpha thalassemia     Back pain     Cervical spondylosis 12/30/2019    Colon polyp     Fatty liver     Hypertension     Prediabetes     Premenstrual symptom        Past Surgical History:  Past Surgical History:   Procedure Laterality Date    COLONOSCOPY N/A 6/18/2020    Procedure: COLONOSCOPY;  Surgeon: Eliot Hill MD;  Location: [facility] ([address] [place] [address]);  Service: Endoscopy;  Laterality: N/A;  COVID screening on 6/16/20-[facility]    COLONOSCOPY N/A 7/6/2020    Procedure: COLONOSCOPY;  Surgeon: Kang Guzmán MD;  [address]  Service: Colon and Rectal;  Laterality: N/A;    COLONOSCOPY N/A 7/9/2021    Procedure: COLONOSCOPY;  Surgeon: GREGG Guzmán MD;  [facility]  Service: Endoscopy;  Laterality: N/A;  in July 2021  covid test [place] 7/6    DESTRUCTION, INTRAOSSEOUS BASIVERTEBRAL NERVE, EACH ADD'L BODY, LUM/SAC Bilateral 2/26/2024    Procedure: DESTRUCTION,INTRAOSSEOUS BASIVERTEBRAL NERVE,EACH ADD'L BODY,LUM/SAC;  Surgeon: Cosme Kirkland MD;  [facility]: [facility] OR;  Service: Pain Management;  Laterality: Bilateral;  L5 & S1    EPIDURAL STEROID INJECTION N/A 10/2/2019    Procedure: INJECTION, STEROID, EPIDURAL, C7-T1;  Surgeon: Cosme Kirkland MD;  Location: [facility] PAIN [facility]GT;   Service: Pain Management;  Laterality: N/A;    EPIDURAL STEROID INJECTION N/A 12/22/2021    Procedure: INJECTION, STEROID, EPIDURAL, C7-T1 NEED CONSENT;  Surgeon: Cosme Kirkland MD;  Location: [facility] PAIN MGT;  Service: Pain Management;  Laterality: N/A;    EPIDURAL STEROID INJECTION N/A 7/6/2022    Procedure: INJECTION, STEROID, EPIDURAL, C7-T1 IL  CONTRAST;  Surgeon: Cosme Kirkland MD;  Location: [facility] PAIN MGT;  Service: Pain Management;  Laterality: N/A;    EYE SURGERY      INJECTION OF ANESTHETIC AGENT AROUND NERVE Bilateral 1/8/2020    Procedure: BLOCK, NERVE C4,5,6;  Surgeon: Cosme Kirkland MD;  Location: [facility] PAIN MGT;  Service: Pain Management;  Laterality: Bilateral;  B/L MBB C4,5,6    INJECTION OF ANESTHETIC AGENT AROUND NERVE Bilateral 1/29/2020    Procedure: BLOCK, NERVE, Repeat C4-C5-C6 MEDIAL BRANCH;  Surgeon: Cosme Kirkland MD;  Location: [facility] PAIN MGT;  Service: Pain Management;  Laterality: Bilateral;    INJECTION OF ANESTHETIC AGENT AROUND NERVE Bilateral 12/14/2022    Procedure: BLOCK, NERVE BILATERAL L3,L4,L5 MEDIAL BRANCH NEEDS CONSENT;  Surgeon: Cosme Kirkland MD;  Location: [facility] PAIN MGT;  Service: Pain Management;  Laterality: Bilateral;    INJECTION OF ANESTHETIC AGENT AROUND NERVE Bilateral 1/25/2023    Procedure: BLOCK, NERVE BILATERAL L3,L4,L5 MEDIAL BRANCH;  Surgeon: Cosme Kirkland MD;  Location: [facility] PAIN MGT;  Service: Pain Management;  Laterality: Bilateral;    INJECTION OF ANESTHETIC AGENT AROUND NERVE Bilateral 11/1/2023    Procedure: BLOCK, NERVE BILATERAL L5, S1, AND S2 LATERAL BRANCH;  Surgeon: Cosme Kirkland MD;  Location: [facility] PAIN MGT;  Service: Pain Management;  Laterality: Bilateral;    INJECTION OF ANESTHETIC AGENT AROUND NERVE Bilateral 11/22/2023    Procedure: BLOCK, NERVE BILATERAL L5, S1, AND S2 MEDIAL BRANCH;  Surgeon: Cosme Kirkland MD;  Location: [facility] PAIN MGT;  Service: Pain Management;  Laterality: Bilateral;    INJECTION OF JOINT  Left 5/18/2022    Procedure: INJECTION, JOINT, LEFT SI and GTB *LORI PT*;  Surgeon: Jed Phillips MD;  Location: [facility] PAIN MGT;  Service: Pain Management;  Laterality: Left;    INJECTION, SACROILIAC JOINT Bilateral 5/24/2023    Procedure: INJECTION,SACROILIAC JOINT BIALTERAL;  Surgeon: Cosme Sandoval MD;  Location: [facility] PAIN MGT;  Service: Pain Management;  Laterality: Bilateral;    LAPAROSCOPIC SURGICAL REMOVAL OF CECUM N/A 7/6/2020    Procedure: EXCISION, CECUM, LAPAROSCOPIC, colonoscopy to start, ERAS low;  Surgeon: Kang Guzmán MD;  Location: [facility] OR 2ND FLR;  Service: Colon and Rectal;  Laterality: N/A;    RADIOFREQUENCY ABLATION Left 3/4/2020    Procedure: RADIOFREQUENCY ABLATION, C4-C5-C6 ME DIAL BRANCH 1 OF 2 need consent;  Surgeon: Cosme Sandoval MD;  Location: [facility] PAIN MGT;  Service: Pain Management;  Laterality: Left;    RADIOFREQUENCY ABLATION Right 6/3/2020    Procedure: RADIOFREQUENCY ABLATION, C4-C5-C6 MEDIAL BRANCH 2 OF 2 need consent;  Surgeon: Cosme Sandoval MD;  Location: [facility] PAIN MGT;  Service: Pain Management;  Laterality: Right;    RADIOFREQUENCY ABLATION Right 3/23/2022    Procedure: Radiofrequency Ablation RIGHT C4,5,6;  Surgeon: Cosme Sandoval MD;  Location: [facility] PAIN MGT;  Service: Pain Management;  Laterality: Right;    RADIOFREQUENCY ABLATION Left 4/6/2022    Procedure: Radiofrequency Ablation LEFT C4,5,6;  Surgeon: Cosme Sandoval MD;  Location: [facility] PAIN MGT;  Service: Pain Management;  Laterality: Left;    RADIOFREQUENCY ABLATION Bilateral 3/1/2023    Procedure: RADIOFREQUENCY ABLATION BILATERAL L3,L4,L5 BOTH SIDES SAME TIME PER DR SANDOVAL;  Surgeon: Cosme Sandoval MD;  Location: [facility] PAIN MGT;  Service: Pain Management;  Laterality: Bilateral;    RADIOFREQUENCY ABLATION Bilateral 5/3/2023    Procedure: RADIOFREQUENCY ABLATION BILATERAL C4,C5,C6 BOTH SIDES PER DR SANDOVAL;  Surgeon: Cosme Sandoval MD;  Location: [facility] PAIN MGT;  Service: Pain  Management;  Laterality: Bilateral;    REFRACTIVE SURGERY  2008    SMALL INTESTINE SURGERY  7/6/20       Family History:  Family History   Problem Relation Name Age of Onset    Hypertension Mother Celsa Hayes     Thyroid disease Mother Celsa Hayes     Rheum arthritis Mother Celsa Hayes     Hearing loss Mother Celsa Hayes     Heart disease Father Wes Irving         Pacemaker    Cancer Maternal Grandmother Katy Zuñiga Ray     Cataracts Maternal Grandmother Katy Housera Ray     Diabetes Maternal Grandmother Katy Zuñiga Ray     Hypertension Maternal Grandmother Katy Zuñiga Ray     Thyroid disease Maternal Grandmother Katy Housera Ray     Breast cancer Maternal Grandmother Katy Housera Ray     Hearing loss Maternal Grandmother Katy Housera Ray     Cataracts Maternal Grandfather Max Garrison, Sr.     Diabetes Maternal Grandfather Max Garrison, Sr.     Hypertension Maternal Grandfather Max Garrison, Sr.     Thyroid disease Maternal Grandfather Max Garrison, Sr.     Lupus Cousin mother's 1st cousin     Cancer Maternal Uncle      No Known Problems Paternal Grandmother      No Known Problems Paternal Grandfather      Amblyopia Neg Hx      Blindness Neg Hx      Glaucoma Neg Hx      Macular degeneration Neg Hx      Retinal detachment Neg Hx      Strabismus Neg Hx      Stroke Neg Hx      Ovarian cancer Neg Hx      Colon cancer Neg Hx      Cirrhosis Neg Hx         Social History:  Social History     Socioeconomic History    Marital status:    Occupational History     Employer: [employer]   Tobacco Use    Smoking status: Never    Smokeless tobacco: Never   Substance and Sexual Activity    Alcohol use: Yes     Alcohol/week: 1.0 standard drink of alcohol     Types: 1 Standard drinks or equivalent per week     Comment: 3 drinks weekly     Drug use: No    Sexual activity: Yes     Partners: Male     Birth control/protection: I.U.D.   Social History Narrative      Children - Dolores and Florin, Jerichosband - Florin        Used to walk, ride bikes. Occasional uses elliptical at home.      Social Determinants of Health     Financial Resource Strain: Low Risk  (5/8/2024)    Overall Financial Resource Strain (CARDIA)     Difficulty of Paying Living Expenses: Not hard at all   Food Insecurity: No Food Insecurity (5/8/2024)    Hunger Vital Sign     Worried About Running Out of Food in the Last Year: Never true     Ran Out of Food in the Last Year: Never true   Transportation Needs: No Transportation Needs (5/8/2024)    PRAPARE - Transportation     Lack of Transportation (Medical): No     Lack of Transportation (Non-Medical): No   Physical Activity: Insufficiently Active (5/8/2024)    Exercise Vital Sign     Days of Exercise per Week: 1 day     Minutes of Exercise per Session: 20 min   Stress: Stress Concern Present (5/8/2024)    [employer] - Occupational Stress Questionnaire     Feeling of Stress : To some extent   Housing Stability: Low Risk  (9/13/2023)    Housing Stability Vital Sign     Unable to Pay for Housing in the Last Year: No     Number of Places Lived in the Last Year: 1     Unstable Housing in the Last Year: No       OBJECTIVE:    Vitals:    05/09/24 0947   BP: 113/68   Pulse: 64   Temp: 97.9 °F (36.6 °C)   Weight: 70.4 kg (155 lb 3.3 oz)   PainSc: 0-No pain       PHYSICAL EXAM:     General appearance: Well appearing, in no acute distress, alert and oriented x3.  Psych:  Mood and affect appropriate.  Skin: Skin color, texture, turgor normal, no rashes or lesions, in both upper and lower body.  Head/face:  Atraumatic, normocephalic. No palpable lymph nodes  Neck: limited  ROM. Moderate TTP over cervical spine or paracervical muscles. moderate  pain to palpation over the cervical paraspinous muscles. Spurling Negative. There is pain with neck extension and lateral flexion. Positive facet loading bilaterally.   Cor: RRR  Pulm:  CTA  GI: Abdomen soft and non-tender.  Back: No Pain to palpation over the spine or costovertebral angles. Normal range of motion without pain reproduction, -ADRIAN   Extremities: Peripheral joint ROM is full and pain free without obvious instability or laxity in all four extremities. No deformities, edema, or skin discoloration. Good capillary refill.  Musculoskeletal: Shoulder, hip, sacroiliac and knee provocative maneuvers are negative. Bilateral upper and lower extremity strength is normal and symmetric.  No atrophy or tone abnormalities are noted.  Neuro: Bilateral upper and lower extremity coordination and muscle stretch reflexes are physiologic and symmetric.  Plantar response are downgoing. No loss of sensation is noted.  Gait: normal          1. Other spondylosis, cervical region  Procedure Order to Pain Management      2. Spondylosis without myelopathy  Procedure Order to Pain Management    CANCELED: Procedure Order to Pain Management    CANCELED: Procedure Request Order for Pain Management      3. DDD (degenerative disc disease), cervical  Procedure Order to Pain Management          PLAN:   - I have stressed the importance of physical activity and a home exercise plan to help with pain and improve health.  - Patient can continue with medications for now since they are providing benefits, using them appropriately, and without side effects.  - She continues to do well following Intracept procedure, but her neck pain has returned  - Schedule for repeat bilateral C4,5,6 RFA.  - Counseled patient regarding the importance of activity modification, constant sleeping habits, and physical therapy.      The above plan and management options were discussed at length with patient. Patient is in agreement with the above and verbalized understanding.    Maurilio Somers MD  [facility]   I have personally reviewed the history and exam of this patient and agree with the resident/fellow/NPs note as stated  above.    Cosme Kirkland MD  5/9/24

## 2024-05-09 NOTE — H&P (VIEW-ONLY)
Chronic Pain-Established Note  Follow up visit  SUBJECTIVE:    Interval History 5/9/24:      Interval History 3/4/2024:  Sheila Costa presents for post-op s/p Intracept L5 and S1 on  2/26/2024.  She reports 75% relief since procedure with more relief everyday.  She denies: fever, chills, drainage from the site, or additional pain.   Today's pain score is 0/10.      Interval History 02/19/2024:  Ms. Igor Asher is a 52 y/o f who presents for virtual follow-up of her chronic low back pain.  She is scheduled for L5 & S1 intraosseous basivertebral nerve ablation 03/11/2024. She continues to endorse axial low back consistent with lumbar vertebrogenic back pain. The patient is presenting today for further information regarding the procedure. All questions answered.      Interval History 9/28/2023:  Patient seen today for virtual follow-up of her low back pain.  Today, she reports that the bilateral SI joint injections that she had in May have worked well for her for about 6 weeks and her back pain is at baseline worse  Her main concern is the pain in her low back which is affecting her ADL .  She previously got about 50% relief with RFA but now states she feels the pain as before all her injection.  She feels that the RFA helped with 1 component of her pain, but she continues to have deep focal low back and buttock pain.  She denies radiation into her legs.  She reports that the pain is fairly persistent but worsens significantly with standing, sitting for a long time and flexion.  She denies any bowel/bladder dysfunction or saddle anesthesia.  She has no other focal neurologic deficits.     Interval History 6/29/2023:  She presents with continued stiffness in her neck and shoulders.  She also complains of lower back pain which is still present. She does feel the SI joint injections do help.  She states that she is doing some stretches and home exercises to help with the pain.  She feels the  stretching does help, but she feels she isn't doing them as often as she'd like.  She continues to state that her pain is 50-70% improved compared to before the injections.  She is inquiring as to if she should schedule her follow up injections now or if she should wait.     Interval History 6/9/2023:  The patient returns to clinic today for follow up neck and back pain via virtual visit. She is s/p bilateral C4,5,6 RFA on 5/3/2023. She is s/p bilateral SI joint injections on 5/24/2023. She reports 70% relief of her neck and back pain. She reports intermittent low back pain. She denies any radicular leg pain. She is performing a home exercise and stretching routine. She is taking Celebrex. She did not receive Robaxin discussed at last visit. She denies any other health changes.      Interval History 4/13/23:  Sheila HortamingsNeptaliJobclaudemelissa presents to the clinic for a follow-up appointment for back pain. Since the last visit, Sheila HortaAbdiasmelissa states the pain has been persistant. Current pain intensity is 7/10. S/p L3/4/5 RFA with 50% relief. Pain is still persistent. Gets better with exercise and stretching although too much movement can exacerbate the pain. She is taking celebrex and tizanidine. She endorses previous SI joint injections were helpful.      Interval History 3/22/2023:  The patient returns to clinic today for follow up of low back pain via virtual visit. She is s/p bilateral L3,4,5 RFA on 3/1/2023. She reports 50% relief of her pain. She reports low back and buttock pain. This seems lower than injection sites. She denies any radicular leg pain. She reports increased neck pain. She describes this pain as constant and aching in nature. She denies any radicular arm pain. Her pain is worse with activity. She is taking Celebrex and Zanaflex as needed. She denies any other health changes.      Interval History 2/1/2023:  The patient returns to clinic today for follow up of low back pain via virtual  visit. She is s/p bilateral L3,4,5 MBB on 1/25/2023. She reports 90% relief of her pain for one day. Since then, her pain has returned to baseline. She reports low back pain that is aching in nature. She denies any radicular leg pain. Her pain is worse with prolonged activity. She is currently taking Celebrex. She also takes Zanaflex intermittently. She denies any other health changes.      Interval History 12/29/2022:  The patient returns to clinic today for follow up of low back pain via virtual visit. She is s/p bilateral L3,4,5 MBB on 12/14/2022. She reports 95% relief of her low back pain for one day. Since then, her pain has returned to baseline. She continues to report low back pain. This is aching across the lower back. She denies any radicular leg pain. Her pain is worse with activity. She is currently taking Zanaflex with limited relief. She denies any other health changes.      Interval history 10/31/22:  Patient returns to clinic for follow up of neck and shoulder pain. She is now s/p C7/T1 cervical MICHELLE 7/6/22 which gave 75-80% relief which lasted a few months. Now with primary complaint of low back pain in a band across the low back described as aching and throbbing which radiates to lateral aspect of BLE which stops at the knees. Still taking ibuprofen, celebrex, and lyrica. Not doing home exercise or PT. Denies fever/chills, or saddle anesthesia.     Interval History 6/13/22:   She is s/p left SIJ and left GTB injection on 5/18/2022 which she reports has helped her buttock/leg pain significantly. She now would like to focus on her neck pain. She continues to have axial neck pain, bilateral. Her RUE sxs have significantly decreased s/p C7-T1 IL MICHELLE done in Dec 2021 but she does still have RUE pain. She is taking Gabapentin 300mg nightly - has not noticed a difference in her pain with this. She has tried topicals - help somewhat, Advil 800mg helps. She has gone to the chiropractor in the past - helpful.  She has not done PT for her neck in ~3 years ago which she thinks helped somewhat. She denies any weakness/numbness/tingling in BUEs. Denies b/b incontinence or saddle anesthesia.      Interval History 4/28/22:   Sheila Costa presents to the clinic for a follow-up appointment for neck pain. Since the last visit, Sheila Costa states the pain has been improving. She is s/p bilateral RFA of C4/5/6 on 3/26 and 4/3. She reports >75% relief to both sides and is satisfied with the results. She does satate that she is having some numbness over the skin over the L side. She also states she is having left buttock pain that she describes as cramping/throbbing which occasionally radiates down posterior aspect of L thigh, stopping above the knee. She denies numbness/tingling in lower extremities.      Interval History 3/10/2022:   Sheila Costa presents to the clinic for a follow-up appointment for neck pain. Since the last visit, Sheila Costa states the pain has been stable. Worse in the mornings. Feels like a stiffness in the neck without radicular symptoms as her radicular symptoms had been resolved since her MICHELLE. Some paraesthesia in arms and hands with typing. Patient states that mobic is beneficial. Best relief of her axial neck pain in the past was with RFA done previously. She discontinued her amitriptyline prescription and did not notice a difference in pain with discontinuation. Denies bowel/bladder dysfunction or difficulty with fine motor skills.     Interval History 1/6/2022:   Sheila Costa presents to the clinic for a follow-up appointment s/p Cervical Interlaminar Epidural Steroid Injection at the end of this past December. Since the last visit, Sheila Costa states the pain has been improving. Current pain intensity is 3/10 but she is still experiencing stiffness. Pain is primarly throbbing that would travel down BL arms. She has been  doing well. Patient states that mobic and elavil have been helping with pain. Patient is sleeping well currently.     Interval History 11/15/2021:   Pt returns to clinic today due to resurgence of her bilateral neck and arm pain. At her last interventional pain encounter she had RFA left C4,5,6 on 03/04/2020 and the right done on 06/2020. Pt reports this provided significant relief of her pain however she has been having increased neck pain with radiation into her arms down to her hands. She has had a cervical epidural in the past with at least 50% relief of her pain. She has been utilizing OTC ibuprofen as well as a chiropractor.   Pt also reports low back pain without any radiation into the lower extremities. She states that the pain is aching and worsened with prolonged physical activity.      Interval History 11/25/2019:  The patient returns to clinic today for follow up. She reports a few days of relief with trigger point injections at last visit. She continues to report neck pain with intermittent radiating pain into both arms to her hands. She also reports mid back pain. 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 She states the previous MICHELLE helped for her arm pain but not for her neck pain. She has had limited relief with NSAIDs and physical therapy. She denies any other health changes. Her pain today is 7/10.     Interval History 10/28/2019:  The patient returns to clinic today for follow up. She is s/p C7-T1 IL MICHELLE on 10/2/2019. She reports 50% relief of her neck and arm pain. She reports intermittent neck pain that is sore in nature. She does report increased mid back pain. She describes this pain as tight and aching in nature. She denies any radicular arm pain. She denies any other health changes. Her pain today is 6/10.     Pain Disability Index Review:      5/9/2024     9:43 AM 4/13/2023     2:26 PM 3/10/2022     4:01 PM   Last 3 PDI Scores   Pain Disability Index (PDI) 35 51 16       Pain  Procedures:   10/2/2019- C7-T1 IL MICHELLE- 50% pain relief  1/8/2020- Bilateral C4,5,6 MBB  1/29/2020- Bilateral C4,5,6 MBB  3/4/2020: Left C4,5,6 RFA - 80% relief  6/3/2020: Right C4,5,6 RFA - 80% relief  12/22/2021- C7/T1 IL MICHELLE  3/23/2022- Right C4,5,6 RFA -80-85% relief  4/6/2022- Left C4,5,6 RFA- 75% relief   5/8/2022- Left SI joint and GTB injection  7/6/2022- C7/T1 IL MICHELLE  12/14/2022- Bilateral L3,4,5 MBB- 95% relief  1/25/2023- Bilateral L3,4,5 MBB  3/1/2023- Bilateral L3,4,5 RFA  5/3/2023- Bilateral C4,5,6 RFA  5/24/2023- Bilateral SI joint injections  2/26/2024 - Intracept at L5 and S1     Physical Therapy/Home Exercise: yes     Imaging:   MRI Lumbar Spine 11/11/2022:  COMPARISON:  11/01/2022     FINDINGS:  Alignment: Normal.     Vertebrae: Degenerative endplate changes at L5-S1.  No fracture or marrow infiltrative process.     Discs: Mild disc height loss at L5-S1 with annular fissure.  No evidence for discitis.     Cord: Conus terminates at L1 and appears unremarkable.  Cauda equina appears unremarkable.     Degenerative findings:     T12-L1: No spinal canal stenosis or neural foraminal narrowing.     L1-L2: No spinal canal stenosis or neural foraminal narrowing.     L2-L3: No spinal canal stenosis or neural foraminal narrowing.     L3-L4: No spinal canal stenosis or neural foraminal narrowing.     L4-L5: Circumferential disc bulge and mild facet arthropathy result in mild left neural foraminal narrowing.     L5-S1: Circumferential disc bulge and moderate facet arthropathy result in mild right, moderate left neural foraminal narrowing.     Paraspinal muscles & soft tissues: Paraspinal muscle bulk is maintained.  Small bilateral renal cysts noted.     Impression:     1. Degenerative changes of the lower lumbar spine detailed above.  Moderate left neural foraminal narrowing noted at L5-S1.     MRI Cervical Spine 11/20/2019:  COMPARISON:  MRI cervical spine without contrast 03/06/2019     FINDINGS:  Alignment:  Sagittal alignment is preserved.     Vertebrae: Normal marrow signal without osseous destructive process or aggressive bone marrow replacement process.  No fracture.     Discs: There is mild disc space narrowing at C2-C3 with endplate irregularity, likely degenerative and similar to the prior examination.  Remaining discs demonstrate normal height and signal.     Cord: Normal caliber, morphology, and signal.     Degenerative findings:     C2-C3: Posterior disc osteophyte complex and left uncovertebral joint spurring contribute to mild left neural foraminal narrowing.  No spinal canal stenosis.     C3-C4: Mild posterior disc osteophyte complex results in mild effacement of the ventral thecal sac without significant spinal canal stenosis or neural foraminal narrowing.     C4-C5: Posterior disc osteophyte complex results in effacement of the anterior thecal sac without significant spinal canal stenosis or neural foraminal narrowing.     C5-C6: Left uncovertebral joint spurring contributes to mild left neural foraminal narrowing without significant spinal canal stenosis.     C6-C7: No significant disc abnormality, spinal canal stenosis, or neural foraminal narrowing.     C7-T1: No significant disc abnormality, spinal canal stenosis, or neural foraminal narrowing.     Limited sagittal evaluation of the upper thoracic spine reveals a disc protrusion at T4-T5 causing effacement of the anterior thecal sac and cord abutment.     Paraspinal muscles & soft tissues: Unremarkable without spinal canal or paraspinal mass.     Impression:     Mild cervical degenerative changes contributing to mild left neural foraminal narrowing at C2-C3 and C5-C6.  No spinal canal stenosis.     T4-T5 disc protrusion resulting in effacement of the anterior thecal sac, abutting the cord.     Narrowing of the C2-C3 disc space with endplate irregularity, likely degenerative and stable compared to the prior examination.       Allergies:   Review of  patient's allergies indicates:   Allergen Reactions    Amoxicillin Hives       Current Medications:   Current Outpatient Medications   Medication Sig Dispense Refill    FLUoxetine 20 MG capsule Take 20 mg by mouth.      zolpidem (AMBIEN) 10 mg Tab Take 10 mg by mouth every evening.      ascorbic acid, vitamin C, (VITAMIN C) 100 MG tablet Take 100 mg by mouth once daily.      atenoloL-chlorthalidone (TENORETIC) 50-25 mg Tab TAKE 1 TABLET BY MOUTH ONCE EVERY DAY      celecoxib (CELEBREX) 100 MG capsule TAKE 1 CAPSULE(100 MG) BY MOUTH TWICE DAILY 60 capsule 2    DULoxetine (CYMBALTA) 60 MG capsule Take 1 capsule (60 mg total) by mouth once daily. 30 capsule 11    estradioL (VIVELLE-DOT) 0.025 mg/24 hr Place 1 patch onto the skin twice a week. 8 patch 11    estradioL (VIVELLE-DOT) 0.0375 mg/24 hr Place 1 patch onto the skin twice a week. 8 patch 11    levonorgestrel (MIRENA) 20 mcg/24 hr (5 years) IUD 1 Intra Uterine Device by Intrauterine route once. for 1 dose 1 Intra Uterine Device 0    loratadine (CLARITIN) 10 mg tablet Take 10 mg by mouth every morning.       metFORMIN (GLUCOPHAGE) 500 MG tablet Take 500 mg by mouth Daily.      methocarbamoL (ROBAXIN) 500 MG Tab TAKE 1 TABLET(500 MG) BY MOUTH THREE TIMES DAILY AS NEEDED FOR MUSCLE PAIN 30 tablet 1    multivitamin capsule Take 1 capsule by mouth once daily.      mupirocin (BACTROBAN) 2 % ointment Apply topically once daily. 22 g 0    ondansetron (ZOFRAN-ODT) 4 MG TbDL Take 4 mg by mouth every 6 (six) hours as needed.      potassium chloride (MICRO-K) 10 MEQ CpSR Take 2 capsules (20 mEq total) by mouth once daily. 180 capsule 3    vitamin D (VITAMIN D3) 1000 units Tab Take 1,000 Units by mouth once daily.       No current facility-administered medications for this visit.       REVIEW OF SYSTEMS:    GENERAL:  No weight loss, malaise or fevers.  HEENT:  Negative for frequent or significant headaches.  NECK:  Negative for lumps, goiter, pain and significant neck  swelling.  RESPIRATORY:  Negative for cough, wheezing or shortness of breath.  CARDIOVASCULAR:  Negative for chest pain, leg swelling or palpitations.  GI:  Negative for abdominal discomfort, blood in stools or black stools or change in bowel habits.  MUSCULOSKELETAL:  See HPI.  SKIN:  Negative for lesions, rash, and itching.  PSYCH:  Negative for sleep disturbance, mood disorder and recent psychosocial stressors.  HEMATOLOGY/LYMPHOLOGY:  Negative for prolonged bleeding, bruising easily or swollen nodes.  NEURO:   No history of headaches, syncope, paralysis, seizures or tremors.  All other reviewed and negative other than HPI.    Past Medical History:  Past Medical History:   Diagnosis Date    Alpha thalassemia     Back pain     Cervical spondylosis 12/30/2019    Colon polyp     Fatty liver     Hypertension     Prediabetes     Premenstrual symptom        Past Surgical History:  Past Surgical History:   Procedure Laterality Date    COLONOSCOPY N/A 6/18/2020    Procedure: COLONOSCOPY;  Surgeon: Eliot Hill MD;  Location: [facility] ([address] [place] [address]);  Service: Endoscopy;  Laterality: N/A;  COVID screening on 6/16/20-[facility]    COLONOSCOPY N/A 7/6/2020    Procedure: COLONOSCOPY;  Surgeon: Kang Guzmán MD;  [address]  Service: Colon and Rectal;  Laterality: N/A;    COLONOSCOPY N/A 7/9/2021    Procedure: COLONOSCOPY;  Surgeon: GREGG Guzmán MD;  [facility]  Service: Endoscopy;  Laterality: N/A;  in July 2021  covid test [place] 7/6    DESTRUCTION, INTRAOSSEOUS BASIVERTEBRAL NERVE, EACH ADD'L BODY, LUM/SAC Bilateral 2/26/2024    Procedure: DESTRUCTION,INTRAOSSEOUS BASIVERTEBRAL NERVE,EACH ADD'L BODY,LUM/SAC;  Surgeon: Cosme Kirkland MD;  [facility]: [facility];  Service: Pain Management;  Laterality: Bilateral;  L5 & S1    EPIDURAL STEROID INJECTION N/A 10/2/2019    Procedure: INJECTION, STEROID, EPIDURAL, C7-T1;  Surgeon: Cosme Kirkland MD;  Location: [facility]GT;   Service: Pain Management;  Laterality: N/A;    EPIDURAL STEROID INJECTION N/A 12/22/2021    Procedure: INJECTION, STEROID, EPIDURAL, C7-T1 NEED CONSENT;  Surgeon: Cosme Kirkland MD;  Location: [facility] PAIN MGT;  Service: Pain Management;  Laterality: N/A;    EPIDURAL STEROID INJECTION N/A 7/6/2022    Procedure: INJECTION, STEROID, EPIDURAL, C7-T1 IL  CONTRAST;  Surgeon: Cosme Kirkland MD;  Location: [facility] PAIN MGT;  Service: Pain Management;  Laterality: N/A;    EYE SURGERY      INJECTION OF ANESTHETIC AGENT AROUND NERVE Bilateral 1/8/2020    Procedure: BLOCK, NERVE C4,5,6;  Surgeon: Cosme Kirkland MD;  Location: [facility] PAIN MGT;  Service: Pain Management;  Laterality: Bilateral;  B/L MBB C4,5,6    INJECTION OF ANESTHETIC AGENT AROUND NERVE Bilateral 1/29/2020    Procedure: BLOCK, NERVE, Repeat C4-C5-C6 MEDIAL BRANCH;  Surgeon: Cosme Kirkland MD;  Location: [facility] PAIN MGT;  Service: Pain Management;  Laterality: Bilateral;    INJECTION OF ANESTHETIC AGENT AROUND NERVE Bilateral 12/14/2022    Procedure: BLOCK, NERVE BILATERAL L3,L4,L5 MEDIAL BRANCH NEEDS CONSENT;  Surgeon: Cosme Kirkland MD;  Location: [facility] PAIN MGT;  Service: Pain Management;  Laterality: Bilateral;    INJECTION OF ANESTHETIC AGENT AROUND NERVE Bilateral 1/25/2023    Procedure: BLOCK, NERVE BILATERAL L3,L4,L5 MEDIAL BRANCH;  Surgeon: Cosme Kirkland MD;  Location: [facility] PAIN MGT;  Service: Pain Management;  Laterality: Bilateral;    INJECTION OF ANESTHETIC AGENT AROUND NERVE Bilateral 11/1/2023    Procedure: BLOCK, NERVE BILATERAL L5, S1, AND S2 LATERAL BRANCH;  Surgeon: Cosme Kirkland MD;  Location: [facility] PAIN MGT;  Service: Pain Management;  Laterality: Bilateral;    INJECTION OF ANESTHETIC AGENT AROUND NERVE Bilateral 11/22/2023    Procedure: BLOCK, NERVE BILATERAL L5, S1, AND S2 MEDIAL BRANCH;  Surgeon: Cosme Kirkland MD;  Location: [facility] PAIN [facility]GT;  Service: Pain Management;  Laterality: Bilateral;    INJECTION OF JOINT  Left 5/18/2022    Procedure: INJECTION, JOINT, LEFT SI and GTB *LORI PT*;  Surgeon: Jed Phillips MD;  Location: [facility] PAIN MGT;  Service: Pain Management;  Laterality: Left;    INJECTION, SACROILIAC JOINT Bilateral 5/24/2023    Procedure: INJECTION,SACROILIAC JOINT BIALTERAL;  Surgeon: Cosme Sandoval MD;  Location: [facility] PAIN MGT;  Service: Pain Management;  Laterality: Bilateral;    LAPAROSCOPIC SURGICAL REMOVAL OF CECUM N/A 7/6/2020    Procedure: EXCISION, CECUM, LAPAROSCOPIC, colonoscopy to start, ERAS low;  Surgeon: Kang Guzmán MD;  Location: [facility] OR 2ND FLR;  Service: Colon and Rectal;  Laterality: N/A;    RADIOFREQUENCY ABLATION Left 3/4/2020    Procedure: RADIOFREQUENCY ABLATION, C4-C5-C6 ME DIAL BRANCH 1 OF 2 need consent;  Surgeon: Cosme Sandoval MD;  Location: [facility] PAIN MGT;  Service: Pain Management;  Laterality: Left;    RADIOFREQUENCY ABLATION Right 6/3/2020    Procedure: RADIOFREQUENCY ABLATION, C4-C5-C6 MEDIAL BRANCH 2 OF 2 need consent;  Surgeon: Cosme Sandoval MD;  Location: [facility] PAIN MGT;  Service: Pain Management;  Laterality: Right;    RADIOFREQUENCY ABLATION Right 3/23/2022    Procedure: Radiofrequency Ablation RIGHT C4,5,6;  Surgeon: Cosme Sandoval MD;  Location: [facility] PAIN MGT;  Service: Pain Management;  Laterality: Right;    RADIOFREQUENCY ABLATION Left 4/6/2022    Procedure: Radiofrequency Ablation LEFT C4,5,6;  Surgeon: Cosme Sandoval MD;  Location: [facility] PAIN MGT;  Service: Pain Management;  Laterality: Left;    RADIOFREQUENCY ABLATION Bilateral 3/1/2023    Procedure: RADIOFREQUENCY ABLATION BILATERAL L3,L4,L5 BOTH SIDES SAME TIME PER DR SANDOVAL;  Surgeon: Cosme Sandoval MD;  Location: [facility] PAIN MGT;  Service: Pain Management;  Laterality: Bilateral;    RADIOFREQUENCY ABLATION Bilateral 5/3/2023    Procedure: RADIOFREQUENCY ABLATION BILATERAL C4,C5,C6 BOTH SIDES PER DR SANDOVAL;  Surgeon: Cosme Sandoval MD;  Location: [facility] PAIN MGT;  Service: Pain  Management;  Laterality: Bilateral;    REFRACTIVE SURGERY  2008    SMALL INTESTINE SURGERY  7/6/20       Family History:  Family History   Problem Relation Name Age of Onset    Hypertension Mother Celsa Hayes     Thyroid disease Mother Celsa Hayes     Rheum arthritis Mother Celsa Hayes     Hearing loss Mother Celsa Hayes     Heart disease Father Wes Irving         Pacemaker    Cancer Maternal Grandmother Katy Zuñiga Ray     Cataracts Maternal Grandmother Katy Housera Ray     Diabetes Maternal Grandmother Katy Zuñiga Ray     Hypertension Maternal Grandmother Katy Zuñiga Ray     Thyroid disease Maternal Grandmother Katy Housera Ray     Breast cancer Maternal Grandmother Katy Housera Ray     Hearing loss Maternal Grandmother Katy Housera Ray     Cataracts Maternal Grandfather Max Garrison, Sr.     Diabetes Maternal Grandfather Max Grarison, Sr.     Hypertension Maternal Grandfather Max Garrison, Sr.     Thyroid disease Maternal Grandfather Max Garrison, Sr.     Lupus Cousin mother's 1st cousin     Cancer Maternal Uncle      No Known Problems Paternal Grandmother      No Known Problems Paternal Grandfather      Amblyopia Neg Hx      Blindness Neg Hx      Glaucoma Neg Hx      Macular degeneration Neg Hx      Retinal detachment Neg Hx      Strabismus Neg Hx      Stroke Neg Hx      Ovarian cancer Neg Hx      Colon cancer Neg Hx      Cirrhosis Neg Hx         Social History:  Social History     Socioeconomic History    Marital status:    Occupational History     Employer: [employer]   Tobacco Use    Smoking status: Never    Smokeless tobacco: Never   Substance and Sexual Activity    Alcohol use: Yes     Alcohol/week: 1.0 standard drink of alcohol     Types: 1 Standard drinks or equivalent per week     Comment: 3 drinks weekly     Drug use: No    Sexual activity: Yes     Partners: Male     Birth control/protection: I.U.D.   Social History Narrative      Children - Dolores and Florin, Jerichosband - Florin        Used to walk, ride bikes. Occasional uses elliptical at home.      Social Determinants of Health     Financial Resource Strain: Low Risk  (5/8/2024)    Overall Financial Resource Strain (CARDIA)     Difficulty of Paying Living Expenses: Not hard at all   Food Insecurity: No Food Insecurity (5/8/2024)    Hunger Vital Sign     Worried About Running Out of Food in the Last Year: Never true     Ran Out of Food in the Last Year: Never true   Transportation Needs: No Transportation Needs (5/8/2024)    PRAPARE - Transportation     Lack of Transportation (Medical): No     Lack of Transportation (Non-Medical): No   Physical Activity: Insufficiently Active (5/8/2024)    Exercise Vital Sign     Days of Exercise per Week: 1 day     Minutes of Exercise per Session: 20 min   Stress: Stress Concern Present (5/8/2024)    [employer] - Occupational Stress Questionnaire     Feeling of Stress : To some extent   Housing Stability: Low Risk  (9/13/2023)    Housing Stability Vital Sign     Unable to Pay for Housing in the Last Year: No     Number of Places Lived in the Last Year: 1     Unstable Housing in the Last Year: No       OBJECTIVE:    Vitals:    05/09/24 0947   BP: 113/68   Pulse: 64   Temp: 97.9 °F (36.6 °C)   Weight: 70.4 kg (155 lb 3.3 oz)   PainSc: 0-No pain       PHYSICAL EXAM:     General appearance: Well appearing, in no acute distress, alert and oriented x3.  Psych:  Mood and affect appropriate.  Skin: Skin color, texture, turgor normal, no rashes or lesions, in both upper and lower body.  Head/face:  Atraumatic, normocephalic. No palpable lymph nodes  Neck: limited  ROM. Moderate TTP over cervical spine or paracervical muscles. moderate  pain to palpation over the cervical paraspinous muscles. Spurling Negative. There is pain with neck extension and lateral flexion. Positive facet loading bilaterally.   Cor: RRR  Pulm:  CTA  GI: Abdomen soft and non-tender.  Back: No Pain to palpation over the spine or costovertebral angles. Normal range of motion without pain reproduction, -ADRIAN   Extremities: Peripheral joint ROM is full and pain free without obvious instability or laxity in all four extremities. No deformities, edema, or skin discoloration. Good capillary refill.  Musculoskeletal: Shoulder, hip, sacroiliac and knee provocative maneuvers are negative. Bilateral upper and lower extremity strength is normal and symmetric.  No atrophy or tone abnormalities are noted.  Neuro: Bilateral upper and lower extremity coordination and muscle stretch reflexes are physiologic and symmetric.  Plantar response are downgoing. No loss of sensation is noted.  Gait: normal          1. Other spondylosis, cervical region  Procedure Order to Pain Management      2. Spondylosis without myelopathy  Procedure Order to Pain Management    CANCELED: Procedure Order to Pain Management    CANCELED: Procedure Request Order for Pain Management      3. DDD (degenerative disc disease), cervical  Procedure Order to Pain Management          PLAN:   - I have stressed the importance of physical activity and a home exercise plan to help with pain and improve health.  - Patient can continue with medications for now since they are providing benefits, using them appropriately, and without side effects.  - She continues to do well following Intracept procedure, but her neck pain has returned  - Schedule for repeat bilateral C4,5,6 RFA.  - Counseled patient regarding the importance of activity modification, constant sleeping habits, and physical therapy.      The above plan and management options were discussed at length with patient. Patient is in agreement with the above and verbalized understanding.    Maurilio Somers MD  [facility]   I have personally reviewed the history and exam of this patient and agree with the resident/fellow/NPs note as stated  above.    Cosme Kirkland MD  5/9/24

## 2024-05-13 ENCOUNTER — TELEPHONE (OUTPATIENT)
Dept: ENDOSCOPY | Facility: HOSPITAL | Age: 54
End: 2024-05-13

## 2024-05-13 ENCOUNTER — CLINICAL SUPPORT (OUTPATIENT)
Dept: ENDOSCOPY | Facility: HOSPITAL | Age: 54
End: 2024-05-13
Attending: HOSPITALIST
Payer: COMMERCIAL

## 2024-05-13 DIAGNOSIS — Z12.12 SCREENING FOR COLORECTAL CANCER: ICD-10-CM

## 2024-05-13 DIAGNOSIS — Z12.11 SCREENING FOR COLORECTAL CANCER: ICD-10-CM

## 2024-05-13 RX ORDER — POLYETHYLENE GLYCOL 3350, SODIUM SULFATE, POTASSIUM CHLORIDE, MAGNESIUM SULFATE, AND SODIUM CHLORIDE FOR ORAL SOLUTION 178.7-7.3G
1 KIT ORAL DAILY
Qty: 2 EACH | Refills: 0 | Status: SHIPPED | OUTPATIENT
Start: 2024-05-13 | End: 2024-05-15

## 2024-05-13 NOTE — PLAN OF CARE
Spoke to patient to schedule procedure(s) Colonoscopy       Physician to perform procedure(s) Dr. GREGG Guzmán  Date of Procedure (s) 7/26/24  Arrival Time 6:00 AM  Time of Procedure(s) 7:00 AM   Location of Procedure(s) [place] 4th Floor  Type of Rx Prep sent to patient: Suflave  Instructions provided to patient via [facility]    Patient was informed on the following information and verbalized understanding. Screening questionnaire reviewed with patient and complete. If procedure requires anesthesia, a responsible adult needs to be present to accompany the patient home, patient cannot drive after receiving anesthesia. Appointment details are tentative, especially check-in time. Patient will receive a prep-op call 7 days prior to confirm check-in time for procedure. If applicable the patient should contact their pharmacy to verify Rx for procedure prep is ready for pick-up. Patient was advised to call the scheduling department at [phone] if pharmacy states no Rx is available. Patient was advised to call the endoscopy scheduling department if any questions or concerns arise.      [facility] Scheduling Department

## 2024-05-13 NOTE — TELEPHONE ENCOUNTER
Spoke to patient to schedule procedure(s) Colonoscopy       Physician to perform procedure(s) Dr. GREGG Guzmán  Date of Procedure (s) 7/26/24  Arrival Time 6:00 AM  Time of Procedure(s) 7:00 AM   Location of Procedure(s) [address]  Type of Rx Prep sent to patient: Suflave  Instructions provided to patient via [facility]     Patient was informed on the following information and verbalized understanding. Screening questionnaire reviewed with patient and complete. If procedure requires anesthesia, a responsible adult needs to be present to accompany the patient home, patient cannot drive after receiving anesthesia. Appointment details are tentative, especially check-in time. Patient will receive a prep-op call 7 days prior to confirm check-in time for procedure. If applicable the patient should contact their pharmacy to verify Rx for procedure prep is ready for pick-up. Patient was advised to call the scheduling department at [phone] if pharmacy states no Rx is available. Patient was advised to call the endoscopy scheduling department if any questions or concerns arise.         Endoscopy Scheduling Department         Colonoscopy Procedure Prep Instructions     Date of procedure: 7/26/24 Arrive at: 6:00 AM     Location of Department:   [facility] [address] [place] [address]  Take the [facility] Elevators to 4th Floor Endoscopy Lab       As soon as possible:   your prep from pharmacy and over the counter DULCOLAX LAXATIVE TABLETS          On the day before your procedure   What You CAN do:   You may have clear liquids ONLY-see below for list.      What You CANNOT do:   Do not EAT solid food, drink milk or anything   colored red.  Do not drink alcohol.  Do not take oral medications within 1 hour of starting   each dose of SUFLAVE.  No gum chewing or candy morning of procedure     Liquids That Are OK to Drink:   Water  Sports drinks (Gatorade, Power-Aid)  Coffee or  tea (no cream or nondairy creamer)  Clear juices without pulp (apple, white grape)  Gelatin desserts (no fruit or toppings)  Clear soda (sprite, coke, ginger ale)  Chicken broth (until 12 midnight the night before procedure)     Note:      (Please disregard the insert instructions from pharmacy).  SUFLAVE Bowel Prep Kit is indicated for cleansing of the colon as a preparation for colonoscopy in adults.   Be sure to tell your doctor about all the medicines you take, including prescription and non-prescription medicines, vitamins, and herbal supplements. SUFLAVE Bowel Prep Kit may affect how other medicines work.  Medication taken by mouth may not be absorbed properly when taken within 1 hour before the start of each dose of SUFLAVE Bowel Prep Kit.     It is not uncommon to experience some abdominal cramping, nausea and/or vomiting when taking the prep. If you have nausea and/or vomiting while taking the prep, stop drinking for 20 to 30 minutes then continue.        How to take prep:     SUFLAVE Bowel Prep Kit is a (2-day) prep.   Two (2) doses of SUFLAVE are required for a complete preparation. Each dose consists of 1 bottle and 1 flavoring packet. Mix as directed prior to use. You must drink water with each dose of SUFLAVE, and additional water after each dose.     DOSE 1--Day Before Colonoscopy 7/25/24      Drink at least 6 to 8 glasses of clear liquids from time you wake up until you begin your prep and then continue until bedtime to avoid dehydration.      12:00 pm (NOON) Take four (4) Dulcolax (Bisacodyl) tablets with at least 8 ounces or more of clear liquids.       Open ONE (1) flavor-enhancing packet and pour the contents into one (1) bottle. Add lukewarm water up to the fill line. Mix until all the powder has disappeared.    Refrigerate the container.      6:00 pm:     You must complete Steps 1 through 3 before going to bed as shown below:     Step 1-Drink ALL the liquid in the container within   one (1)  hour.   Step 2-You must drink another 16-ounces of clear liquids.   Step 3-Repeat mixing instructions for the 2nd dose and refrigerate for the morning of the procedure.       IMPORTANT: If you experience preparation-related symptoms (for example, nausea, bloating, or cramping), stop or slow the rate of drinking the additional water until your symptoms decrease.     DOSE 2--Day of the Colonoscopy 7/26/24 at 2-3 AM.     For this dose, repeat Steps 1 and 2 shown above.      You may continue drinking water/clear liquids until   4 hours before your colonoscopy or as directed by the scheduling nurse  3:00AM.        For information about your procedure, two (2) things to view prior to colonoscopy:  Please watch this informational video. It is important to watch this animated consent video prior to your arrival. If you haven't watched the video prior to arriving, you are required to watch it during admission which can causes delays.     Options for viewing:   Using a keyboard:  press and hold the control tab (Ctrl) and left mouse click to follow links.            Colonoscopy Instructional Video                                                                                   OR     Type link address into your web browser's address bar:  [email or web address]        Educational Booklet with pictures:     Colonoscopy Prep - Liquid             IMPORTANT INFORMATION TO KNOW BEFORE YOUR PROCEDURE     [facility] [place] 4th Floor     If your procedure requires the administration of anesthesia, it is necessary for a responsible adult to drive you home. (Medical Transportation, Uber, Lyft, Taxi, etc. may ONLY be used if a responsible adult is present to accompany you home.  The responsible adult CAN'T be the  of the service).       person must be available to return to pick you up within 15 minutes of being notified of discharge.         Please bring a picture ID, insurance card,  & copayment        Take Medications as directed below:     If you are taking any injectable medication (s) for weight loss and/or diabetes  weekly, please hold for 8 days prior to your scheduled procedure, please stop Ozempic (Semaglutide)  take last dose on 7/18/24. After the procedure, your provider will inform you  of when to resume injection.        If you begin taking any blood thinning medications or injectable weight loss/diabetes medications (other than insulin) , please contact the endoscopy scheduling department listed below as soon as possible.     If you are diabetic see the attached instruction sheet regarding your medication.      If you take HEART, BLOOD PRESSURE, SEIZURE, PAIN, LUNG (including inhalers/nebulizers), ANTI-REJECTION (transplant patients), or PSYCHIATRIC medications, please take at your regular times with a sip of water or as directed by the scheduling nurse.      Important contact information:     Endoscopy [phone] Hours of operation Monday-Friday 8:00-4:30pm.     Questions about insurance or financial obligations call [phone] or [phone].     If you have questions regarding the prep or need to reschedule, please call [phone]. After hours questions requiring immediate assistance, contact [facility] nurse line at [phone] or [phone].   NOTE:      On occasion, unforeseen circumstances may cause a delay in your procedure start time. We respect your time and appreciate your patience during these circumstances.            Comments: Diabetes Medication Instructions                       If you are unsure about any of these instructions, call [facility] at [phone].                                               Oral Medicine   Day of Prep   Day of Procedure                   Glyburide       Do NOT take morning of procedure. If you               Glucotrol (Glipizide)   Do NOT take. take twice daily, take with dinner.                Amaryl (Glimepiride)                                                           Glucophage       Do NOT take morning of procedure. Take               Glumetza   Take as   when you start eating again.                 Fortamet (Metformin)   prescribed.                                                       Januvia (Sitaglipitin)       Do NOT take morning of procedure. Take               Nesina (Aloglipitin)       when you start eating again.                 Onglyza (Saxaglipitin)   Take as                         Tradjenta (Linaglipitin)   prescribed.                                                       Invokana (Canagliflozin)                           Farxiga (Dapagliflozin)   Stop 2 days   Do NOT take morning of procedure. Take               Jardiance(Empagliflozin) before prep.   when you start eating again.                                               Actos (Pioglitazone)   Take as   Do NOT take morning of procedure. Take                   prescribed.   when you start eating again.                                               Injectable & Combination Daily Dose   Weekly Dose                   Medicine                             Adlyxin (Lixisenatide)   If you take these For weekly dose, hold dose at least 8 days prior             Byetta (Exenatide)   medications daily to appointment. You may take the dose after your             Bydureon (Exenatide XL) on the day of your procedure.                     Ozempic (Semaglutide)   appointment hold                        Trulicity (Dulaglutide)   that dose until                        Victoza (Liraglutide)   after your                         Mounjaro (Tirzepatide)   procedure.                         Margaret Robert                             It is important to monitor your blood sugar while doing the bowel preparation. On the day of your bowel   prep, when you are on a clear liquid diet, you may drink beverages with sugar as your  source of glucose.   Be sure to mix the prep with water or sugar free liquid only. Below are instructions on how to adjust your   diabetic medications prior to your scheduled procedure. Call the healthcare provider who manages your   diabetes if you have questions.      Crow Kaminski

## 2024-05-14 ENCOUNTER — PATIENT MESSAGE (OUTPATIENT)
Dept: PAIN MEDICINE | Facility: CLINIC | Age: 54
End: 2024-05-14
Payer: COMMERCIAL

## 2024-05-15 ENCOUNTER — PATIENT MESSAGE (OUTPATIENT)
Dept: INTERNAL MEDICINE | Facility: CLINIC | Age: 54
End: 2024-05-15
Payer: COMMERCIAL

## 2024-05-15 DIAGNOSIS — R73.03 PREDIABETES: Primary | ICD-10-CM

## 2024-05-16 NOTE — TELEPHONE ENCOUNTER
Pt states she is taking a full pill of BP meds daily, she want to know should she still be continuing metformin, given for pre diabetes.

## 2024-05-17 RX ORDER — METFORMIN HYDROCHLORIDE 500 MG/1
500 TABLET ORAL DAILY
Qty: 90 TABLET | Refills: 3 | Status: SHIPPED | OUTPATIENT
Start: 2024-05-17

## 2024-05-21 PROBLEM — M43.02 SPONDYLOLYSIS, CERVICAL REGION: Status: RESOLVED | Noted: 2024-05-21 | Resolved: 2024-05-21

## 2024-05-21 PROBLEM — M43.02 SPONDYLOLYSIS, CERVICAL REGION: Status: ACTIVE | Noted: 2024-05-21

## 2024-05-21 PROBLEM — M54.2 CERVICAL PAIN (NECK): Status: RESOLVED | Noted: 2019-03-06 | Resolved: 2024-05-21

## 2024-05-21 PROBLEM — M47.819 SPONDYLOSIS WITHOUT MYELOPATHY: Status: ACTIVE | Noted: 2024-05-21

## 2024-05-28 ENCOUNTER — PATIENT MESSAGE (OUTPATIENT)
Dept: INTERNAL MEDICINE | Facility: CLINIC | Age: 54
End: 2024-05-28
Payer: COMMERCIAL

## 2024-05-29 ENCOUNTER — HOSPITAL ENCOUNTER (OUTPATIENT)
Facility: OTHER | Age: 54
Discharge: HOME | End: 2024-05-29
Attending: ANESTHESIOLOGY | Admitting: ANESTHESIOLOGY
Payer: COMMERCIAL

## 2024-05-29 VITALS
OXYGEN SATURATION: 97 % | SYSTOLIC BLOOD PRESSURE: 146 MMHG | DIASTOLIC BLOOD PRESSURE: 70 MMHG | HEIGHT: 61 IN | HEART RATE: 79 BPM | TEMPERATURE: 98 F | BODY MASS INDEX: 28.32 KG/M2 | RESPIRATION RATE: 16 BRPM | WEIGHT: 150 LBS

## 2024-05-29 DIAGNOSIS — G89.29 CHRONIC PAIN: ICD-10-CM

## 2024-05-29 DIAGNOSIS — M47.819 SPONDYLOSIS WITHOUT MYELOPATHY: Primary | ICD-10-CM

## 2024-05-29 DIAGNOSIS — M47.899 OTHER OSTEOARTHRITIS OF SPINE, UNSPECIFIED SPINAL REGION: ICD-10-CM

## 2024-05-29 PROCEDURE — 64634 DESTROY C/TH FACET JNT ADDL: CPT | Mod: 50,,, | Performed by: ANESTHESIOLOGY

## 2024-05-29 PROCEDURE — 64633 DESTROY CERV/THOR FACET JNT: CPT | Mod: 50 | Performed by: ANESTHESIOLOGY

## 2024-05-29 PROCEDURE — 99152 MOD SED SAME PHYS/QHP 5/>YRS: CPT | Performed by: ANESTHESIOLOGY

## 2024-05-29 PROCEDURE — 64634 DESTROY C/TH FACET JNT ADDL: CPT | Mod: 50 | Performed by: ANESTHESIOLOGY

## 2024-05-29 PROCEDURE — 63600175 PHARM REV CODE 636 W HCPCS: Mod: JZ,JG | Performed by: ANESTHESIOLOGY

## 2024-05-29 PROCEDURE — 99152 MOD SED SAME PHYS/QHP 5/>YRS: CPT | Mod: ,,, | Performed by: ANESTHESIOLOGY

## 2024-05-29 PROCEDURE — 25000003 PHARM REV CODE 250: Performed by: ANESTHESIOLOGY

## 2024-05-29 PROCEDURE — 64633 DESTROY CERV/THOR FACET JNT: CPT | Mod: 50,,, | Performed by: ANESTHESIOLOGY

## 2024-05-29 PROCEDURE — 25000003 PHARM REV CODE 250: Performed by: STUDENT IN AN ORGANIZED HEALTH CARE EDUCATION/TRAINING PROGRAM

## 2024-05-29 RX ORDER — DEXAMETHASONE SODIUM PHOSPHATE 10 MG/ML
INJECTION INTRAMUSCULAR; INTRAVENOUS
Status: DISCONTINUED | OUTPATIENT
Start: 2024-05-29 | End: 2024-05-29 | Stop reason: HOSPADM

## 2024-05-29 RX ORDER — MIDAZOLAM HYDROCHLORIDE 1 MG/ML
INJECTION INTRAMUSCULAR; INTRAVENOUS
Status: DISCONTINUED | OUTPATIENT
Start: 2024-05-29 | End: 2024-05-29 | Stop reason: HOSPADM

## 2024-05-29 RX ORDER — SODIUM CHLORIDE 9 MG/ML
INJECTION, SOLUTION INTRAVENOUS CONTINUOUS
Status: DISCONTINUED | OUTPATIENT
Start: 2024-05-29 | End: 2024-05-29 | Stop reason: HOSPADM

## 2024-05-29 RX ORDER — LIDOCAINE HYDROCHLORIDE 20 MG/ML
INJECTION, SOLUTION INFILTRATION; PERINEURAL
Status: DISCONTINUED | OUTPATIENT
Start: 2024-05-29 | End: 2024-05-29 | Stop reason: HOSPADM

## 2024-05-29 RX ORDER — FENTANYL CITRATE 50 UG/ML
INJECTION, SOLUTION INTRAMUSCULAR; INTRAVENOUS
Status: DISCONTINUED | OUTPATIENT
Start: 2024-05-29 | End: 2024-05-29 | Stop reason: HOSPADM

## 2024-05-29 RX ORDER — BUPIVACAINE HYDROCHLORIDE 2.5 MG/ML
INJECTION, SOLUTION EPIDURAL; INFILTRATION; INTRACAUDAL
Status: DISCONTINUED | OUTPATIENT
Start: 2024-05-29 | End: 2024-05-29 | Stop reason: HOSPADM

## 2024-05-29 NOTE — INTERVAL H&P NOTE
The patient has been examined and the H&P has been reviewed:    I concur with the findings and no changes have occurred since H&P was written.    Procedure risks, benefits and alternative options discussed and understood by patient/family.          Active Hospital Problems    Diagnosis  POA    Other osteoarthritis of spine [M47.899]  Yes      Resolved Hospital Problems   No resolved problems to display.

## 2024-05-29 NOTE — DISCHARGE SUMMARY
Discharge Note  Short Stay      SUMMARY     Admit Date: 5/29/2024    Attending Physician: Cosme Kirkland MD      Discharge Physician: Maurilio Somers MD      Discharge Date: 5/29/2024 2:50 PM    Procedure(s) (LRB):  RADIOFREQUENCY ABLATION BILATERAL C4, 5, 6 (Bilateral)    Final Diagnosis: Spondylosis without myelopathy [M47.819]    Disposition: Home or self care    Patient Instructions:   Current Discharge Medication List        CONTINUE these medications which have NOT CHANGED    Details   ascorbic acid, vitamin C, (VITAMIN C) 100 MG tablet Take 100 mg by mouth once daily.      atenoloL-chlorthalidone (TENORETIC) 50-25 mg Tab TAKE 1 TABLET BY MOUTH ONCE EVERY DAY    Comments: .  Associated Diagnoses: Essential hypertension      celecoxib (CELEBREX) 100 MG capsule TAKE 1 CAPSULE(100 MG) BY MOUTH TWICE DAILY  Qty: 60 capsule, Refills: 2    Associated Diagnoses: Cervical spondylosis without myelopathy      DULoxetine (CYMBALTA) 60 MG capsule Take 1 capsule (60 mg total) by mouth once daily.  Qty: 30 capsule, Refills: 11    Associated Diagnoses: Vertebrogenic low back pain; Sacroiliitis; DDD (degenerative disc disease), lumbar      estradioL (VIVELLE-DOT) 0.025 mg/24 hr Place 1 patch onto the skin twice a week.  Qty: 8 patch, Refills: 11      estradioL (VIVELLE-DOT) 0.0375 mg/24 hr Place 1 patch onto the skin twice a week.  Qty: 8 patch, Refills: 11      FLUoxetine 20 MG capsule Take 20 mg by mouth.      levonorgestrel (MIRENA) 20 mcg/24 hr (5 years) IUD 1 Intra Uterine Device by Intrauterine route once. for 1 dose  Qty: 1 Intra Uterine Device, Refills: 0    Associated Diagnoses: Contraception, device intrauterine      loratadine (CLARITIN) 10 mg tablet Take 10 mg by mouth every morning.       metFORMIN (GLUCOPHAGE) 500 MG tablet Take 1 tablet (500 mg total) by mouth Daily.  Qty: 90 tablet, Refills: 3      methocarbamoL (ROBAXIN) 500 MG Tab TAKE 1 TABLET(500 MG) BY MOUTH THREE TIMES DAILY AS NEEDED FOR MUSCLE  PAIN  Qty: 30 tablet, Refills: 1    Associated Diagnoses: Myofascial pain      multivitamin capsule Take 1 capsule by mouth once daily.      mupirocin (BACTROBAN) 2 % ointment Apply topically once daily.  Qty: 22 g, Refills: 0    Associated Diagnoses: Post-operative pain      ondansetron (ZOFRAN-ODT) 4 MG TbDL Take 4 mg by mouth every 6 (six) hours as needed.      potassium chloride (MICRO-K) 10 MEQ CpSR Take 2 capsules (20 mEq total) by mouth once daily.  Qty: 180 capsule, Refills: 3    Comments: Discontinue prior scripts with same name      vitamin D (VITAMIN D3) 1000 units Tab Take 1,000 Units by mouth once daily.      zolpidem (AMBIEN) 10 mg Tab Take 10 mg by mouth every evening.                 Discharge Diagnosis: Spondylosis without myelopathy [M47.819]  Condition on Discharge: Stable with no complications to procedure   Diet on Discharge: Same as before.  Activity: as per instruction sheet.  Discharge to: Home with a responsible adult.  Follow up: 2-4 weeks       Please call my office, on call number [phone] or pager at [phone] if experienced any weakness or loss of sensation, fever > 101.5, pain uncontrolled with oral medications, persistent nausea/vomiting/or diarrhea, redness or drainage from the incisions, or any other worrisome concerns. If physician on call was not reached or could not communicate with our office for any reason please go to the nearest emergency department

## 2024-05-29 NOTE — OP NOTE
Therapeutic Cervical Medial Branch Radiofrequency Ablation Under Fluoroscopy     The procedure, risks, benefits, and options were discussed with the patient. There are no contraindications to the procedure. The patent expressed understanding and agreed to the procedure. Informed written consent was obtained prior to the start of the procedure and can be found in the patient's chart.        PATIENT NAME: Sheila Costa   MRN: [id number]     DATE OF PROCEDURE: 05/29/2024       PROCEDURE: Therapeutic Bilateral C4, C5, and C6 Cervical Radiofrequency Ablation under Fluoroscopy    PRE-OP DIAGNOSIS: Spondylosis without myelopathy [M47.819] Cervical Spondylosis [M47.812]    POST-OP DIAGNOSIS: Same    PHYSICIAN: Cosme Kirkland MD    ASSISTANTS: Maurilio Somers MD Fellow     MEDICATIONS INJECTED:  Preservative-free Decadron 10mg with 9cc of Bupivicaine 0.25%    LOCAL ANESTHETIC INJECTED:   Xylocaine 2%    SEDATION: Versed 2mg and Fentanyl 100mcg                                                                                                                                                                                     Conscious sedation ordered by M.D. Patient re-evaluation prior to administration of conscious sedation. No changes noted in patient's status from initial evaluation. The patient's vital signs were monitored by RN and patient remained hemodynamically stable throughout the procedure.    Event Time In   Sedation Start 1457   Sedation End 1516       ESTIMATED BLOOD LOSS:  None    COMPLICATIONS:  None.     INTERVAL HISTORY: Patient has clinical and imaging findings suggestive of recurrent facet mediated pain. Patient has undergone a previous RFA at specified levels with at least 50% relief for at least 6 months. Successful diagnostic medial branch blocks have been completed within the past 2 years.    TECHNIQUE: Time-out was performed to identify the patient and procedure to be performed. With the  patient laying in a prone position, the surgical area was prepped and draped in the usual sterile fashion using ChloraPrep and fenestrated drape. The levels were determined under fluoroscopic guidance. Skin anesthesia was achieved by injecting Lidocaine 2% over the injection sites. A 20 gauge 10mm curved active tip needle was introduced to the anatomic local of the medial branch at each of the above levels using AP, lateral and/or contralateral oblique fluoroscopic imaging. Then the sensory and motor testing was performed to confirm that the needle tips were in the correct location. After negative aspiration for blood or CSF was confirmed, 1 mL of the lidocaine 2% listed above was injected slowly at each site. This was followed by thermal lesioning at 80 degrees celsius for 90 seconds. That was followed by slowly injecting 1 mL of the medication mixture listed above at each site. The needles were removed and bleeding was nil. A sterile dressing was applied. No specimens collected. The patient tolerated the procedure well and did not have any procedure related motor deficit at the conclusion of the procedure.    The patient was monitored after the procedure in the recovery area. They were given post-procedure and discharge instructions to follow at home. The patient was discharged in a stable condition.     Maurilio Somers MD    I reviewed and edited the fellow's note. I conducted my own interview and physical examination. I agree with the findings. I was present and supervising all critical portions of the procedure.      Cosme Kirkland MD

## 2024-05-29 NOTE — TELEPHONE ENCOUNTER
Please advise Blood Pressure after taking whole blood pressure pill.    5/9/2024 = 113/68   5/10 = 127/69   5/12 = 125/87   5/13 = 130/85   5/16 = 122/80   5/17 = 135/77 and 131/87   5/19 = 141/83   5/20 = 146/88   5/22 = 144/81   5/24 = 160/100 (11 pm)   5/25 = 134/75   5/27 = 145/90   5/28 = 137/83     Please let me know next steps in terms of additional testing, etc....

## 2024-05-29 NOTE — DISCHARGE INSTRUCTIONS

## 2024-06-03 ENCOUNTER — PATIENT MESSAGE (OUTPATIENT)
Dept: ADMINISTRATIVE | Facility: OTHER | Age: 54
End: 2024-06-03
Payer: COMMERCIAL

## 2024-06-04 RX ORDER — ATENOLOL AND CHLORTHALIDONE TABLET 100; 25 MG/1; MG/1
1 TABLET ORAL DAILY
Qty: 90 TABLET | Refills: 3 | Status: SHIPPED | OUTPATIENT
Start: 2024-06-04 | End: 2025-06-04

## 2024-06-25 ENCOUNTER — PATIENT MESSAGE (OUTPATIENT)
Dept: ADMINISTRATIVE | Facility: HOSPITAL | Age: 54
End: 2024-06-25
Payer: COMMERCIAL

## 2024-06-25 ENCOUNTER — TELEPHONE (OUTPATIENT)
Dept: ENDOSCOPY | Facility: HOSPITAL | Age: 54
End: 2024-06-25
Payer: COMMERCIAL

## 2024-06-25 NOTE — TELEPHONE ENCOUNTER
Spoke to patient to schedule procedure(s) Colonoscopy       Physician to perform procedure(s) Dr. GREGG Guzmán  Date of Procedure (s) 9/13/24  Arrival Time 8:00 AM  Time of Procedure(s) 9:00 AM   Location of Procedure(s) [facility] [address]  Type of Rx Prep sent to patient: [facility]  Instructions provided to patient via [facility]    Patient was informed on the following information and verbalized understanding. Screening questionnaire reviewed with patient and complete. If procedure requires anesthesia, a responsible adult needs to be present to accompany the patient home, patient cannot drive after receiving anesthesia. Appointment details are tentative, especially check-in time. Patient will receive a prep-op call 7 days prior to confirm check-in time for procedure. If applicable the patient should contact their pharmacy to verify Rx for procedure prep is ready for pick-up. Patient was advised to call the scheduling department at [phone] if pharmacy states no Rx is available. Patient was advised to call the endoscopy scheduling department if any questions or concerns arise.      [facility] Scheduling Department

## 2024-06-27 ENCOUNTER — OFFICE VISIT (OUTPATIENT)
Dept: PAIN MEDICINE | Facility: CLINIC | Age: 54
End: 2024-06-27
Attending: ANESTHESIOLOGY
Payer: COMMERCIAL

## 2024-06-27 DIAGNOSIS — M47.819 SPONDYLOSIS WITHOUT MYELOPATHY: ICD-10-CM

## 2024-06-27 DIAGNOSIS — M47.892 OTHER SPONDYLOSIS, CERVICAL REGION: Primary | ICD-10-CM

## 2024-06-27 PROCEDURE — 99213 OFFICE O/P EST LOW 20 MIN: CPT | Mod: 95,,, | Performed by: ANESTHESIOLOGY

## 2024-06-27 NOTE — PROGRESS NOTES
Chronic Pain-Tele-Medicine-Established Note (Follow up visit)      The patient location is: [place]  The chief complaint leading to consultation is: neck pain  Visit type: Virtual visit with synchronous audio and video  Total time spent with patient: 15 minutes  Each patient to whom he or she provides medical services by telemedicine is:  (1) informed of the relationship between the physician and patient and the respective role of any other health care provider with respect to management of the patient; and (2) notified that he or she may decline to receive medical services by telemedicine and may withdraw from such care at any time.    SUBJECTIVE:    Interval History 6/27/24:  Sheila Costa returns today for follow-up after cervical RFA. Since last seen, they report their pain has been markedly improved (~80% relief). She does have a little numbness on the left side of her posterior neck but has had this before and states it does not radiate past her neck and is not painful.  She denies other ongoing pains at this time.     Interval History 3/4/2024:  Sheila Costa presents for post-op s/p Intracept L5 and S1 on  2/26/2024.  She reports 75% relief since procedure with more relief everyday.  She denies: fever, chills, drainage from the site, or additional pain.   Today's pain score is 0/10.      Interval History 02/19/2024:  Ms. Igor Asher is a 52 y/o f who presents for virtual follow-up of her chronic low back pain.  She is scheduled for L5 & S1 intraosseous basivertebral nerve ablation 03/11/2024. She continues to endorse axial low back consistent with lumbar vertebrogenic back pain. The patient is presenting today for further information regarding the procedure. All questions answered.      Interval History 9/28/2023:  Patient seen today for virtual follow-up of her low back pain.  Today, she reports that the bilateral SI joint injections that she had in May have worked  well for her for about 6 weeks and her back pain is at baseline worse  Her main concern is the pain in her low back which is affecting her ADL .  She previously got about 50% relief with RFA but now states she feels the pain as before all her injection.  She feels that the RFA helped with 1 component of her pain, but she continues to have deep focal low back and buttock pain.  She denies radiation into her legs.  She reports that the pain is fairly persistent but worsens significantly with standing, sitting for a long time and flexion.  She denies any bowel/bladder dysfunction or saddle anesthesia.  She has no other focal neurologic deficits.     Interval History 6/29/2023:  She presents with continued stiffness in her neck and shoulders.  She also complains of lower back pain which is still present. She does feel the SI joint injections do help.  She states that she is doing some stretches and home exercises to help with the pain.  She feels the stretching does help, but she feels she isn't doing them as often as she'd like.  She continues to state that her pain is 50-70% improved compared to before the injections.  She is inquiring as to if she should schedule her follow up injections now or if she should wait.     Interval History 6/9/2023:  The patient returns to clinic today for follow up neck and back pain via virtual visit. She is s/p bilateral C4,5,6 RFA on 5/3/2023. She is s/p bilateral SI joint injections on 5/24/2023. She reports 70% relief of her neck and back pain. She reports intermittent low back pain. She denies any radicular leg pain. She is performing a home exercise and stretching routine. She is taking Celebrex. She did not receive Robaxin discussed at last visit. She denies any other health changes.      Interval History 4/13/23:  Sheila Igor presents to the clinic for a follow-up appointment for back pain. Since the last visit, Sheila Costa states the pain has been  persistant. Current pain intensity is 7/10. S/p L3/4/5 RFA with 50% relief. Pain is still persistent. Gets better with exercise and stretching although too much movement can exacerbate the pain. She is taking celebrex and tizanidine. She endorses previous SI joint injections were helpful.      Interval History 3/22/2023:  The patient returns to clinic today for follow up of low back pain via virtual visit. She is s/p bilateral L3,4,5 RFA on 3/1/2023. She reports 50% relief of her pain. She reports low back and buttock pain. This seems lower than injection sites. She denies any radicular leg pain. She reports increased neck pain. She describes this pain as constant and aching in nature. She denies any radicular arm pain. Her pain is worse with activity. She is taking Celebrex and Zanaflex as needed. She denies any other health changes.      Interval History 2/1/2023:  The patient returns to clinic today for follow up of low back pain via virtual visit. She is s/p bilateral L3,4,5 MBB on 1/25/2023. She reports 90% relief of her pain for one day. Since then, her pain has returned to baseline. She reports low back pain that is aching in nature. She denies any radicular leg pain. Her pain is worse with prolonged activity. She is currently taking Celebrex. She also takes Zanaflex intermittently. She denies any other health changes.      Interval History 12/29/2022:  The patient returns to clinic today for follow up of low back pain via virtual visit. She is s/p bilateral L3,4,5 MBB on 12/14/2022. She reports 95% relief of her low back pain for one day. Since then, her pain has returned to baseline. She continues to report low back pain. This is aching across the lower back. She denies any radicular leg pain. Her pain is worse with activity. She is currently taking Zanaflex with limited relief. She denies any other health changes.      Interval history 10/31/22:  Patient returns to clinic for follow up of neck and shoulder  pain. She is now s/p C7/T1 cervical MICHELLE 7/6/22 which gave 75-80% relief which lasted a few months. Now with primary complaint of low back pain in a band across the low back described as aching and throbbing which radiates to lateral aspect of BLE which stops at the knees. Still taking ibuprofen, celebrex, and lyrica. Not doing home exercise or PT. Denies fever/chills, or saddle anesthesia.     Interval History 6/13/22:   She is s/p left SIJ and left GTB injection on 5/18/2022 which she reports has helped her buttock/leg pain significantly. She now would like to focus on her neck pain. She continues to have axial neck pain, bilateral. Her RUE sxs have significantly decreased s/p C7-T1 IL MICHELLE done in Dec 2021 but she does still have RUE pain. She is taking Gabapentin 300mg nightly - has not noticed a difference in her pain with this. She has tried topicals - help somewhat, Advil 800mg helps. She has gone to the chiropractor in the past - helpful. She has not done PT for her neck in ~3 years ago which she thinks helped somewhat. She denies any weakness/numbness/tingling in BUEs. Denies b/b incontinence or saddle anesthesia.      Interval History 4/28/22:   Sheila HortaAbdiasmelissa presents to the clinic for a follow-up appointment for neck pain. Since the last visit, Sheila Igor states the pain has been improving. She is s/p bilateral RFA of C4/5/6 on 3/26 and 4/3. She reports >75% relief to both sides and is satisfied with the results. She does satate that she is having some numbness over the skin over the L side. She also states she is having left buttock pain that she describes as cramping/throbbing which occasionally radiates down posterior aspect of L thigh, stopping above the knee. She denies numbness/tingling in lower extremities.      Interval History 3/10/2022:   Sheila HortaAbdiasmelissa presents to the clinic for a follow-up appointment for neck pain. Since the last visit, Sheila  Igor states the pain has been stable. Worse in the mornings. Feels like a stiffness in the neck without radicular symptoms as her radicular symptoms had been resolved since her MICHELLE. Some paraesthesia in arms and hands with typing. Patient states that mobic is beneficial. Best relief of her axial neck pain in the past was with RFA done previously. She discontinued her amitriptyline prescription and did not notice a difference in pain with discontinuation. Denies bowel/bladder dysfunction or difficulty with fine motor skills.     Interval History 1/6/2022:   Sheila Costa presents to the clinic for a follow-up appointment s/p Cervical Interlaminar Epidural Steroid Injection at the end of this past December. Since the last visit, Sheila Costa states the pain has been improving. Current pain intensity is 3/10 but she is still experiencing stiffness. Pain is primarly throbbing that would travel down BL arms. She has been doing well. Patient states that mobic and elavil have been helping with pain. Patient is sleeping well currently.     Interval History 11/15/2021:   Pt returns to clinic today due to resurgence of her bilateral neck and arm pain. At her last interventional pain encounter she had RFA left C4,5,6 on 03/04/2020 and the right done on 06/2020. Pt reports this provided significant relief of her pain however she has been having increased neck pain with radiation into her arms down to her hands. She has had a cervical epidural in the past with at least 50% relief of her pain. She has been utilizing OTC ibuprofen as well as a chiropractor.   Pt also reports low back pain without any radiation into the lower extremities. She states that the pain is aching and worsened with prolonged physical activity.      Interval History 11/25/2019:  The patient returns to clinic today for follow up. She reports a few days of relief with trigger point injections at last visit. She continues  to report neck pain with intermittent radiating pain into both arms to her hands. She also reports mid back pain. 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 She states the previous MICHELLE helped for her arm pain but not for her neck pain. She has had limited relief with NSAIDs and physical therapy. She denies any other health changes. Her pain today is 7/10.     Interval History 10/28/2019:  The patient returns to clinic today for follow up. She is s/p C7-T1 IL MICHELLE on 10/2/2019. She reports 50% relief of her neck and arm pain. She reports intermittent neck pain that is sore in nature. She does report increased mid back pain. She describes this pain as tight and aching in nature. She denies any radicular arm pain. She denies any other health changes. Her pain today is 6/10.     Pain Disability Index Review:      5/9/2024     9:43 AM 4/13/2023     2:26 PM 3/10/2022     4:01 PM   Last 3 PDI Scores   Pain Disability Index (PDI) 35 51 [id number]       Pain Procedures:   10/2/2019- C7-T1 IL MICHELLE- 50% pain relief  1/8/2020- Bilateral C4,5,6 MBB  1/29/2020- Bilateral C4,5,6 MBB  3/4/2020: Left C4,5,6 RFA - 80% relief  6/3/2020: Right C4,5,6 RFA - 80% relief  12/22/2021- C7/T1 IL MICHELLE  3/23/2022- Right C4,5,6 RFA -80-85% relief  4/6/2022- Left C4,5,6 RFA- 75% relief   5/8/2022- Left SI joint and GTB injection  7/6/2022- C7/T1 IL MICHELLE  12/14/2022- Bilateral L3,4,5 MBB- 95% relief  1/25/2023- Bilateral L3,4,5 MBB  3/1/2023- Bilateral L3,4,5 RFA  5/3/2023- Bilateral C4,5,6 RFA  5/24/2023- Bilateral SI joint injections  2/26/2024 - Intracept at L5 and S1  5/29/2024 - Bilateral C4, 5, 6 with 80% relief     Physical Therapy/Home Exercise: yes     Imaging:   MRI Lumbar Spine 11/11/2022:  COMPARISON:  11/01/2022     FINDINGS:  Alignment: Normal.     Vertebrae: Degenerative endplate changes at L5-S1.  No fracture or marrow infiltrative process.     Discs: Mild disc height loss at L5-S1 with annular fissure.  No evidence for discitis.      Cord: Conus terminates at L1 and appears unremarkable.  Cauda equina appears unremarkable.     Degenerative findings:     T12-L1: No spinal canal stenosis or neural foraminal narrowing.     L1-L2: No spinal canal stenosis or neural foraminal narrowing.     L2-L3: No spinal canal stenosis or neural foraminal narrowing.     L3-L4: No spinal canal stenosis or neural foraminal narrowing.     L4-L5: Circumferential disc bulge and mild facet arthropathy result in mild left neural foraminal narrowing.     L5-S1: Circumferential disc bulge and moderate facet arthropathy result in mild right, moderate left neural foraminal narrowing.     Paraspinal muscles & soft tissues: Paraspinal muscle bulk is maintained.  Small bilateral renal cysts noted.     Impression:     1. Degenerative changes of the lower lumbar spine detailed above.  Moderate left neural foraminal narrowing noted at L5-S1.     MRI Cervical Spine 11/20/2019:  COMPARISON:  MRI cervical spine without contrast 03/06/2019     FINDINGS:  Alignment: Sagittal alignment is preserved.     Vertebrae: Normal marrow signal without osseous destructive process or aggressive bone marrow replacement process.  No fracture.     Discs: There is mild disc space narrowing at C2-C3 with endplate irregularity, likely degenerative and similar to the prior examination.  Remaining discs demonstrate normal height and signal.     Cord: Normal caliber, morphology, and signal.     Degenerative findings:     C2-C3: Posterior disc osteophyte complex and left uncovertebral joint spurring contribute to mild left neural foraminal narrowing.  No spinal canal stenosis.     C3-C4: Mild posterior disc osteophyte complex results in mild effacement of the ventral thecal sac without significant spinal canal stenosis or neural foraminal narrowing.     C4-C5: Posterior disc osteophyte complex results in effacement of the anterior thecal sac without significant spinal canal stenosis or neural foraminal  narrowing.     C5-C6: Left uncovertebral joint spurring contributes to mild left neural foraminal narrowing without significant spinal canal stenosis.     C6-C7: No significant disc abnormality, spinal canal stenosis, or neural foraminal narrowing.     C7-T1: No significant disc abnormality, spinal canal stenosis, or neural foraminal narrowing.     Limited sagittal evaluation of the upper thoracic spine reveals a disc protrusion at T4-T5 causing effacement of the anterior thecal sac and cord abutment.     Paraspinal muscles & soft tissues: Unremarkable without spinal canal or paraspinal mass.     Impression:     Mild cervical degenerative changes contributing to mild left neural foraminal narrowing at C2-C3 and C5-C6.  No spinal canal stenosis.     T4-T5 disc protrusion resulting in effacement of the anterior thecal sac, abutting the cord.     Narrowing of the C2-C3 disc space with endplate irregularity, likely degenerative and stable compared to the prior examination.       Allergies:   Review of patient's allergies indicates:   Allergen Reactions    Amoxicillin Hives       Current Medications:   Current Outpatient Medications   Medication Sig Dispense Refill    ascorbic acid, vitamin C, (VITAMIN C) 100 MG tablet Take 100 mg by mouth once daily.      atenoloL-chlorthalidone (TENORETIC) 100-25 mg per tablet Take 1 tablet by mouth once daily. 90 tablet 3    celecoxib (CELEBREX) 100 MG capsule TAKE 1 CAPSULE(100 MG) BY MOUTH TWICE DAILY 60 capsule 2    DULoxetine (CYMBALTA) 60 MG capsule Take 1 capsule (60 mg total) by mouth once daily. 30 capsule 11    estradioL (VIVELLE-DOT) 0.025 mg/24 hr Place 1 patch onto the skin twice a week. 8 patch 11    estradioL (VIVELLE-DOT) 0.0375 mg/24 hr Place 1 patch onto the skin twice a week. 8 patch 11    FLUoxetine 20 MG capsule Take 20 mg by mouth.      levonorgestrel (MIRENA) 20 mcg/24 hr (5 years) IUD 1 Intra Uterine Device by Intrauterine route once. for 1 dose 1 Intra Uterine  Device 0    loratadine (CLARITIN) 10 mg tablet Take 10 mg by mouth every morning.       metFORMIN (GLUCOPHAGE) 500 MG tablet Take 1 tablet (500 mg total) by mouth Daily. 90 tablet 3    methocarbamoL (ROBAXIN) 500 MG Tab TAKE 1 TABLET(500 MG) BY MOUTH THREE TIMES DAILY AS NEEDED FOR MUSCLE PAIN 30 tablet 1    multivitamin capsule Take 1 capsule by mouth once daily.      mupirocin (BACTROBAN) 2 % ointment Apply topically once daily. 22 g 0    ondansetron (ZOFRAN-ODT) 4 MG TbDL Take 4 mg by mouth every 6 (six) hours as needed.      potassium chloride (MICRO-K) 10 MEQ CpSR Take 2 capsules (20 mEq total) by mouth once daily. 180 capsule 3    vitamin D (VITAMIN D3) 1000 units Tab Take 1,000 Units by mouth once daily.      zolpidem (AMBIEN) 10 mg Tab Take 10 mg by mouth every evening.       No current facility-administered medications for this visit.       REVIEW OF SYSTEMS:    GENERAL:  No weight loss, malaise or fevers.  HEENT:  Negative for frequent or significant headaches.  NECK:  Negative for lumps, goiter, pain and significant neck swelling.  RESPIRATORY:  Negative for cough, wheezing or shortness of breath.  CARDIOVASCULAR:  Negative for chest pain, leg swelling or palpitations.  GI:  Negative for abdominal discomfort, blood in stools or black stools or change in bowel habits.  MUSCULOSKELETAL:  See HPI.  SKIN:  Negative for lesions, rash, and itching.  PSYCH:  Negative for sleep disturbance, mood disorder and recent psychosocial stressors.  HEMATOLOGY/LYMPHOLOGY:  Negative for prolonged bleeding, bruising easily or swollen nodes.  NEURO:   No history of headaches, syncope, paralysis, seizures or tremors.  All other reviewed and negative other than HPI.    Past Medical History:  Past Medical History:   Diagnosis Date    Alpha thalassemia     Back pain     Cervical spondylosis 12/30/2019    Colon polyp     Fatty liver     Hypertension     Prediabetes     Premenstrual symptom        Past Surgical History:  Past  Surgical History:   Procedure Laterality Date    COLONOSCOPY N/A 6/18/2020    Procedure: COLONOSCOPY;  Surgeon: Eliot Hill MD;  Location: [facility] ENDO (4TH FLR);  Service: Endoscopy;  Laterality: N/A;  COVID screening on 6/16/20[facility]- [facility]    COLONOSCOPY N/A 7/6/2020    Procedure: COLONOSCOPY;  Surgeon: Kang Guzmán MD;  Location: [facility] OR 2ND FLR;  Service: Colon and Rectal;  Laterality: N/A;    COLONOSCOPY N/A 7/9/2021    Procedure: COLONOSCOPY;  Surgeon: GREGG Guzmán MD;  Location: [facility] ENDO (4TH FLR);  Service: Endoscopy;  Laterality: N/A;  in July 2021  covid test [place] 7/6    DESTRUCTION, INTRAOSSEOUS BASIVERTEBRAL NERVE, EACH ADD'L BODY, LUM/SAC Bilateral 2/26/2024    Procedure: DESTRUCTION,INTRAOSSEOUS BASIVERTEBRAL NERVE,EACH ADD'L BODY,LUM/SAC;  Surgeon: Cosme Kirkland MD;  Location: [facility] OR;  Service: Pain Management;  Laterality: Bilateral;  L5 & S1    EPIDURAL STEROID INJECTION N/A 10/2/2019    Procedure: INJECTION, STEROID, EPIDURAL, C7-T1;  Surgeon: Cosme Kirkland MD;  Location: [facility] PAIN MGT;  Service: Pain Management;  Laterality: N/A;    EPIDURAL STEROID INJECTION N/A 12/22/2021    Procedure: INJECTION, STEROID, EPIDURAL, C7-T1 NEED CONSENT;  Surgeon: Cosme Kirkland MD;  Location: [facility] PAIN MGT;  Service: Pain Management;  Laterality: N/A;    EPIDURAL STEROID INJECTION N/A 7/6/2022    Procedure: INJECTION, STEROID, EPIDURAL, C7-T1 IL  CONTRAST;  Surgeon: Cosme Kirkland MD;  Location: [facility] PAIN MGT;  Service: Pain Management;  Laterality: N/A;    EYE SURGERY      INJECTION OF ANESTHETIC AGENT AROUND NERVE Bilateral 1/8/2020    Procedure: BLOCK, NERVE C4,5,6;  Surgeon: Cosme Kirkland MD;  Location: [facility] PAIN MGT;  Service: Pain Management;  Laterality: Bilateral;  B/L MBB C4,5,6    INJECTION OF ANESTHETIC AGENT AROUND NERVE Bilateral 1/29/2020    Procedure: BLOCK, NERVE, Repeat C4-C5-C6 MEDIAL BRANCH;  Surgeon: Cosme Kirkland MD;  [facility];   Service: Pain Management;  Laterality: Bilateral;    INJECTION OF ANESTHETIC AGENT AROUND NERVE Bilateral 12/14/2022    Procedure: BLOCK, NERVE BILATERAL L3,L4,L5 MEDIAL BRANCH NEEDS CONSENT;  Surgeon: Cosme Kirkland MD;  Location: [facility] PAIN MGT;  Service: Pain Management;  Laterality: Bilateral;    INJECTION OF ANESTHETIC AGENT AROUND NERVE Bilateral 1/25/2023    Procedure: BLOCK, NERVE BILATERAL L3,L4,L5 MEDIAL BRANCH;  Surgeon: Cosme Kirkland MD;  Location: [facility] PAIN MGT;  Service: Pain Management;  Laterality: Bilateral;    INJECTION OF ANESTHETIC AGENT AROUND NERVE Bilateral 11/1/2023    Procedure: BLOCK, NERVE BILATERAL L5, S1, AND S2 LATERAL BRANCH;  Surgeon: Cosme Kirkland MD;  Location: [facility] PAIN MGT;  Service: Pain Management;  Laterality: Bilateral;    INJECTION OF ANESTHETIC AGENT AROUND NERVE Bilateral 11/22/2023    Procedure: BLOCK, NERVE BILATERAL L5, S1, AND S2 MEDIAL BRANCH;  Surgeon: Cosme Kirkland MD;  Location: [facility] PAIN MGT;  Service: Pain Management;  Laterality: Bilateral;    INJECTION OF JOINT Left 5/18/2022    Procedure: INJECTION, JOINT, LEFT SI and GTB *[facility] PT*;  Surgeon: Jed Phillips MD;  Location: [facility] PAIN MGT;  Service: Pain Management;  Laterality: Left;    INJECTION, SACROILIAC JOINT Bilateral 5/24/2023    Procedure: INJECTION,SACROILIAC JOINT BIALTERAL;  Surgeon: Cosme Kirkland MD;  Location: [facility] PAIN MGT;  Service: Pain Management;  Laterality: Bilateral;    LAPAROSCOPIC SURGICAL REMOVAL OF CECUM N/A 7/6/2020    Procedure: EXCISION, CECUM, LAPAROSCOPIC, colonoscopy to start, ERAS low;  Surgeon: Kang Guzmán MD;  [facility];  Service: Colon and Rectal;  Laterality: N/A;    RADIOFREQUENCY ABLATION Left 3/4/2020    Procedure: RADIOFREQUENCY ABLATION, C4-C5-C6 ME DIAL BRANCH 1 OF 2 need consent;  Surgeon: Cosme Kirkland MD;  Location: [facility] PAIN MGT;  Service: Pain Management;  Laterality: Left;    RADIOFREQUENCY ABLATION Right  6/3/2020    Procedure: RADIOFREQUENCY ABLATION, C4-C5-C6 MEDIAL BRANCH 2 OF 2 need consent;  Surgeon: Cosme Sandoval MD;  Location: [facility] PAIN MGT;  Service: Pain Management;  Laterality: Right;    RADIOFREQUENCY ABLATION Right 3/23/2022    Procedure: Radiofrequency Ablation RIGHT C4,5,6;  Surgeon: Cosme Sandoval MD;  Location: [facility] PAIN MGT;  Service: Pain Management;  Laterality: Right;    RADIOFREQUENCY ABLATION Left 4/6/2022    Procedure: Radiofrequency Ablation LEFT C4,5,6;  Surgeon: Cosme Sandoval MD;  Location: [facility] PAIN [facility]GT;  Service: Pain Management;  Laterality: Left;    RADIOFREQUENCY ABLATION Bilateral 3/1/2023    Procedure: RADIOFREQUENCY ABLATION BILATERAL L3,L4,L5 BOTH SIDES SAME TIME PER DR SANDOVAL;  Surgeon: Cosme Sandoval MD;  Location: [facility] PAIN [facility]GT;  Service: Pain Management;  Laterality: Bilateral;    RADIOFREQUENCY ABLATION Bilateral 5/3/2023    Procedure: RADIOFREQUENCY ABLATION BILATERAL C4,C5,C6 BOTH SIDES PER DR SANDOVAL;  Surgeon: Cosme Sandoval MD;  Location: [facility]IN [facility]GT;  Service: Pain Management;  Laterality: Bilateral;    RADIOFREQUENCY ABLATION Bilateral 5/29/2024    Procedure: RADIOFREQUENCY ABLATION BILATERAL C4, 5, 6;  Surgeon: Cosme Sandoval MD;  Location: [facility] PAIN MGT;  Service: Pain Management;  Laterality: Bilateral;  [phone]  4 WK F/U LORI    REFRACTIVE SURGERY  2008    SMALL INTESTINE SURGERY  7/6/20       Family History:  Family History   Problem Relation Name Age of Onset    Hypertension Mother Celsa Irving Hayes     Thyroid disease Mother Celsa Kirbyers     Rheum arthritis Mother Celsa Irving Hayes     Hearing loss Mother Celsa Hayes     Heart disease Father Wes Irving         Pacemaker    Cancer Maternal Grandmother Katy Garrison     Cataracts Maternal Grandmother Katy Garrison     Diabetes Maternal Grandmother Katy Garrison     Hypertension Maternal Grandmother Katy  Zara Garrison     Thyroid disease Maternal Grandmother Katy Garrison     Breast cancer Maternal Grandmother Katy Garrison     Hearing loss Maternal Grandmother Katy Garrison     Cataracts Maternal Grandfather Max Bladimir, Sr.     Diabetes Maternal Grandfather Max Bladimir, Sr.     Hypertension Maternal Grandfather Max Bladimir, Sr.     Thyroid disease Maternal Grandfather Max Garrison, Sr.     Lupus Cousin mother's 1st cousin     Cancer Maternal Uncle      No Known Problems Paternal Grandmother      No Known Problems Paternal Grandfather      Amblyopia Neg Hx      Blindness Neg Hx      Glaucoma Neg Hx      Macular degeneration Neg Hx      Retinal detachment Neg Hx      Strabismus Neg Hx      Stroke Neg Hx      Ovarian cancer Neg Hx      Colon cancer Neg Hx      Cirrhosis Neg Hx         Social History:  Social History     Socioeconomic History    Marital status:    Occupational History     Employer: [employer]   Tobacco Use    Smoking status: Never    Smokeless tobacco: Never   Substance and Sexual Activity    Alcohol use: Yes     Alcohol/week: 1.0 standard drink of alcohol     Types: 1 Standard drinks or equivalent per week     Comment: 3 drinks weekly     Drug use: No    Sexual activity: Yes     Partners: Male     Birth control/protection: I.U.D.   Social History Narrative     Children - Dolores and Graciela Catherine - Florin        Used to walk, ride bikes. Occasional uses elliptical at home.      Social Determinants of Health     Financial Resource Strain: Low Risk  (5/8/2024)    Overall Financial Resource Strain (CARDIA)     Difficulty of Paying Living Expenses: Not hard at all   Food Insecurity: No Food Insecurity (5/8/2024)    Hunger Vital Sign     Worried About Running Out of Food in the Last Year: Never true     Ran Out of Food in the Last Year: Never true   Transportation Needs: No Transportation Needs (5/8/2024)    PRAPARE - Transportation     Lack of Transportation (Medical): No     Lack  of Transportation (Non-Medical): No   Physical Activity: Insufficiently Active (5/8/2024)    Exercise Vital Sign     Days of Exercise per Week: 1 day     Minutes of Exercise per Session: 20 min   Stress: Stress Concern Present (5/8/2024)    [employer] [place] [employer] - Occupational Stress Questionnaire     Feeling of Stress : To some extent   Housing Stability: Low Risk  (9/13/2023)    Housing Stability Vital Sign     Unable to Pay for Housing in the Last Year: No     Number of Places Lived in the Last Year: 1     Unstable Housing in the Last Year: No       OBJECTIVE:    There were no vitals filed for this visit.    PHYSICAL EXAM:   General appearance: Well appearing, in no acute distress, alert and oriented x3.  Psych:  Mood and affect appropriate.    PRIOR PHYSICAL EXAM:   General appearance: Well appearing, in no acute distress, alert and oriented x3.  Psych:  Mood and affect appropriate.  Skin: Skin color, texture, turgor normal, no rashes or lesions, in both upper and lower body.  Head/face:  Atraumatic, normocephalic. No palpable lymph nodes  Neck: limited  ROM. Moderate TTP over cervical spine or paracervical muscles. moderate  pain to palpation over the cervical paraspinous muscles. Spurling Negative. There is pain with neck extension and lateral flexion. Positive facet loading bilaterally.   Cor: RRR  Pulm: CTA  GI: Abdomen soft and non-tender.  Back: No Pain to palpation over the spine or costovertebral angles. Normal range of motion without pain reproduction, -ADRIAN   Extremities: Peripheral joint ROM is full and pain free without obvious instability or laxity in all four extremities. No deformities, edema, or skin discoloration. Good capillary refill.  Musculoskeletal: Shoulder, hip, sacroiliac and knee provocative maneuvers are negative. Bilateral upper and lower extremity strength is normal and symmetric.  No atrophy or tone abnormalities are noted.  Neuro: Bilateral upper and lower  extremity coordination and muscle stretch reflexes are physiologic and symmetric.  Plantar response are downgoing. No loss of sensation is noted.  Gait: normal     ASSESSMENT: 53 y.o. year old female with neck pian. Etiology consistent with vertebrogenic low back pain      1. Other spondylosis, cervical region        2. Spondylosis without myelopathy              PLAN:   - I have stressed the importance of physical activity and a home exercise plan to help with pain and improve health.  - Patient can continue with medications for now since they are providing benefits, using them appropriately, and without side effects.  - She continues to do well following Intracept procedure.  - Can repeat bilateral C4,5,6 RFA as needed. They continue to provide 80%+ relief.  -RTC as needed  - Counseled patient regarding the importance of activity modification, constant sleeping habits, and physical therapy.    The above plan and management options were discussed at length with patient. Patient is in agreement with the above and verbalized understanding.    Matt Oneil  PGY-5  Interventional Pain Management Fellow  [facility]  Pager: [phone]      I have personally reviewed the history and personally discussed the plan with patient through video visit and agree with the resident/fellow/NPs note as stated above.    Cosme Kirkland MD  6/27/24

## 2024-08-06 RX ORDER — POTASSIUM CHLORIDE 750 MG/1
CAPSULE, EXTENDED RELEASE ORAL
Qty: 180 CAPSULE | Refills: 1 | Status: SHIPPED | OUTPATIENT
Start: 2024-08-06

## 2024-08-19 ENCOUNTER — LAB VISIT (OUTPATIENT)
Dept: LAB | Facility: HOSPITAL | Age: 54
End: 2024-08-19
Attending: HOSPITALIST
Payer: COMMERCIAL

## 2024-08-19 DIAGNOSIS — R73.03 PREDIABETES: ICD-10-CM

## 2024-08-19 LAB
ESTIMATED AVG GLUCOSE: 117 MG/DL (ref 68–131)
HBA1C MFR BLD: 5.7 % (ref 4–5.6)

## 2024-08-19 PROCEDURE — 36415 COLL VENOUS BLD VENIPUNCTURE: CPT | Mod: PO | Performed by: HOSPITALIST

## 2024-08-19 PROCEDURE — 83036 HEMOGLOBIN GLYCOSYLATED A1C: CPT | Performed by: HOSPITALIST

## 2024-09-09 ENCOUNTER — TELEPHONE (OUTPATIENT)
Dept: ENDOSCOPY | Facility: HOSPITAL | Age: 54
End: 2024-09-09
Payer: COMMERCIAL

## 2024-09-09 ENCOUNTER — PATIENT MESSAGE (OUTPATIENT)
Dept: ENDOSCOPY | Facility: HOSPITAL | Age: 54
End: 2024-09-09
Payer: COMMERCIAL

## 2024-09-09 DIAGNOSIS — Z12.11 SPECIAL SCREENING FOR MALIGNANT NEOPLASMS, COLON: Primary | ICD-10-CM

## 2024-09-09 RX ORDER — SOD SULF/POT CHLORIDE/MAG SULF 1.479 G
12 TABLET ORAL DAILY
Qty: 24 TABLET | Refills: 0 | Status: SHIPPED | OUTPATIENT
Start: 2024-09-09

## 2024-09-09 NOTE — TELEPHONE ENCOUNTER
Spoke to pt for pre-call to confirm scheduled Colonoscopy and patient verbalized understanding of the following:       Date of Procedure (s)  verified 9/13/24  Arrival Time 8:00 AM verified.  Location of Procedure(s) [place] 4th Floor verified.  NPO status reinforced. Ok to continue clear liquids up until 4 hours prior to the Endoscopy procedure.   Confirmed Pt is currently taking Ozempic (Semaglutide), Pt instructed to stop stop taking Ozempic (Semaglutide) on 9/5/24    Pt could not confirm last dose but stated it was before 9/5/24.     Pt confirmed receipt of prep instructions and Rx prep (if applicable).  Instructions provided to patient via [facility]  Pt confirmed ride home after procedure if procedure requires anesthesia.   Pre-call screening questionnaire reviewed and completed with patient.   Appointment details are tentative, including check-in time.  If the patient begins taking any blood thinning medications, injectable weight loss/diabetes medications (other than insulin), or Adipex (phentermine) patient was instructed to contact the endoscopy scheduling department as soon as possible.  Patient was advised to call the endoscopy scheduling department if any questions or concerns arise.       [facility]oscopy Scheduling Department

## 2024-09-09 NOTE — TELEPHONE ENCOUNTER
Pt returned call to confirm colonoscopy for 9/13/24. Pt requesting sutab. New prescription sent to [email or web address] via portal.

## 2024-09-12 ENCOUNTER — ANESTHESIA EVENT (OUTPATIENT)
Dept: ENDOSCOPY | Facility: HOSPITAL | Age: 54
End: 2024-09-12
Payer: COMMERCIAL

## 2024-09-13 ENCOUNTER — HOSPITAL ENCOUNTER (OUTPATIENT)
Facility: HOSPITAL | Age: 54
Discharge: HOME | End: 2024-09-13
Attending: COLON & RECTAL SURGERY | Admitting: COLON & RECTAL SURGERY
Payer: COMMERCIAL

## 2024-09-13 ENCOUNTER — ANESTHESIA (OUTPATIENT)
Dept: ENDOSCOPY | Facility: HOSPITAL | Age: 54
End: 2024-09-13
Payer: COMMERCIAL

## 2024-09-13 VITALS
HEIGHT: 61 IN | RESPIRATION RATE: 16 BRPM | OXYGEN SATURATION: 99 % | WEIGHT: 150 LBS | SYSTOLIC BLOOD PRESSURE: 119 MMHG | HEART RATE: 64 BPM | DIASTOLIC BLOOD PRESSURE: 68 MMHG | BODY MASS INDEX: 28.32 KG/M2 | TEMPERATURE: 98 F

## 2024-09-13 DIAGNOSIS — Z86.010 HISTORY OF COLON POLYPS: ICD-10-CM

## 2024-09-13 LAB
B-HCG UR QL: NEGATIVE
CTP QC/QA: YES

## 2024-09-13 PROCEDURE — G0105 COLORECTAL SCRN; HI RISK IND: HCPCS | Mod: ,,, | Performed by: COLON & RECTAL SURGERY

## 2024-09-13 PROCEDURE — 25000003 PHARM REV CODE 250

## 2024-09-13 PROCEDURE — G0105 COLORECTAL SCRN; HI RISK IND: HCPCS | Performed by: COLON & RECTAL SURGERY

## 2024-09-13 PROCEDURE — 37000008 HC ANESTHESIA 1ST 15 MINUTES: Performed by: COLON & RECTAL SURGERY

## 2024-09-13 PROCEDURE — 37000009 HC ANESTHESIA EA ADD 15 MINS: Performed by: COLON & RECTAL SURGERY

## 2024-09-13 PROCEDURE — 25000003 PHARM REV CODE 250: Performed by: COLON & RECTAL SURGERY

## 2024-09-13 PROCEDURE — 81025 URINE PREGNANCY TEST: CPT | Performed by: COLON & RECTAL SURGERY

## 2024-09-13 PROCEDURE — 63600175 PHARM REV CODE 636 W HCPCS

## 2024-09-13 RX ORDER — PROPOFOL 10 MG/ML
VIAL (ML) INTRAVENOUS
Status: DISCONTINUED | OUTPATIENT
Start: 2024-09-13 | End: 2024-09-13

## 2024-09-13 RX ORDER — SODIUM CHLORIDE 9 MG/ML
INJECTION, SOLUTION INTRAVENOUS CONTINUOUS
Status: DISCONTINUED | OUTPATIENT
Start: 2024-09-13 | End: 2024-09-13 | Stop reason: HOSPADM

## 2024-09-13 RX ORDER — LIDOCAINE HYDROCHLORIDE 20 MG/ML
INJECTION INTRAVENOUS
Status: DISCONTINUED | OUTPATIENT
Start: 2024-09-13 | End: 2024-09-13

## 2024-09-13 RX ADMIN — SODIUM CHLORIDE: 0.9 INJECTION, SOLUTION INTRAVENOUS at 09:09

## 2024-09-13 RX ADMIN — PROPOFOL 70 MG: 10 INJECTION, EMULSION INTRAVENOUS at 09:09

## 2024-09-13 RX ADMIN — LIDOCAINE HYDROCHLORIDE 100 MG: 20 INJECTION INTRAVENOUS at 09:09

## 2024-09-13 NOTE — PLAN OF CARE
Pt instructed on falls and safety, reviewed discharge plan and education re: diagnosis and follow up plan of care.

## 2024-09-13 NOTE — ANESTHESIA PREPROCEDURE EVALUATION
09/13/2024  Sheila Costa is a 53 y.o., female.    Past Medical History:   Diagnosis Date    Alpha thalassemia     Back pain     Cervical spondylosis 12/30/2019    Colon polyp     Fatty liver     Hypertension     Prediabetes     Premenstrual symptom        Past Surgical History:   Procedure Laterality Date    COLONOSCOPY N/A 6/18/2020    Procedure: COLONOSCOPY;  Surgeon: Eliot Hill MD;  Location: [facility] ENDO (4TH FLR);  Service: Endoscopy;  Laterality: N/A;  [facility]    COLONOSCOPY N/A 7/6/2020    Procedure: COLONOSCOPY;  Surgeon: Kang Guzmán MD;  Location: [facility] OR 2ND FLR;  Service: Colon and Rectal;  Laterality: N/A;    COLONOSCOPY N/A 7/9/2021    Procedure: COLONOSCOPY;  Surgeon: GREGG Guzmán MD;  Location: [facility] ENDO (4TH FLR);  Service: Endoscopy;  Laterality: N/A;  in July 2021  covid test [place] 7/6    DESTRUCTION, INTRAOSSEOUS BASIVERTEBRAL NERVE, EACH ADD'L BODY, LUM/SAC Bilateral 2/26/2024    Procedure: DESTRUCTION,INTRAOSSEOUS BASIVERTEBRAL NERVE,EACH ADD'L BODY,LUM/SAC;  Surgeon: Cosme Kirkland MD;  Location: [facility] OR;  Service: Pain Management;  Laterality: Bilateral;  L5 & S1    EPIDURAL STEROID INJECTION N/A 10/2/2019    Procedure: INJECTION, STEROID, EPIDURAL, C7-T1;  Surgeon: Cosme Kirkland MD;  Location: [facility] PAIN MGT;  Service: Pain Management;  Laterality: N/A;    EPIDURAL STEROID INJECTION N/A 12/22/2021    Procedure: INJECTION, STEROID, EPIDURAL, C7-T1 NEED CONSENT;  Surgeon: Cosme Kirkland MD;  Location: [facility] PAIN MGT;  Service: Pain Management;  Laterality: N/A;    EPIDURAL STEROID INJECTION N/A 7/6/2022    Procedure: INJECTION, STEROID, EPIDURAL, C7-T1 IL  CONTRAST;  Surgeon: Cosme Kirkland MD;  Location: [facility] PAIN MGT;  Service: Pain Management;  Laterality: N/A;    EYE SURGERY      INJECTION OF ANESTHETIC  AGENT AROUND NERVE Bilateral 1/8/2020    Procedure: BLOCK, NERVE C4,5,6;  Surgeon: Cosme Kirkland MD;  Location: [facility] PAIN MGT;  Service: Pain Management;  Laterality: Bilateral;  B/L MBB C4,5,6    INJECTION OF ANESTHETIC AGENT AROUND NERVE Bilateral 1/29/2020    Procedure: BLOCK, NERVE, Repeat C4-C5-C6 MEDIAL BRANCH;  Surgeon: Cosme Kirkland MD;  Location: [facility] PAIN MGT;  Service: Pain Management;  Laterality: Bilateral;    INJECTION OF ANESTHETIC AGENT AROUND NERVE Bilateral 12/14/2022    Procedure: BLOCK, NERVE BILATERAL L3,L4,L5 MEDIAL BRANCH NEEDS CONSENT;  Surgeon: Cosme Kirkland MD;  Location: [facility] PAIN MGT;  Service: Pain Management;  Laterality: Bilateral;    INJECTION OF ANESTHETIC AGENT AROUND NERVE Bilateral 1/25/2023    Procedure: BLOCK, NERVE BILATERAL L3,L4,L5 MEDIAL BRANCH;  Surgeon: Cosme Kirkland MD;  Location: [facility] PAIN MGT;  Service: Pain Management;  Laterality: Bilateral;    INJECTION OF ANESTHETIC AGENT AROUND NERVE Bilateral 11/1/2023    Procedure: BLOCK, NERVE BILATERAL L5, S1, AND S2 LATERAL BRANCH;  Surgeon: Cosme Kirkland MD;  Location: [facility] PAIN MGT;  Service: Pain Management;  Laterality: Bilateral;    INJECTION OF ANESTHETIC AGENT AROUND NERVE Bilateral 11/22/2023    Procedure: BLOCK, NERVE BILATERAL L5, S1, AND S2 MEDIAL BRANCH;  Surgeon: Cosme Kirkland MD;  Location: [facility] PAIN MGT;  Service: Pain Management;  Laterality: Bilateral;    INJECTION OF JOINT Left 5/18/2022    Procedure: INJECTION, JOINT, LEFT SI and GTB *LORI PT*;  Surgeon: Jed Phillips MD;  Location: [facility] PAIN MGT;  Service: Pain Management;  Laterality: Left;    INJECTION, SACROILIAC JOINT Bilateral 5/24/2023    Procedure: INJECTION,SACROILIAC JOINT BIALTERAL;  Surgeon: Cosme Kirkland MD;  Location: [facility] PAIN MGT;  Service: Pain Management;  Laterality: Bilateral;    LAPAROSCOPIC SURGICAL REMOVAL OF CECUM N/A 7/6/2020    Procedure: EXCISION, CECUM, LAPAROSCOPIC, colonoscopy to  CRISTHIAN waldron;  Surgeon: Kang Guzmán MD;  Location: [facility] OR 2ND FLR;  Service: Colon and Rectal;  Laterality: N/A;    RADIOFREQUENCY ABLATION Left 3/4/2020    Procedure: RADIOFREQUENCY ABLATION, C4-C5-C6 ME DIAL BRANCH 1 OF 2 need consent;  Surgeon: Cosme Sandoval MD;  Location: [facility] PAIN MGT;  Service: Pain Management;  Laterality: Left;    RADIOFREQUENCY ABLATION Right 6/3/2020    Procedure: RADIOFREQUENCY ABLATION, C4-C5-C6 MEDIAL BRANCH 2 OF 2 need consent;  Surgeon: Cosme Sandoval MD;  Location: [facility] PAIN MGT;  Service: Pain Management;  Laterality: Right;    RADIOFREQUENCY ABLATION Right 3/23/2022    Procedure: Radiofrequency Ablation RIGHT C4,5,6;  Surgeon: Cosme Sandoval MD;  Location: [facility] PAIN MGT;  Service: Pain Management;  Laterality: Right;    RADIOFREQUENCY ABLATION Left 4/6/2022    Procedure: Radiofrequency Ablation LEFT C4,5,6;  Surgeon: Cosme Sandoval MD;  Location: [facility] PAIN MGT;  Service: Pain Management;  Laterality: Left;    RADIOFREQUENCY ABLATION Bilateral 3/1/2023    Procedure: RADIOFREQUENCY ABLATION BILATERAL L3,L4,L5 BOTH SIDES SAME TIME PER DR SANDOVAL;  Surgeon: Cosme Sandoval MD;  Location: [facility] PAIN MGT;  Service: Pain Management;  Laterality: Bilateral;    RADIOFREQUENCY ABLATION Bilateral 5/3/2023    Procedure: RADIOFREQUENCY ABLATION BILATERAL C4,C5,C6 BOTH SIDES PER DR SANDOVAL;  Surgeon: Cosme Sandoval MD;  Location: [facility] PAIN MGT;  Service: Pain Management;  Laterality: Bilateral;    RADIOFREQUENCY ABLATION Bilateral 5/29/2024    Procedure: RADIOFREQUENCY ABLATION BILATERAL C4, 5, 6;  Surgeon: Cosme Sandoval MD;  Location: [facility] PAIN MGT;  Service: Pain Management;  Laterality: Bilateral;  [phone]  4 WK F/U LORI    REFRACTIVE SURGERY  2008    SMALL INTESTINE SURGERY  7/6/20       Family History   Problem Relation Name Age of Onset    Hypertension Mother Celsa Hortahugo Hayes     Thyroid disease Mother Celsa Garrison Maria Fernanda  Hayes     Rheum arthritis Mother Celsa Irving Hayes     Hearing loss Mother Celsa Hayes     Heart disease Father Wes Irving         Pacemaker    Cancer Maternal Grandmother Katy Garrison     Cataracts Maternal Grandmother Katy Garrison     Diabetes Maternal Grandmother Katy Garrison     Hypertension Maternal Grandmother Katy Garrison     Thyroid disease Maternal Grandmother Katy Garrison     Breast cancer Maternal Grandmother Katy Garrison     Hearing loss Maternal Grandmother Katy Garrison     Cataracts Maternal Grandfather Max Ray, Sr.     Diabetes Maternal Grandfather Max Ray, Sr.     Hypertension Maternal Grandfather Max Ray, Sr.     Thyroid disease Maternal Grandfather Max Garrison, Sr.     Lupus Cousin mother's 1st cousin     Cancer Maternal Uncle      No Known Problems Paternal Grandmother      No Known Problems Paternal Grandfather      Amblyopia Neg Hx      Blindness Neg Hx      Glaucoma Neg Hx      Macular degeneration Neg Hx      Retinal detachment Neg Hx      Strabismus Neg Hx      Stroke Neg Hx      Ovarian cancer Neg Hx      Colon cancer Neg Hx      Cirrhosis Neg Hx         Social History     Socioeconomic History    Marital status:    Occupational History     Employer: [employer]   Tobacco Use    Smoking status: Never    Smokeless tobacco: Never   Substance and Sexual Activity    Alcohol use: Yes     Alcohol/week: 1.0 standard drink of alcohol     Types: 1 Standard drinks or equivalent per week     Comment: 3 drinks weekly     Drug use: No    Sexual activity: Yes     Partners: Male     Birth control/protection: I.U.D.   Social History Narrative     Lukas - Dolores and Graciela Catherine - Florin        Used to walk, ride bikes. Occasional uses elliptical at home.      Social Determinants of Health     Financial Resource Strain: Low Risk  (5/8/2024)    Overall Financial Resource Strain (CARDIA)     Difficulty of Paying  Living Expenses: Not hard at all   Food Insecurity: No Food Insecurity (5/8/2024)    Hunger Vital Sign     Worried About Running Out of Food in the Last Year: Never true     Ran Out of Food in the Last Year: Never true   Transportation Needs: No Transportation Needs (5/8/2024)    PRAPARE - Transportation     Lack of Transportation (Medical): No     Lack of Transportation (Non-Medical): No   Physical Activity: Insufficiently Active (5/8/2024)    Exercise Vital Sign     Days of Exercise per Week: 1 day     Minutes of Exercise per Session: 20 min   Stress: Stress Concern Present (5/8/2024)    [employer] [employer] [place] [employer] - Occupational Stress Questionnaire     Feeling of Stress : To some extent   Housing Stability: Low Risk  (9/13/2023)    Housing Stability Vital Sign     Unable to Pay for Housing in the Last Year: No     Number of Places Lived in the Last Year: 1     Unstable Housing in the Last Year: No       Current Facility-Administered Medications   Medication Dose Route Frequency Provider Last Rate Last Admin    0.9%  NaCl infusion   Intravenous Continuous GREGG Guzmán MD           Review of patient's allergies indicates:   Allergen Reactions    Amoxicillin Hives      Pre-op Assessment    I have reviewed the Patient Summary Reports.     I have reviewed the Nursing Notes. I have reviewed the NPO Status.   I have reviewed the Medications.     Review of Systems  Anesthesia Hx:   History of prior surgery of interest to airway management or planning:          Denies Family Hx of Anesthesia complications.    Denies Personal Hx of Anesthesia complications.                    Cardiovascular:     Hypertension                                        Hepatic/GI:      Liver Disease,            Musculoskeletal:  Arthritis                   Physical Exam  General: Well nourished, Cooperative, Alert and Oriented    Airway:  Mallampati: II   Mouth Opening: Normal  TM Distance: Normal  Tongue: Normal  Neck  ROM: Normal ROM    Dental:  Intact        Anesthesia Plan  Type of Anesthesia, risks & benefits discussed:    Anesthesia Type: Gen Natural Airway  Intra-op Monitoring Plan: Standard ASA Monitors  Post Op Pain Control Plan: multimodal analgesia and IV/PO Opioids PRN  Induction:  IV  Informed Consent: Informed consent signed with the Patient and all parties understand the risks and agree with anesthesia plan.  All questions answered. Patient consented to blood products? No  ASA Score: 2  Day of Surgery Review of History & Physical: H&P Update referred to the surgeon/provider.    Ready For Surgery From Anesthesia Perspective.     .

## 2024-09-13 NOTE — H&P
Endoscopy H&P    Procedure : Colonoscopy     Sheila Costa is a 53 y.o. female presenting today for colonoscopy. Indications:  personal history of colon polyps. Her last scope was in 07/2021 where a 12 mm polyp was removed from the sigmoid. Pathology results are below. She denies significant changes in the interim.    Colon, sigmoid polyp (polypectomy):   Tubulovillous adenoma with focal superficial high-grade dysplasia   Margin negative for dysplasia   Multiple deeper levels examined     Past Medical History:   Diagnosis Date    Alpha thalassemia     Back pain     Cervical spondylosis 12/30/2019    Colon polyp     Fatty liver     Hypertension     Prediabetes     Premenstrual symptom      Prior Sedation Problems: NO  Family History   Problem Relation Name Age of Onset    Hypertension Mother Celsa Hayes     Thyroid disease Mother Celsa Hayes     Rheum arthritis Mother Celsa Hayes     Hearing loss Mother Celsa Hayes     Heart disease Father Wes Irving         Pacemaker    Cancer Maternal Grandmother Katy Housera Ray     Cataracts Maternal Grandmother Katy Housera Ray     Diabetes Maternal Grandmother Katy [place] Ray     Hypertension Maternal Grandmother Katy Housera Ray     Thyroid disease Maternal Grandmother Katy Zara Ray     Breast cancer Maternal Grandmother Katy Housera Ray     Hearing loss Maternal Grandmother Katy Housera Ray     Cataracts Maternal Grandfather Max Garrison, Sr.     Diabetes Maternal Grandfather Max Garrison, Sr.     Hypertension Maternal Grandfather Max Garrison, Sr.     Thyroid disease Maternal Grandfather Max Garrison, Sr.     Lupus Cousin mother's 1st cousin     Cancer Maternal Uncle      No Known Problems Paternal Grandmother      No Known Problems Paternal Grandfather      Amblyopia Neg Hx      Blindness Neg Hx      Glaucoma Neg Hx      Macular  degeneration Neg Hx      Retinal detachment Neg Hx      Strabismus Neg Hx      Stroke Neg Hx      Ovarian cancer Neg Hx      Colon cancer Neg Hx      Cirrhosis Neg Hx       Social History     Socioeconomic History    Marital status:    Occupational History     Employer: [employer]   Tobacco Use    Smoking status: Never    Smokeless tobacco: Never   Substance and Sexual Activity    Alcohol use: Yes     Alcohol/week: 1.0 standard drink of alcohol     Types: 1 Standard drinks or equivalent per week     Comment: 3 drinks weekly     Drug use: No    Sexual activity: Yes     Partners: Male     Birth control/protection: I.U.D.   Social History Narrative     Lukas - Dolores and FlorinGraciela - Florin        Used to walk, ride bikes. Occasional uses elliptical at home.      Social Determinants of Health     Financial Resource Strain: Low Risk  (5/8/2024)    Overall Financial Resource Strain (CARDIA)     Difficulty of Paying Living Expenses: Not hard at all   Food Insecurity: No Food Insecurity (5/8/2024)    Hunger Vital Sign     Worried About Running Out of Food in the Last Year: Never true     Ran Out of Food in the Last Year: Never true   Transportation Needs: No Transportation Needs (5/8/2024)    PRAPARE - Transportation     Lack of Transportation (Medical): No     Lack of Transportation (Non-Medical): No   Physical Activity: Insufficiently Active (5/8/2024)    Exercise Vital Sign     Days of Exercise per Week: 1 day     Minutes of Exercise per Session: 20 min   Stress: Stress Concern Present (5/8/2024)    [place] [employer] - Occupational Stress Questionnaire     Feeling of Stress : To some extent   Housing Stability: Low Risk  (9/13/2023)    Housing Stability Vital Sign     Unable to Pay for Housing in the Last Year: No     Number of Places Lived in the Last Year: 1     Unstable Housing in the Last Year: No     Review of patient's allergies indicates:   Allergen  Reactions    Amoxicillin Hives     Current Facility-Administered Medications   Medication Dose Route Frequency Provider Last Rate Last Admin    0.9%  NaCl infusion   Intravenous Continuous GREGG Guzmán MD           Review of Systems - Negative except as above    Physical Exam:  General: no distress  Head: normocephalic  Airway:  normal oropharynx, airway normal  Lungs:  clear to auscultation bilaterally and normal respiratory effort  Heart: regular rate and rhythm, S1, S2 normal, no murmur, rub or gallop  Abdomen: soft, non-tender non-distented; bowel sounds normal; no masses,  no organomegaly  Extremities: no cyanosis or edema, or clubbing    Deep Sedation: Mallampati Score per anesthesia     Sedation Plan: Moderate Sedation    ASA : II    A/P: Proceed with colonoscopy    Maurilio Perez MD  General Surgery PGY-5  09/13/2024

## 2024-09-13 NOTE — ANESTHESIA POSTPROCEDURE EVALUATION
Anesthesia Post Evaluation    Patient: Sheila Costa    Procedure(s) Performed: Procedure(s) (LRB):  COLONOSCOPY (N/A)    Final Anesthesia Type: general      Patient location during evaluation: PACU  Patient participation: Yes- Able to Participate  Level of consciousness: awake and alert  Post-procedure vital signs: reviewed and stable  Pain management: adequate  Airway patency: patent    PONV status at discharge: No PONV  Anesthetic complications: no      Cardiovascular status: blood pressure returned to baseline  Respiratory status: spontaneous ventilation and room air  Hydration status: euvolemic  Follow-up not needed.              Vitals Value Taken Time   /68 09/13/24 1002   Temp 36.6 °C (97.9 °F) 09/13/24 0946   Pulse 64 09/13/24 1002   Resp 16 09/13/24 1002   SpO2 99 % 09/13/24 1002         No case tracking events are documented in the log.      Pain/Yadira Score: Yadira Score: 9 (9/13/2024  9:47 AM)

## 2024-09-13 NOTE — TRANSFER OF CARE
"Anesthesia Transfer of Care Note    Patient: Sheila Costa    Procedure(s) Performed: Procedure(s) (LRB):  COLONOSCOPY (N/A)    Patient location: GI    Anesthesia Type: general    Transport from OR: Transported from OR on room air with adequate spontaneous ventilation    Post pain: adequate analgesia    Post assessment: no apparent anesthetic complications and tolerated procedure well    Post vital signs: stable    Level of consciousness: awake    Nausea/Vomiting: no nausea/vomiting    Complications: none    Transfer of care protocol was followed      Last vitals: Visit Vitals  /63 (BP Location: Left arm)   Pulse 74   Temp 36.8 °C (98.2 °F) (Temporal)   Resp 16   Ht 5' 1" (1.549 m)   Wt 68 kg (150 lb)   SpO2 100%   Breastfeeding No   BMI 28.34 kg/m²     "

## 2024-09-13 NOTE — PROVATION PATIENT INSTRUCTIONS
Discharge Summary/Instructions after an Endoscopic Procedure  Patient Name: Sheila Hinds  Patient MRN: [id number]  Patient   YOB: 1970  Friday, September 13, 2024  Kang Guzmán MD  Dear patient,  As a result of recent federal legislation (The Federal Cures Act), you may   receive lab or pathology results from your procedure in your [facility]   account before your physician is able to contact you. Your physician or   their representative will relay the results to you with their   recommendations at their soonest availability.  Thank you,  RESTRICTIONS:  During your procedure today, you received medications for sedation.  These   medications may affect your judgment, balance and coordination.  Therefore,   for 24 hours, you have the following restrictions:   - DO NOT drive a car, operate machinery, make legal/financial decisions,   sign important papers or drink alcohol.    ACTIVITY:  Today: no heavy lifting, straining or running due to procedural   sedation/anesthesia.  The following day: return to full activity including work.  DIET:  Eat and drink normally unless instructed otherwise.     TREATMENT FOR COMMON SIDE EFFECTS:  - Mild abdominal pain, nausea, belching, bloating or excessive gas:  rest,   eat lightly and use a heating pad.  - Sore Throat: treat with throat lozenges and/or gargle with warm salt   water.  - Because air was used during the procedure, expelling large amounts of air   from your rectum or belching is normal.  - If a bowel prep was taken, you may not have a bowel movement for 1-3 days.    This is normal.  SYMPTOMS TO WATCH FOR AND REPORT TO YOUR PHYSICIAN:  1. Abdominal pain or bloating, other than gas cramps.  2. Chest pain.  3. Back pain.  4. Signs of infection such as: chills or fever occurring within 24 hours   after the procedure.  5. Rectal bleeding, which would show as bright red, maroon, or black stools.   (A tablespoon of blood from the rectum is not  serious, especially if   hemorrhoids are present.)  6. Vomiting.  7. Weakness or dizziness.  GO DIRECTLY TO THE NEAREST EMERGENCY ROOM IF YOU HAVE ANY OF THE FOLLOWING:      Difficulty breathing              Chills and/or fever over 101 F   Persistent vomiting and/or vomiting blood   Severe abdominal pain   Severe chest pain   Black, tarry stools   Bleeding- more than one tablespoon   Any other symptom or condition that you feel may need urgent attention  Your doctor recommends these additional instructions:  If any biopsies were taken, your doctors clinic will contact you in 1 to 2   weeks with any results.  - Discharge patient to home (ambulatory).   - Resume previous diet.   - Continue present medications.   - Repeat colonoscopy in 5 years for surveillance.  For questions, problems or results please call your physician - Kang Guzmán MD at Work:  [phone].  [facility] PHONE NUMBER: [phone]  IF A COMPLICATION OR EMERGENCY SITUATION ARISES AND YOU ARE UNABLE TO REACH   YOUR PHYSICIAN - GO DIRECTLY TO THE EMERGENCY ROOM.  Kang Guzmán MD  9/13/2024 9:47:53 AM  This report has been verified and signed electronically.  Dear patient,  As a result of recent federal legislation (The Federal Cures Act), you may   receive lab or pathology results from your procedure in your [facility]   account before your physician is able to contact you. Your physician or   their representative will relay the results to you with their   recommendations at their soonest availability.  Thank you,  PROVATION

## 2024-09-25 ENCOUNTER — HOSPITAL ENCOUNTER (OUTPATIENT)
Dept: RADIOLOGY | Facility: OTHER | Age: 54
Discharge: HOME | End: 2024-09-25
Attending: HOSPITALIST
Payer: COMMERCIAL

## 2024-09-25 DIAGNOSIS — Z12.31 ENCOUNTER FOR SCREENING MAMMOGRAM FOR BREAST CANCER: ICD-10-CM

## 2024-09-25 PROCEDURE — 77063 BREAST TOMOSYNTHESIS BI: CPT | Mod: TC

## 2024-09-25 PROCEDURE — 77067 SCR MAMMO BI INCL CAD: CPT | Mod: TC

## 2024-10-01 ENCOUNTER — PATIENT MESSAGE (OUTPATIENT)
Dept: INTERNAL MEDICINE | Facility: CLINIC | Age: 54
End: 2024-10-01
Payer: COMMERCIAL

## 2024-11-07 RX ORDER — POTASSIUM CHLORIDE 750 MG/1
20 CAPSULE, EXTENDED RELEASE ORAL DAILY
Qty: 180 CAPSULE | Refills: 1 | Status: SHIPPED | OUTPATIENT
Start: 2024-11-07

## 2024-11-07 NOTE — TELEPHONE ENCOUNTER
No care due was identified.  [facility] Embedded Care Due Messages. Reference number: [id number].   11/07/2024 10:35:25 AM CST

## 2024-11-07 NOTE — TELEPHONE ENCOUNTER
Refill Decision Note   Sheila Costa  is requesting a refill authorization.    Brief Assessment and Rationale for Refill:   Approve       Medication Therapy Plan:         Comments:     Note composed:2:41 PM 11/07/2024

## 2024-11-12 ENCOUNTER — PATIENT MESSAGE (OUTPATIENT)
Dept: PAIN MEDICINE | Facility: CLINIC | Age: 54
End: 2024-11-12
Payer: COMMERCIAL

## 2024-11-29 ENCOUNTER — OFFICE VISIT (OUTPATIENT)
Dept: PAIN MEDICINE | Facility: CLINIC | Age: 54
End: 2024-11-29
Attending: ANESTHESIOLOGY
Payer: COMMERCIAL

## 2024-11-29 VITALS
RESPIRATION RATE: 12 BRPM | OXYGEN SATURATION: 100 % | HEART RATE: 75 BPM | BODY MASS INDEX: 28.72 KG/M2 | HEIGHT: 61 IN | DIASTOLIC BLOOD PRESSURE: 75 MMHG | SYSTOLIC BLOOD PRESSURE: 112 MMHG | WEIGHT: 152.13 LBS

## 2024-11-29 DIAGNOSIS — M54.51 VERTEBROGENIC LOW BACK PAIN: ICD-10-CM

## 2024-11-29 DIAGNOSIS — M46.1 SACROILIITIS: ICD-10-CM

## 2024-11-29 DIAGNOSIS — M47.812 CERVICAL SPONDYLOSIS: ICD-10-CM

## 2024-11-29 DIAGNOSIS — M47.892 OTHER SPONDYLOSIS, CERVICAL REGION: Primary | ICD-10-CM

## 2024-11-29 DIAGNOSIS — M51.369 DDD (DEGENERATIVE DISC DISEASE), LUMBAR: ICD-10-CM

## 2024-11-29 PROCEDURE — 3078F DIAST BP <80 MM HG: CPT | Mod: CPTII,S$GLB,, | Performed by: ANESTHESIOLOGY

## 2024-11-29 PROCEDURE — 99214 OFFICE O/P EST MOD 30 MIN: CPT | Mod: S$GLB,,, | Performed by: ANESTHESIOLOGY

## 2024-11-29 PROCEDURE — 1160F RVW MEDS BY RX/DR IN RCRD: CPT | Mod: CPTII,S$GLB,, | Performed by: ANESTHESIOLOGY

## 2024-11-29 PROCEDURE — 1159F MED LIST DOCD IN RCRD: CPT | Mod: CPTII,S$GLB,, | Performed by: ANESTHESIOLOGY

## 2024-11-29 PROCEDURE — 3044F HG A1C LEVEL LT 7.0%: CPT | Mod: CPTII,S$GLB,, | Performed by: ANESTHESIOLOGY

## 2024-11-29 PROCEDURE — 3008F BODY MASS INDEX DOCD: CPT | Mod: CPTII,S$GLB,, | Performed by: ANESTHESIOLOGY

## 2024-11-29 PROCEDURE — 99999 PR PBB SHADOW E&M-EST. PATIENT-LVL IV: CPT | Mod: PBBFAC,,, | Performed by: ANESTHESIOLOGY

## 2024-11-29 PROCEDURE — 3074F SYST BP LT 130 MM HG: CPT | Mod: CPTII,S$GLB,, | Performed by: ANESTHESIOLOGY

## 2024-11-29 RX ORDER — DULOXETIN HYDROCHLORIDE 60 MG/1
60 CAPSULE, DELAYED RELEASE ORAL DAILY
Qty: 30 CAPSULE | Refills: 11 | Status: SHIPPED | OUTPATIENT
Start: 2024-11-29 | End: 2025-11-29

## 2024-11-29 NOTE — H&P (VIEW-ONLY)
Chronic Pain Established Patient       PCP: Marj Gonzalez MD    CHIEF COMPLAINT: Neck Pain    INTERVAL HISTORY (Date):   Interval History 11/29/2024  Patient is here for follow up of her neck pain.  Patient had over 80+% relief from Cervical RFA, last done in 5/29/2024.  A few weeks ago, started to have return of her pain.  Now pain is about same as pre-abalation level.  Also endorse tenderness in upper back and neck muscles.  Doing home exercises when she can. Denies achyness in deltoid and fingers but denies numbness, tingling, weakness, dropping items, difficulty with opening jars and door.  Denies recent fall, GI/ incontinence.  Pain today 8/10.  Taking ibuprofen as needed.  Taking cymbalta daily, needs refill.  Reports minimal back pain since intracept.        Interval History 6/27/24:  Sheila Costa returns today for follow-up after cervical RFA. Since last seen, they report their pain has been markedly improved (~80% relief). She does have a little numbness on the left side of her posterior neck but has had this before and states it does not radiate past her neck and is not painful.  She denies other ongoing pains at this time.      Interval History 3/4/2024:  Sheila Costa presents for post-op s/p Intracept L5 and S1 on  2/26/2024.  She reports 75% relief since procedure with more relief everyday.  She denies: fever, chills, drainage from the site, or additional pain.   Today's pain score is 0/10.       Interval History 02/19/2024:  Ms. Igor Asher is a 52 y/o f who presents for virtual follow-up of her chronic low back pain.  She is scheduled for L5 & S1 intraosseous basivertebral nerve ablation 03/11/2024. She continues to endorse axial low back consistent with lumbar vertebrogenic back pain. The patient is presenting today for further information regarding the procedure. All questions answered.      Interval History 9/28/2023:  Patient seen today for virtual  follow-up of her low back pain.  Today, she reports that the bilateral SI joint injections that she had in May have worked well for her for about 6 weeks and her back pain is at baseline worse  Her main concern is the pain in her low back which is affecting her ADL .  She previously got about 50% relief with RFA but now states she feels the pain as before all her injection.  She feels that the RFA helped with 1 component of her pain, but she continues to have deep focal low back and buttock pain.  She denies radiation into her legs.  She reports that the pain is fairly persistent but worsens significantly with standing, sitting for a long time and flexion.  She denies any bowel/bladder dysfunction or saddle anesthesia.  She has no other focal neurologic deficits.     Interval History 6/29/2023:  She presents with continued stiffness in her neck and shoulders.  She also complains of lower back pain which is still present. She does feel the SI joint injections do help.  She states that she is doing some stretches and home exercises to help with the pain.  She feels the stretching does help, but she feels she isn't doing them as often as she'd like.  She continues to state that her pain is 50-70% improved compared to before the injections.  She is inquiring as to if she should schedule her follow up injections now or if she should wait.     Interval History 6/9/2023:  The patient returns to clinic today for follow up neck and back pain via virtual visit. She is s/p bilateral C4,5,6 RFA on 5/3/2023. She is s/p bilateral SI joint injections on 5/24/2023. She reports 70% relief of her neck and back pain. She reports intermittent low back pain. She denies any radicular leg pain. She is performing a home exercise and stretching routine. She is taking Celebrex. She did not receive Robaxin discussed at last visit. She denies any other health changes.      Interval History 4/13/23:  Sheila Costa presents to the  clinic for a follow-up appointment for back pain. Since the last visit, Sheila Costa states the pain has been persistant. Current pain intensity is 7/10. S/p L3/4/5 RFA with 50% relief. Pain is still persistent. Gets better with exercise and stretching although too much movement can exacerbate the pain. She is taking celebrex and tizanidine. She endorses previous SI joint injections were helpful.      Interval History 3/22/2023:  The patient returns to clinic today for follow up of low back pain via virtual visit. She is s/p bilateral L3,4,5 RFA on 3/1/2023. She reports 50% relief of her pain. She reports low back and buttock pain. This seems lower than injection sites. She denies any radicular leg pain. She reports increased neck pain. She describes this pain as constant and aching in nature. She denies any radicular arm pain. Her pain is worse with activity. She is taking Celebrex and Zanaflex as needed. She denies any other health changes.      Interval History 2/1/2023:  The patient returns to clinic today for follow up of low back pain via virtual visit. She is s/p bilateral L3,4,5 MBB on 1/25/2023. She reports 90% relief of her pain for one day. Since then, her pain has returned to baseline. She reports low back pain that is aching in nature. She denies any radicular leg pain. Her pain is worse with prolonged activity. She is currently taking Celebrex. She also takes Zanaflex intermittently. She denies any other health changes.      Interval History 12/29/2022:  The patient returns to clinic today for follow up of low back pain via virtual visit. She is s/p bilateral L3,4,5 MBB on 12/14/2022. She reports 95% relief of her low back pain for one day. Since then, her pain has returned to baseline. She continues to report low back pain. This is aching across the lower back. She denies any radicular leg pain. Her pain is worse with activity. She is currently taking Zanaflex with limited relief. She  denies any other health changes.      Interval history 10/31/22:  Patient returns to clinic for follow up of neck and shoulder pain. She is now s/p C7/T1 cervical MICHELLE 7/6/22 which gave 75-80% relief which lasted a few months. Now with primary complaint of low back pain in a band across the low back described as aching and throbbing which radiates to lateral aspect of BLE which stops at the knees. Still taking ibuprofen, celebrex, and lyrica. Not doing home exercise or PT. Denies fever/chills, or saddle anesthesia.     Interval History 6/13/22:   She is s/p left SIJ and left GTB injection on 5/18/2022 which she reports has helped her buttock/leg pain significantly. She now would like to focus on her neck pain. She continues to have axial neck pain, bilateral. Her RUE sxs have significantly decreased s/p C7-T1 IL MICHELLE done in Dec 2021 but she does still have RUE pain. She is taking Gabapentin 300mg nightly - has not noticed a difference in her pain with this. She has tried topicals - help somewhat, Advil 800mg helps. She has gone to the chiropractor in the past - helpful. She has not done PT for her neck in ~3 years ago which she thinks helped somewhat. She denies any weakness/numbness/tingling in BUEs. Denies b/b incontinence or saddle anesthesia.      Interval History 4/28/22:   Sheila HortaAbdiasmelissa presents to the clinic for a follow-up appointment for neck pain. Since the last visit, Sheila Igor states the pain has been improving. She is s/p bilateral RFA of C4/5/6 on 3/26 and 4/3. She reports >75% relief to both sides and is satisfied with the results. She does satate that she is having some numbness over the skin over the L side. She also states she is having left buttock pain that she describes as cramping/throbbing which occasionally radiates down posterior aspect of L thigh, stopping above the knee. She denies numbness/tingling in lower extremities.      Interval History 3/10/2022:   Sheila  Igor presents to the clinic for a follow-up appointment for neck pain. Since the last visit, Sheila Costa states the pain has been stable. Worse in the mornings. Feels like a stiffness in the neck without radicular symptoms as her radicular symptoms had been resolved since her MICHELLE. Some paraesthesia in arms and hands with typing. Patient states that mobic is beneficial. Best relief of her axial neck pain in the past was with RFA done previously. She discontinued her amitriptyline prescription and did not notice a difference in pain with discontinuation. Denies bowel/bladder dysfunction or difficulty with fine motor skills.     Interval History 1/6/2022:   Sheila Costa presents to the clinic for a follow-up appointment s/p Cervical Interlaminar Epidural Steroid Injection at the end of this past December. Since the last visit, Sheila Costa states the pain has been improving. Current pain intensity is 3/10 but she is still experiencing stiffness. Pain is primarly throbbing that would travel down BL arms. She has been doing well. Patient states that mobic and elavil have been helping with pain. Patient is sleeping well currently.     Interval History 11/15/2021:   Pt returns to clinic today due to resurgence of her bilateral neck and arm pain. At her last interventional pain encounter she had RFA left C4,5,6 on 03/04/2020 and the right done on 06/2020. Pt reports this provided significant relief of her pain however she has been having increased neck pain with radiation into her arms down to her hands. She has had a cervical epidural in the past with at least 50% relief of her pain. She has been utilizing OTC ibuprofen as well as a chiropractor.   Pt also reports low back pain without any radiation into the lower extremities. She states that the pain is aching and worsened with prolonged physical activity.      Interval History 11/25/2019:  The patient returns to  clinic today for follow up. She reports a few days of relief with trigger point injections at last visit. She continues to report neck pain with intermittent radiating pain into both arms to her hands. She also reports mid back pain. 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 She states the previous MICHELLE helped for her arm pain but not for her neck pain. She has had limited relief with NSAIDs and physical therapy. She denies any other health changes. Her pain today is 7/10.     Interval History 10/28/2019:  The patient returns to clinic today for follow up. She is s/p C7-T1 IL MICHELLE on 10/2/2019. She reports 50% relief of her neck and arm pain. She reports intermittent neck pain that is sore in nature. She does report increased mid back pain. She describes this pain as tight and aching in nature. She denies any radicular arm pain. She denies any other health changes. Her pain today is 6/10.      Pain Disability Index Review:       5/9/2024     9:43 AM 4/13/2023     2:26 PM 3/10/2022     4:01 PM   Last 3 PDI Scores   Pain Disability Index (PDI) 35 [phone] [id number]         Pain Procedures:   10/2/2019- C7-T1 IL MICHELLE- 50% pain relief  1/8/2020- Bilateral C4,5,6 MBB  1/29/2020- Bilateral C4,5,6 MBB  3/4/2020: Left C4,5,6 RFA - 80% relief  6/3/2020: Right C4,5,6 RFA - 80% relief  12/22/2021- C7/T1 IL MICHELLE  3/23/2022- Right C4,5,6 RFA -80-85% relief  4/6/2022- Left C4,5,6 RFA- 75% relief   5/8/2022- Left SI joint and GTB injection  7/6/2022- C7/T1 IL MICHELLE  12/14/2022- Bilateral L3,4,5 MBB- 95% relief  1/25/2023- Bilateral L3,4,5 MBB  3/1/2023- Bilateral L3,4,5 RFA  5/3/2023- Bilateral C4,5,6 RFA  5/24/2023- Bilateral SI joint injections  2/26/2024 - Intracept at L5 and S1  5/29/2024 - Bilateral C4, 5, 6 with 80% relief     Physical Therapy/Home Exercise: yes     Imaging:   MRI Lumbar Spine 11/11/2022:  COMPARISON:  11/01/2022     FINDINGS:  Alignment: Normal.     Vertebrae: Degenerative endplate changes at L5-S1.  No fracture or  marrow infiltrative process.     Discs: Mild disc height loss at L5-S1 with annular fissure.  No evidence for discitis.     Cord: Conus terminates at L1 and appears unremarkable.  Cauda equina appears unremarkable.     Degenerative findings:     T12-L1: No spinal canal stenosis or neural foraminal narrowing.     L1-L2: No spinal canal stenosis or neural foraminal narrowing.     L2-L3: No spinal canal stenosis or neural foraminal narrowing.     L3-L4: No spinal canal stenosis or neural foraminal narrowing.     L4-L5: Circumferential disc bulge and mild facet arthropathy result in mild left neural foraminal narrowing.     L5-S1: Circumferential disc bulge and moderate facet arthropathy result in mild right, moderate left neural foraminal narrowing.     Paraspinal muscles & soft tissues: Paraspinal muscle bulk is maintained.  Small bilateral renal cysts noted.     Impression:     1. Degenerative changes of the lower lumbar spine detailed above.  Moderate left neural foraminal narrowing noted at L5-S1.     MRI Cervical Spine 11/20/2019:  COMPARISON:  MRI cervical spine without contrast 03/06/2019     FINDINGS:  Alignment: Sagittal alignment is preserved.     Vertebrae: Normal marrow signal without osseous destructive process or aggressive bone marrow replacement process.  No fracture.     Discs: There is mild disc space narrowing at C2-C3 with endplate irregularity, likely degenerative and similar to the prior examination.  Remaining discs demonstrate normal height and signal.     Cord: Normal caliber, morphology, and signal.     Degenerative findings:     C2-C3: Posterior disc osteophyte complex and left uncovertebral joint spurring contribute to mild left neural foraminal narrowing.  No spinal canal stenosis.     C3-C4: Mild posterior disc osteophyte complex results in mild effacement of the ventral thecal sac without significant spinal canal stenosis or neural foraminal narrowing.     C4-C5: Posterior disc osteophyte  complex results in effacement of the anterior thecal sac without significant spinal canal stenosis or neural foraminal narrowing.     C5-C6: Left uncovertebral joint spurring contributes to mild left neural foraminal narrowing without significant spinal canal stenosis.     C6-C7: No significant disc abnormality, spinal canal stenosis, or neural foraminal narrowing.     C7-T1: No significant disc abnormality, spinal canal stenosis, or neural foraminal narrowing.     Limited sagittal evaluation of the upper thoracic spine reveals a disc protrusion at T4-T5 causing effacement of the anterior thecal sac and cord abutment.     Paraspinal muscles & soft tissues: Unremarkable without spinal canal or paraspinal mass.     Impression:     Mild cervical degenerative changes contributing to mild left neural foraminal narrowing at C2-C3 and C5-C6.  No spinal canal stenosis.     T4-T5 disc protrusion resulting in effacement of the anterior thecal sac, abutting the cord.     Narrowing of the C2-C3 disc space with endplate irregularity, likely degenerative and stable compared to the prior examination      Past Medical History:   Diagnosis Date    Alpha thalassemia     Back pain     Cervical spondylosis 12/30/2019    Colon polyp     Fatty liver     Hypertension     Prediabetes     Premenstrual symptom      Past Surgical History:   Procedure Laterality Date    COLONOSCOPY N/A 6/18/2020    Procedure: COLONOSCOPY;  Surgeon: Eliot Hill MD;  Location: [facility] ([address] [place] [address]);  Service: Endoscopy;  Laterality: N/A;  COVID screening on [facility]/20[facility]    COLONOSCOPY N/A 7/6/2020    Procedure: COLONOSCOPY;  Surgeon: Kang Guzmán MD;  Location: [facility] OR [address] [place] [address];  Service: Colon and Rectal;  Laterality: N/A;    COLONOSCOPY N/A 7/9/2021    Procedure: COLONOSCOPY;  Surgeon: GREGG Guzmán MD;  Location: [facility] ENDO (4TH FLR);  Service: Endoscopy;  Laterality: N/A;  in July 2021  covid test [place] 7/6    COLONOSCOPY N/A  9/13/2024    Procedure: COLONOSCOPY;  Surgeon: GREGG Guzmán MD;  [facility]  Service: Endoscopy;  Laterality: N/A;  ref by / suflave inst portal-RB  polyps noted on procedure report 7/9/2021; Dr. Guzmán patient  6/25 r/s, unopened Suflave, updated instr. to portal, pt states she may be restarting semaglutide on Tuesdays-instr. to be hold for 8 days-st  9/9-lvm for pre warren    DESTRUCTION, INTRAOSSEOUS BASIVERTEBRAL NERVE, EACH ADD'L BODY, LUM/SAC Bilateral 2/26/2024    Procedure: DESTRUCTION,INTRAOSSEOUS BASIVERTEBRAL NERVE,EACH ADD'L BODY,LUM/SAC;  Surgeon: Cosme Kirkland MD;  Location: [facility] OR;  Service: Pain Management;  Laterality: Bilateral;  L5 & S1    EPIDURAL STEROID INJECTION N/A 10/2/2019    Procedure: INJECTION, STEROID, EPIDURAL, C7-T1;  Surgeon: Cosme Kirkland MD;  Location: [facility] PAIN MGT;  Service: Pain Management;  Laterality: N/A;    EPIDURAL STEROID INJECTION N/A 12/22/2021    Procedure: INJECTION, STEROID, EPIDURAL, C7-T1 NEED CONSENT;  Surgeon: Cosme Kirkland MD;  Location: [facility] PAIN MGT;  Service: Pain Management;  Laterality: N/A;    EPIDURAL STEROID INJECTION N/A 7/6/2022    Procedure: INJECTION, STEROID, EPIDURAL, C7-T1 IL  CONTRAST;  Surgeon: Cosme Kirkland MD;  Location: [facility] PAIN MGT;  Service: Pain Management;  Laterality: N/A;    EYE SURGERY      INJECTION OF ANESTHETIC AGENT AROUND NERVE Bilateral 1/8/2020    Procedure: BLOCK, NERVE C4,5,6;  Surgeon: Cosme Kirkland MD;  Location: [facility] PAIN MGT;  Service: Pain Management;  Laterality: Bilateral;  B/L MBB C4,5,6    INJECTION OF ANESTHETIC AGENT AROUND NERVE Bilateral 1/29/2020    Procedure: BLOCK, NERVE, Repeat C4-C5-C6 MEDIAL BRANCH;  Surgeon: Cosme Kirkland MD;  Location: [facility] PAIN MGT;  Service: Pain Management;  Laterality: Bilateral;    INJECTION OF ANESTHETIC AGENT AROUND NERVE Bilateral 12/14/2022    Procedure: BLOCK, NERVE BILATERAL L3,L4,L5 MEDIAL BRANCH NEEDS  CONSENT;  Surgeon: Cosme Kirkland MD;  Location: [facility] PAIN MGT;  Service: Pain Management;  Laterality: Bilateral;    INJECTION OF ANESTHETIC AGENT AROUND NERVE Bilateral 1/25/2023    Procedure: BLOCK, NERVE BILATERAL L3,L4,L5 MEDIAL BRANCH;  Surgeon: Cosme Kirkland MD;  Location: [facility] PAIN MGT;  Service: Pain Management;  Laterality: Bilateral;    INJECTION OF ANESTHETIC AGENT AROUND NERVE Bilateral 11/1/2023    Procedure: BLOCK, NERVE BILATERAL L5, S1, AND S2 LATERAL BRANCH;  Surgeon: Cosme Kirkland MD;  Location: [facility] PAIN MGT;  Service: Pain Management;  Laterality: Bilateral;    INJECTION OF ANESTHETIC AGENT AROUND NERVE Bilateral 11/22/2023    Procedure: BLOCK, NERVE BILATERAL L5, S1, AND S2 MEDIAL BRANCH;  Surgeon: Cosme Kirkland MD;  Location: [facility] PAIN MGT;  Service: Pain Management;  Laterality: Bilateral;    INJECTION OF JOINT Left 5/18/2022    Procedure: INJECTION, JOINT, LEFT SI and GTB *[facility]ORI PT*;  Surgeon: Jed Phillips MD;  Location: [facility] PAIN MGT;  Service: Pain Management;  Laterality: Left;    INJECTION, SACROILIAC JOINT Bilateral 5/24/2023    Procedure: INJECTION,SACROILIAC JOINT BIALTERAL;  Surgeon: Cosme Kirkland MD;  Location: [facility] PAIN MGT;  Service: Pain Management;  Laterality: Bilateral;    LAPAROSCOPIC SURGICAL REMOVAL OF CECUM N/A 7/6/2020    Procedure: EXCISION, CECUM, LAPAROSCOPIC, colonoscopy to start, ERAS low;  Surgeon: Kang Guzmán MD;  Location: [facility] OR [address] [place] [address];  Service: Colon and Rectal;  Laterality: N/A;    RADIOFREQUENCY ABLATION Left 3/4/2020    Procedure: RADIOFREQUENCY ABLATION, C4-C5-C6 ME DIAL BRANCH 1 OF 2 need consent;  Surgeon: Cosme Kirkland MD;  Location: [facility] PAIN MGT;  Service: Pain Management;  Laterality: Left;    RADIOFREQUENCY ABLATION Right 6/3/2020    Procedure: RADIOFREQUENCY ABLATION, C4-C5-C6 MEDIAL BRANCH 2 OF 2 need consent;  Surgeon: Cosme Kirkland MD;  Location: [facility] PAIN MGT;  Service: Pain Management;   Laterality: Right;    RADIOFREQUENCY ABLATION Right 3/23/2022    Procedure: Radiofrequency Ablation RIGHT C4,5,6;  Surgeon: Cosme Kirkland MD;  Location: [facility] PAIN MGT;  Service: Pain Management;  Laterality: Right;    RADIOFREQUENCY ABLATION Left 4/6/2022    Procedure: Radiofrequency Ablation LEFT C4,5,6;  Surgeon: Cosme Kirkland MD;  Location: [facility] PAIN MGT;  Service: Pain Management;  Laterality: Left;    RADIOFREQUENCY ABLATION Bilateral 3/1/2023    Procedure: RADIOFREQUENCY ABLATION BILATERAL L3,L4,L5 BOTH SIDES SAME TIME PER DR KIRKLAND;  Surgeon: Cosme Kirkland MD;  Location: [facility] PAIN MGT;  Service: Pain Management;  Laterality: Bilateral;    RADIOFREQUENCY ABLATION Bilateral 5/3/2023    Procedure: RADIOFREQUENCY ABLATION BILATERAL C4,C5,C6 BOTH SIDES PER DR KIRKLAND;  Surgeon: Cosme Kirkland MD;  Location: [facility] PAIN MGT;  Service: Pain Management;  Laterality: Bilateral;    RADIOFREQUENCY ABLATION Bilateral 5/29/2024    Procedure: RADIOFREQUENCY ABLATION BILATERAL C4, 5, 6;  Surgeon: Cosme Kirkland MD;  Location: [facility] PAIN MGT;  Service: Pain Management;  Laterality: Bilateral;  [phone]  4 WK F/U LORI    REFRACTIVE SURGERY  2008    SMALL INTESTINE SURGERY  7/6/20     Social History     Socioeconomic History    Marital status:    Occupational History     Employer: [employer]   Tobacco Use    Smoking status: Never    Smokeless tobacco: Never   Substance and Sexual Activity    Alcohol use: Yes     Alcohol/week: 1.0 standard drink of alcohol     Types: 1 Standard drinks or equivalent per week     Comment: 3 drinks weekly     Drug use: No    Sexual activity: Yes     Partners: Male     Birth control/protection: I.U.D.   Social History Narrative     Children - Dolores and Florin, Jerichosband - Florin        Used to walk, ride bikes. Occasional uses elliptical at home.      Social Drivers of Health     Financial Resource Strain: Low Risk  (5/8/2024)    Overall  Financial Resource Strain (CARDIA)     Difficulty of Paying Living Expenses: Not hard at all   Food Insecurity: No Food Insecurity (5/8/2024)    Hunger Vital Sign     Worried About Running Out of Food in the Last Year: Never true     Ran Out of Food in the Last Year: Never true   Transportation Needs: No Transportation Needs (5/8/2024)    PRAPARE - Transportation     Lack of Transportation (Medical): No     Lack of Transportation (Non-Medical): No   Physical Activity: Insufficiently Active (5/8/2024)    Exercise Vital Sign     Days of Exercise per Week: 1 day     Minutes of Exercise per Session: 20 min   Stress: Stress Concern Present (5/8/2024)    [employer] - Occupational Stress Questionnaire     Feeling of Stress : To some extent   Housing Stability: Low Risk  (9/13/2023)    Housing Stability Vital Sign     Unable to Pay for Housing in the Last Year: No     Number of Places Lived in the Last Year: 1     Unstable Housing in the Last Year: No     Family History   Problem Relation Name Age of Onset    Hypertension Mother Celsa Domínguezandres KirbyHayes     Thyroid disease Mother Celsa Hayes     Rheum arthritis Mother Celsa Hayes     Hearing loss Mother Celsa Hayes     Heart disease Father Wes Irving         Pacemaker    Cancer Maternal Grandmother Katy Fanrock Ray     Cataracts Maternal Grandmother Katy Zara Ray     Diabetes Maternal Grandmother Katy Zara Ray     Hypertension Maternal Grandmother Katy Fanrock Ray     Thyroid disease Maternal Grandmother Katy Zara Ray     Breast cancer Maternal Grandmother Katy Zara Ray     Hearing loss Maternal Grandmother Katy Zara Ray     Cataracts Maternal Grandfather Max Garrison, Sr.     Diabetes Maternal Grandfather Max Garrison, Sr.     Hypertension Maternal Grandfather Max Garrison, Sr.     Thyroid disease Maternal Grandfather Max Garrison, Sr.     Lupus Cousin mother's 1st cousin     Cancer  Maternal Uncle      No Known Problems Paternal Grandmother      No Known Problems Paternal Grandfather      Amblyopia Neg Hx      Blindness Neg Hx      Glaucoma Neg Hx      Macular degeneration Neg Hx      Retinal detachment Neg Hx      Strabismus Neg Hx      Stroke Neg Hx      Ovarian cancer Neg Hx      Colon cancer Neg Hx      Cirrhosis Neg Hx         Review of patient's allergies indicates:   Allergen Reactions    Amoxicillin Hives       Current Outpatient Medications   Medication Sig    ascorbic acid, vitamin C, (VITAMIN C) 100 MG tablet Take 100 mg by mouth once daily.    atenoloL-chlorthalidone (TENORETIC) 100-25 mg per tablet Take 1 tablet by mouth once daily.    celecoxib (CELEBREX) 100 MG capsule TAKE 1 CAPSULE(100 MG) BY MOUTH TWICE DAILY    estradioL (VIVELLE-DOT) 0.025 mg/24 hr Place 1 patch onto the skin twice a week.    estradioL (VIVELLE-DOT) 0.0375 mg/24 hr Place 1 patch onto the skin twice a week.    FLUoxetine 20 MG capsule Take 20 mg by mouth.    levonorgestrel (MIRENA) 20 mcg/24 hr (5 years) IUD 1 Intra Uterine Device by Intrauterine route once. for 1 dose    loratadine (CLARITIN) 10 mg tablet Take 10 mg by mouth every morning.     metFORMIN (GLUCOPHAGE) 500 MG tablet Take 1 tablet (500 mg total) by mouth Daily.    methocarbamoL (ROBAXIN) 500 MG Tab TAKE 1 TABLET(500 MG) BY MOUTH THREE TIMES DAILY AS NEEDED FOR MUSCLE PAIN    multivitamin capsule Take 1 capsule by mouth once daily.    mupirocin (BACTROBAN) 2 % ointment Apply topically once daily.    ondansetron (ZOFRAN-ODT) 4 MG TbDL Take 4 mg by mouth every 6 (six) hours as needed.    potassium chloride (MICRO-K) 10 MEQ CpSR Take 2 capsules (20 mEq total) by mouth once daily.    semaglutide (OZEMPIC) 1 mg/dose (2 mg/1.5 mL) PnIj Inject 1 mg into the skin every 7 days.    sod sulf-pot chloride-mag sulf (SUTAB) 1.479-0.188- 0.225 gram tablet Take 12 tablets by mouth once daily. Please follow Endoscopy 's instructions. Please apply coupon  "code. Please apply coupon code. BIN: [id number], PCN: [id number], GROUP: [id number], MEMBER ID: [id number]    vitamin D (VITAMIN D3) 1000 units Tab Take 1,000 Units by mouth once daily.    zolpidem (AMBIEN) 10 mg Tab Take 10 mg by mouth every evening.    DULoxetine (CYMBALTA) 60 MG capsule Take 1 capsule (60 mg total) by mouth once daily.     No current facility-administered medications for this visit.         ROS:  GENERAL: No fever. No chills. No fatigue. Denies weight loss. Denies weight gain.  HEENT: Denies headaches. Denies vision change. Denies eye pain. Denies double vision. Denies ear pain.   CV: Denies chest pain.   PULM: Denies of shortness of breath.  GI: Denies constipation. No diarrhea. No abdominal pain. Denies nausea. Denies vomiting. No blood in stool.  HEME: Denies bleeding problems.  : Denies urgency. No painful urination. No blood in urine.  MS: Denies joint stiffness. Denies joint swelling.    SKIN: Denies rash.   NEURO: Denies seizures. No weakness.  PSYCH:  Denies difficulty sleeping. No anxiety. Denies depression. No suicidal thoughts.       VITALS:   Vitals:    11/29/24 1520 11/29/24 1521   BP: 112/75    Pulse: 75    Resp: 12    SpO2: 100%    Weight: 69 kg (152 lb 1.9 oz)    Height: 5' 1" (1.549 m)    PainSc:   7   7   PainLoc: Back          PHYSICAL EXAM:   GENERAL: Well appearing, in no acute distress, alert and oriented x3.  PSYCH:  Mood and affect appropriate.  SKIN: Skin color, texture, turgor normal, no rashes or lesions.  HEENT:  Normocephalic, atraumatic. Cranial nerves grossly intact.  NECK:   positive for pain to palpation over the cervical paraspinous muscles.   POSITIVE for pain to palpation over facets.  POSITIVE for pain with neck flexion, extension, or lateral flexion.   Spurling test was Negative Bilaterally  Tenderness to trapezius bilaterally  PULM: No evidence of respiratory difficulty, symmetric chest rise.  GI:  Non-distended  BACK:   Normal range of motion.   Negative for pain " to palpation over the spinous processes.  POSITIVE for pain to palpation over facet joints.   POSITIVE axial loading test bilateral.   POSITIVE for tenderness over BILATERAL SIJ.   Negative thigh and sacral thrust test  POSITIVE BILATERALLY FABERE, Ganselin and Yeoman's test.   EXTREMITIES:   No deformities, edema, or skin discoloration.   NEURO: Sensation is equal and appropriate bilaterally. Bilateral upper and lower extremity strength is normal and symmetric. Bilateral upper and lower extremity coordination and muscle stretch reflexes are physiologic and symmetric. Plantar response are downgoing.  GAIT: Normal        ASSESSMENT: 54 y.o. year old with Neck pain, consistent with:    1. Other spondylosis, cervical region        2. Cervical spondylosis  Procedure Order to Pain Management      3. Vertebrogenic low back pain  DULoxetine (CYMBALTA) 60 MG capsule      4. Sacroiliitis  DULoxetine (CYMBALTA) 60 MG capsule      5. DDD (degenerative disc disease), lumbar  DULoxetine (CYMBALTA) 60 MG capsule          PLAN:  Patient Education:  Discussed the importance of physical therapy, activity modification, and a home exercise plan to help with pain and improve health.  Therapy referral:  continue HEP, discussed elastic band exercise for neck and shoulder  Medications:   Patient can continue with pain medications for now per their provider since they are providing benefits, using them appropriately, and without side effects.  Cymbalta 60mg daily, refilled  Schedule pain intervention for: repeat Cervical RFA bilateral C4,5,6 bilateral  Counseled patient: regarding the importance of activity modification and physical therapy  Return to clinic: 4 wks after pain procedure      Alva Bradford MD   I have personally reviewed the history and exam of this patient and agree with the resident/fellow/NPs note as stated above.    Cosme Kirkland MD    11/29/2024

## 2024-11-29 NOTE — PROGRESS NOTES
Chronic Pain Established Patient       PCP: Marj Gonzalez MD    CHIEF COMPLAINT: Neck Pain    INTERVAL HISTORY (Date):   Interval History 11/29/2024  Patient is here for follow up of her neck pain.  Patient had over 80+% relief from Cervical RFA, last done in 5/29/2024.  A few weeks ago, started to have return of her pain.  Now pain is about same as pre-abalation level.  Also endorse tenderness in upper back and neck muscles.  Doing home exercises when she can. Denies achyness in deltoid and fingers but denies numbness, tingling, weakness, dropping items, difficulty with opening jars and door.  Denies recent fall, GI/ incontinence.  Pain today 8/10.  Taking ibuprofen as needed.  Taking cymbalta daily, needs refill.  Reports minimal back pain since intracept.        Interval History 6/27/24:  Sheila Costa returns today for follow-up after cervical RFA. Since last seen, they report their pain has been markedly improved (~80% relief). She does have a little numbness on the left side of her posterior neck but has had this before and states it does not radiate past her neck and is not painful.  She denies other ongoing pains at this time.      Interval History 3/4/2024:  Sheila Costa presents for post-op s/p Intracept L5 and S1 on  2/26/2024.  She reports 75% relief since procedure with more relief everyday.  She denies: fever, chills, drainage from the site, or additional pain.   Today's pain score is 0/10.       Interval History 02/19/2024:  Ms. Igor Asher is a 54 y/o f who presents for virtual follow-up of her chronic low back pain.  She is scheduled for L5 & S1 intraosseous basivertebral nerve ablation 03/11/2024. She continues to endorse axial low back consistent with lumbar vertebrogenic back pain. The patient is presenting today for further information regarding the procedure. All questions answered.      Interval History 9/28/2023:  Patient seen today for virtual  follow-up of her low back pain.  Today, she reports that the bilateral SI joint injections that she had in May have worked well for her for about 6 weeks and her back pain is at baseline worse  Her main concern is the pain in her low back which is affecting her ADL .  She previously got about 50% relief with RFA but now states she feels the pain as before all her injection.  She feels that the RFA helped with 1 component of her pain, but she continues to have deep focal low back and buttock pain.  She denies radiation into her legs.  She reports that the pain is fairly persistent but worsens significantly with standing, sitting for a long time and flexion.  She denies any bowel/bladder dysfunction or saddle anesthesia.  She has no other focal neurologic deficits.     Interval History 6/29/2023:  She presents with continued stiffness in her neck and shoulders.  She also complains of lower back pain which is still present. She does feel the SI joint injections do help.  She states that she is doing some stretches and home exercises to help with the pain.  She feels the stretching does help, but she feels she isn't doing them as often as she'd like.  She continues to state that her pain is 50-70% improved compared to before the injections.  She is inquiring as to if she should schedule her follow up injections now or if she should wait.     Interval History 6/9/2023:  The patient returns to clinic today for follow up neck and back pain via virtual visit. She is s/p bilateral C4,5,6 RFA on 5/3/2023. She is s/p bilateral SI joint injections on 5/24/2023. She reports 70% relief of her neck and back pain. She reports intermittent low back pain. She denies any radicular leg pain. She is performing a home exercise and stretching routine. She is taking Celebrex. She did not receive Robaxin discussed at last visit. She denies any other health changes.      Interval History 4/13/23:  Sheila Costa presents to the  clinic for a follow-up appointment for back pain. Since the last visit, Sheila Costa states the pain has been persistant. Current pain intensity is 7/10. S/p L3/4/5 RFA with 50% relief. Pain is still persistent. Gets better with exercise and stretching although too much movement can exacerbate the pain. She is taking celebrex and tizanidine. She endorses previous SI joint injections were helpful.      Interval History 3/22/2023:  The patient returns to clinic today for follow up of low back pain via virtual visit. She is s/p bilateral L3,4,5 RFA on 3/1/2023. She reports 50% relief of her pain. She reports low back and buttock pain. This seems lower than injection sites. She denies any radicular leg pain. She reports increased neck pain. She describes this pain as constant and aching in nature. She denies any radicular arm pain. Her pain is worse with activity. She is taking Celebrex and Zanaflex as needed. She denies any other health changes.      Interval History 2/1/2023:  The patient returns to clinic today for follow up of low back pain via virtual visit. She is s/p bilateral L3,4,5 MBB on 1/25/2023. She reports 90% relief of her pain for one day. Since then, her pain has returned to baseline. She reports low back pain that is aching in nature. She denies any radicular leg pain. Her pain is worse with prolonged activity. She is currently taking Celebrex. She also takes Zanaflex intermittently. She denies any other health changes.      Interval History 12/29/2022:  The patient returns to clinic today for follow up of low back pain via virtual visit. She is s/p bilateral L3,4,5 MBB on 12/14/2022. She reports 95% relief of her low back pain for one day. Since then, her pain has returned to baseline. She continues to report low back pain. This is aching across the lower back. She denies any radicular leg pain. Her pain is worse with activity. She is currently taking Zanaflex with limited relief. She  denies any other health changes.      Interval history 10/31/22:  Patient returns to clinic for follow up of neck and shoulder pain. She is now s/p C7/T1 cervical MICHELLE 7/6/22 which gave 75-80% relief which lasted a few months. Now with primary complaint of low back pain in a band across the low back described as aching and throbbing which radiates to lateral aspect of BLE which stops at the knees. Still taking ibuprofen, celebrex, and lyrica. Not doing home exercise or PT. Denies fever/chills, or saddle anesthesia.     Interval History 6/13/22:   She is s/p left SIJ and left GTB injection on 5/18/2022 which she reports has helped her buttock/leg pain significantly. She now would like to focus on her neck pain. She continues to have axial neck pain, bilateral. Her RUE sxs have significantly decreased s/p C7-T1 IL MICHELLE done in Dec 2021 but she does still have RUE pain. She is taking Gabapentin 300mg nightly - has not noticed a difference in her pain with this. She has tried topicals - help somewhat, Advil 800mg helps. She has gone to the chiropractor in the past - helpful. She has not done PT for her neck in ~3 years ago which she thinks helped somewhat. She denies any weakness/numbness/tingling in BUEs. Denies b/b incontinence or saddle anesthesia.      Interval History 4/28/22:   Sheila HortaAbdiasmelissa presents to the clinic for a follow-up appointment for neck pain. Since the last visit, Sheila Igor states the pain has been improving. She is s/p bilateral RFA of C4/5/6 on 3/26 and 4/3. She reports >75% relief to both sides and is satisfied with the results. She does satate that she is having some numbness over the skin over the L side. She also states she is having left buttock pain that she describes as cramping/throbbing which occasionally radiates down posterior aspect of L thigh, stopping above the knee. She denies numbness/tingling in lower extremities.      Interval History 3/10/2022:   Sheila  Igor presents to the clinic for a follow-up appointment for neck pain. Since the last visit, Sheila Costa states the pain has been stable. Worse in the mornings. Feels like a stiffness in the neck without radicular symptoms as her radicular symptoms had been resolved since her MICHELLE. Some paraesthesia in arms and hands with typing. Patient states that mobic is beneficial. Best relief of her axial neck pain in the past was with RFA done previously. She discontinued her amitriptyline prescription and did not notice a difference in pain with discontinuation. Denies bowel/bladder dysfunction or difficulty with fine motor skills.     Interval History 1/6/2022:   Sheila Costa presents to the clinic for a follow-up appointment s/p Cervical Interlaminar Epidural Steroid Injection at the end of this past December. Since the last visit, Sheila Costa states the pain has been improving. Current pain intensity is 3/10 but she is still experiencing stiffness. Pain is primarly throbbing that would travel down BL arms. She has been doing well. Patient states that mobic and elavil have been helping with pain. Patient is sleeping well currently.     Interval History 11/15/2021:   Pt returns to clinic today due to resurgence of her bilateral neck and arm pain. At her last interventional pain encounter she had RFA left C4,5,6 on 03/04/2020 and the right done on 06/2020. Pt reports this provided significant relief of her pain however she has been having increased neck pain with radiation into her arms down to her hands. She has had a cervical epidural in the past with at least 50% relief of her pain. She has been utilizing OTC ibuprofen as well as a chiropractor.   Pt also reports low back pain without any radiation into the lower extremities. She states that the pain is aching and worsened with prolonged physical activity.      Interval History 11/25/2019:  The patient returns to  clinic today for follow up. She reports a few days of relief with trigger point injections at last visit. She continues to report neck pain with intermittent radiating pain into both arms to her hands. She also reports mid back pain. 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 She states the previous MICHELLE helped for her arm pain but not for her neck pain. She has had limited relief with NSAIDs and physical therapy. She denies any other health changes. Her pain today is 7/10.     Interval History 10/28/2019:  The patient returns to clinic today for follow up. She is s/p C7-T1 IL MICHELLE on 10/2/2019. She reports 50% relief of her neck and arm pain. She reports intermittent neck pain that is sore in nature. She does report increased mid back pain. She describes this pain as tight and aching in nature. She denies any radicular arm pain. She denies any other health changes. Her pain today is 6/10.      Pain Disability Index Review:       5/9/2024     9:43 AM 4/13/2023     2:26 PM 3/10/2022     4:01 PM   Last 3 PDI Scores   Pain Disability Index (PDI) 35 51 [id number]         Pain Procedures:   10/2/2019- C7-T1 IL MICHELLE- 50% pain relief  1/8/2020- Bilateral C4,5,6 MBB  1/29/2020- Bilateral C4,5,6 MBB  3/4/2020: Left C4,5,6 RFA - 80% relief  6/3/2020: Right C4,5,6 RFA - 80% relief  12/22/2021- C7/T1 IL MICHELLE  3/23/2022- Right C4,5,6 RFA -80-85% relief  4/6/2022- Left C4,5,6 RFA- 75% relief   5/8/2022- Left SI joint and GTB injection  7/6/2022- C7/T1 IL MICHELLE  12/14/2022- Bilateral L3,4,5 MBB- 95% relief  1/25/2023- Bilateral L3,4,5 MBB  3/1/2023- Bilateral L3,4,5 RFA  5/3/2023- Bilateral C4,5,6 RFA  5/24/2023- Bilateral SI joint injections  2/26/2024 - Intracept at L5 and S1  5/29/2024 - Bilateral C4, 5, 6 with 80% relief     Physical Therapy/Home Exercise: yes     Imaging:   MRI Lumbar Spine 11/11/2022:  COMPARISON:  11/01/2022     FINDINGS:  Alignment: Normal.     Vertebrae: Degenerative endplate changes at L5-S1.  No fracture or  marrow infiltrative process.     Discs: Mild disc height loss at L5-S1 with annular fissure.  No evidence for discitis.     Cord: Conus terminates at L1 and appears unremarkable.  Cauda equina appears unremarkable.     Degenerative findings:     T12-L1: No spinal canal stenosis or neural foraminal narrowing.     L1-L2: No spinal canal stenosis or neural foraminal narrowing.     L2-L3: No spinal canal stenosis or neural foraminal narrowing.     L3-L4: No spinal canal stenosis or neural foraminal narrowing.     L4-L5: Circumferential disc bulge and mild facet arthropathy result in mild left neural foraminal narrowing.     L5-S1: Circumferential disc bulge and moderate facet arthropathy result in mild right, moderate left neural foraminal narrowing.     Paraspinal muscles & soft tissues: Paraspinal muscle bulk is maintained.  Small bilateral renal cysts noted.     Impression:     1. Degenerative changes of the lower lumbar spine detailed above.  Moderate left neural foraminal narrowing noted at L5-S1.     MRI Cervical Spine 11/20/2019:  COMPARISON:  MRI cervical spine without contrast 03/06/2019     FINDINGS:  Alignment: Sagittal alignment is preserved.     Vertebrae: Normal marrow signal without osseous destructive process or aggressive bone marrow replacement process.  No fracture.     Discs: There is mild disc space narrowing at C2-C3 with endplate irregularity, likely degenerative and similar to the prior examination.  Remaining discs demonstrate normal height and signal.     Cord: Normal caliber, morphology, and signal.     Degenerative findings:     C2-C3: Posterior disc osteophyte complex and left uncovertebral joint spurring contribute to mild left neural foraminal narrowing.  No spinal canal stenosis.     C3-C4: Mild posterior disc osteophyte complex results in mild effacement of the ventral thecal sac without significant spinal canal stenosis or neural foraminal narrowing.     C4-C5: Posterior disc osteophyte  complex results in effacement of the anterior thecal sac without significant spinal canal stenosis or neural foraminal narrowing.     C5-C6: Left uncovertebral joint spurring contributes to mild left neural foraminal narrowing without significant spinal canal stenosis.     C6-C7: No significant disc abnormality, spinal canal stenosis, or neural foraminal narrowing.     C7-T1: No significant disc abnormality, spinal canal stenosis, or neural foraminal narrowing.     Limited sagittal evaluation of the upper thoracic spine reveals a disc protrusion at T4-T5 causing effacement of the anterior thecal sac and cord abutment.     Paraspinal muscles & soft tissues: Unremarkable without spinal canal or paraspinal mass.     Impression:     Mild cervical degenerative changes contributing to mild left neural foraminal narrowing at C2-C3 and C5-C6.  No spinal canal stenosis.     T4-T5 disc protrusion resulting in effacement of the anterior thecal sac, abutting the cord.     Narrowing of the C2-C3 disc space with endplate irregularity, likely degenerative and stable compared to the prior examination      Past Medical History:   Diagnosis Date    Alpha thalassemia     Back pain     Cervical spondylosis 12/30/2019    Colon polyp     Fatty liver     Hypertension     Prediabetes     Premenstrual symptom      Past Surgical History:   Procedure Laterality Date    COLONOSCOPY N/A 6/18/2020    Procedure: COLONOSCOPY;  Surgeon: Eliot Hill MD;  Location: [facility] ([address] [place] [address]);  Service: Endoscopy;  Laterality: N/A;  COVID screening on 6/[facility]/20[facility]    COLONOSCOPY N/A 7/6/2020    Procedure: COLONOSCOPY;  Surgeon: Kang Guzmán MD;  Location: [facility] OR [address] [place] [address];  Service: Colon and Rectal;  Laterality: N/A;    COLONOSCOPY N/A 7/9/2021    Procedure: COLONOSCOPY;  Surgeon: GREGG Guzmán MD;  Location: [facility] ENDO (4TH FLR);  Service: Endoscopy;  Laterality: N/A;  in July 2021  covid test [place] 7/6    COLONOSCOPY N/A  9/13/2024    Procedure: COLONOSCOPY;  Surgeon: GREGG Guzmán MD;  [facility]  Service: Endoscopy;  Laterality: N/A;  ref by / suflave inst portal-RB  polyps noted on procedure report 7/9/2021; Dr. Guzmán patient  6/25 r/s, unopened Suflave, updated instr. to portal, pt states she may be restarting semaglutide on Tuesdays-instr. to be hold for 8 days-st  9/9-lvm for pre warren    DESTRUCTION, INTRAOSSEOUS BASIVERTEBRAL NERVE, EACH ADD'L BODY, LUM/SAC Bilateral 2/26/2024    Procedure: DESTRUCTION,INTRAOSSEOUS BASIVERTEBRAL NERVE,EACH ADD'L BODY,LUM/SAC;  Surgeon: Cosme Kirkland MD;  Location: [facility] OR;  Service: Pain Management;  Laterality: Bilateral;  L5 & S1    EPIDURAL STEROID INJECTION N/A 10/2/2019    Procedure: INJECTION, STEROID, EPIDURAL, C7-T1;  Surgeon: Cosme Kirkland MD;  Location: [facility] PAIN MGT;  Service: Pain Management;  Laterality: N/A;    EPIDURAL STEROID INJECTION N/A 12/22/2021    Procedure: INJECTION, STEROID, EPIDURAL, C7-T1 NEED CONSENT;  Surgeon: Cosme Kirkland MD;  Location: [facility] PAIN MGT;  Service: Pain Management;  Laterality: N/A;    EPIDURAL STEROID INJECTION N/A 7/6/2022    Procedure: INJECTION, STEROID, EPIDURAL, C7-T1 IL  CONTRAST;  Surgeon: Cosme Kirkland MD;  Location: [facility] PAIN MGT;  Service: Pain Management;  Laterality: N/A;    EYE SURGERY      INJECTION OF ANESTHETIC AGENT AROUND NERVE Bilateral 1/8/2020    Procedure: BLOCK, NERVE C4,5,6;  Surgeon: Cosme Kirkland MD;  Location: [facility] PAIN MGT;  Service: Pain Management;  Laterality: Bilateral;  B/L MBB C4,5,6    INJECTION OF ANESTHETIC AGENT AROUND NERVE Bilateral 1/29/2020    Procedure: BLOCK, NERVE, Repeat C4-C5-C6 MEDIAL BRANCH;  Surgeon: Cosme Kirkland MD;  Location: [facility] PAIN MGT;  Service: Pain Management;  Laterality: Bilateral;    INJECTION OF ANESTHETIC AGENT AROUND NERVE Bilateral 12/14/2022    Procedure: BLOCK, NERVE BILATERAL L3,L4,L5 MEDIAL BRANCH NEEDS  CONSENT;  Surgeon: Cosme Kirkland MD;  Location: [facility] PAIN MGT;  Service: Pain Management;  Laterality: Bilateral;    INJECTION OF ANESTHETIC AGENT AROUND NERVE Bilateral 1/25/2023    Procedure: BLOCK, NERVE BILATERAL L3,L4,L5 MEDIAL BRANCH;  Surgeon: Cosme Kirkland MD;  Location: [facility] PAIN MGT;  Service: Pain Management;  Laterality: Bilateral;    INJECTION OF ANESTHETIC AGENT AROUND NERVE Bilateral 11/1/2023    Procedure: BLOCK, NERVE BILATERAL L5, S1, AND S2 LATERAL BRANCH;  Surgeon: Cosme Kirkland MD;  Location: [facility] PAIN MGT;  Service: Pain Management;  Laterality: Bilateral;    INJECTION OF ANESTHETIC AGENT AROUND NERVE Bilateral 11/22/2023    Procedure: BLOCK, NERVE BILATERAL L5, S1, AND S2 MEDIAL BRANCH;  Surgeon: Cosme Kirkland MD;  Location: [facility] PAIN MGT;  Service: Pain Management;  Laterality: Bilateral;    INJECTION OF JOINT Left 5/18/2022    Procedure: INJECTION, JOINT, LEFT SI and GTB *[facility] PT*;  Surgeon: Jed Phillips MD;  Location: [facility] PAIN MGT;  Service: Pain Management;  Laterality: Left;    INJECTION, SACROILIAC JOINT Bilateral 5/24/2023    Procedure: INJECTION,SACROILIAC JOINT BIALTERAL;  Surgeon: Cosme Kirkland MD;  Location: [facility] PAIN MGT;  Service: Pain Management;  Laterality: Bilateral;    LAPAROSCOPIC SURGICAL REMOVAL OF CECUM N/A 7/6/2020    Procedure: EXCISION, CECUM, LAPAROSCOPIC, colonoscopy to start, ERAS low;  Surgeon: Kang Guzmán MD;  Location: [facility] OR [address] [place] [address];  Service: Colon and Rectal;  Laterality: N/A;    RADIOFREQUENCY ABLATION Left 3/4/2020    Procedure: RADIOFREQUENCY ABLATION, C4-C5-C6 ME DIAL BRANCH 1 OF 2 need consent;  Surgeon: Cosme Kirkland MD;  Location: [facility] PAIN MGT;  Service: Pain Management;  Laterality: Left;    RADIOFREQUENCY ABLATION Right 6/3/2020    Procedure: RADIOFREQUENCY ABLATION, C4-C5-C6 MEDIAL BRANCH 2 OF 2 need consent;  Surgeon: Cosme Kirkland MD;  Location: [facility] PAIN MGT;  Service: Pain Management;   Laterality: Right;    RADIOFREQUENCY ABLATION Right 3/23/2022    Procedure: Radiofrequency Ablation RIGHT C4,5,6;  Surgeon: Cosme Kirkland MD;  Location: [facility] PAIN MGT;  Service: Pain Management;  Laterality: Right;    RADIOFREQUENCY ABLATION Left 4/6/2022    Procedure: Radiofrequency Ablation LEFT C4,5,6;  Surgeon: Cosme Kirkland MD;  Location: [facility] PAIN MGT;  Service: Pain Management;  Laterality: Left;    RADIOFREQUENCY ABLATION Bilateral 3/1/2023    Procedure: RADIOFREQUENCY ABLATION BILATERAL L3,L4,L5 BOTH SIDES SAME TIME PER DR KIRKLAND;  Surgeon: Cosme Kirkland MD;  Location: [facility] PAIN MGT;  Service: Pain Management;  Laterality: Bilateral;    RADIOFREQUENCY ABLATION Bilateral 5/3/2023    Procedure: RADIOFREQUENCY ABLATION BILATERAL C4,C5,C6 BOTH SIDES PER DR KIRKLAND;  Surgeon: Cosme Kirkland MD;  Location: [facility] PAIN MGT;  Service: Pain Management;  Laterality: Bilateral;    RADIOFREQUENCY ABLATION Bilateral 5/29/2024    Procedure: RADIOFREQUENCY ABLATION BILATERAL C4, 5, 6;  Surgeon: Cosme Kirkland MD;  Location: [facility] PAIN MGT;  Service: Pain Management;  Laterality: Bilateral;  [phone]  4 WK F/U LORI    REFRACTIVE SURGERY  2008    SMALL INTESTINE SURGERY  7/6/20     Social History     Socioeconomic History    Marital status:    Occupational History     Employer: [employer]   Tobacco Use    Smoking status: Never    Smokeless tobacco: Never   Substance and Sexual Activity    Alcohol use: Yes     Alcohol/week: 1.0 standard drink of alcohol     Types: 1 Standard drinks or equivalent per week     Comment: 3 drinks weekly     Drug use: No    Sexual activity: Yes     Partners: Male     Birth control/protection: I.U.D.   Social History Narrative     Children - Dolores and Florin, Jerichosband - Florin        Used to walk, ride bikes. Occasional uses elliptical at home.      Social Drivers of Health     Financial Resource Strain: Low Risk  (5/8/2024)    Overall  Financial Resource Strain (CARDIA)     Difficulty of Paying Living Expenses: Not hard at all   Food Insecurity: No Food Insecurity (5/8/2024)    Hunger Vital Sign     Worried About Running Out of Food in the Last Year: Never true     Ran Out of Food in the Last Year: Never true   Transportation Needs: No Transportation Needs (5/8/2024)    PRAPARE - Transportation     Lack of Transportation (Medical): No     Lack of Transportation (Non-Medical): No   Physical Activity: Insufficiently Active (5/8/2024)    Exercise Vital Sign     Days of Exercise per Week: 1 day     Minutes of Exercise per Session: 20 min   Stress: Stress Concern Present (5/8/2024)    [employer] - Occupational Stress Questionnaire     Feeling of Stress : To some extent   Housing Stability: Low Risk  (9/13/2023)    Housing Stability Vital Sign     Unable to Pay for Housing in the Last Year: No     Number of Places Lived in the Last Year: 1     Unstable Housing in the Last Year: No     Family History   Problem Relation Name Age of Onset    Hypertension Mother Celsa Domínguezandres KirbyHayes     Thyroid disease Mother Celsa Hayes     Rheum arthritis Mother Celsa Hayes     Hearing loss Mother Celsa Hayes     Heart disease Father Wes Irving         Pacemaker    Cancer Maternal Grandmother Katy Gilchrist Ray     Cataracts Maternal Grandmother Katy Zara Ray     Diabetes Maternal Grandmother Katy Zara Ray     Hypertension Maternal Grandmother Katy Gilchrist Ray     Thyroid disease Maternal Grandmother Katy Zara Ray     Breast cancer Maternal Grandmother Katy Zara Ray     Hearing loss Maternal Grandmother Katy Zara Ray     Cataracts Maternal Grandfather Max Garrison, Sr.     Diabetes Maternal Grandfather Max Garrison, Sr.     Hypertension Maternal Grandfather Max Garrison, Sr.     Thyroid disease Maternal Grandfather Max Garrison, Sr.     Lupus Cousin mother's 1st cousin     Cancer  Maternal Uncle      No Known Problems Paternal Grandmother      No Known Problems Paternal Grandfather      Amblyopia Neg Hx      Blindness Neg Hx      Glaucoma Neg Hx      Macular degeneration Neg Hx      Retinal detachment Neg Hx      Strabismus Neg Hx      Stroke Neg Hx      Ovarian cancer Neg Hx      Colon cancer Neg Hx      Cirrhosis Neg Hx         Review of patient's allergies indicates:   Allergen Reactions    Amoxicillin Hives       Current Outpatient Medications   Medication Sig    ascorbic acid, vitamin C, (VITAMIN C) 100 MG tablet Take 100 mg by mouth once daily.    atenoloL-chlorthalidone (TENORETIC) 100-25 mg per tablet Take 1 tablet by mouth once daily.    celecoxib (CELEBREX) 100 MG capsule TAKE 1 CAPSULE(100 MG) BY MOUTH TWICE DAILY    estradioL (VIVELLE-DOT) 0.025 mg/24 hr Place 1 patch onto the skin twice a week.    estradioL (VIVELLE-DOT) 0.0375 mg/24 hr Place 1 patch onto the skin twice a week.    FLUoxetine 20 MG capsule Take 20 mg by mouth.    levonorgestrel (MIRENA) 20 mcg/24 hr (5 years) IUD 1 Intra Uterine Device by Intrauterine route once. for 1 dose    loratadine (CLARITIN) 10 mg tablet Take 10 mg by mouth every morning.     metFORMIN (GLUCOPHAGE) 500 MG tablet Take 1 tablet (500 mg total) by mouth Daily.    methocarbamoL (ROBAXIN) 500 MG Tab TAKE 1 TABLET(500 MG) BY MOUTH THREE TIMES DAILY AS NEEDED FOR MUSCLE PAIN    multivitamin capsule Take 1 capsule by mouth once daily.    mupirocin (BACTROBAN) 2 % ointment Apply topically once daily.    ondansetron (ZOFRAN-ODT) 4 MG TbDL Take 4 mg by mouth every 6 (six) hours as needed.    potassium chloride (MICRO-K) 10 MEQ CpSR Take 2 capsules (20 mEq total) by mouth once daily.    semaglutide (OZEMPIC) 1 mg/dose (2 mg/1.5 mL) PnIj Inject 1 mg into the skin every 7 days.    sod sulf-pot chloride-mag sulf (SUTAB) 1.479-0.188- 0.225 gram tablet Take 12 tablets by mouth once daily. Please follow Endoscopy 's instructions. Please apply coupon  "code. Please apply coupon code. BIN: [id number], PCN: [id number], GROUP: [id number], MEMBER ID: [id number]    vitamin D (VITAMIN D3) 1000 units Tab Take 1,000 Units by mouth once daily.    zolpidem (AMBIEN) 10 mg Tab Take 10 mg by mouth every evening.    DULoxetine (CYMBALTA) 60 MG capsule Take 1 capsule (60 mg total) by mouth once daily.     No current facility-administered medications for this visit.         ROS:  GENERAL: No fever. No chills. No fatigue. Denies weight loss. Denies weight gain.  HEENT: Denies headaches. Denies vision change. Denies eye pain. Denies double vision. Denies ear pain.   CV: Denies chest pain.   PULM: Denies of shortness of breath.  GI: Denies constipation. No diarrhea. No abdominal pain. Denies nausea. Denies vomiting. No blood in stool.  HEME: Denies bleeding problems.  : Denies urgency. No painful urination. No blood in urine.  MS: Denies joint stiffness. Denies joint swelling.    SKIN: Denies rash.   NEURO: Denies seizures. No weakness.  PSYCH:  Denies difficulty sleeping. No anxiety. Denies depression. No suicidal thoughts.       VITALS:   Vitals:    11/29/24 1520 11/29/24 1521   BP: 112/75    Pulse: 75    Resp: 12    SpO2: 100%    Weight: 69 kg (152 lb 1.9 oz)    Height: 5' 1" (1.549 m)    PainSc:   7   7   PainLoc: Back          PHYSICAL EXAM:   GENERAL: Well appearing, in no acute distress, alert and oriented x3.  PSYCH:  Mood and affect appropriate.  SKIN: Skin color, texture, turgor normal, no rashes or lesions.  HEENT:  Normocephalic, atraumatic. Cranial nerves grossly intact.  NECK:   positive for pain to palpation over the cervical paraspinous muscles.   POSITIVE for pain to palpation over facets.  POSITIVE for pain with neck flexion, extension, or lateral flexion.   Spurling test was Negative Bilaterally  Tenderness to trapezius bilaterally  PULM: No evidence of respiratory difficulty, symmetric chest rise.  GI:  Non-distended  BACK:   Normal range of motion.   Negative for pain " to palpation over the spinous processes.  POSITIVE for pain to palpation over facet joints.   POSITIVE axial loading test bilateral.   POSITIVE for tenderness over BILATERAL SIJ.   Negative thigh and sacral thrust test  POSITIVE BILATERALLY FABERE, Ganselin and Yeoman's test.   EXTREMITIES:   No deformities, edema, or skin discoloration.   NEURO: Sensation is equal and appropriate bilaterally. Bilateral upper and lower extremity strength is normal and symmetric. Bilateral upper and lower extremity coordination and muscle stretch reflexes are physiologic and symmetric. Plantar response are downgoing.  GAIT: Normal        ASSESSMENT: 54 y.o. year old with Neck pain, consistent with:    1. Other spondylosis, cervical region        2. Cervical spondylosis  Procedure Order to Pain Management      3. Vertebrogenic low back pain  DULoxetine (CYMBALTA) 60 MG capsule      4. Sacroiliitis  DULoxetine (CYMBALTA) 60 MG capsule      5. DDD (degenerative disc disease), lumbar  DULoxetine (CYMBALTA) 60 MG capsule          PLAN:  Patient Education:  Discussed the importance of physical therapy, activity modification, and a home exercise plan to help with pain and improve health.  Therapy referral:  continue HEP, discussed elastic band exercise for neck and shoulder  Medications:   Patient can continue with pain medications for now per their provider since they are providing benefits, using them appropriately, and without side effects.  Cymbalta 60mg daily, refilled  Schedule pain intervention for: repeat Cervical RFA bilateral C4,5,6 bilateral  Counseled patient: regarding the importance of activity modification and physical therapy  Return to clinic: 4 wks after pain procedure      Alva Bradford MD   I have personally reviewed the history and exam of this patient and agree with the resident/fellow/NPs note as stated above.    Cosme Kirkland MD    11/29/2024

## 2024-12-02 ENCOUNTER — PATIENT MESSAGE (OUTPATIENT)
Dept: PAIN MEDICINE | Facility: OTHER | Age: 54
End: 2024-12-02
Payer: COMMERCIAL

## 2024-12-02 DIAGNOSIS — M47.812 CERVICAL SPONDYLOSIS: Primary | ICD-10-CM

## 2024-12-03 ENCOUNTER — PATIENT MESSAGE (OUTPATIENT)
Dept: PAIN MEDICINE | Facility: OTHER | Age: 54
End: 2024-12-03
Payer: COMMERCIAL

## 2024-12-09 ENCOUNTER — PATIENT OUTREACH (OUTPATIENT)
Dept: ADMINISTRATIVE | Facility: HOSPITAL | Age: 54
End: 2024-12-09
Payer: COMMERCIAL

## 2024-12-09 VITALS — SYSTOLIC BLOOD PRESSURE: 112 MMHG | DIASTOLIC BLOOD PRESSURE: 75 MMHG

## 2024-12-16 ENCOUNTER — PATIENT MESSAGE (OUTPATIENT)
Dept: PAIN MEDICINE | Facility: OTHER | Age: 54
End: 2024-12-16
Payer: COMMERCIAL

## 2024-12-18 ENCOUNTER — HOSPITAL ENCOUNTER (OUTPATIENT)
Facility: OTHER | Age: 54
Discharge: HOME | End: 2024-12-18
Attending: ANESTHESIOLOGY | Admitting: ANESTHESIOLOGY
Payer: COMMERCIAL

## 2024-12-18 VITALS
DIASTOLIC BLOOD PRESSURE: 62 MMHG | HEART RATE: 71 BPM | OXYGEN SATURATION: 97 % | TEMPERATURE: 98 F | HEIGHT: 61 IN | BODY MASS INDEX: 28.32 KG/M2 | WEIGHT: 150 LBS | SYSTOLIC BLOOD PRESSURE: 138 MMHG | RESPIRATION RATE: 16 BRPM

## 2024-12-18 DIAGNOSIS — M47.812 CERVICAL SPONDYLOSIS: Primary | ICD-10-CM

## 2024-12-18 DIAGNOSIS — G89.29 CHRONIC PAIN: ICD-10-CM

## 2024-12-18 PROCEDURE — 64634 DESTROY C/TH FACET JNT ADDL: CPT | Mod: 50 | Performed by: ANESTHESIOLOGY

## 2024-12-18 PROCEDURE — 64633 DESTROY CERV/THOR FACET JNT: CPT | Mod: 50,,, | Performed by: ANESTHESIOLOGY

## 2024-12-18 PROCEDURE — 99152 MOD SED SAME PHYS/QHP 5/>YRS: CPT | Performed by: ANESTHESIOLOGY

## 2024-12-18 PROCEDURE — 63600175 PHARM REV CODE 636 W HCPCS: Mod: JZ,JG | Performed by: ANESTHESIOLOGY

## 2024-12-18 PROCEDURE — 64634 DESTROY C/TH FACET JNT ADDL: CPT | Mod: 50,,, | Performed by: ANESTHESIOLOGY

## 2024-12-18 PROCEDURE — 64633 DESTROY CERV/THOR FACET JNT: CPT | Mod: 50 | Performed by: ANESTHESIOLOGY

## 2024-12-18 RX ORDER — LIDOCAINE HYDROCHLORIDE 20 MG/ML
INJECTION, SOLUTION INFILTRATION; PERINEURAL
Status: DISCONTINUED | OUTPATIENT
Start: 2024-12-18 | End: 2024-12-18 | Stop reason: HOSPADM

## 2024-12-18 RX ORDER — BUPIVACAINE HYDROCHLORIDE 2.5 MG/ML
INJECTION, SOLUTION EPIDURAL; INFILTRATION; INTRACAUDAL
Status: DISCONTINUED | OUTPATIENT
Start: 2024-12-18 | End: 2024-12-18 | Stop reason: HOSPADM

## 2024-12-18 RX ORDER — SODIUM CHLORIDE 9 MG/ML
INJECTION, SOLUTION INTRAVENOUS CONTINUOUS
Status: DISCONTINUED | OUTPATIENT
Start: 2024-12-18 | End: 2024-12-18 | Stop reason: HOSPADM

## 2024-12-18 RX ORDER — FENTANYL CITRATE 50 UG/ML
INJECTION, SOLUTION INTRAMUSCULAR; INTRAVENOUS
Status: DISCONTINUED | OUTPATIENT
Start: 2024-12-18 | End: 2024-12-18 | Stop reason: HOSPADM

## 2024-12-18 RX ORDER — MIDAZOLAM HYDROCHLORIDE 1 MG/ML
INJECTION INTRAMUSCULAR; INTRAVENOUS
Status: DISCONTINUED | OUTPATIENT
Start: 2024-12-18 | End: 2024-12-18 | Stop reason: HOSPADM

## 2024-12-18 RX ORDER — DEXAMETHASONE SODIUM PHOSPHATE 10 MG/ML
INJECTION INTRAMUSCULAR; INTRAVENOUS
Status: DISCONTINUED | OUTPATIENT
Start: 2024-12-18 | End: 2024-12-18 | Stop reason: HOSPADM

## 2024-12-18 NOTE — DISCHARGE SUMMARY
Discharge Note  Short Stay      SUMMARY     Admit Date: 12/18/2024    Attending Physician: Cosme Kirkland MD    Discharge Physician: Cosme Kirkland MD      Discharge Date: 12/18/2024 3:47 PM    Procedure(s) (LRB):  RADIOFREQUENCY ABLATION BILATERAL C4, 5, 6 *ECONSENTED* (Bilateral)    Final Diagnosis:  Cervical spondylosis [M47.812]      Disposition: Home or self care    Patient Instructions:   Current Discharge Medication List        CONTINUE these medications which have NOT CHANGED    Details   ascorbic acid, vitamin C, (VITAMIN C) 100 MG tablet Take 100 mg by mouth once daily.      atenoloL-chlorthalidone (TENORETIC) 100-25 mg per tablet Take 1 tablet by mouth once daily.  Qty: 90 tablet, Refills: 3    Comments: Discontinue prior scripts with same  name      celecoxib (CELEBREX) 100 MG capsule TAKE 1 CAPSULE(100 MG) BY MOUTH TWICE DAILY  Qty: 60 capsule, Refills: 2    Associated Diagnoses: Cervical spondylosis without myelopathy      DULoxetine (CYMBALTA) 60 MG capsule Take 1 capsule (60 mg total) by mouth once daily.  Qty: 30 capsule, Refills: 11    Associated Diagnoses: Vertebrogenic low back pain; Sacroiliitis; DDD (degenerative disc disease), lumbar      estradioL (VIVELLE-DOT) 0.025 mg/24 hr Place 1 patch onto the skin twice a week.  Qty: 8 patch, Refills: 11      estradioL (VIVELLE-DOT) 0.0375 mg/24 hr Place 1 patch onto the skin twice a week.  Qty: 8 patch, Refills: 11      FLUoxetine 20 MG capsule Take 20 mg by mouth.      levonorgestrel (MIRENA) 20 mcg/24 hr (5 years) IUD 1 Intra Uterine Device by Intrauterine route once. for 1 dose  Qty: 1 Intra Uterine Device, Refills: 0    Associated Diagnoses: Contraception, device intrauterine      loratadine (CLARITIN) 10 mg tablet Take 10 mg by mouth every morning.       metFORMIN (GLUCOPHAGE) 500 MG tablet Take 1 tablet (500 mg total) by mouth Daily.  Qty: 90 tablet, Refills: 3      methocarbamoL (ROBAXIN) 500 MG Tab TAKE 1 TABLET(500 MG) BY MOUTH THREE  TIMES DAILY AS NEEDED FOR MUSCLE PAIN  Qty: 30 tablet, Refills: 1    Associated Diagnoses: Myofascial pain      multivitamin capsule Take 1 capsule by mouth once daily.      mupirocin (BACTROBAN) 2 % ointment Apply topically once daily.  Qty: 22 g, Refills: 0    Associated Diagnoses: Post-operative pain      ondansetron (ZOFRAN-ODT) 4 MG TbDL Take 4 mg by mouth every 6 (six) hours as needed.      potassium chloride (MICRO-K) 10 MEQ CpSR Take 2 capsules (20 mEq total) by mouth once daily.  Qty: 180 capsule, Refills: 1      semaglutide (OZEMPIC) 1 mg/dose (2 mg/1.5 mL) PnIj Inject 1 mg into the skin every 7 days.      sod sulf-pot chloride-mag sulf (SUTAB) 1.479-0.188- 0.225 gram tablet Take 12 tablets by mouth once daily. Please follow Endoscopy 's instructions. Please apply coupon code. Please apply coupon code. BIN: [id number], PCN: [id number], GROUP: [id number], MEMBER ID: [id number]  Qty: 24 tablet, Refills: 0    Associated Diagnoses: Special screening for malignant neoplasms, colon      vitamin D (VITAMIN D3) 1000 units Tab Take 1,000 Units by mouth once daily.      zolpidem (AMBIEN) 10 mg Tab Take 10 mg by mouth every evening.                 Discharge Diagnosis: Same as above  Condition on Discharge: Stable with no complications to procedure   Diet on Discharge: Same as before.  Activity: as per instruction sheet.  Discharge to: Home with a responsible adult.  Follow up: 2-4 weeks       Please call my office or pager at [phone] if experienced any weakness or loss of sensation, fever > 101.5, pain uncontrolled with oral medications, persistent nausea/vomiting/or diarrhea, redness or drainage from the incisions, or any other worrisome concerns. If physician on call was not reached or could not communicate with our office for any reason please go to the nearest emergency department     Alva Bradford MD

## 2024-12-18 NOTE — DISCHARGE INSTRUCTIONS

## 2024-12-18 NOTE — OP NOTE
Therapeutic Cervical Medial Branch Radiofrequency Ablation Under Fluoroscopy     The procedure, risks, benefits, and options were discussed with the patient. There are no contraindications to the procedure. The patent expressed understanding and agreed to the procedure. Informed written consent was obtained prior to the start of the procedure and can be found in the patient's chart.        PATIENT NAME: Sheila Costa   MRN: [id number]     DATE OF PROCEDURE: 12/18/2024       PROCEDURE: Therapeutic Bilateral C4, C5, and C6 Cervical Radiofrequency Ablation under Fluoroscopy    PRE-OP DIAGNOSIS: Cervical spondylosis [M47.812]     POST-OP DIAGNOSIS: Same    PHYSICIAN: Cosme Kirkland MD    ASSISTANTS: none     MEDICATIONS INJECTED:  Preservative-free Decadron 10mg with 9cc of Bupivicaine 0.25%    LOCAL ANESTHETIC INJECTED:   Xylocaine 2%    SEDATION: Versed 2mg and Fentanyl 100mcg                                                                                                                                                                                     Conscious sedation ordered by M.D. Patient re-evaluation prior to administration of conscious sedation. No changes noted in patient's status from initial evaluation. The patient's vital signs were monitored by RN and patient remained hemodynamically stable throughout the procedure.    Event Time In   Sedation Start 1536   Sedation End 1556       ESTIMATED BLOOD LOSS:  None    COMPLICATIONS:  None.     INTERVAL HISTORY: Patient has clinical and imaging findings suggestive of recurrent facet mediated pain. Patient has undergone a previous RFA at specified levels with at least 50% relief for at least 6 months. Successful diagnostic medial branch blocks have been completed within the past 2 years.    TECHNIQUE: Time-out was performed to identify the patient and procedure to be performed. With the patient laying in a prone position, the surgical area was  prepped and draped in the usual sterile fashion using ChloraPrep and fenestrated drape. The levels were determined under fluoroscopic guidance. Skin anesthesia was achieved by injecting Lidocaine 2% over the injection sites. A 20 gauge 10mm curved active tip needle was introduced to the anatomic local of the medial branch at each of the above levels using AP, lateral and/or contralateral oblique fluoroscopic imaging. Then the sensory and motor testing was performed to confirm that the needle tips were in the correct location. After negative aspiration for blood or CSF was confirmed, 1 mL of the lidocaine 2% listed above was injected slowly at each site. This was followed by thermal lesioning at 80 degrees celsius for 90 seconds. That was followed by slowly injecting 1 mL of the medication mixture listed above at each site. The needles were removed and bleeding was nil. A sterile dressing was applied. No specimens collected. The patient tolerated the procedure well and did not have any procedure related motor deficit at the conclusion of the procedure.      The patient was monitored after the procedure in the recovery area. They were given post-procedure and discharge instructions to follow at home. The patient was discharged in a stable condition.       Alva Bradford MD      I reviewed and edited the fellow's note. I conducted my own interview and physical examination. I agree with the findings. I was present and supervising all critical portions of the procedure.  Cosme Kirkland MD

## 2025-02-17 ENCOUNTER — OFFICE VISIT (OUTPATIENT)
Dept: PAIN MEDICINE | Facility: CLINIC | Age: 55
End: 2025-02-17
Payer: COMMERCIAL

## 2025-02-17 VITALS
WEIGHT: 149.94 LBS | DIASTOLIC BLOOD PRESSURE: 64 MMHG | SYSTOLIC BLOOD PRESSURE: 105 MMHG | TEMPERATURE: 98 F | HEART RATE: 61 BPM | BODY MASS INDEX: 28.33 KG/M2

## 2025-02-17 DIAGNOSIS — G89.4 CHRONIC PAIN SYNDROME: Primary | ICD-10-CM

## 2025-02-17 DIAGNOSIS — M47.812 CERVICAL SPONDYLOSIS: ICD-10-CM

## 2025-02-17 NOTE — PROGRESS NOTES
Chronic Pain Established Patient       PCP: Marj Gonzalez MD    CHIEF COMPLAINT: Neck Pain    INTERVAL HISTORY (Date):       Interval History 2/17/2025:  The patient presents today for follow up of chronic neck pain post procedure. She is s/p bilateral C4,5,6 RFA on 12/18/24. She is reporting 80% relief. She still has some stiffness in the morning. She has benefit when she starts moving around. No weakness reported. Her back pain has also been well controlled after Intracept procedure. Her pain today is 2/10.    Interval History 11/29/2024  Patient is here for follow up of her neck pain.  Patient had over 80+% relief from Cervical RFA, last done in 5/29/2024.  A few weeks ago, started to have return of her pain.  Now pain is about same as pre-abalation level.  Also endorse tenderness in upper back and neck muscles.  Doing home exercises when she can. Denies achyness in deltoid and fingers but denies numbness, tingling, weakness, dropping items, difficulty with opening jars and door.  Denies recent fall, GI/ incontinence.  Pain today 8/10.  Taking ibuprofen as needed.  Taking cymbalta daily, needs refill.  Reports minimal back pain since intracept.        Interval History 6/27/24:  Sheila Costa returns today for follow-up after cervical RFA. Since last seen, they report their pain has been markedly improved (~80% relief). She does have a little numbness on the left side of her posterior neck but has had this before and states it does not radiate past her neck and is not painful.  She denies other ongoing pains at this time.      Interval History 3/4/2024:  Sheila Costa presents for post-op s/p Intracept L5 and S1 on  2/26/2024.  She reports 75% relief since procedure with more relief everyday.  She denies: fever, chills, drainage from the site, or additional pain.   Today's pain score is 0/10.       Interval History 02/19/2024:  Ms. Igor Asher is a 54 y/o f who presents  for virtual follow-up of her chronic low back pain.  She is scheduled for L5 & S1 intraosseous basivertebral nerve ablation 03/11/2024. She continues to endorse axial low back consistent with lumbar vertebrogenic back pain. The patient is presenting today for further information regarding the procedure. All questions answered.      Interval History 9/28/2023:  Patient seen today for virtual follow-up of her low back pain.  Today, she reports that the bilateral SI joint injections that she had in May have worked well for her for about 6 weeks and her back pain is at baseline worse  Her main concern is the pain in her low back which is affecting her ADL .  She previously got about 50% relief with RFA but now states she feels the pain as before all her injection.  She feels that the RFA helped with 1 component of her pain, but she continues to have deep focal low back and buttock pain.  She denies radiation into her legs.  She reports that the pain is fairly persistent but worsens significantly with standing, sitting for a long time and flexion.  She denies any bowel/bladder dysfunction or saddle anesthesia.  She has no other focal neurologic deficits.     Interval History 6/29/2023:  She presents with continued stiffness in her neck and shoulders.  She also complains of lower back pain which is still present. She does feel the SI joint injections do help.  She states that she is doing some stretches and home exercises to help with the pain.  She feels the stretching does help, but she feels she isn't doing them as often as she'd like.  She continues to state that her pain is 50-70% improved compared to before the injections.  She is inquiring as to if she should schedule her follow up injections now or if she should wait.     Interval History 6/9/2023:  The patient returns to clinic today for follow up neck and back pain via virtual visit. She is s/p bilateral C4,5,6 RFA on 5/3/2023. She is s/p bilateral SI joint  injections on 5/24/2023. She reports 70% relief of her neck and back pain. She reports intermittent low back pain. She denies any radicular leg pain. She is performing a home exercise and stretching routine. She is taking Celebrex. She did not receive Robaxin discussed at last visit. She denies any other health changes.      Interval History 4/13/23:  Sheila Costa presents to the clinic for a follow-up appointment for back pain. Since the last visit, Sheila Costa states the pain has been persistant. Current pain intensity is 7/10. S/p L3/4/5 RFA with 50% relief. Pain is still persistent. Gets better with exercise and stretching although too much movement can exacerbate the pain. She is taking celebrex and tizanidine. She endorses previous SI joint injections were helpful.      Interval History 3/22/2023:  The patient returns to clinic today for follow up of low back pain via virtual visit. She is s/p bilateral L3,4,5 RFA on 3/1/2023. She reports 50% relief of her pain. She reports low back and buttock pain. This seems lower than injection sites. She denies any radicular leg pain. She reports increased neck pain. She describes this pain as constant and aching in nature. She denies any radicular arm pain. Her pain is worse with activity. She is taking Celebrex and Zanaflex as needed. She denies any other health changes.      Interval History 2/1/2023:  The patient returns to clinic today for follow up of low back pain via virtual visit. She is s/p bilateral L3,4,5 MBB on 1/25/2023. She reports 90% relief of her pain for one day. Since then, her pain has returned to baseline. She reports low back pain that is aching in nature. She denies any radicular leg pain. Her pain is worse with prolonged activity. She is currently taking Celebrex. She also takes Zanaflex intermittently. She denies any other health changes.      Interval History 12/29/2022:  The patient returns to clinic today for follow  up of low back pain via virtual visit. She is s/p bilateral L3,4,5 MBB on 12/14/2022. She reports 95% relief of her low back pain for one day. Since then, her pain has returned to baseline. She continues to report low back pain. This is aching across the lower back. She denies any radicular leg pain. Her pain is worse with activity. She is currently taking Zanaflex with limited relief. She denies any other health changes.      Interval history 10/31/22:  Patient returns to clinic for follow up of neck and shoulder pain. She is now s/p C7/T1 cervical MICHELLE 7/6/22 which gave 75-80% relief which lasted a few months. Now with primary complaint of low back pain in a band across the low back described as aching and throbbing which radiates to lateral aspect of BLE which stops at the knees. Still taking ibuprofen, celebrex, and lyrica. Not doing home exercise or PT. Denies fever/chills, or saddle anesthesia.     Interval History 6/13/22:   She is s/p left SIJ and left GTB injection on 5/18/2022 which she reports has helped her buttock/leg pain significantly. She now would like to focus on her neck pain. She continues to have axial neck pain, bilateral. Her RUE sxs have significantly decreased s/p C7-T1 IL MICHELLE done in Dec 2021 but she does still have RUE pain. She is taking Gabapentin 300mg nightly - has not noticed a difference in her pain with this. She has tried topicals - help somewhat, Advil 800mg helps. She has gone to the chiropractor in the past - helpful. She has not done PT for her neck in ~3 years ago which she thinks helped somewhat. She denies any weakness/numbness/tingling in BUEs. Denies b/b incontinence or saddle anesthesia.      Interval History 4/28/22:   Sheila Igor presents to the clinic for a follow-up appointment for neck pain. Since the last visit, Sheila Igor states the pain has been improving. She is s/p bilateral RFA of C4/5/6 on 3/26 and 4/3. She reports >75% relief to  both sides and is satisfied with the results. She does satate that she is having some numbness over the skin over the L side. She also states she is having left buttock pain that she describes as cramping/throbbing which occasionally radiates down posterior aspect of L thigh, stopping above the knee. She denies numbness/tingling in lower extremities.      Interval History 3/10/2022:   Sheila Costa presents to the clinic for a follow-up appointment for neck pain. Since the last visit, Sheila Costa states the pain has been stable. Worse in the mornings. Feels like a stiffness in the neck without radicular symptoms as her radicular symptoms had been resolved since her MICHELLE. Some paraesthesia in arms and hands with typing. Patient states that mobic is beneficial. Best relief of her axial neck pain in the past was with RFA done previously. She discontinued her amitriptyline prescription and did not notice a difference in pain with discontinuation. Denies bowel/bladder dysfunction or difficulty with fine motor skills.     Interval History 1/6/2022:   Sheila Costa presents to the clinic for a follow-up appointment s/p Cervical Interlaminar Epidural Steroid Injection at the end of this past December. Since the last visit, Sheila Costa states the pain has been improving. Current pain intensity is 3/10 but she is still experiencing stiffness. Pain is primarly throbbing that would travel down BL arms. She has been doing well. Patient states that mobic and elavil have been helping with pain. Patient is sleeping well currently.     Interval History 11/15/2021:   Pt returns to clinic today due to resurgence of her bilateral neck and arm pain. At her last interventional pain encounter she had RFA left C4,5,6 on 03/04/2020 and the right done on 06/2020. Pt reports this provided significant relief of her pain however she has been having increased neck pain with radiation into her  arms down to her hands. She has had a cervical epidural in the past with at least 50% relief of her pain. She has been utilizing OTC ibuprofen as well as a chiropractor.   Pt also reports low back pain without any radiation into the lower extremities. She states that the pain is aching and worsened with prolonged physical activity.      Interval History 11/25/2019:  The patient returns to clinic today for follow up. She reports a few days of relief with trigger point injections at last visit. She continues to report neck pain with intermittent radiating pain into both arms to her hands. She also reports mid back pain. 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 She states the previous MICHELLE helped for her arm pain but not for her neck pain. She has had limited relief with NSAIDs and physical therapy. She denies any other health changes. Her pain today is 7/10.     Interval History 10/28/2019:  The patient returns to clinic today for follow up. She is s/p C7-T1 IL MICHELLE on 10/2/2019. She reports 50% relief of her neck and arm pain. She reports intermittent neck pain that is sore in nature. She does report increased mid back pain. She describes this pain as tight and aching in nature. She denies any radicular arm pain. She denies any other health changes. Her pain today is 6/10.      Pain Disability Index Review:    PDI Score:       11/29/2024     3:21 PM 5/9/2024     9:43 AM 4/13/2023     2:26 PM   Last 3 PDI Scores   Pain Disability Index (PDI) 60 35 51          Pain Procedures:   10/2/2019- C7-T1 IL MICHELLE- 50% pain relief  1/8/2020- Bilateral C4,5,6 MBB  1/29/2020- Bilateral C4,5,6 MBB  3/4/2020: Left C4,5,6 RFA - 80% relief  6/3/2020: Right C4,5,6 RFA - 80% relief  12/22/2021- C7/T1 IL MICHELLE  3/23/2022- Right C4,5,6 RFA -80-85% relief  4/6/2022- Left C4,5,6 RFA- 75% relief   5/8/2022- Left SI joint and GTB injection  7/6/2022- C7/T1 IL MICHELLE  12/14/2022- Bilateral L3,4,5 MBB- 95% relief  1/25/2023- Bilateral L3,4,5  MBB  3/1/2023- Bilateral L3,4,5 RFA  5/3/2023- Bilateral C4,5,6 RFA  5/24/2023- Bilateral SI joint injections  2/26/2024 - Intracept at L5 and S1  5/29/2024 - Bilateral C4, 5, 6 with 80% relief  12/18/24 B/L C4,5,6 RFA- 80% relief     Physical Therapy/Home Exercise: yes     Imaging:   MRI Lumbar Spine 11/11/2022:  COMPARISON:  11/01/2022     FINDINGS:  Alignment: Normal.     Vertebrae: Degenerative endplate changes at L5-S1.  No fracture or marrow infiltrative process.     Discs: Mild disc height loss at L5-S1 with annular fissure.  No evidence for discitis.     Cord: Conus terminates at L1 and appears unremarkable.  Cauda equina appears unremarkable.     Degenerative findings:     T12-L1: No spinal canal stenosis or neural foraminal narrowing.     L1-L2: No spinal canal stenosis or neural foraminal narrowing.     L2-L3: No spinal canal stenosis or neural foraminal narrowing.     L3-L4: No spinal canal stenosis or neural foraminal narrowing.     L4-L5: Circumferential disc bulge and mild facet arthropathy result in mild left neural foraminal narrowing.     L5-S1: Circumferential disc bulge and moderate facet arthropathy result in mild right, moderate left neural foraminal narrowing.     Paraspinal muscles & soft tissues: Paraspinal muscle bulk is maintained.  Small bilateral renal cysts noted.     Impression:     1. Degenerative changes of the lower lumbar spine detailed above.  Moderate left neural foraminal narrowing noted at L5-S1.     MRI Cervical Spine 11/20/2019:  COMPARISON:  MRI cervical spine without contrast 03/06/2019     FINDINGS:  Alignment: Sagittal alignment is preserved.     Vertebrae: Normal marrow signal without osseous destructive process or aggressive bone marrow replacement process.  No fracture.     Discs: There is mild disc space narrowing at C2-C3 with endplate irregularity, likely degenerative and similar to the prior examination.  Remaining discs demonstrate normal height and signal.      Cord: Normal caliber, morphology, and signal.     Degenerative findings:     C2-C3: Posterior disc osteophyte complex and left uncovertebral joint spurring contribute to mild left neural foraminal narrowing.  No spinal canal stenosis.     C3-C4: Mild posterior disc osteophyte complex results in mild effacement of the ventral thecal sac without significant spinal canal stenosis or neural foraminal narrowing.     C4-C5: Posterior disc osteophyte complex results in effacement of the anterior thecal sac without significant spinal canal stenosis or neural foraminal narrowing.     C5-C6: Left uncovertebral joint spurring contributes to mild left neural foraminal narrowing without significant spinal canal stenosis.     C6-C7: No significant disc abnormality, spinal canal stenosis, or neural foraminal narrowing.     C7-T1: No significant disc abnormality, spinal canal stenosis, or neural foraminal narrowing.     Limited sagittal evaluation of the upper thoracic spine reveals a disc protrusion at T4-T5 causing effacement of the anterior thecal sac and cord abutment.     Paraspinal muscles & soft tissues: Unremarkable without spinal canal or paraspinal mass.     Impression:     Mild cervical degenerative changes contributing to mild left neural foraminal narrowing at C2-C3 and C5-C6.  No spinal canal stenosis.     T4-T5 disc protrusion resulting in effacement of the anterior thecal sac, abutting the cord.     Narrowing of the C2-C3 disc space with endplate irregularity, likely degenerative and stable compared to the prior examination      Past Medical History:   Diagnosis Date    Alpha thalassemia     Back pain     Cervical spondylosis 12/30/2019    Colon polyp     Fatty liver     Hypertension     Prediabetes     Premenstrual symptom      Past Surgical History:   Procedure Laterality Date    COLONOSCOPY N/A 6/18/2020    Procedure: COLONOSCOPY;  Surgeon: Eliot Hill MD;  Location: [facility] ([address] [place] [address]);  Service: Endoscopy;   Laterality: N/A;  COVID screening on 6/16/20-[place]- ERW    COLONOSCOPY N/A 7/6/2020    Procedure: COLONOSCOPY;  Surgeon: Kang Guzmán MD;  Location: [facility] OR 2ND FLR;  Service: Colon and Rectal;  Laterality: N/A;    COLONOSCOPY N/A 7/9/2021    Procedure: COLONOSCOPY;  Surgeon: GREGG Guzmán MD;  Location: [facility] ENDO (4TH FLR);  Service: Endoscopy;  Laterality: N/A;  in July 2021  covid test [place] 7/6    COLONOSCOPY N/A 9/13/2024    Procedure: COLONOSCOPY;  Surgeon: GREGG Guzmán MD;  Location: [facility] ENDO (4TH FLR);  Service: Endoscopy;  Laterality: N/A;  ref by / suflave inst portal-RB  polyps noted on procedure report 7/9/2021; Dr. Guzmán patient  6/25 r/s, unopened Suflave, updated instr. to portal, pt states she may be restarting semaglutide on Tuesdays-instr. to be hold for 8 days-st  9/9-lvm for pre warren    DESTRUCTION, INTRAOSSEOUS BASIVERTEBRAL NERVE, EACH ADD'L BODY, LUM/SAC Bilateral 2/26/2024    Procedure: DESTRUCTION,INTRAOSSEOUS BASIVERTEBRAL NERVE,EACH ADD'L BODY,LUM/SAC;  Surgeon: Cosme Kirkland MD;  Location: [facility] OR;  Service: Pain Management;  Laterality: Bilateral;  L5 & S1    EPIDURAL STEROID INJECTION N/A 10/2/2019    Procedure: INJECTION, STEROID, EPIDURAL, C7-T1;  Surgeon: Cosme Kirkland MD;  Location: [facility] PAIN MGT;  Service: Pain Management;  Laterality: N/A;    EPIDURAL STEROID INJECTION N/A 12/22/2021    Procedure: INJECTION, STEROID, EPIDURAL, C7-T1 NEED CONSENT;  Surgeon: Cosme Kirkland MD;  Location: [facility] PAIN MGT;  Service: Pain Management;  Laterality: N/A;    EPIDURAL STEROID INJECTION N/A 7/6/2022    Procedure: INJECTION, STEROID, EPIDURAL, C7-T1 IL  CONTRAST;  Surgeon: Cosme Kirkland MD;  Location: [facility] PAIN MGT;  Service: Pain Management;  Laterality: N/A;    EYE SURGERY      INJECTION OF ANESTHETIC AGENT AROUND NERVE Bilateral 1/8/2020    Procedure: BLOCK, NERVE C4,5,6;  Surgeon: Cosme Kirkland MD;  [facility];   Service: Pain Management;  Laterality: Bilateral;  B/L MBB C4,5,6    INJECTION OF ANESTHETIC AGENT AROUND NERVE Bilateral 1/29/2020    Procedure: BLOCK, NERVE, Repeat C4-C5-C6 MEDIAL BRANCH;  Surgeon: Cosme Kirkland MD;  Location: [facility] PAIN MGT;  Service: Pain Management;  Laterality: Bilateral;    INJECTION OF ANESTHETIC AGENT AROUND NERVE Bilateral 12/14/2022    Procedure: BLOCK, NERVE BILATERAL L3,L4,L5 MEDIAL BRANCH NEEDS CONSENT;  Surgeon: Cosme Kirkland MD;  Location: [facility] PAIN MGT;  Service: Pain Management;  Laterality: Bilateral;    INJECTION OF ANESTHETIC AGENT AROUND NERVE Bilateral 1/25/2023    Procedure: BLOCK, NERVE BILATERAL L3,L4,L5 MEDIAL BRANCH;  Surgeon: Cosme Kirkland MD;  Location: [facility] PAIN MGT;  Service: Pain Management;  Laterality: Bilateral;    INJECTION OF ANESTHETIC AGENT AROUND NERVE Bilateral 11/1/2023    Procedure: BLOCK, NERVE BILATERAL L5, S1, AND S2 LATERAL BRANCH;  Surgeon: Cosme Kirkland MD;  Location: [facility] PAIN MGT;  Service: Pain Management;  Laterality: Bilateral;    INJECTION OF ANESTHETIC AGENT AROUND NERVE Bilateral 11/22/2023    Procedure: BLOCK, NERVE BILATERAL L5, S1, AND S2 MEDIAL BRANCH;  Surgeon: Cosme Kirkland MD;  Location: [facility] PAIN MGT;  Service: Pain Management;  Laterality: Bilateral;    INJECTION OF JOINT Left 5/18/2022    Procedure: INJECTION, JOINT, LEFT SI and GTB *LORI PT*;  Surgeon: Jed Phillips MD;  Location: [facility] PAIN MGT;  Service: Pain Management;  Laterality: Left;    INJECTION, SACROILIAC JOINT Bilateral 5/24/2023    Procedure: INJECTION,SACROILIAC JOINT BIALTERAL;  Surgeon: Cosme Kirkland MD;  Location: [facility] PAIN MGT;  Service: Pain Management;  Laterality: Bilateral;    LAPAROSCOPIC SURGICAL REMOVAL OF CECUM N/A 7/6/2020    Procedure: EXCISION, CECUM, LAPAROSCOPIC, colonoscopy to start, ERAS low;  Surgeon: Kang Guzmán MD;  Location: [facility] OR [address] [place] [address];  Service: Colon and Rectal;  Laterality: N/A;     RADIOFREQUENCY ABLATION Left 3/4/2020    Procedure: RADIOFREQUENCY ABLATION, C4-C5-C6 ME DIAL BRANCH 1 OF 2 need consent;  Surgeon: Cosme Kirkland MD;  Location: [facility] PAIN MGT;  Service: Pain Management;  Laterality: Left;    RADIOFREQUENCY ABLATION Right 6/3/2020    Procedure: RADIOFREQUENCY ABLATION, C4-C5-C6 MEDIAL BRANCH 2 OF 2 need consent;  Surgeon: Cosme Kirkland MD;  Location: [facility] PAIN MGT;  Service: Pain Management;  Laterality: Right;    RADIOFREQUENCY ABLATION Right 3/23/2022    Procedure: Radiofrequency Ablation RIGHT C4,5,6;  Surgeon: Cosme Kirkland MD;  Location: [facility] PAIN MGT;  Service: Pain Management;  Laterality: Right;    RADIOFREQUENCY ABLATION Left 4/6/2022    Procedure: Radiofrequency Ablation LEFT C4,5,6;  Surgeon: Cosme Kirkland MD;  Location: [facility] PAIN MGT;  Service: Pain Management;  Laterality: Left;    RADIOFREQUENCY ABLATION Bilateral 3/1/2023    Procedure: RADIOFREQUENCY ABLATION BILATERAL L3,L4,L5 BOTH SIDES SAME TIME PER DR KIRKLAND;  Surgeon: Cosme Kirkland MD;  Location: [facility] PAIN MGT;  Service: Pain Management;  Laterality: Bilateral;    RADIOFREQUENCY ABLATION Bilateral 5/3/2023    Procedure: RADIOFREQUENCY ABLATION BILATERAL C4,C5,C6 BOTH SIDES PER DR KIRKLAND;  Surgeon: Cosme Kirkland MD;  Location: [facility] PAIN MGT;  Service: Pain Management;  Laterality: Bilateral;    RADIOFREQUENCY ABLATION Bilateral 5/29/2024    Procedure: RADIOFREQUENCY ABLATION BILATERAL C4, 5, 6;  Surgeon: Cosme Kirkland MD;  Location: [facility] PAIN MGT;  Service: Pain Management;  Laterality: Bilateral;  [phone]  4 WK F/U LORI    RADIOFREQUENCY ABLATION Bilateral 12/18/2024    Procedure: RADIOFREQUENCY ABLATION BILATERAL C4, 5, 6 *ECONSENTED*;  Surgeon: Cosme Kirkland MD;  Location: [facility] PAIN MGT;  Service: Pain Management;  Laterality: Bilateral;  4 WK F/U NERISSA    REFRACTIVE SURGERY  2008    SMALL INTESTINE SURGERY  7/6/20     Social History     Socioeconomic History     Marital status:    Occupational History     Employer: [employer]   Tobacco Use    Smoking status: Never    Smokeless tobacco: Never   Substance and Sexual Activity    Alcohol use: Yes     Alcohol/week: 1.0 standard drink of alcohol     Types: 1 Standard drinks or equivalent per week     Comment: 3 drinks weekly     Drug use: No    Sexual activity: Yes     Partners: Male     Birth control/protection: I.U.D.   Social History Narrative     Children - Dolores and Florin, JrHusband - Florin        Used to walk, ride bikes. Occasional uses elliptical at home.      Social Drivers of Health     Financial Resource Strain: Low Risk  (5/8/2024)    Overall Financial Resource Strain (CARDIA)     Difficulty of Paying Living Expenses: Not hard at all   Food Insecurity: No Food Insecurity (5/8/2024)    Hunger Vital Sign     Worried About Running Out of Food in the Last Year: Never true     Ran Out of Food in the Last Year: Never true   Transportation Needs: No Transportation Needs (5/8/2024)    PRAPARE - Transportation     Lack of Transportation (Medical): No     Lack of Transportation (Non-Medical): No   Physical Activity: Insufficiently Active (5/8/2024)    Exercise Vital Sign     Days of Exercise per Week: 1 day     Minutes of Exercise per Session: 20 min   Stress: Stress Concern Present (5/8/2024)    [employer] - Occupational Stress Questionnaire     Feeling of Stress : To some extent   Housing Stability: Low Risk  (9/13/2023)    Housing Stability Vital Sign     Unable to Pay for Housing in the Last Year: No     Number of Places Lived in the Last Year: 1     Unstable Housing in the Last Year: No     Family History   Problem Relation Name Age of Onset    Hypertension Mother Celsa Bladimir Hayes     Thyroid disease Mother Celsa Garrison Maria Fernanda Hayes     Rheum arthritis Mother Celsa Bladimir Hayes     Hearing loss Mother Celsa Bladimir Hayes      Heart disease Father Wes Irving         Pacemaker    Cancer Maternal Grandmother Katy Garrison     Cataracts Maternal Grandmother Katy Garrison     Diabetes Maternal Grandmother Katy Garrison     Hypertension Maternal Grandmother Katy Garrison     Thyroid disease Maternal Grandmother Katy Garrison     Breast cancer Maternal Grandmother Katy Garrison     Hearing loss Maternal Grandmother Katy Garrison     Cataracts Maternal Grandfather Max Garrison, Sr.     Diabetes Maternal Grandfather Max Garrison, Sr.     Hypertension Maternal Grandfather Max Garrison, Sr.     Thyroid disease Maternal Grandfather Max Garrison, Sr.     Lupus Cousin mother's 1st cousin     Cancer Maternal Uncle      No Known Problems Paternal Grandmother      No Known Problems Paternal Grandfather      Amblyopia Neg Hx      Blindness Neg Hx      Glaucoma Neg Hx      Macular degeneration Neg Hx      Retinal detachment Neg Hx      Strabismus Neg Hx      Stroke Neg Hx      Ovarian cancer Neg Hx      Colon cancer Neg Hx      Cirrhosis Neg Hx         Review of patient's allergies indicates:   Allergen Reactions    Amoxicillin Hives       Current Outpatient Medications   Medication Sig    ascorbic acid, vitamin C, (VITAMIN C) 100 MG tablet Take 100 mg by mouth once daily.    atenoloL-chlorthalidone (TENORETIC) 100-25 mg per tablet Take 1 tablet by mouth once daily.    celecoxib (CELEBREX) 100 MG capsule TAKE 1 CAPSULE(100 MG) BY MOUTH TWICE DAILY    DULoxetine (CYMBALTA) 60 MG capsule Take 1 capsule (60 mg total) by mouth once daily.    estradioL (VIVELLE-DOT) 0.025 mg/24 hr Place 1 patch onto the skin twice a week.    estradioL (VIVELLE-DOT) 0.0375 mg/24 hr Place 1 patch onto the skin twice a week.    FLUoxetine 20 MG capsule Take 20 mg by mouth.    levonorgestrel (MIRENA) 20 mcg/24 hr (5 years) IUD 1 Intra Uterine Device by Intrauterine route once. for 1 dose    loratadine (CLARITIN) 10 mg tablet Take 10 mg by mouth every morning.     metFORMIN  (GLUCOPHAGE) 500 MG tablet Take 1 tablet (500 mg total) by mouth Daily.    methocarbamoL (ROBAXIN) 500 MG Tab TAKE 1 TABLET(500 MG) BY MOUTH THREE TIMES DAILY AS NEEDED FOR MUSCLE PAIN    multivitamin capsule Take 1 capsule by mouth once daily.    mupirocin (BACTROBAN) 2 % ointment Apply topically once daily.    ondansetron (ZOFRAN-ODT) 4 MG TbDL Take 4 mg by mouth every 6 (six) hours as needed.    potassium chloride (MICRO-K) 10 MEQ CpSR Take 2 capsules (20 mEq total) by mouth once daily.    semaglutide (OZEMPIC) 1 mg/dose (2 mg/1.5 mL) PnIj Inject 1 mg into the skin every 7 days.    sod sulf-pot chloride-mag sulf (SUTAB) 1.479-0.188- 0.225 gram tablet Take 12 tablets by mouth once daily. Please follow Endoscopy 's instructions. Please apply coupon code. Please apply coupon code. BIN: [id number], PCN: [id number], GROUP: [id number], MEMBER ID: [id number]    vitamin D (VITAMIN D3) 1000 units Tab Take 1,000 Units by mouth once daily.    zolpidem (AMBIEN) 10 mg Tab Take 10 mg by mouth every evening.     No current facility-administered medications for this visit.         ROS:  GENERAL: No fever. No chills. No fatigue. Denies weight loss. Denies weight gain.  HEENT: Denies headaches. Denies vision change. Denies eye pain. Denies double vision. Denies ear pain.   CV: Denies chest pain.   PULM: Denies of shortness of breath.  GI: Denies constipation. No diarrhea. No abdominal pain. Denies nausea. Denies vomiting. No blood in stool.  HEME: Denies bleeding problems.  : Denies urgency. No painful urination. No blood in urine.  MS: Denies joint stiffness. Denies joint swelling.    SKIN: Denies rash.   NEURO: Denies seizures. No weakness.  PSYCH:  Denies difficulty sleeping. No anxiety. Denies depression. No suicidal thoughts.       VITALS:   Vitals:    02/17/25 1513 02/17/25 1514   BP:  105/64   Pulse:  61   Temp: 97.6 °F (36.4 °C)    Weight: 68 kg (149 lb 14.6 oz)    PainSc:   2   2   PainLoc: Neck          PHYSICAL EXAM:    GENERAL: Well appearing, in no acute distress, alert and oriented x3.  PSYCH:  Mood and affect appropriate.  SKIN: Skin color, texture, turgor normal, no rashes or lesions.  HEENT:  Normocephalic, atraumatic. Cranial nerves grossly intact.  NECK:   positive for pain to palpation over the cervical paraspinous muscles.   Mild pain with neck flexion and extension with normal ROM.  Spurling Test was Negative Bilaterally  Tenderness to trapezius bilaterally  PULM: No evidence of respiratory difficulty, symmetric chest rise.  EXTREMITIES:   No deformities, edema, or skin discoloration.   NEURO: Sensation is equal and appropriate bilaterally. Bilateral upper extremity strength is normal and symmetric. Ríos is negative bilaterally.   GAIT: Normal        ASSESSMENT: 54 y.o. year old with Neck pain, consistent with:    1. Chronic pain syndrome        2. Cervical spondylosis              PLAN:  Patient Education:  Discussed the importance of physical therapy, activity modification, and a home exercise plan to help with pain and improve health.  Therapy referral: Continue HEP.  Medications:   Patient can continue with pain medications for now per their provider since they are providing benefits, using them appropriately, and without side effects.  Cymbalta 60mg daily.  Schedule pain intervention for: N/A. She is s/p repeat Cervical RFA bilateral C4,5,6 with 80% relief. Can monitor and repeat after 6 months if needed.  Counseled patient: regarding the importance of activity modification and physical therapy  Return to clinic: in 3 months or sooner if needed.      The above plan and management options were discussed at length with patient. Patient is in agreement with the above and verbalized understanding.     Paula Roberts, URIAH  02/17/2025

## 2025-03-05 DIAGNOSIS — I10 ESSENTIAL HYPERTENSION: ICD-10-CM

## 2025-03-13 ENCOUNTER — LAB VISIT (OUTPATIENT)
Dept: LAB | Facility: HOSPITAL | Age: 55
End: 2025-03-13
Attending: HOSPITALIST
Payer: COMMERCIAL

## 2025-03-13 DIAGNOSIS — I10 ESSENTIAL HYPERTENSION: ICD-10-CM

## 2025-03-13 LAB
ALBUMIN SERPL BCP-MCNC: 3.8 G/DL (ref 3.5–5.2)
ALP SERPL-CCNC: 52 U/L (ref 40–150)
ALT SERPL W/O P-5'-P-CCNC: 13 U/L (ref 10–44)
ANION GAP SERPL CALC-SCNC: 9 MMOL/L (ref 8–16)
AST SERPL-CCNC: 16 U/L (ref 10–40)
BILIRUB SERPL-MCNC: 0.4 MG/DL (ref 0.1–1)
BUN SERPL-MCNC: 10 MG/DL (ref 6–20)
CALCIUM SERPL-MCNC: 9.6 MG/DL (ref 8.7–10.5)
CHLORIDE SERPL-SCNC: 99 MMOL/L (ref 95–110)
CO2 SERPL-SCNC: 30 MMOL/L (ref 23–29)
CREAT SERPL-MCNC: 0.9 MG/DL (ref 0.5–1.4)
EST. GFR  (NO RACE VARIABLE): >60 ML/MIN/1.73 M^2
GLUCOSE SERPL-MCNC: 92 MG/DL (ref 70–110)
POTASSIUM SERPL-SCNC: 3.4 MMOL/L (ref 3.5–5.1)
PROT SERPL-MCNC: 7.1 G/DL (ref 6–8.4)
SODIUM SERPL-SCNC: 138 MMOL/L (ref 136–145)

## 2025-03-13 PROCEDURE — 80053 COMPREHEN METABOLIC PANEL: CPT | Performed by: HOSPITALIST

## 2025-03-13 PROCEDURE — 36415 COLL VENOUS BLD VENIPUNCTURE: CPT | Mod: PO | Performed by: HOSPITALIST

## 2025-03-18 ENCOUNTER — PATIENT MESSAGE (OUTPATIENT)
Dept: INTERNAL MEDICINE | Facility: CLINIC | Age: 55
End: 2025-03-18
Payer: COMMERCIAL

## 2025-03-18 DIAGNOSIS — I10 ESSENTIAL HYPERTENSION: ICD-10-CM

## 2025-03-18 DIAGNOSIS — K76.0 FATTY LIVER: ICD-10-CM

## 2025-03-18 DIAGNOSIS — R73.03 PREDIABETES: ICD-10-CM

## 2025-03-18 DIAGNOSIS — Z00.00 ANNUAL PHYSICAL EXAM: Primary | ICD-10-CM

## 2025-03-18 DIAGNOSIS — D56.3 ALPHA THALASSEMIA TRAIT: ICD-10-CM

## 2025-03-19 NOTE — TELEPHONE ENCOUNTER
LOV with Marj Gonzalez MD , 5/8/2024  Pt requesting lab results from 3/13/25. Concerned about abn CO2.  Also asking if an A1c is needed. Last one done on 8/19/24

## 2025-04-16 ENCOUNTER — PATIENT MESSAGE (OUTPATIENT)
Dept: OBSTETRICS AND GYNECOLOGY | Facility: CLINIC | Age: 55
End: 2025-04-16
Payer: COMMERCIAL

## 2025-05-08 ENCOUNTER — NURSE TRIAGE (OUTPATIENT)
Dept: ADMINISTRATIVE | Facility: CLINIC | Age: 55
End: 2025-05-08
Payer: COMMERCIAL

## 2025-05-08 ENCOUNTER — OFFICE VISIT (OUTPATIENT)
Dept: PAIN MEDICINE | Facility: CLINIC | Age: 55
End: 2025-05-08
Attending: ANESTHESIOLOGY
Payer: COMMERCIAL

## 2025-05-08 DIAGNOSIS — M79.18 MYOFASCIAL PAIN: ICD-10-CM

## 2025-05-08 DIAGNOSIS — M47.812 CERVICAL SPONDYLOSIS WITHOUT MYELOPATHY: ICD-10-CM

## 2025-05-08 PROCEDURE — 98006 SYNCH AUDIO-VIDEO EST MOD 30: CPT | Mod: 95,,, | Performed by: ANESTHESIOLOGY

## 2025-05-08 PROCEDURE — 1159F MED LIST DOCD IN RCRD: CPT | Mod: CPTII,95,, | Performed by: ANESTHESIOLOGY

## 2025-05-08 PROCEDURE — 1160F RVW MEDS BY RX/DR IN RCRD: CPT | Mod: CPTII,95,, | Performed by: ANESTHESIOLOGY

## 2025-05-08 PROCEDURE — 98005 SYNCH AUDIO-VIDEO EST LOW 20: CPT | Mod: 95,,, | Performed by: NURSE PRACTITIONER

## 2025-05-08 RX ORDER — CELECOXIB 100 MG/1
100 CAPSULE ORAL 2 TIMES DAILY
Qty: 60 CAPSULE | Refills: 2 | Status: SHIPPED | OUTPATIENT
Start: 2025-05-08

## 2025-05-08 RX ORDER — METHOCARBAMOL 500 MG/1
500 TABLET, FILM COATED ORAL 3 TIMES DAILY PRN
Qty: 30 TABLET | Refills: 1 | Status: SHIPPED | OUTPATIENT
Start: 2025-05-08

## 2025-05-08 NOTE — PROGRESS NOTES
Chronic Pain Established Patient     VISIT TYPE: Virtual visit with synchronous audio and video    The patient location is: At home  Total time spent with patient: 30 min    Each patient to whom he or she provides medical services by telemedicine is:  (1) informed of the relationship between the physician and patient and the respective role of any other health care provider with respect to management of the patient; and (2) notified that he or she may decline to receive medical services by telemedicine and may withdraw from such care at any time.    PCP: Marj Gonzalez MD    CHIEF COMPLAINT: Neck Pain    Interval History 05/08/2025  Patient is here for follow up of her neck pain.  She had bilateral C4,5,6 RFA in 12/2024 with 80% relief.  Her pain has started to return but not as severe as before but in same distribution.  NO new weakness, pain radiating down the arms.  She is doing HEP.        Interval History 2/17/2025:  The patient presents today for follow up of chronic neck pain post procedure. She is s/p bilateral C4,5,6 RFA on 12/18/24. She is reporting 80% relief. She still has some stiffness in the morning. She has benefit when she starts moving around. No weakness reported. Her back pain has also been well controlled after Intracept procedure. Her pain today is 2/10.     Interval History 11/29/2024  Patient is here for follow up of her neck pain.  Patient had over 80+% relief from Cervical RFA, last done in 5/29/2024.  A few weeks ago, started to have return of her pain.  Now pain is about same as pre-abalation level.  Also endorse tenderness in upper back and neck muscles.  Doing home exercises when she can. Denies achyness in deltoid and fingers but denies numbness, tingling, weakness, dropping items, difficulty with opening jars and door.  Denies recent fall, GI/ incontinence.  Pain today 8/10.  Taking ibuprofen as needed.  Taking cymbalta daily, needs refill.  Reports minimal back pain since  intracept.          Interval History 6/27/24:  Sheila Costa returns today for follow-up after cervical RFA. Since last seen, they report their pain has been markedly improved (~80% relief). She does have a little numbness on the left side of her posterior neck but has had this before and states it does not radiate past her neck and is not painful.  She denies other ongoing pains at this time.      Interval History 3/4/2024:  Sheila Costa presents for post-op s/p Intracept L5 and S1 on  2/26/2024.  She reports 75% relief since procedure with more relief everyday.  She denies: fever, chills, drainage from the site, or additional pain.   Today's pain score is 0/10.       Interval History 02/19/2024:  Ms. Igor Asher is a 54 y/o f who presents for virtual follow-up of her chronic low back pain.  She is scheduled for L5 & S1 intraosseous basivertebral nerve ablation 03/11/2024. She continues to endorse axial low back consistent with lumbar vertebrogenic back pain. The patient is presenting today for further information regarding the procedure. All questions answered.      Interval History 9/28/2023:  Patient seen today for virtual follow-up of her low back pain.  Today, she reports that the bilateral SI joint injections that she had in May have worked well for her for about 6 weeks and her back pain is at baseline worse  Her main concern is the pain in her low back which is affecting her ADL .  She previously got about 50% relief with RFA but now states she feels the pain as before all her injection.  She feels that the RFA helped with 1 component of her pain, but she continues to have deep focal low back and buttock pain.  She denies radiation into her legs.  She reports that the pain is fairly persistent but worsens significantly with standing, sitting for a long time and flexion.  She denies any bowel/bladder dysfunction or saddle anesthesia.  She has no other focal neurologic  deficits.     Interval History 6/29/2023:  She presents with continued stiffness in her neck and shoulders.  She also complains of lower back pain which is still present. She does feel the SI joint injections do help.  She states that she is doing some stretches and home exercises to help with the pain.  She feels the stretching does help, but she feels she isn't doing them as often as she'd like.  She continues to state that her pain is 50-70% improved compared to before the injections.  She is inquiring as to if she should schedule her follow up injections now or if she should wait.     Interval History 6/9/2023:  The patient returns to clinic today for follow up neck and back pain via virtual visit. She is s/p bilateral C4,5,6 RFA on 5/3/2023. She is s/p bilateral SI joint injections on 5/24/2023. She reports 70% relief of her neck and back pain. She reports intermittent low back pain. She denies any radicular leg pain. She is performing a home exercise and stretching routine. She is taking Celebrex. She did not receive Robaxin discussed at last visit. She denies any other health changes.      Interval History 4/13/23:  Sheila HortaAbdiasmelissa presents to the clinic for a follow-up appointment for back pain. Since the last visit, Sheila HoratAbdiasmelissa states the pain has been persistant. Current pain intensity is 7/10. S/p L3/4/5 RFA with 50% relief. Pain is still persistent. Gets better with exercise and stretching although too much movement can exacerbate the pain. She is taking celebrex and tizanidine. She endorses previous SI joint injections were helpful.      Interval History 3/22/2023:  The patient returns to clinic today for follow up of low back pain via virtual visit. She is s/p bilateral L3,4,5 RFA on 3/1/2023. She reports 50% relief of her pain. She reports low back and buttock pain. This seems lower than injection sites. She denies any radicular leg pain. She reports increased neck pain. She  describes this pain as constant and aching in nature. She denies any radicular arm pain. Her pain is worse with activity. She is taking Celebrex and Zanaflex as needed. She denies any other health changes.      Interval History 2/1/2023:  The patient returns to clinic today for follow up of low back pain via virtual visit. She is s/p bilateral L3,4,5 MBB on 1/25/2023. She reports 90% relief of her pain for one day. Since then, her pain has returned to baseline. She reports low back pain that is aching in nature. She denies any radicular leg pain. Her pain is worse with prolonged activity. She is currently taking Celebrex. She also takes Zanaflex intermittently. She denies any other health changes.      Interval History 12/29/2022:  The patient returns to clinic today for follow up of low back pain via virtual visit. She is s/p bilateral L3,4,5 MBB on 12/14/2022. She reports 95% relief of her low back pain for one day. Since then, her pain has returned to baseline. She continues to report low back pain. This is aching across the lower back. She denies any radicular leg pain. Her pain is worse with activity. She is currently taking Zanaflex with limited relief. She denies any other health changes.      Interval history 10/31/22:  Patient returns to clinic for follow up of neck and shoulder pain. She is now s/p C7/T1 cervical MICHELLE 7/6/22 which gave 75-80% relief which lasted a few months. Now with primary complaint of low back pain in a band across the low back described as aching and throbbing which radiates to lateral aspect of BLE which stops at the knees. Still taking ibuprofen, celebrex, and lyrica. Not doing home exercise or PT. Denies fever/chills, or saddle anesthesia.     Interval History 6/13/22:   She is s/p left SIJ and left GTB injection on 5/18/2022 which she reports has helped her buttock/leg pain significantly. She now would like to focus on her neck pain. She continues to have axial neck pain,  bilateral. Her RUE sxs have significantly decreased s/p C7-T1 IL MICHELLE done in Dec 2021 but she does still have RUE pain. She is taking Gabapentin 300mg nightly - has not noticed a difference in her pain with this. She has tried topicals - help somewhat, Advil 800mg helps. She has gone to the chiropractor in the past - helpful. She has not done PT for her neck in ~3 years ago which she thinks helped somewhat. She denies any weakness/numbness/tingling in BUEs. Denies b/b incontinence or saddle anesthesia.      Interval History 4/28/22:   Sheila Costa presents to the clinic for a follow-up appointment for neck pain. Since the last visit, Sheila Costa states the pain has been improving. She is s/p bilateral RFA of C4/5/6 on 3/26 and 4/3. She reports >75% relief to both sides and is satisfied with the results. She does satate that she is having some numbness over the skin over the L side. She also states she is having left buttock pain that she describes as cramping/throbbing which occasionally radiates down posterior aspect of L thigh, stopping above the knee. She denies numbness/tingling in lower extremities.      Interval History 3/10/2022:   Sheila Costa presents to the clinic for a follow-up appointment for neck pain. Since the last visit, Sheila IrvingNeptaliSuellen states the pain has been stable. Worse in the mornings. Feels like a stiffness in the neck without radicular symptoms as her radicular symptoms had been resolved since her MICHELLE. Some paraesthesia in arms and hands with typing. Patient states that mobic is beneficial. Best relief of her axial neck pain in the past was with RFA done previously. She discontinued her amitriptyline prescription and did not notice a difference in pain with discontinuation. Denies bowel/bladder dysfunction or difficulty with fine motor skills.     Interval History 1/6/2022:   Sheila Costa presents to the clinic for a follow-up  appointment s/p Cervical Interlaminar Epidural Steroid Injection at the end of this past December. Since the last visit, Sheila Costa states the pain has been improving. Current pain intensity is 3/10 but she is still experiencing stiffness. Pain is primarly throbbing that would travel down BL arms. She has been doing well. Patient states that mobic and elavil have been helping with pain. Patient is sleeping well currently.     Interval History 11/15/2021:   Pt returns to clinic today due to resurgence of her bilateral neck and arm pain. At her last interventional pain encounter she had RFA left C4,5,6 on 03/04/2020 and the right done on 06/2020. Pt reports this provided significant relief of her pain however she has been having increased neck pain with radiation into her arms down to her hands. She has had a cervical epidural in the past with at least 50% relief of her pain. She has been utilizing OTC ibuprofen as well as a chiropractor.   Pt also reports low back pain without any radiation into the lower extremities. She states that the pain is aching and worsened with prolonged physical activity.      Interval History 11/25/2019:  The patient returns to clinic today for follow up. She reports a few days of relief with trigger point injections at last visit. She continues to report neck pain with intermittent radiating pain into both arms to her hands. She also reports mid back pain. 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 She states the previous MICHELLE helped for her arm pain but not for her neck pain. She has had limited relief with NSAIDs and physical therapy. She denies any other health changes. Her pain today is 7/10.     Interval History 10/28/2019:  The patient returns to clinic today for follow up. She is s/p C7-T1 IL MICHELLE on 10/2/2019. She reports 50% relief of her neck and arm pain. She reports intermittent neck pain that is sore in nature. She does report increased mid back pain. She  describes this pain as tight and aching in nature. She denies any radicular arm pain. She denies any other health changes. Her pain today is 6/10.      Pain Disability Index Review:     PDI Score:       2/17/2025     3:13 PM 11/29/2024     3:21 PM 5/9/2024     9:43 AM   Last 3 PDI Scores   Pain Disability Index (PDI) 12 60 35             Pain Procedures:   10/2/2019- C7-T1 IL MICHELLE- 50% pain relief  1/8/2020- Bilateral C4,5,6 MBB  1/29/2020- Bilateral C4,5,6 MBB  3/4/2020: Left C4,5,6 RFA - 80% relief  6/3/2020: Right C4,5,6 RFA - 80% relief  12/22/2021- C7/T1 IL MICHELLE  3/23/2022- Right C4,5,6 RFA -80-85% relief  4/6/2022- Left C4,5,6 RFA- 75% relief   5/8/2022- Left SI joint and GTB injection  7/6/2022- C7/T1 IL MICHELLE  12/14/2022- Bilateral L3,4,5 MBB- 95% relief  1/25/2023- Bilateral L3,4,5 MBB  3/1/2023- Bilateral L3,4,5 RFA  5/3/2023- Bilateral C4,5,6 RFA  5/24/2023- Bilateral SI joint injections  2/26/2024 - Intracept at L5 and S1  5/29/2024 - Bilateral C4, 5, 6 with 80% relief  12/18/24 B/L C4,5,6 RFA- 80% relief     Physical Therapy/Home Exercise: yes     Imaging:   MRI Lumbar Spine 11/11/2022:  COMPARISON:  11/01/2022     FINDINGS:  Alignment: Normal.     Vertebrae: Degenerative endplate changes at L5-S1.  No fracture or marrow infiltrative process.     Discs: Mild disc height loss at L5-S1 with annular fissure.  No evidence for discitis.     Cord: Conus terminates at L1 and appears unremarkable.  Cauda equina appears unremarkable.     Degenerative findings:     T12-L1: No spinal canal stenosis or neural foraminal narrowing.     L1-L2: No spinal canal stenosis or neural foraminal narrowing.     L2-L3: No spinal canal stenosis or neural foraminal narrowing.     L3-L4: No spinal canal stenosis or neural foraminal narrowing.     L4-L5: Circumferential disc bulge and mild facet arthropathy result in mild left neural foraminal narrowing.     L5-S1: Circumferential disc bulge and moderate facet arthropathy result in  mild right, moderate left neural foraminal narrowing.     Paraspinal muscles & soft tissues: Paraspinal muscle bulk is maintained.  Small bilateral renal cysts noted.     Impression:     1. Degenerative changes of the lower lumbar spine detailed above.  Moderate left neural foraminal narrowing noted at L5-S1.     MRI Cervical Spine 11/20/2019:  COMPARISON:  MRI cervical spine without contrast 03/06/2019     FINDINGS:  Alignment: Sagittal alignment is preserved.     Vertebrae: Normal marrow signal without osseous destructive process or aggressive bone marrow replacement process.  No fracture.     Discs: There is mild disc space narrowing at C2-C3 with endplate irregularity, likely degenerative and similar to the prior examination.  Remaining discs demonstrate normal height and signal.     Cord: Normal caliber, morphology, and signal.     Degenerative findings:     C2-C3: Posterior disc osteophyte complex and left uncovertebral joint spurring contribute to mild left neural foraminal narrowing.  No spinal canal stenosis.     C3-C4: Mild posterior disc osteophyte complex results in mild effacement of the ventral thecal sac without significant spinal canal stenosis or neural foraminal narrowing.     C4-C5: Posterior disc osteophyte complex results in effacement of the anterior thecal sac without significant spinal canal stenosis or neural foraminal narrowing.     C5-C6: Left uncovertebral joint spurring contributes to mild left neural foraminal narrowing without significant spinal canal stenosis.     C6-C7: No significant disc abnormality, spinal canal stenosis, or neural foraminal narrowing.     C7-T1: No significant disc abnormality, spinal canal stenosis, or neural foraminal narrowing.     Limited sagittal evaluation of the upper thoracic spine reveals a disc protrusion at T4-T5 causing effacement of the anterior thecal sac and cord abutment.     Paraspinal muscles & soft tissues: Unremarkable without spinal canal or  paraspinal mass.     Impression:     Mild cervical degenerative changes contributing to mild left neural foraminal narrowing at C2-C3 and C5-C6.  No spinal canal stenosis.     T4-T5 disc protrusion resulting in effacement of the anterior thecal sac, abutting the cord.     Narrowing of the C2-C3 disc space with endplate irregularity, likely degenerative and stable compared to the prior examination         Past Medical History:   Diagnosis Date    Alpha thalassemia     Back pain     Cervical spondylosis 12/30/2019    Colon polyp     Fatty liver     Hypertension     Prediabetes     Premenstrual symptom      Past Surgical History:   Procedure Laterality Date    COLONOSCOPY N/A 6/18/2020    Procedure: COLONOSCOPY;  Surgeon: Eliot Hill MD;  [facility]  Service: Endoscopy;  Laterality: N/A;  [facility]    COLONOSCOPY N/A 7/6/2020    Procedure: COLONOSCOPY;  Surgeon: Kang Guzmná MD;  [address]  Service: Colon and Rectal;  Laterality: N/A;    COLONOSCOPY N/A 7/9/2021    Procedure: COLONOSCOPY;  Surgeon: GREGG Guzmán MD;  [facility]  Service: Endoscopy;  Laterality: N/A;  in July 2021  covid test [place] 7/6    COLONOSCOPY N/A 9/13/2024    Procedure: COLONOSCOPY;  Surgeon: GREGG Guzmán MD;  [facility]  Service: Endoscopy;  Laterality: N/A;  ref by / suflave inst portal-RB  polyps noted on procedure report 7/9/2021; Dr. Guzmán patient  6/25 r/s, unopened Suflave, updated instr. to portal, pt states she may be restarting semaglutide on Tuesdays-instr. to be hold for 8 days-st  9/9-lvm for pre warren    DESTRUCTION, INTRAOSSEOUS BASIVERTEBRAL NERVE, EACH ADD'L BODY, LUM/SAC Bilateral 2/26/2024    Procedure: DESTRUCTION,INTRAOSSEOUS BASIVERTEBRAL NERVE,EACH ADD'L BODY,LUM/SAC;  Surgeon: Cosme Kirkland MD;  Location: [facility];  Service: Pain Management;  Laterality: Bilateral;  L5 & S1     EPIDURAL STEROID INJECTION N/A 10/2/2019    Procedure: INJECTION, STEROID, EPIDURAL, C7-T1;  Surgeon: Cosme Kirkland MD;  Location: [facility];  Service: Pain Management;  Laterality: N/A;    EPIDURAL STEROID INJECTION N/A 12/22/2021    Procedure: INJECTION, STEROID, EPIDURAL, C7-T1 NEED CONSENT;  Surgeon: Cosme Kirkland MD;  Location: [facility];  Service: Pain Management;  Laterality: N/A;    EPIDURAL STEROID INJECTION N/A 7/6/2022    Procedure: INJECTION, STEROID, EPIDURAL, C7-T1 IL  CONTRAST;  Surgeon: Cosme Kirkland MD;  Location: [facility];  Service: Pain Management;  Laterality: N/A;    EYE SURGERY      INJECTION OF ANESTHETIC AGENT AROUND NERVE Bilateral 1/8/2020    Procedure: BLOCK, NERVE C4,5,6;  Surgeon: Cosme Kirkland MD;  Location: [facility];  Service: Pain Management;  Laterality: Bilateral;  B/L MBB C4,5,6    INJECTION OF ANESTHETIC AGENT AROUND NERVE Bilateral 1/29/2020    Procedure: BLOCK, NERVE, Repeat C4-C5-C6 MEDIAL BRANCH;  Surgeon: Cosme Kirkland MD;  Location: [facility];  Service: Pain Management;  Laterality: Bilateral;    INJECTION OF ANESTHETIC AGENT AROUND NERVE Bilateral 12/14/2022    Procedure: BLOCK, NERVE BILATERAL L3,L4,L5 MEDIAL BRANCH NEEDS CONSENT;  Surgeon: Cosme Kirkland MD;  Location: [facility];  Service: Pain Management;  Laterality: Bilateral;    INJECTION OF ANESTHETIC AGENT AROUND NERVE Bilateral 1/25/2023    Procedure: BLOCK, NERVE BILATERAL L3,L4,L5 MEDIAL BRANCH;  Surgeon: Cosme Kirkland MD;  Location: [facility];  Service: Pain Management;  Laterality: Bilateral;    INJECTION OF ANESTHETIC AGENT AROUND NERVE Bilateral 11/1/2023    Procedure: BLOCK, NERVE BILATERAL L5, S1, AND S2 LATERAL BRANCH;  Surgeon: Cosme Kirkland MD;  Location: [facility];  Service: Pain Management;  Laterality: Bilateral;    INJECTION OF ANESTHETIC AGENT AROUND NERVE Bilateral 11/22/2023    Procedure: BLOCK, NERVE BILATERAL L5, S1,  AND S2 MEDIAL BRANCH;  Surgeon: Cosme Kirkland MD;  Location: [facility];  Service: Pain Management;  Laterality: Bilateral;    INJECTION OF JOINT Left 5/18/2022    Procedure: INJECTION, JOINT, LEFT SI and GTB *LORI PT*;  Surgeon: Jed Phillips MD;  Location: [facility];  Service: Pain Management;  Laterality: Left;    INJECTION, SACROILIAC JOINT Bilateral 5/24/2023    Procedure: INJECTION,SACROILIAC JOINT BIALTERAL;  Surgeon: Cosme Kirkland MD;  Location: [facility];  Service: Pain Management;  Laterality: Bilateral;    LAPAROSCOPIC SURGICAL REMOVAL OF CECUM N/A 7/6/2020    Procedure: EXCISION, CECUM, LAPAROSCOPIC, colonoscopy to start, ERAS low;  Surgeon: Kang Guzmán MD;  [facility];  Service: Colon and Rectal;  Laterality: N/A;    RADIOFREQUENCY ABLATION Left 3/4/2020    Procedure: RADIOFREQUENCY ABLATION, C4-C5-C6 ME DIAL BRANCH 1 OF 2 need consent;  Surgeon: Cosme Kirkland MD;  Location: [facility];  Service: Pain Management;  Laterality: Left;    RADIOFREQUENCY ABLATION Right 6/3/2020    Procedure: RADIOFREQUENCY ABLATION, C4-C5-C6 MEDIAL BRANCH 2 OF 2 need consent;  Surgeon: Cosme Kirkland MD;  Location: [facility];  Service: Pain Management;  Laterality: Right;    RADIOFREQUENCY ABLATION Right 3/23/2022    Procedure: Radiofrequency Ablation RIGHT C4,5,6;  Surgeon: Cosme Kirkland MD;  Location: [facility];  Service: Pain Management;  Laterality: Right;    RADIOFREQUENCY ABLATION Left 4/6/2022    Procedure: Radiofrequency Ablation LEFT C4,5,6;  Surgeon: Cosme Kirkland MD;  Location: [facility];  Service: Pain Management;  Laterality: Left;    RADIOFREQUENCY ABLATION Bilateral 3/1/2023    Procedure: RADIOFREQUENCY ABLATION BILATERAL L3,L4,L5 BOTH SIDES SAME TIME PER DR KIRKLAND;  Surgeon: Cosme Kirkland MD;  Location: [facility];  Service: Pain Management;  Laterality: Bilateral;    RADIOFREQUENCY ABLATION Bilateral 5/3/2023     Procedure: RADIOFREQUENCY ABLATION BILATERAL C4,C5,C6 BOTH SIDES PER DR SANDOVAL;  Surgeon: Cosme Sandoval MD;  Location: [facility];  Service: Pain Management;  Laterality: Bilateral;    RADIOFREQUENCY ABLATION Bilateral 5/29/2024    Procedure: RADIOFREQUENCY ABLATION BILATERAL C4, 5, 6;  Surgeon: Cosme Sandoval MD;  Location: [facility];  Service: Pain Management;  Laterality: Bilateral;  [phone]  4 WK F/U LORI    RADIOFREQUENCY ABLATION Bilateral 12/18/2024    Procedure: RADIOFREQUENCY ABLATION BILATERAL C4, 5, 6 *ECONSENTED*;  Surgeon: Cosme Sandoval MD;  Location: [facility];  Service: Pain Management;  Laterality: Bilateral;  4 WK F/U NERISSA    REFRACTIVE SURGERY  2008    SMALL INTESTINE SURGERY  7/6/20     Social History[1]  Family History   Problem Relation Name Age of Onset    Hypertension Mother Celsa Hayes     Thyroid disease Mother Celsa Hayes     Rheum arthritis Mother Celsa Hayes     Hearing loss Mother Celsa Hayes     Heart disease Father Wes Irving         Pacemaker    Cancer Maternal Grandmother Katy Housera Ray     Cataracts Maternal Grandmother Katy [place] Ray     Diabetes Maternal Grandmother Katy [place] Ray     Hypertension Maternal Grandmother Katy Zara Ray     Thyroid disease Maternal Grandmother Katy [place] Ray     Breast cancer Maternal Grandmother Katy [place] Ray     Hearing loss Maternal Grandmother Katy Zara Ray     Cataracts Maternal Grandfather Max Garrison, Sr.     Diabetes Maternal Grandfather Max Garrison, Sr.     Hypertension Maternal Grandfather Max Garrison, Sr.     Thyroid disease Maternal Grandfather Max Garrison, Sr.     Lupus Cousin mother's 1st cousin     Cancer Maternal Uncle      No Known Problems Paternal Grandmother      No Known Problems Paternal Grandfather      Amblyopia Neg Hx      Blindness Neg Hx      Glaucoma Neg Hx      Macular degeneration Neg Hx      Retinal  detachment Neg Hx      Strabismus Neg Hx      Stroke Neg Hx      Ovarian cancer Neg Hx      Colon cancer Neg Hx      Cirrhosis Neg Hx         Review of patient's allergies indicates:   Allergen Reactions    Amoxicillin Hives       Current Outpatient Medications   Medication Sig    ascorbic acid, vitamin C, (VITAMIN C) 100 MG tablet Take 100 mg by mouth once daily.    atenoloL-chlorthalidone (TENORETIC) 100-25 mg per tablet Take 1 tablet by mouth once daily.    celecoxib (CELEBREX) 100 MG capsule Take 1 capsule (100 mg total) by mouth 2 (two) times daily.    DULoxetine (CYMBALTA) 60 MG capsule Take 1 capsule (60 mg total) by mouth once daily.    estradioL (VIVELLE-DOT) 0.025 mg/24 hr Place 1 patch onto the skin twice a week.    FLUoxetine 20 MG capsule Take 20 mg by mouth.    levonorgestrel (MIRENA) 20 mcg/24 hr (5 years) IUD 1 Intra Uterine Device by Intrauterine route once. for 1 dose    loratadine (CLARITIN) 10 mg tablet Take 10 mg by mouth every morning.     metFORMIN (GLUCOPHAGE) 500 MG tablet Take 1 tablet (500 mg total) by mouth Daily.    methocarbamoL (ROBAXIN) 500 MG Tab Take 1 tablet (500 mg total) by mouth 3 (three) times daily as needed.    multivitamin capsule Take 1 capsule by mouth once daily.    mupirocin (BACTROBAN) 2 % ointment Apply topically once daily.    ondansetron (ZOFRAN-ODT) 4 MG TbDL Take 4 mg by mouth every 6 (six) hours as needed.    potassium chloride (MICRO-K) 10 MEQ CpSR Take 2 capsules (20 mEq total) by mouth once daily.    semaglutide (OZEMPIC) 1 mg/dose (2 mg/1.5 mL) PnIj Inject 1 mg into the skin every 7 days.    sod sulf-pot chloride-mag sulf (SUTAB) 1.479-0.188- 0.225 gram tablet Take 12 tablets by mouth once daily. Please follow Endoscopy 's instructions. Please apply coupon code. Please apply coupon code. BIN: [id number], PCN: [id number], GROUP: [id number], MEMBER ID: [id number]    vitamin D (VITAMIN D3) 1000 units Tab Take 1,000 Units by mouth once daily.    zolpidem (AMBIEN)  10 mg Tab Take 10 mg by mouth every evening.     No current facility-administered medications for this visit.         ROS:  GENERAL: No fever. No chills. No fatigue. Denies weight loss. Denies weight gain.  HEENT: Denies headaches. Denies vision change. Denies eye pain. Denies double vision. Denies ear pain.   CV: Denies chest pain.   PULM: Denies of shortness of breath.  GI: Denies constipation. No diarrhea. No abdominal pain. Denies nausea. Denies vomiting. No blood in stool.  HEME: Denies bleeding problems.  : Denies urgency. No painful urination. No blood in urine.  MS: Denies joint stiffness. Denies joint swelling.    SKIN: Denies rash.   NEURO: Denies seizures. No weakness.  PSYCH:  Denies difficulty sleeping. No anxiety. Denies depression. No suicidal thoughts.       VITALS:   There were no vitals filed for this visit.          Prior in office PHYSICAL EXAM:   GENERAL: Well appearing, in no acute distress, alert and oriented x3.  PSYCH:  Mood and affect appropriate.  SKIN: Skin color, texture, turgor normal, no rashes or lesions.  HEENT:  Normocephalic, atraumatic. Cranial nerves grossly intact.  NECK:   positive for pain to palpation over the cervical paraspinous muscles.   Mild pain with neck flexion and extension with normal ROM.  Spurling Test was Negative Bilaterally  Tenderness to trapezius bilaterally  PULM: No evidence of respiratory difficulty, symmetric chest rise.  EXTREMITIES:   No deformities, edema, or skin discoloration.   NEURO: Sensation is equal and appropriate bilaterally. Bilateral upper extremity strength is normal and symmetric. Ríos is negative bilaterally.   GAIT: Normal        ASSESSMENT: 54 y.o. year old with axial neck pain, consistent with:    1. Cervical spondylosis without myelopathy  celecoxib (CELEBREX) 100 MG capsule    Procedure Request Order for Pain Management      2. Myofascial pain  methocarbamoL (ROBAXIN) 500 MG Tab            PLAN:  Patient Education:  Discussed  the importance of physical therapy, activity modification, and a home exercise plan to help with pain and improve health.  Therapy referral:  continue HEP  Medications:   Patient can continue with pain medications for now per their provider since they are providing benefits, using them appropriately, and without side effects.  Prescribed Celebrex and Robaxin  Schedule pain intervention for: repeat cervical RFA BL C4,5,6 for end of June  Counseled patient: regarding the importance of activity modification and physical therapy  Return to clinic: after pain procedure      Alva Bradford MD  I have personally reviewed the history and personally discussed the plan with patient through video visit and agree with the resident/fellow/NPs note as stated above.    Cosme Kirkland MD    05/08/2025         [1]   Social History  Socioeconomic History    Marital status:    Occupational History     Employer: [employer]   Tobacco Use    Smoking status: Never    Smokeless tobacco: Never   Substance and Sexual Activity    Alcohol use: Yes     Alcohol/week: 1.0 standard drink of alcohol     Types: 1 Standard drinks or equivalent per week     Comment: 3 drinks weekly     Drug use: No    Sexual activity: Yes     Partners: Male     Birth control/protection: I.U.D.   Social History Narrative     Children - Dolores and Graciela Catherine - [place]        Used to walk, ride bikes. Occasional uses elliptical at home.      Social Drivers of Health     Financial Resource Strain: Low Risk  (5/8/2024)    Overall Financial Resource Strain (CARDIA)     Difficulty of Paying Living Expenses: Not hard at all   Food Insecurity: No Food Insecurity (5/8/2024)    Hunger Vital Sign     Worried About Running Out of Food in the Last Year: Never true     Ran Out of Food in the Last Year: Never true   Transportation Needs: No Transportation Needs (5/8/2024)    PRAPARE - Transportation     Lack of Transportation (Medical): No     Lack  of Transportation (Non-Medical): No   Physical Activity: Insufficiently Active (5/8/2024)    Exercise Vital Sign     Days of Exercise per Week: 1 day     Minutes of Exercise per Session: 20 min   Stress: Stress Concern Present (5/8/2024)    [employer] [place] [employer] - Occupational Stress Questionnaire     Feeling of Stress : To some extent   Housing Stability: Low Risk  (9/13/2023)    Housing Stability Vital Sign     Unable to Pay for Housing in the Last Year: No     Number of Places Lived in the Last Year: 1     Unstable Housing in the Last Year: No

## 2025-05-08 NOTE — TELEPHONE ENCOUNTER
"Has been having sinus issues. Congestion in her head sinus stuff, as a result her gums has been hurting on the left side of her mouth. Woke up this morning and right jaw is swollen.  Reason for Disposition   MILD face swelling of unknown cause    Additional Information   Negative: [1] Life-threatening reaction (anaphylaxis) in the past to similar substance (e.g., food, insect bite/sting, chemical, etc.) AND [2] < 2 hours since exposure   Negative: Unresponsive, passed out or very weak   Negative: Swollen tongue   Negative: Difficulty breathing or wheezing   Negative: Sounds like a life-threatening emergency to the triager   Negative: Followed a face injury   Negative: [1] Bee sting AND [2] within last 24 hours   Negative: Insect bite suspected   Negative: Swelling mainly of lip(s)   Negative: Swelling mainly of eyelid(s) and around the eyes   Negative: [1] SEVERE swelling (e.g., entire face) AND [2] < 2 hours since exposure to high-risk allergen (e.g., peanuts, tree nuts, fish, shellfish or 1st dose of drug) AND [3] no serious symptoms AND [4] no serious allergic reaction in the past   Negative: Fever   Negative: Taking an ACE Inhibitor medicine (e.g., benazepril / LOTENSIN, captopril / CAPOTEN, enalapril / VASOTEC, lisinopril / ZESTRIL)   Negative: Patient sounds very sick or weak to the triager   Negative: Pregnant 20 or more weeks   Negative: Postpartum (from 0 to 6 weeks after delivery)   Negative: SEVERE swelling (e.g., entire face)   Negative: [1] Swelling is red AND [2] very painful to touch   Negative: Face swelling began after taking a drug   Negative: [1] Looks infected (e.g., spreading redness, pus) AND [2] large red area (> 2 in. or 5 cm)   Negative: [1] Painful rash AND [2] multiple small blisters grouped together (i.e., dermatomal distribution or "band" or "stripe")   Negative: Toothache   Negative: Swelling of both lower legs (i.e., bilateral pedal edema)   Negative: " Widespread rash on body   Negative: Face swelling is painful to touch   Negative: Looks like a boil or infected sore   Negative: [1] MILD face swelling (e.g., puffiness) AND [2] persists > 3 days   Negative: [1] MILD face swelling from food reaction AND [2] diagnosis never confirmed by a doctor (or NP/PA)   Negative: [1] Face swelling is a chronic problem (recurrent or ongoing AND present > 4 weeks) AND [2] cause unknown   Negative: [1] MILD face swelling from food reaction AND [2] diagnosis already confirmed    Protocols used: Face Swelling-A-AH

## 2025-05-09 ENCOUNTER — OFFICE VISIT (OUTPATIENT)
Dept: INTERNAL MEDICINE | Facility: CLINIC | Age: 55
End: 2025-05-09
Payer: COMMERCIAL

## 2025-05-09 VITALS
SYSTOLIC BLOOD PRESSURE: 122 MMHG | BODY MASS INDEX: 28.26 KG/M2 | RESPIRATION RATE: 18 BRPM | OXYGEN SATURATION: 97 % | TEMPERATURE: 97 F | DIASTOLIC BLOOD PRESSURE: 68 MMHG | WEIGHT: 149.69 LBS | HEIGHT: 61 IN | HEART RATE: 71 BPM

## 2025-05-09 DIAGNOSIS — R22.0 LEFT FACIAL SWELLING: Primary | ICD-10-CM

## 2025-05-09 DIAGNOSIS — I10 ESSENTIAL HYPERTENSION: ICD-10-CM

## 2025-05-09 DIAGNOSIS — J01.40 ACUTE PANSINUSITIS, RECURRENCE NOT SPECIFIED: ICD-10-CM

## 2025-05-09 DIAGNOSIS — M47.812 CERVICAL SPONDYLOSIS WITHOUT MYELOPATHY: Primary | ICD-10-CM

## 2025-05-09 PROCEDURE — 99999 PR PBB SHADOW E&M-EST. PATIENT-LVL V: CPT | Mod: PBBFAC,,, | Performed by: NURSE PRACTITIONER

## 2025-05-09 NOTE — PROGRESS NOTES
Subjective:       Patient ID: Sheila Costa is a 54 y.o. female.    Chief Complaint: Facial Swelling      Patient is a 54 y.o. female who traditionally follows with Marj Gonzalez MD presenting today for facial swelling.     Patient had called on call line when she woke with left sided facial swelling. Improved today as compared to yesterday. Sinus symptoms began on Monday.   Typically has allergy issues and takes daily Claritin - D.   Had urgent virtual visit yesterday and was started on Doxyxycline and Flonase.   She denies dental pain.     Review of patient's allergies indicates:   Allergen Reactions    Amoxicillin Hives       Medication List with Changes/Refills   Current Medications    ASCORBIC ACID, VITAMIN C, (VITAMIN C) 100 MG TABLET    Take 100 mg by mouth once daily.    ATENOLOL-CHLORTHALIDONE (TENORETIC) 100-25 MG PER TABLET    Take 1 tablet by mouth once daily.    CELECOXIB (CELEBREX) 100 MG CAPSULE    Take 1 capsule (100 mg total) by mouth 2 (two) times daily.    DULOXETINE (CYMBALTA) 60 MG CAPSULE    Take 1 capsule (60 mg total) by mouth once daily.    ESTRADIOL (VIVELLE-DOT) 0.025 MG/24 HR    Place 1 patch onto the skin twice a week.    FLUOXETINE 20 MG CAPSULE    Take 20 mg by mouth.    LEVONORGESTREL (MIRENA) 20 MCG/24 HR (5 YEARS) IUD    1 Intra Uterine Device by Intrauterine route once. for 1 dose    LORATADINE (CLARITIN) 10 MG TABLET    Take 10 mg by mouth every morning.     METFORMIN (GLUCOPHAGE) 500 MG TABLET    Take 1 tablet (500 mg total) by mouth Daily.    METHOCARBAMOL (ROBAXIN) 500 MG TAB    Take 1 tablet (500 mg total) by mouth 3 (three) times daily as needed.    MULTIVITAMIN CAPSULE    Take 1 capsule by mouth once daily.    MUPIROCIN (BACTROBAN) 2 % OINTMENT    Apply topically once daily.    ONDANSETRON (ZOFRAN-ODT) 4 MG TBDL    Take 4 mg by mouth every 6 (six) hours as needed.    POTASSIUM CHLORIDE (MICRO-K) 10 MEQ CPSR    Take 2 capsules (20 mEq total) by mouth once  "daily.    SEMAGLUTIDE (OZEMPIC) 1 MG/DOSE (2 MG/1.5 ML) PNIJ    Inject 1 mg into the skin every 7 days.    SOD SULF-POT CHLORIDE-MAG SULF (SUTAB) 1.479-0.188- 0.225 GRAM TABLET    Take 12 tablets by mouth once daily. Please follow Endoscopy 's instructions. Please apply coupon code. Please apply coupon code. BIN: [id number], PCN: 2001, GROUP: [id number], MEMBER ID: [id number]    VITAMIN D (VITAMIN D3) 1000 UNITS TAB    Take 1,000 Units by mouth once daily.    ZOLPIDEM (AMBIEN) 10 MG TAB    Take 10 mg by mouth every evening.     Review of Systems   HENT:  Positive for facial swelling and sinus pressure/congestion. Negative for dental problem and ear pain.       Objective:   /68 (BP Location: Left arm, Patient Position: Sitting)   Pulse 71   Temp 97.1 °F (36.2 °C) (Temporal)   Resp 18   Ht 5' 1" (1.549 m)   Wt 67.9 kg (149 lb 11.1 oz)   SpO2 97%   BMI 28.28 kg/m²       Physical Exam  Vitals reviewed.   Constitutional:       Appearance: Normal appearance.   HENT:      Head: Atraumatic.      Jaw: No tenderness or pain on movement.        Comments: Left side of face swollen as compared to right side     Right Ear: Tympanic membrane normal.      Left Ear: Tympanic membrane is bulging.      Nose:      Right Turbinates: Swollen and pale.      Left Turbinates: Swollen and pale.      Right Sinus: No maxillary sinus tenderness or frontal sinus tenderness.      Left Sinus: Maxillary sinus tenderness present. No frontal sinus tenderness.      Mouth/Throat:      Dentition: No gingival swelling or dental abscesses.      Pharynx: Posterior oropharyngeal erythema present.   Pulmonary:      Effort: Pulmonary effort is normal.   Lymphadenopathy:      Head:      Right side of head: No submental, submandibular or tonsillar adenopathy.      Left side of head: No submental, submandibular or tonsillar adenopathy.   Skin:     General: Skin is warm and dry.   Neurological:      Mental Status: She is alert and oriented to " person, place, and time.       Assessment and Plan:     1. Left facial swelling    Likely secondary to sinusitis. Advised to continue Flonase, antihistamine - decongestant combo and antibiotic. Should symptoms worsen or fail to improve patient advised to follow up.     - methylPREDNISolone sodium succinate injection 40 mg    2. Acute pansinusitis, recurrence not specified    - methylPREDNISolone sodium succinate injection 40 mg    3. Essential hypertension    Chronic, stable, continue current medication. Advised that typically we suggest avoiding decongestants when you have HTN as it can raise blood pressure.

## 2025-05-19 RX ORDER — POTASSIUM CHLORIDE 750 MG/1
CAPSULE, EXTENDED RELEASE ORAL
Qty: 180 CAPSULE | Refills: 0 | Status: SHIPPED | OUTPATIENT
Start: 2025-05-19

## 2025-05-19 NOTE — TELEPHONE ENCOUNTER
Provider Staff:  Action required for this patient    Requires labs      Please see care gap opportunities below in Care Due Message.    Thanks!  [facility]     Appointments      Date Provider   Last Visit   5/8/2024 Marj Gonzalez MD   Next Visit   6/23/2025 Marj Gonzalez MD     Refill Decision Note   Sheila Igor  is requesting a refill authorization.  Brief Assessment and Rationale for Refill:  Approve     Medication Therapy Plan:        Comments:     Note composed:11:26 AM 05/19/2025

## 2025-05-19 NOTE — TELEPHONE ENCOUNTER
Care Due:                  Date            Visit Type   Department     Provider  --------------------------------------------------------------------------------                                ESTABLISHED                              PATIENT -    [facility] INTER[facility]  Last Visit: 05-      [facility]                              EP -                              PRIMARY      [facility]  Next Visit: 06-      CARE ([facility])   MEDICINE       [facility]                                                            Last  Test          Frequency    Reason                     Performed    Due Date  --------------------------------------------------------------------------------    HBA1C.......  6 months...  metFORMIN................  08-   02-    [employer] Embedded Care Due Messages. Reference number: [id number].   5/19/2025 3:55:27 AM CDT

## 2025-05-29 NOTE — TELEPHONE ENCOUNTER
Refill Routing Note   Medication(s) are not appropriate for processing by [facility] for the following reason(s):        Required labs abnormal  03/13/2025 3.4 (L) 3.5 - 5.1 mmol/L Final       [facility] action(s):  Defer             Appointments  past 12m or future 3m with PCP    Date Provider   Last Visit   5/8/2024 Marj Gonzalez MD   Next Visit   6/23/2025 Marj Gonzalez MD   ED visits in past 90 days: 0        Note composed:11:19 AM 05/29/2025

## 2025-05-29 NOTE — TELEPHONE ENCOUNTER
No care due was identified.  [facility] Embedded Care Due Messages. Reference number: [id number].   5/29/2025 8:09:28 AM CDT

## 2025-05-30 RX ORDER — ATENOLOL AND CHLORTHALIDONE TABLET 100; 25 MG/1; MG/1
1 TABLET ORAL
Qty: 90 TABLET | Refills: 0 | Status: SHIPPED | OUTPATIENT
Start: 2025-05-30

## 2025-06-10 NOTE — TELEPHONE ENCOUNTER
No care due was identified.  [facility] Embedded Care Due Messages. Reference number: [id number].   6/10/2025 6:55:40 PM CDT

## 2025-06-11 RX ORDER — METFORMIN HYDROCHLORIDE 500 MG/1
TABLET ORAL
Qty: 90 TABLET | Refills: 0 | Status: SHIPPED | OUTPATIENT
Start: 2025-06-11

## 2025-06-11 NOTE — TELEPHONE ENCOUNTER
Refill Routing Note   Medication(s) are not appropriate for processing by [facility] for the following reason(s):        Required labs outdated    [facility] action(s):  Defer             Appointments  past 12m or future 3m with PCP    Date Provider   Last Visit   5/8/2024 Marj Gonzalez MD   Next Visit   6/23/2025 Marj Gonzalez MD   ED visits in past 90 days: 0        Note composed:7:22 PM 06/10/2025

## 2025-06-16 ENCOUNTER — PATIENT MESSAGE (OUTPATIENT)
Dept: PAIN MEDICINE | Facility: OTHER | Age: 55
End: 2025-06-16
Payer: COMMERCIAL

## 2025-06-19 RX ORDER — METFORMIN HYDROCHLORIDE 500 MG/1
TABLET ORAL
Qty: 90 TABLET | Refills: 0 | OUTPATIENT
Start: 2025-06-19

## 2025-06-19 NOTE — TELEPHONE ENCOUNTER
Refill Decision Note   Sheila Costa  is requesting a refill authorization.  Brief Assessment and Rationale for Refill:  Quick Discontinue     Medication Therapy Plan:        Comments:     Note composed:1:48 PM 06/19/2025

## 2025-06-19 NOTE — TELEPHONE ENCOUNTER
No care due was identified.  [facility] Embedded Care Due Messages. Reference number: [id number].   6/19/2025 8:09:28 AM CDT

## 2025-06-23 ENCOUNTER — PATIENT MESSAGE (OUTPATIENT)
Dept: OBSTETRICS AND GYNECOLOGY | Facility: CLINIC | Age: 55
End: 2025-06-23
Payer: COMMERCIAL

## 2025-06-23 ENCOUNTER — HOSPITAL ENCOUNTER (OUTPATIENT)
Facility: OTHER | Age: 55
Discharge: HOME | End: 2025-06-23
Attending: ANESTHESIOLOGY | Admitting: ANESTHESIOLOGY
Payer: COMMERCIAL

## 2025-06-23 VITALS
BODY MASS INDEX: 25.49 KG/M2 | TEMPERATURE: 98 F | SYSTOLIC BLOOD PRESSURE: 148 MMHG | HEART RATE: 66 BPM | WEIGHT: 135 LBS | HEIGHT: 61 IN | DIASTOLIC BLOOD PRESSURE: 71 MMHG | RESPIRATION RATE: 18 BRPM | OXYGEN SATURATION: 94 %

## 2025-06-23 DIAGNOSIS — M47.812 CERVICAL SPONDYLOSIS: Primary | ICD-10-CM

## 2025-06-23 DIAGNOSIS — G89.29 CHRONIC PAIN: ICD-10-CM

## 2025-06-23 PROCEDURE — 64633 DESTROY CERV/THOR FACET JNT: CPT | Mod: 50,,, | Performed by: ANESTHESIOLOGY

## 2025-06-23 PROCEDURE — 64633 DESTROY CERV/THOR FACET JNT: CPT | Mod: 50 | Performed by: ANESTHESIOLOGY

## 2025-06-23 PROCEDURE — 64634 DESTROY C/TH FACET JNT ADDL: CPT | Mod: 50 | Performed by: ANESTHESIOLOGY

## 2025-06-23 PROCEDURE — 64634 DESTROY C/TH FACET JNT ADDL: CPT | Mod: 50,,, | Performed by: ANESTHESIOLOGY

## 2025-06-23 PROCEDURE — 63600175 PHARM REV CODE 636 W HCPCS: Performed by: ANESTHESIOLOGY

## 2025-06-23 PROCEDURE — 99152 MOD SED SAME PHYS/QHP 5/>YRS: CPT | Mod: ,,, | Performed by: ANESTHESIOLOGY

## 2025-06-23 PROCEDURE — 99152 MOD SED SAME PHYS/QHP 5/>YRS: CPT | Performed by: ANESTHESIOLOGY

## 2025-06-23 RX ORDER — DEXAMETHASONE SODIUM PHOSPHATE 10 MG/ML
INJECTION, SOLUTION INTRA-ARTICULAR; INTRALESIONAL; INTRAMUSCULAR; INTRAVENOUS; SOFT TISSUE
Status: DISCONTINUED | OUTPATIENT
Start: 2025-06-23 | End: 2025-06-23 | Stop reason: HOSPADM

## 2025-06-23 RX ORDER — LIDOCAINE HYDROCHLORIDE 20 MG/ML
INJECTION, SOLUTION INFILTRATION; PERINEURAL
Status: DISCONTINUED | OUTPATIENT
Start: 2025-06-23 | End: 2025-06-23 | Stop reason: HOSPADM

## 2025-06-23 RX ORDER — MIDAZOLAM HYDROCHLORIDE 1 MG/ML
INJECTION INTRAMUSCULAR; INTRAVENOUS
Status: DISCONTINUED | OUTPATIENT
Start: 2025-06-23 | End: 2025-06-23 | Stop reason: HOSPADM

## 2025-06-23 RX ORDER — FENTANYL CITRATE 50 UG/ML
INJECTION, SOLUTION INTRAMUSCULAR; INTRAVENOUS
Status: DISCONTINUED | OUTPATIENT
Start: 2025-06-23 | End: 2025-06-23 | Stop reason: HOSPADM

## 2025-06-23 RX ORDER — SODIUM CHLORIDE 9 MG/ML
INJECTION, SOLUTION INTRAVENOUS CONTINUOUS
Status: DISCONTINUED | OUTPATIENT
Start: 2025-06-23 | End: 2025-06-23 | Stop reason: HOSPADM

## 2025-06-23 RX ORDER — BUPIVACAINE HYDROCHLORIDE 2.5 MG/ML
INJECTION, SOLUTION EPIDURAL; INFILTRATION; INTRACAUDAL; PERINEURAL
Status: DISCONTINUED | OUTPATIENT
Start: 2025-06-23 | End: 2025-06-23 | Stop reason: HOSPADM

## 2025-06-23 NOTE — OP NOTE
Therapeutic Cervical Medial Branch Radiofrequency Ablation Under Fluoroscopy     The procedure, risks, benefits, and options were discussed with the patient. There are no contraindications to the procedure. The patent expressed understanding and agreed to the procedure. Informed written consent was obtained prior to the start of the procedure and can be found in the patient's chart.        PATIENT NAME: Sheila Costa   MRN: [id number]     DATE OF PROCEDURE: 06/23/2025       PROCEDURE: Therapeutic Bilateral C4, C5, and C6 Cervical Radiofrequency Ablation under Fluoroscopy    PRE-OP DIAGNOSIS: Cervical spondylosis without myelopathy [M47.812] Cervical Spondylosis [M47.812]    POST-OP DIAGNOSIS: Same    PHYSICIAN: Cosme Kirkland MD    ASSISTANTS: MURIEL Mullen PMR Pain Fellow     MEDICATIONS INJECTED:  Preservative-free Decadron 10mg with 9cc of Bupivicaine 0.25%    LOCAL ANESTHETIC INJECTED:   Xylocaine 2%    SEDATION: Versed 2mg and Fentanyl 100mcg                                                                                                                                                                                     Conscious sedation ordered by M.D. Patient re-evaluation prior to administration of conscious sedation. No changes noted in patient's status from initial evaluation. The patient's vital signs were monitored by RN and patient remained hemodynamically stable throughout the procedure.    Event Time In   Sedation Start 1431   Sedation End 1452       ESTIMATED BLOOD LOSS:  None    COMPLICATIONS:  None.     INTERVAL HISTORY: Patient has clinical and imaging findings suggestive of recurrent facet mediated pain. Patient has undergone a previous RFA at specified levels with at least 50% relief for at least 6 months. Successful diagnostic medial branch blocks have been completed within the past 2 years.    TECHNIQUE: Time-out was performed to identify the patient and procedure to be  performed. With the patient laying in a prone position, the surgical area was prepped and draped in the usual sterile fashion using ChloraPrep and fenestrated drape. The levels were determined under fluoroscopic guidance. Skin anesthesia was achieved by injecting Lidocaine 2% over the injection sites. A 20 gauge 10mm curved active tip needle was introduced to the anatomic local of the medial branch at each of the above levels using AP, lateral and/or contralateral oblique fluoroscopic imaging. Then the sensory and motor testing was performed to confirm that the needle tips were in the correct location. After negative aspiration for blood or CSF was confirmed, 1 mL of the lidocaine 2% listed above was injected slowly at each site. This was followed by thermal lesioning at 80 degrees celsius for 90 seconds. That was followed by slowly injecting 1.5 mL of the medication mixture listed above at each site. The needles were removed and bleeding was nil. A sterile dressing was applied. No specimens collected. The patient tolerated the procedure well and did not have any procedure related motor deficit at the conclusion of the procedure.    The patient was monitored after the procedure in the recovery area. They were given post-procedure and discharge instructions to follow at home. The patient was discharged in a stable condition.     Morgan Villalobos MD     I reviewed and edited the fellow's note. I conducted my own interview and physical examination. I agree with the findings. I was present and supervising all critical portions of the procedure.  Cosme Kirkland MD

## 2025-06-23 NOTE — DISCHARGE SUMMARY
Discharge Note  Short Stay      SUMMARY     Admit Date: 6/23/2025    Attending Physician: Morgan Villalobos      Discharge Physician: Morgan Villalobos      Discharge Date: 6/23/2025 2:26 PM    Procedure(s) (LRB):  RADIOFREQUENCY ABLATION BILATERAL C4, 5, 6 (Bilateral)    Final Diagnosis: Cervical spondylosis without myelopathy [M47.812]    Disposition: Home or self care    Patient Instructions:   Current Discharge Medication List        CONTINUE these medications which have NOT CHANGED    Details   ascorbic acid, vitamin C, (VITAMIN C) 100 MG tablet Take 100 mg by mouth once daily.      atenoloL-chlorthalidone (TENORETIC) 100-25 mg per tablet TAKE 1 TABLET BY MOUTH DAILY  Qty: 90 tablet, Refills: 0    Comments: ZERO refills remain on this prescription. Your patient is requesting advance approval of refills for this medication to PREVENT ANY MISSED DOSES      celecoxib (CELEBREX) 100 MG capsule Take 1 capsule (100 mg total) by mouth 2 (two) times daily.  Qty: 60 capsule, Refills: 2    Associated Diagnoses: Cervical spondylosis without myelopathy      DULoxetine (CYMBALTA) 60 MG capsule Take 1 capsule (60 mg total) by mouth once daily.  Qty: 30 capsule, Refills: 11    Associated Diagnoses: Vertebrogenic low back pain; Sacroiliitis; DDD (degenerative disc disease), lumbar      estradioL (VIVELLE-DOT) 0.025 mg/24 hr Place 1 patch onto the skin twice a week.  Qty: 8 patch, Refills: 11      FLUoxetine 20 MG capsule Take 20 mg by mouth.      levonorgestrel (MIRENA) 20 mcg/24 hr (5 years) IUD 1 Intra Uterine Device by Intrauterine route once. for 1 dose  Qty: 1 Intra Uterine Device, Refills: 0    Associated Diagnoses: Contraception, device intrauterine      loratadine (CLARITIN) 10 mg tablet Take 10 mg by mouth every morning.       metFORMIN (GLUCOPHAGE) 500 MG tablet TAKE 1 TABLET(500 MG) BY MOUTH DAILY  Qty: 90 tablet, Refills: 0      methocarbamoL (ROBAXIN) 500 MG Tab Take 1 tablet (500 mg total) by mouth 3 (three)  times daily as needed.  Qty: 30 tablet, Refills: 1    Associated Diagnoses: Myofascial pain      multivitamin capsule Take 1 capsule by mouth once daily.      mupirocin (BACTROBAN) 2 % ointment Apply topically once daily.  Qty: 22 g, Refills: 0    Associated Diagnoses: Post-operative pain      ondansetron (ZOFRAN-ODT) 4 MG TbDL Take 4 mg by mouth every 6 (six) hours as needed.      potassium chloride (MICRO-K) 10 MEQ CpSR TAKE 2 CAPSULES(20 MEQ) BY MOUTH DAILY  Qty: 180 capsule, Refills: 0      semaglutide (OZEMPIC) 1 mg/dose (2 mg/1.5 mL) PnIj Inject 1 mg into the skin every 7 days.      sod sulf-pot chloride-mag sulf (SUTAB) 1.479-0.188- 0.225 gram tablet Take 12 tablets by mouth once daily. Please follow Endoscopy 's instructions. Please apply coupon code. Please apply coupon code. BIN: [id number], PCN: 2001, GROUP: [id number], MEMBER ID: [id number]  Qty: 24 tablet, Refills: 0    Associated Diagnoses: Special screening for malignant neoplasms, colon      vitamin D (VITAMIN D3) 1000 units Tab Take 1,000 Units by mouth once daily.      zolpidem (AMBIEN) 10 mg Tab Take 10 mg by mouth every evening.                 Discharge Diagnosis: Cervical spondylosis without myelopathy [M47.812]  Condition on Discharge: Stable with no complications to procedure   Diet on Discharge: Same as before.  Activity: as per instruction sheet.  Discharge to: Home with a responsible adult.  Follow up: 2-4 weeks       Please call my office or pager at [phone] if experienced any weakness or loss of sensation, fever > 101.5, pain uncontrolled with oral medications, persistent nausea/vomiting/or diarrhea, redness or drainage from the incisions, or any other worrisome concerns. If physician on call was not reached or could not communicate with our office for any reason please go to the nearest emergency department

## 2025-06-23 NOTE — H&P
HPI  Patient presenting for Procedure(s) (LRB):  RADIOFREQUENCY ABLATION BILATERAL C4, 5, 6 (Bilateral)     Patient on Anti-coagulation No    No health changes since previous encounter    Past Medical History:   Diagnosis Date    Alpha thalassemia     Back pain     Cervical spondylosis 12/30/2019    Colon polyp     Fatty liver     Hypertension     Prediabetes     Premenstrual symptom      Past Surgical History:   Procedure Laterality Date    COLONOSCOPY N/A 6/18/2020    Procedure: COLONOSCOPY;  Surgeon: Eliot Hill MD;  Location: [facility] ENDO (4TH FLR);  Service: Endoscopy;  Laterality: N/A;  COVID screening on 6/16/20-[place]- ERW    COLONOSCOPY N/A 7/6/2020    Procedure: COLONOSCOPY;  Surgeon: Kang Guzmán MD;  Location: [facility] OR 2ND FLR;  Service: Colon and Rectal;  Laterality: N/A;    COLONOSCOPY N/A 7/9/2021    Procedure: COLONOSCOPY;  Surgeon: GREGG Guzmán MD;  Location: [facility] ENDO (4TH FLR);  Service: Endoscopy;  Laterality: N/A;  in July 2021  covid test [place] 7/6    COLONOSCOPY N/A 9/13/2024    Procedure: COLONOSCOPY;  Surgeon: GREGG Guzmán MD;  Location: [facility] ENDO (4TH FLR);  Service: Endoscopy;  Laterality: N/A;  ref by / suflave inst portal-RB  polyps noted on procedure report 7/9/2021; Dr. Guzmán patient  6/25 r/s, unopened Suflave, updated instr. to portal, pt states she may be restarting semaglutide on Tuesdays-instr. to be hold for 8 days-st  9/9-lvm for pre warren    DESTRUCTION, INTRAOSSEOUS BASIVERTEBRAL NERVE, EACH ADD'L BODY, LUM/SAC Bilateral 2/26/2024    Procedure: DESTRUCTION,INTRAOSSEOUS BASIVERTEBRAL NERVE,EACH ADD'L BODY,LUM/SAC;  Surgeon: Cosme Kirkland MD;  Location: [facility] OR;  Service: Pain Management;  Laterality: Bilateral;  L5 & S1    EPIDURAL STEROID INJECTION N/A 10/2/2019    Procedure: INJECTION, STEROID, EPIDURAL, C7-T1;  Surgeon: Cosme Kirkland MD;  Location: [facility] PAIN MGT;  Service: Pain Management;  Laterality: N/A;    EPIDURAL STEROID  INJECTION N/A 12/22/2021    Procedure: INJECTION, STEROID, EPIDURAL, C7-T1 NEED CONSENT;  Surgeon: Cosme Kirkland MD;  Location: [facility] [place] PAIN MGT;  Service: Pain Management;  Laterality: N/A;    EPIDURAL STEROID INJECTION N/A 7/6/2022    Procedure: INJECTION, STEROID, EPIDURAL, C7-T1 IL  CONTRAST;  Surgeon: Cosme Kirkland MD;  Location: [facility] [place] PAIN MGT;  Service: Pain Management;  Laterality: N/A;    EYE SURGERY      INJECTION OF ANESTHETIC AGENT AROUND NERVE Bilateral 1/8/2020    Procedure: BLOCK, NERVE C4,5,6;  Surgeon: Cosme Kirkland MD;  Location: [facility] [place] PAIN MGT;  Service: Pain Management;  Laterality: Bilateral;  B/L MBB C4,5,6    INJECTION OF ANESTHETIC AGENT AROUND NERVE Bilateral 1/29/2020    Procedure: BLOCK, NERVE, Repeat C4-C5-C6 MEDIAL BRANCH;  Surgeon: Cosme Kirkland MD;  Location: [facility] [place] PAIN MGT;  Service: Pain Management;  Laterality: Bilateral;    INJECTION OF ANESTHETIC AGENT AROUND NERVE Bilateral 12/14/2022    Procedure: BLOCK, NERVE BILATERAL L3,L4,L5 MEDIAL BRANCH NEEDS CONSENT;  Surgeon: Cosme Kirkland MD;  Location: [facility] [place] PAIN MGT;  Service: Pain Management;  Laterality: Bilateral;    INJECTION OF ANESTHETIC AGENT AROUND NERVE Bilateral 1/25/2023    Procedure: BLOCK, NERVE BILATERAL L3,L4,L5 MEDIAL BRANCH;  Surgeon: Cosme Kirkland MD;  Location: [facility] [place] PAIN MGT;  Service: Pain Management;  Laterality: Bilateral;    INJECTION OF ANESTHETIC AGENT AROUND NERVE Bilateral 11/1/2023    Procedure: BLOCK, NERVE BILATERAL L5, S1, AND S2 LATERAL BRANCH;  Surgeon: Cosme Kirkland MD;  Location: [facility] [place] PAIN MGT;  Service: Pain Management;  Laterality: Bilateral;    INJECTION OF ANESTHETIC AGENT AROUND NERVE Bilateral 11/22/2023    Procedure: BLOCK, NERVE BILATERAL L5, S1, AND S2 MEDIAL BRANCH;  Surgeon: Cosme Kirkland MD;  Location: [facility] [place] PAIN MGT;  Service: Pain Management;  Laterality: Bilateral;    INJECTION OF JOINT Left 5/18/2022    Procedure: INJECTION, JOINT, LEFT SI and GTB  *LORI PT*;  Surgeon: Jed Phillips MD;  Location: [facility] PAIN MGT;  Service: Pain Management;  Laterality: Left;    INJECTION, SACROILIAC JOINT Bilateral 5/24/2023    Procedure: INJECTION,SACROILIAC JOINT BIALTERAL;  Surgeon: Cosme Sandoval MD;  Location: [facility] PAIN MGT;  Service: Pain Management;  Laterality: Bilateral;    LAPAROSCOPIC SURGICAL REMOVAL OF CECUM N/A 7/6/2020    Procedure: EXCISION, CECUM, LAPAROSCOPIC, colonoscopy to start, ERAS low;  Surgeon: Kang Guzmán MD;  Location: [facility] OR 2ND FLR;  Service: Colon and Rectal;  Laterality: N/A;    RADIOFREQUENCY ABLATION Left 3/4/2020    Procedure: RADIOFREQUENCY ABLATION, C4-C5-C6 ME DIAL BRANCH 1 OF 2 need consent;  Surgeon: Cosme Sandoval MD;  Location: [facility] PAIN MGT;  Service: Pain Management;  Laterality: Left;    RADIOFREQUENCY ABLATION Right 6/3/2020    Procedure: RADIOFREQUENCY ABLATION, C4-C5-C6 MEDIAL BRANCH 2 OF 2 need consent;  Surgeon: Cosme Sandoval MD;  Location: [facility] PAIN MGT;  Service: Pain Management;  Laterality: Right;    RADIOFREQUENCY ABLATION Right 3/23/2022    Procedure: Radiofrequency Ablation RIGHT C4,5,6;  Surgeon: Cosme Sandoval MD;  Location: [facility] PAIN MGT;  Service: Pain Management;  Laterality: Right;    RADIOFREQUENCY ABLATION Left 4/6/2022    Procedure: Radiofrequency Ablation LEFT C4,5,6;  Surgeon: Cosme Sandoval MD;  Location: [facility] PAIN MGT;  Service: Pain Management;  Laterality: Left;    RADIOFREQUENCY ABLATION Bilateral 3/1/2023    Procedure: RADIOFREQUENCY ABLATION BILATERAL L3,L4,L5 BOTH SIDES SAME TIME PER DR SANDOVAL;  Surgeon: Cosme Sandoval MD;  Location: [facility] PAIN MGT;  Service: Pain Management;  Laterality: Bilateral;    RADIOFREQUENCY ABLATION Bilateral 5/3/2023    Procedure: RADIOFREQUENCY ABLATION BILATERAL C4,C5,C6 BOTH SIDES PER DR SANDOVAL;  Surgeon: Cosme Sandoval MD;  Location: [facility] PAIN MGT;  Service: Pain Management;  Laterality: Bilateral;    RADIOFREQUENCY ABLATION  "Bilateral 5/29/2024    Procedure: RADIOFREQUENCY ABLATION BILATERAL C4, 5, 6;  Surgeon: Cosme Kirkland MD;  Location: [facility] PAIN MGT;  Service: Pain Management;  Laterality: Bilateral;  [phone]  4 WK F/U LORI    RADIOFREQUENCY ABLATION Bilateral 12/18/2024    Procedure: RADIOFREQUENCY ABLATION BILATERAL C4, 5, 6 *ECONSENTED*;  Surgeon: Cosme Kirkland MD;  Location: [facility] PAIN MGT;  Service: Pain Management;  Laterality: Bilateral;  4 WK F/U NERISSA    REFRACTIVE SURGERY  2008    SMALL INTESTINE SURGERY  7/6/20     Review of patient's allergies indicates:   Allergen Reactions    Amoxicillin Hives      Current Facility-Administered Medications   Medication    0.9% NaCl infusion    BUPivacaine (PF) 0.25% (2.5 mg/ml) injection    dexAMETHasone sodium phos (Preservative Free) injection    LIDOcaine HCL 20 mg/ml (2%) injection       PMHx, PSHx, Allergies, Medications reviewed in epic    ROS negative except pain complaints in HPI    OBJECTIVE:    BP (!) 141/67   Pulse 63   Temp 97.8 °F (36.6 °C) (Oral)   Resp 16   Ht 5' 1" (1.549 m)   Wt 61.2 kg (135 lb)   SpO2 100%   Breastfeeding No   BMI 25.51 kg/m²     PHYSICAL EXAMINATION:    GENERAL: Well appearing, in no acute distress, alert and oriented x3.  PSYCH:  Mood and affect appropriate.  SKIN: Skin color, texture, turgor normal, no rashes or lesions which will impact the procedure.  CV: RRR with palpation of the radial artery.  PULM: No evidence of respiratory difficulty, symmetric chest rise. Clear to auscultation.  NEURO: Cranial nerves grossly intact.    Plan:    Proceed with procedure as planned Procedure(s) (LRB):  RADIOFREQUENCY ABLATION BILATERAL C4, 5, 6 (Bilateral)    Morgan Hoang Villalobos  06/23/2025            "

## 2025-06-23 NOTE — DISCHARGE INSTRUCTIONS

## 2025-07-05 ENCOUNTER — PATIENT MESSAGE (OUTPATIENT)
Dept: PAIN MEDICINE | Facility: CLINIC | Age: 55
End: 2025-07-05
Payer: COMMERCIAL

## 2025-07-07 ENCOUNTER — OFFICE VISIT (OUTPATIENT)
Dept: OBSTETRICS AND GYNECOLOGY | Facility: CLINIC | Age: 55
End: 2025-07-07
Attending: OBSTETRICS & GYNECOLOGY
Payer: COMMERCIAL

## 2025-07-07 ENCOUNTER — LAB VISIT (OUTPATIENT)
Dept: LAB | Facility: OTHER | Age: 55
End: 2025-07-07
Payer: COMMERCIAL

## 2025-07-07 ENCOUNTER — TELEPHONE (OUTPATIENT)
Dept: PAIN MEDICINE | Facility: CLINIC | Age: 55
End: 2025-07-07
Payer: COMMERCIAL

## 2025-07-07 VITALS
HEIGHT: 61 IN | WEIGHT: 156.81 LBS | BODY MASS INDEX: 29.61 KG/M2 | HEART RATE: 71 BPM | SYSTOLIC BLOOD PRESSURE: 107 MMHG | DIASTOLIC BLOOD PRESSURE: 69 MMHG

## 2025-07-07 DIAGNOSIS — Z12.31 VISIT FOR SCREENING MAMMOGRAM: ICD-10-CM

## 2025-07-07 DIAGNOSIS — T83.32XA INTRAUTERINE CONTRACEPTIVE DEVICE THREADS LOST, INITIAL ENCOUNTER: ICD-10-CM

## 2025-07-07 DIAGNOSIS — Z30.09 ENCOUNTER FOR GENERAL COUNSELING AND ADVICE ON CONTRACEPTIVE MANAGEMENT: ICD-10-CM

## 2025-07-07 DIAGNOSIS — Z79.890 POST-MENOPAUSE ON HRT (HORMONE REPLACEMENT THERAPY): ICD-10-CM

## 2025-07-07 DIAGNOSIS — Z12.4 PAP SMEAR FOR CERVICAL CANCER SCREENING: ICD-10-CM

## 2025-07-07 DIAGNOSIS — Z11.3 ROUTINE SCREENING FOR STI (SEXUALLY TRANSMITTED INFECTION): ICD-10-CM

## 2025-07-07 DIAGNOSIS — Z01.419 ENCOUNTER FOR GYNECOLOGICAL EXAMINATION (GENERAL) (ROUTINE) WITHOUT ABNORMAL FINDINGS: Primary | ICD-10-CM

## 2025-07-07 LAB
ESTRADIOL SERPL HS-MCNC: 13 PG/ML
TESTOST SERPL-MCNC: 29 NG/DL (ref 5–73)

## 2025-07-07 PROCEDURE — 36415 COLL VENOUS BLD VENIPUNCTURE: CPT

## 2025-07-07 PROCEDURE — 82670 ASSAY OF TOTAL ESTRADIOL: CPT

## 2025-07-07 PROCEDURE — 99999 PR PBB SHADOW E&M-EST. PATIENT-LVL IV: CPT | Mod: PBBFAC,,,

## 2025-07-07 PROCEDURE — 87624 HPV HI-RISK TYP POOLED RSLT: CPT

## 2025-07-07 PROCEDURE — 99396 PREV VISIT EST AGE 40-64: CPT | Mod: S$GLB,,,

## 2025-07-07 PROCEDURE — 3008F BODY MASS INDEX DOCD: CPT | Mod: CPTII,S$GLB,,

## 2025-07-07 PROCEDURE — 1159F MED LIST DOCD IN RCRD: CPT | Mod: CPTII,S$GLB,,

## 2025-07-07 PROCEDURE — 88175 CYTOPATH C/V AUTO FLUID REDO: CPT | Mod: TC

## 2025-07-07 PROCEDURE — 84403 ASSAY OF TOTAL TESTOSTERONE: CPT

## 2025-07-07 PROCEDURE — 87591 N.GONORRHOEAE DNA AMP PROB: CPT

## 2025-07-07 PROCEDURE — 3078F DIAST BP <80 MM HG: CPT | Mod: CPTII,S$GLB,,

## 2025-07-07 PROCEDURE — 3074F SYST BP LT 130 MM HG: CPT | Mod: CPTII,S$GLB,,

## 2025-07-07 PROCEDURE — 84402 ASSAY OF FREE TESTOSTERONE: CPT

## 2025-07-07 PROCEDURE — 1160F RVW MEDS BY RX/DR IN RCRD: CPT | Mod: CPTII,S$GLB,,

## 2025-07-07 RX ORDER — ESTRADIOL 0.04 MG/D
1 FILM, EXTENDED RELEASE TRANSDERMAL
Qty: 8 PATCH | Refills: 11 | Status: SHIPPED | OUTPATIENT
Start: 2025-07-07 | End: 2026-07-07

## 2025-07-07 RX ORDER — ESTRADIOL 0.03 MG/D
1 FILM, EXTENDED RELEASE TRANSDERMAL
Qty: 8 PATCH | Refills: 11 | Status: SHIPPED | OUTPATIENT
Start: 2025-07-07 | End: 2025-07-07

## 2025-07-07 NOTE — PROGRESS NOTES
"CC: Well Woman Exam    Sheila Costa is a 54 y.o. female  presents for her annual/well woman exam/HRT follow up.  Her LMP is n/a, She has a Mirena IUD since 2018, due for replacement;  She is sexually active. She is on Estrogen and Testosterone therapy, doing well on regimen. Does feel like it helps her.   Denies breast or vaginal concerns today.    Denies further issues, problems, or complaints.    Pt feels safe at home and denies domestic violence.     Pap: 2023 ASCUS; and Neg HPV  Mammogram: 2025: BIRADS I- Negative  Colonoscopy: 2024  PCP: Marj Gonzalez MD   Routine Screening Labs: ordered    /69 (Patient Position: Sitting)   Pulse 71   Ht 5' 1" (1.549 m)   Wt 71.1 kg (156 lb 12.8 oz)   BMI 29.63 kg/m²     ROS:  Constitutional: no weight loss, weight gain, fever, fatigue  Eyes:  No vision changes, glasses/contacts  ENT/Mouth: No ulcers, sinus problems, ears ringing, headache  Cardiovascular: No inability to lie flat, chest pain, exercise intolerance, swelling, heart palpitations  Respiratory: No wheezing, coughing blood, shortness of breath, or cough  Gastrointestinal: No diarrhea, bloody stool, nausea/vomiting, constipation, gas, hemorrhoids  Genitourinary: No blood in urine, painful urination, urgency of urination, frequency of urination, incomplete emptying, incontinence, abnormal bleeding, painful periods, heavy periods, vaginal discharge, vaginal odor, painful intercourse, sexual problems, bleeding after intercourse.  Musculoskeletal: No muscle weakness  Breast: No painful breasts, nipple discharge, masses, rash, ulcers.  Neurological: No passing out, seizures, numbness, headache  Endocrine: No diabetes, hypothyroid, hyperthyroid, hot flashes, hair loss, abnormal hair growth, acne  Psychiatric: No depression, crying  Hematologic: No bruises, bleeding, swollen lymph nodes, anemia.    OBJECTIVE:  Physical Exam:   Constitutional: She is oriented to person, " place, and time. Vital signs are normal. She appears well-developed and well-nourished. No distress.      Neck: No tracheal deviation present. No thyromegaly present.    Cardiovascular:       Exam reveals no edema.        Pulmonary/Chest: Effort normal. She exhibits no mass, no tenderness, no deformity and no retraction. Right breast exhibits no inverted nipple, no mass, no nipple discharge, no skin change, no tenderness, presence, no bleeding and no swelling. Left breast exhibits no inverted nipple, no mass, no nipple discharge, no skin change, no tenderness, presence, no bleeding and no swelling. Breasts are symmetrical.            Abdominal: Soft. She exhibits no distension and no mass. There is no abdominal tenderness. There is no rebound and no guarding. No hernia. Hernia confirmed negative in the right inguinal area and confirmed negative in the left inguinal area.     Genitourinary:    Inguinal canal, urethra, vagina, uterus, right adnexa and left adnexa normal.   Rectum:      No external hemorrhoid.      Pelvic exam was performed with patient in the lithotomy position.   The external female genitalia was normal.   No external genitalia lesions identified,Genitalia hair distrobution normal .     Labial bartholins normal.There is no rash, tenderness or lesion on the right labia. There is no rash, tenderness or lesion on the left labia. Cervix is normal. No no adexnal prolapse. Right adnexum displays no mass, no tenderness and no fullness. Left adnexum displays no mass, no tenderness and no fullness. Vagina exhibits no lesion. No erythema, vaginal discharge, tenderness, bleeding, rectocele, cystocele or prolapse of vaginal walls in the vagina.    No foreign body in the vagina.      No signs of injury in the vagina.   Vagina was moist.Cervix exhibits no motion tenderness, no lesion, no discharge, no friability, no tenderness and no polyp.    pap smear completedUterus consistancy normal and Uerus contour normal   Uterus is not deviated, not enlarged, not tender and not hosting fibroids. Normal urethral meatus.   Genitourinary Comments: Multiple nabothian cysts   Unable to visualize IUD strings.          Musculoskeletal: Normal range of motion and moves all extremeties. No edema.       Neurological: She is alert and oriented to person, place, and time.    Skin: Skin is warm and dry. No rash noted. She is not diaphoretic. No erythema. No pallor.    Psychiatric: She has a normal mood and affect. Her behavior is normal. Mood, judgment and thought content normal.        ASSESSMENT:   Encounter for gynecological examination (general) (routine) without abnormal findings    Visit for screening mammogram  -     Mammo Digital Screening Bilat w/ Tone (XPD); Future; Expected date: 07/07/2025    Pap smear for cervical cancer screening  -     Liquid-Based Pap Smear, Screening    Post-menopause on HRT (hormone replacement therapy)  -     Estradiol; Future; Expected date: 07/07/2025  -     Testosterone,Total; Future; Expected date: 07/07/2025  -     Testosterone, Free; Future; Expected date: 07/07/2025    Intrauterine contraceptive device threads lost, initial encounter  -     US Pelvis Complete w/ Transvag for IUD (XPD); Future; Expected date: 07/07/2025    Encounter for general counseling and advice on contraceptive management  -     Device Authorization Order    Other orders  -     estradioL (VIVELLE-DOT) 0.025 mg/24 hr; Place 1 patch onto the skin twice a week.  Dispense: 8 patch; Refill: 11      PLAN:   - Pap + HPV today, CBE WNL  - Yearly MMG  - Unable to see/find IUD strings, pelvic US ordered. Will schedule for in-office removal/replacement.   - Hormone labs  - Refill HRT, denies contraindications to continued use.   - Reinforced healthy lifestyle choices to include sleep hygiene, regular exercise and nutrition.  - Follow up 1 year or PRN    Patient was counseled today on A.C.S. Pap guidelines and recommendations for yearly pelvic  exams, mammograms and monthly self breast exams; to see her PCP for other health maintenance.

## 2025-07-09 LAB
C TRACH DNA SPEC QL NAA+PROBE: NOT DETECTED
CTGC SOURCE (OHS) ORD-325: NORMAL
N GONORRHOEA DNA UR QL NAA+PROBE: NOT DETECTED

## 2025-07-10 LAB
INSULIN SERPL-ACNC: NORMAL U[IU]/ML
LAB AP BETHESDA CATEGORY: NORMAL
LAB AP CLINICAL FINDINGS: NORMAL
LAB AP CONTRACEPTIVES: NORMAL
LAB AP GYN ADDITIONAL FINDINGS: NORMAL
LAB AP OCHS PAP SPECIMEN ADEQUACY: NORMAL
LAB AP OHS PAP INTERPRETATION: NORMAL
LAB AP PAP DISCLAIMER COMMENTS: NORMAL
LAB AP PAP ESTROGEN REPLACEMENT THERAPY: NORMAL
LAB AP PAP PMP: NORMAL
LAB AP PAP PREVIOUS BX: NORMAL
LAB AP PAP PRIOR TREATMENT: NORMAL
LAB AP PERFORMING LOCATION(S): NORMAL
W FREE TESTOSTERONE: <0.4 PG/ML

## 2025-07-11 LAB
HPV DNA, HIGH RISK TYPE 16, PCR (OHS): NEGATIVE
HPV DNA, HIGH RISK TYPE 18, PCR (OHS): NEGATIVE
HPV DNA, HIGH RISK TYPE OTHER, PCR (OHS): NEGATIVE

## 2025-07-14 ENCOUNTER — HOSPITAL ENCOUNTER (OUTPATIENT)
Dept: RADIOLOGY | Facility: OTHER | Age: 55
Discharge: HOME | End: 2025-07-14
Payer: COMMERCIAL

## 2025-07-14 DIAGNOSIS — T83.32XA INTRAUTERINE CONTRACEPTIVE DEVICE THREADS LOST, INITIAL ENCOUNTER: ICD-10-CM

## 2025-07-14 PROCEDURE — 76830 TRANSVAGINAL US NON-OB: CPT | Mod: 26,,, | Performed by: RADIOLOGY

## 2025-07-14 PROCEDURE — 76856 US EXAM PELVIC COMPLETE: CPT | Mod: TC

## 2025-07-14 PROCEDURE — 76856 US EXAM PELVIC COMPLETE: CPT | Mod: 26,,, | Performed by: RADIOLOGY

## 2025-07-16 ENCOUNTER — TELEPHONE (OUTPATIENT)
Facility: CLINIC | Age: 55
End: 2025-07-16
Payer: COMMERCIAL

## 2025-07-16 RX ORDER — ATENOLOL AND CHLORTHALIDONE TABLET 100; 25 MG/1; MG/1
1 TABLET ORAL
Qty: 90 TABLET | Refills: 0 | Status: SHIPPED | OUTPATIENT
Start: 2025-07-16

## 2025-07-16 NOTE — TELEPHONE ENCOUNTER
S/w patient and scheduled IUD replacement appointment for 07/31/25 @ 2:30pm. No further action required.

## 2025-07-16 NOTE — TELEPHONE ENCOUNTER
Refill Routing Note   Medication(s) are not appropriate for processing by [facility] for the following reason(s):        Required labs abnormal    [facility] action(s):  Defer               Appointments  past 12m or future 3m with PCP    Date Provider   Last Visit   5/8/2024 Marj Gonzalez MD   Next Visit   7/24/2025 Marj Gonzalez MD   ED visits in past 90 days: 0        Note composed:7:07 AM 07/16/2025

## 2025-07-16 NOTE — TELEPHONE ENCOUNTER
No care due was identified.  [facility] Embedded Care Due Messages. Reference number: [id number].   7/16/2025 12:43:16 AM CDT

## 2025-07-18 RX ORDER — ATENOLOL AND CHLORTHALIDONE TABLET 100; 25 MG/1; MG/1
1 TABLET ORAL
Qty: 90 TABLET | Refills: 0 | OUTPATIENT
Start: 2025-07-18

## 2025-07-18 NOTE — TELEPHONE ENCOUNTER
Provider Staff:  Action required for this patient    Requires labs      Please see care gap opportunities below in Care Due Message.    Thanks!  [facility]     Appointments      Date Provider   Last Visit   5/8/2024 Marj Gonzalez MD   Next Visit   7/24/2025 Marj Gonzalez MD     Refill Decision Note   Sheila Igor  is requesting a refill authorization.  Brief Assessment and Rationale for Refill:  Route     Medication Therapy Plan:  fovs; Receipt confirmed by pharmacy (7/16/2025  8:06 AM CDT)      Comments:     Note composed:11:39 AM 07/18/2025

## 2025-07-18 NOTE — TELEPHONE ENCOUNTER
Care Due:                  Date            Visit Type   Department     Provider  --------------------------------------------------------------------------------                                ESTABLISHED                              PATIENT -    [facility] INTERNAL  [facility]                               -                              PRIMARY      [facility] INTERNAL  Next Visit: 07-      CARE ([facility])   MEDICINE       [facility]                                                            Last  Test          Frequency    Reason                     Performed    Due Date  --------------------------------------------------------------------------------    HBA1C.......  6 months...  metFORMIN................  08-   02-    [employer] Embedded Care Due Messages. Reference number: [id number].   7/18/2025 8:09:28 AM CDT

## 2025-07-24 ENCOUNTER — OFFICE VISIT (OUTPATIENT)
Dept: INTERNAL MEDICINE | Facility: CLINIC | Age: 55
End: 2025-07-24
Payer: COMMERCIAL

## 2025-07-24 ENCOUNTER — LAB VISIT (OUTPATIENT)
Dept: LAB | Facility: HOSPITAL | Age: 55
End: 2025-07-24
Attending: HOSPITALIST
Payer: COMMERCIAL

## 2025-07-24 VITALS
BODY MASS INDEX: 29.68 KG/M2 | SYSTOLIC BLOOD PRESSURE: 118 MMHG | TEMPERATURE: 98 F | HEART RATE: 70 BPM | HEIGHT: 61 IN | OXYGEN SATURATION: 99 % | DIASTOLIC BLOOD PRESSURE: 70 MMHG | WEIGHT: 157.19 LBS

## 2025-07-24 DIAGNOSIS — I10 ESSENTIAL HYPERTENSION: ICD-10-CM

## 2025-07-24 DIAGNOSIS — Z00.00 ANNUAL PHYSICAL EXAM: Primary | ICD-10-CM

## 2025-07-24 DIAGNOSIS — E66.3 OVERWEIGHT: ICD-10-CM

## 2025-07-24 DIAGNOSIS — R73.03 PREDIABETES: ICD-10-CM

## 2025-07-24 DIAGNOSIS — Z00.00 ANNUAL PHYSICAL EXAM: ICD-10-CM

## 2025-07-24 DIAGNOSIS — M47.892 OTHER SPONDYLOSIS, CERVICAL REGION: ICD-10-CM

## 2025-07-24 LAB
ABSOLUTE EOSINOPHIL (OHS): 0.08 K/UL
ABSOLUTE MONOCYTE (OHS): 0.65 K/UL (ref 0.3–1)
ABSOLUTE NEUTROPHIL COUNT (OHS): 5.78 K/UL (ref 1.8–7.7)
ALBUMIN SERPL BCP-MCNC: 3.9 G/DL (ref 3.5–5.2)
ALP SERPL-CCNC: 56 UNIT/L (ref 40–150)
ALT SERPL W/O P-5'-P-CCNC: 14 UNIT/L (ref 0–55)
ANION GAP (OHS): 5 MMOL/L (ref 8–16)
AST SERPL-CCNC: 22 UNIT/L (ref 0–50)
BASOPHILS # BLD AUTO: 0.06 K/UL
BASOPHILS NFR BLD AUTO: 0.7 %
BILIRUB SERPL-MCNC: 0.3 MG/DL (ref 0.1–1)
BUN SERPL-MCNC: 12 MG/DL (ref 6–20)
CALCIUM SERPL-MCNC: 9.1 MG/DL (ref 8.7–10.5)
CHLORIDE SERPL-SCNC: 102 MMOL/L (ref 95–110)
CHOLEST SERPL-MCNC: 173 MG/DL (ref 120–199)
CHOLEST/HDLC SERPL: 3 {RATIO} (ref 2–5)
CO2 SERPL-SCNC: 31 MMOL/L (ref 23–29)
CREAT SERPL-MCNC: 0.8 MG/DL (ref 0.5–1.4)
EAG (OHS): 120 MG/DL (ref 68–131)
ERYTHROCYTE [DISTWIDTH] IN BLOOD BY AUTOMATED COUNT: 14.6 % (ref 11.5–14.5)
GFR SERPLBLD CREATININE-BSD FMLA CKD-EPI: >60 ML/MIN/1.73/M2
GLUCOSE SERPL-MCNC: 130 MG/DL (ref 70–110)
HBA1C MFR BLD: 5.8 % (ref 4–5.6)
HCT VFR BLD AUTO: 37.8 % (ref 37–48.5)
HDLC SERPL-MCNC: 57 MG/DL (ref 40–75)
HDLC SERPL: 32.9 % (ref 20–50)
HGB BLD-MCNC: 11.2 GM/DL (ref 12–16)
IMM GRANULOCYTES # BLD AUTO: 0.02 K/UL (ref 0–0.04)
IMM GRANULOCYTES NFR BLD AUTO: 0.2 % (ref 0–0.5)
LDLC SERPL CALC-MCNC: 95.4 MG/DL (ref 63–159)
LYMPHOCYTES # BLD AUTO: 2.64 K/UL (ref 1–4.8)
MCH RBC QN AUTO: 22.8 PG (ref 27–31)
MCHC RBC AUTO-ENTMCNC: 29.6 G/DL (ref 32–36)
MCV RBC AUTO: 77 FL (ref 82–98)
NONHDLC SERPL-MCNC: 116 MG/DL
NUCLEATED RBC (/100WBC) (OHS): 0 /100 WBC
PLATELET # BLD AUTO: 434 K/UL (ref 150–450)
PMV BLD AUTO: 9 FL (ref 9.2–12.9)
POTASSIUM SERPL-SCNC: 3.5 MMOL/L (ref 3.5–5.1)
PROT SERPL-MCNC: 7 GM/DL (ref 6–8.4)
RBC # BLD AUTO: 4.91 M/UL (ref 4–5.4)
RELATIVE EOSINOPHIL (OHS): 0.9 %
RELATIVE LYMPHOCYTE (OHS): 28.6 % (ref 18–48)
RELATIVE MONOCYTE (OHS): 7 % (ref 4–15)
RELATIVE NEUTROPHIL (OHS): 62.6 % (ref 38–73)
SODIUM SERPL-SCNC: 138 MMOL/L (ref 136–145)
TRIGL SERPL-MCNC: 103 MG/DL (ref 30–150)
TSH SERPL-ACNC: 0.94 UIU/ML (ref 0.4–4)
WBC # BLD AUTO: 9.23 K/UL (ref 3.9–12.7)

## 2025-07-24 PROCEDURE — 1160F RVW MEDS BY RX/DR IN RCRD: CPT | Mod: CPTII,S$GLB,, | Performed by: HOSPITALIST

## 2025-07-24 PROCEDURE — 83036 HEMOGLOBIN GLYCOSYLATED A1C: CPT

## 2025-07-24 PROCEDURE — 1159F MED LIST DOCD IN RCRD: CPT | Mod: CPTII,S$GLB,, | Performed by: HOSPITALIST

## 2025-07-24 PROCEDURE — 36415 COLL VENOUS BLD VENIPUNCTURE: CPT | Mod: PO

## 2025-07-24 PROCEDURE — 3078F DIAST BP <80 MM HG: CPT | Mod: CPTII,S$GLB,, | Performed by: HOSPITALIST

## 2025-07-24 PROCEDURE — 84443 ASSAY THYROID STIM HORMONE: CPT

## 2025-07-24 PROCEDURE — 85025 COMPLETE CBC W/AUTO DIFF WBC: CPT

## 2025-07-24 PROCEDURE — 99999 PR PBB SHADOW E&M-EST. PATIENT-LVL IV: CPT | Mod: PBBFAC,,, | Performed by: HOSPITALIST

## 2025-07-24 PROCEDURE — 3074F SYST BP LT 130 MM HG: CPT | Mod: CPTII,S$GLB,, | Performed by: HOSPITALIST

## 2025-07-24 PROCEDURE — 99396 PREV VISIT EST AGE 40-64: CPT | Mod: S$GLB,,, | Performed by: HOSPITALIST

## 2025-07-24 PROCEDURE — 3008F BODY MASS INDEX DOCD: CPT | Mod: CPTII,S$GLB,, | Performed by: HOSPITALIST

## 2025-07-24 PROCEDURE — 80053 COMPREHEN METABOLIC PANEL: CPT

## 2025-07-24 PROCEDURE — 80061 LIPID PANEL: CPT

## 2025-07-24 RX ORDER — ATENOLOL AND CHLORTHALIDONE TABLET 100; 25 MG/1; MG/1
1 TABLET ORAL DAILY
Qty: 90 TABLET | Refills: 3 | Status: SHIPPED | OUTPATIENT
Start: 2025-07-24

## 2025-07-24 RX ORDER — METFORMIN HYDROCHLORIDE 500 MG/1
500 TABLET ORAL
Qty: 90 TABLET | Refills: 3 | Status: SHIPPED | OUTPATIENT
Start: 2025-07-24

## 2025-07-24 NOTE — PROGRESS NOTES
"Subjective:     @Patient ID: Sheila Costa is a 54 y.o. female.    Chief Complaint: Annual Exam    HPI    53 yo F with HTN, fatty liver, prediabetes alpha thalassemia, cervical spondylosis, colon polyp, chronic fatigue, premenstrual syndrome presents for annual:       HTN: atenolol-chlorthalidone 50-25 mg qday  Prediabetes: metformin  mg  qday   Cervical spondylosis: Follows with pain management  PMS/PMDD: fluoxetine 20 mg qday        Lipid disorders/ASCVD risk (ages >/= 45 or >/= 20 if increased risk ): ordered  DM (>45y yearly or if obese, HTN): A1c ordered  Breast Cancer (40-50y discretion of pt, 50-74y every 1-2 years): Mammogram utd   Cervical Cancer (Pap Smear ages 21-65 every 3 years or Pap + HPV q5 years after 30 years of age):  utd   Colorectal Cancer (normal risk 50-75yr): Colonoscopy utd 2024        Vaccines:   Influenza (yearly)   Tetanus (every 10 yrs - 1st tdap) utd 2016    Pna: utd  Covid19: utd     Exercise: occ walking         Review of Systems   Psychiatric/Behavioral:  Negative for agitation and confusion.      Past medical history, surgical history, and family medical history reviewed and updated as appropriate.    Medications and allergies reviewed.     Objective:     Vitals:    07/24/25 1514   BP: 118/70   Pulse: 70   Temp: 98.4 °F (36.9 °C)   SpO2: 99%   Weight: 71.3 kg (157 lb 3 oz)   Height: 5' 1" (1.549 m)     Body mass index is 29.7 kg/m².  Physical Exam  Vitals reviewed.   Constitutional:       General: She is not in acute distress.     Appearance: She is well-developed.   HENT:      Head: Normocephalic and atraumatic.      Right Ear: Tympanic membrane normal.      Left Ear: Tympanic membrane normal.      Mouth/Throat:      Mouth: Mucous membranes are moist.      Pharynx: No oropharyngeal exudate.   Eyes:      General:         Right eye: No discharge.         Left eye: No discharge.      Conjunctiva/sclera: Conjunctivae normal.   Cardiovascular:      Rate and Rhythm: " Normal rate and regular rhythm.      Heart sounds: No murmur heard.     No friction rub.   Pulmonary:      Effort: Pulmonary effort is normal.      Breath sounds: Normal breath sounds.   Abdominal:      General: Bowel sounds are normal. There is no distension.      Palpations: Abdomen is soft.      Tenderness: There is no abdominal tenderness. There is no guarding.   Musculoskeletal:         General: Normal range of motion.      Cervical back: Normal range of motion and neck supple.      Right lower leg: No edema.      Left lower leg: No edema.   Lymphadenopathy:      Cervical: No cervical adenopathy.   Skin:     General: Skin is warm and dry.   Neurological:      Mental Status: She is alert and oriented to person, place, and time.   Psychiatric:         Mood and Affect: Mood normal.         Behavior: Behavior normal.         Lab Results   Component Value Date    WBC 9.78 02/26/2024    HGB 12.1 02/26/2024    HCT 39.6 02/26/2024     02/26/2024    CHOL 188 09/19/2023    TRIG 141 09/19/2023    HDL 53 09/19/2023    ALT 13 03/13/2025    AST 16 03/13/2025     03/13/2025    K 3.4 (L) 03/13/2025    CL 99 03/13/2025    CREATININE 0.9 03/13/2025    BUN 10 03/13/2025    CO2 30 (H) 03/13/2025    TSH 2.874 09/19/2023    HGBA1C 5.7 (H) 08/19/2024       Assessment:     1. Annual physical exam    2. Essential hypertension    3. Prediabetes    4. Overweight    5. Other spondylosis, cervical region      Plan:   Sheila was seen today for annual exam.    Diagnoses and all orders for this visit:    Annual physical exam  -     Comprehensive Metabolic Panel; Future  -     CBC Auto Differential; Future  -     Lipid Panel; Future  -     TSH; Future  -     Urinalysis; Future  -     Hemoglobin A1C; Future    Essential hypertension  -     Comprehensive Metabolic Panel; Future  -     CBC Auto Differential; Future  -     Lipid Panel; Future  -     TSH; Future  -     Urinalysis; Future  -     Hemoglobin A1C; Future    Prediabetes  -      Comprehensive Metabolic Panel; Future  -     CBC Auto Differential; Future  -     Lipid Panel; Future  -     TSH; Future  -     Urinalysis; Future  -     Hemoglobin A1C; Future    Overweight  -     Comprehensive Metabolic Panel; Future  -     CBC Auto Differential; Future  -     Lipid Panel; Future  -     TSH; Future  -     Urinalysis; Future  -     Hemoglobin A1C; Future    Other spondylosis, cervical region  - chronic. Continue f/u with pain management     Other orders  -     metFORMIN (GLUCOPHAGE) 500 MG tablet; Take 1 tablet (500 mg total) by mouth daily with breakfast.  -     atenoloL-chlorthalidone (TENORETIC) 100-25 mg per tablet; Take 1 tablet by mouth once daily.          Rtc 1 year and prn     Marj Gonzalez MD  Internal Medicine    7/24/2025

## 2025-07-25 ENCOUNTER — OFFICE VISIT (OUTPATIENT)
Dept: PAIN MEDICINE | Facility: CLINIC | Age: 55
End: 2025-07-25
Payer: COMMERCIAL

## 2025-07-25 VITALS
OXYGEN SATURATION: 100 % | RESPIRATION RATE: 19 BRPM | WEIGHT: 157.63 LBS | HEIGHT: 61 IN | HEART RATE: 65 BPM | DIASTOLIC BLOOD PRESSURE: 73 MMHG | SYSTOLIC BLOOD PRESSURE: 121 MMHG | TEMPERATURE: 99 F | BODY MASS INDEX: 29.76 KG/M2

## 2025-07-25 DIAGNOSIS — M79.18 MYOFASCIAL PAIN: ICD-10-CM

## 2025-07-25 DIAGNOSIS — M47.812 CERVICAL SPONDYLOSIS: Primary | ICD-10-CM

## 2025-07-25 DIAGNOSIS — G89.4 CHRONIC PAIN SYNDROME: ICD-10-CM

## 2025-07-25 DIAGNOSIS — M54.51 VERTEBROGENIC LOW BACK PAIN: ICD-10-CM

## 2025-07-25 PROCEDURE — 99999 PR PBB SHADOW E&M-EST. PATIENT-LVL V: CPT | Mod: PBBFAC,,, | Performed by: NURSE PRACTITIONER

## 2025-07-25 NOTE — PROGRESS NOTES
Chronic Pain Established Patient       PCP: Marj Gonzalez MD    CHIEF COMPLAINT: Neck Pain    Interval History 7/25/2025:  The patient presents for follow-up of neck and back pain. She is s/p repeat C4,5,6 RFA on 6/23/25 with approximately 80% pain relief. She reports neck soreness, particularly in the morning and sometimes throughout the day. Pain improves with stretching. Her back pain has been minimal since Intracept last year. She has not been taking recently prescribed medications (Robaxin and Celebrex) but has them to take if needed. No new complaints at this time. Her pain today is 3/10.    Interval History 05/08/2025  Patient is here for follow up of her neck pain.  She had bilateral C4,5,6 RFA in 12/2024 with 80% relief.  Her pain has started to return but not as severe as before but in same distribution.  NO new weakness, pain radiating down the arms.  She is doing HEP.      Interval History 2/17/2025:  The patient presents today for follow up of chronic neck pain post procedure. She is s/p bilateral C4,5,6 RFA on 12/18/24. She is reporting 80% relief. She still has some stiffness in the morning. She has benefit when she starts moving around. No weakness reported. Her back pain has also been well controlled after Intracept procedure. Her pain today is 2/10.     Interval History 11/29/2024  Patient is here for follow up of her neck pain.  Patient had over 80+% relief from Cervical RFA, last done in 5/29/2024.  A few weeks ago, started to have return of her pain.  Now pain is about same as pre-abalation level.  Also endorse tenderness in upper back and neck muscles.  Doing home exercises when she can. Denies achyness in deltoid and fingers but denies numbness, tingling, weakness, dropping items, difficulty with opening jars and door.  Denies recent fall, GI/ incontinence.  Pain today 8/10.  Taking ibuprofen as needed.  Taking cymbalta daily, needs refill.  Reports minimal back pain since intracept.        Interval History 6/27/24:  Sheila Costa returns today for follow-up after cervical RFA. Since last seen, they report their pain has been markedly improved (~80% relief). She does have a little numbness on the left side of her posterior neck but has had this before and states it does not radiate past her neck and is not painful.  She denies other ongoing pains at this time.      Interval History 3/4/2024:  Sheila Costa presents for post-op s/p Intracept L5 and S1 on  2/26/2024.  She reports 75% relief since procedure with more relief everyday.  She denies: fever, chills, drainage from the site, or additional pain.   Today's pain score is 0/10.       Interval History 02/19/2024:  Ms. Igor Asher is a 52 y/o f who presents for virtual follow-up of her chronic low back pain.  She is scheduled for L5 & S1 intraosseous basivertebral nerve ablation 03/11/2024. She continues to endorse axial low back consistent with lumbar vertebrogenic back pain. The patient is presenting today for further information regarding the procedure. All questions answered.      Interval History 9/28/2023:  Patient seen today for virtual follow-up of her low back pain.  Today, she reports that the bilateral SI joint injections that she had in May have worked well for her for about 6 weeks and her back pain is at baseline worse  Her main concern is the pain in her low back which is affecting her ADL .  She previously got about 50% relief with RFA but now states she feels the pain as before all her injection.  She feels that the RFA helped with 1 component of her pain, but she continues to have deep focal low back and buttock pain.  She denies radiation into her legs.  She reports that the pain is fairly persistent but worsens significantly with standing, sitting for a long time and flexion.  She denies any bowel/bladder dysfunction or saddle anesthesia.  She has no other focal neurologic deficits.      Interval History 6/29/2023:  She presents with continued stiffness in her neck and shoulders.  She also complains of lower back pain which is still present. She does feel the SI joint injections do help.  She states that she is doing some stretches and home exercises to help with the pain.  She feels the stretching does help, but she feels she isn't doing them as often as she'd like.  She continues to state that her pain is 50-70% improved compared to before the injections.  She is inquiring as to if she should schedule her follow up injections now or if she should wait.     Interval History 6/9/2023:  The patient returns to clinic today for follow up neck and back pain via virtual visit. She is s/p bilateral C4,5,6 RFA on 5/3/2023. She is s/p bilateral SI joint injections on 5/24/2023. She reports 70% relief of her neck and back pain. She reports intermittent low back pain. She denies any radicular leg pain. She is performing a home exercise and stretching routine. She is taking Celebrex. She did not receive Robaxin discussed at last visit. She denies any other health changes.      Interval History 4/13/23:  Sheila HortaAbdiasmelissa presents to the clinic for a follow-up appointment for back pain. Since the last visit, Sheila HortaAbdiasmelissa states the pain has been persistant. Current pain intensity is 7/10. S/p L3/4/5 RFA with 50% relief. Pain is still persistent. Gets better with exercise and stretching although too much movement can exacerbate the pain. She is taking celebrex and tizanidine. She endorses previous SI joint injections were helpful.      Interval History 3/22/2023:  The patient returns to clinic today for follow up of low back pain via virtual visit. She is s/p bilateral L3,4,5 RFA on 3/1/2023. She reports 50% relief of her pain. She reports low back and buttock pain. This seems lower than injection sites. She denies any radicular leg pain. She reports increased neck pain. She describes this  pain as constant and aching in nature. She denies any radicular arm pain. Her pain is worse with activity. She is taking Celebrex and Zanaflex as needed. She denies any other health changes.      Interval History 2/1/2023:  The patient returns to clinic today for follow up of low back pain via virtual visit. She is s/p bilateral L3,4,5 MBB on 1/25/2023. She reports 90% relief of her pain for one day. Since then, her pain has returned to baseline. She reports low back pain that is aching in nature. She denies any radicular leg pain. Her pain is worse with prolonged activity. She is currently taking Celebrex. She also takes Zanaflex intermittently. She denies any other health changes.      Interval History 12/29/2022:  The patient returns to clinic today for follow up of low back pain via virtual visit. She is s/p bilateral L3,4,5 MBB on 12/14/2022. She reports 95% relief of her low back pain for one day. Since then, her pain has returned to baseline. She continues to report low back pain. This is aching across the lower back. She denies any radicular leg pain. Her pain is worse with activity. She is currently taking Zanaflex with limited relief. She denies any other health changes.      Interval history 10/31/22:  Patient returns to clinic for follow up of neck and shoulder pain. She is now s/p C7/T1 cervical MICHELLE 7/6/22 which gave 75-80% relief which lasted a few months. Now with primary complaint of low back pain in a band across the low back described as aching and throbbing which radiates to lateral aspect of BLE which stops at the knees. Still taking ibuprofen, celebrex, and lyrica. Not doing home exercise or PT. Denies fever/chills, or saddle anesthesia.     Interval History 6/13/22:   She is s/p left SIJ and left GTB injection on 5/18/2022 which she reports has helped her buttock/leg pain significantly. She now would like to focus on her neck pain. She continues to have axial neck pain, bilateral. Her RUE sxs  have significantly decreased s/p C7-T1 IL MICHELLE done in Dec 2021 but she does still have RUE pain. She is taking Gabapentin 300mg nightly - has not noticed a difference in her pain with this. She has tried topicals - help somewhat, Advil 800mg helps. She has gone to the chiropractor in the past - helpful. She has not done PT for her neck in ~3 years ago which she thinks helped somewhat. She denies any weakness/numbness/tingling in BUEs. Denies b/b incontinence or saddle anesthesia.      Interval History 4/28/22:   Sheila Costa presents to the clinic for a follow-up appointment for neck pain. Since the last visit, Sheila Costa states the pain has been improving. She is s/p bilateral RFA of C4/5/6 on 3/26 and 4/3. She reports >75% relief to both sides and is satisfied with the results. She does satate that she is having some numbness over the skin over the L side. She also states she is having left buttock pain that she describes as cramping/throbbing which occasionally radiates down posterior aspect of L thigh, stopping above the knee. She denies numbness/tingling in lower extremities.      Interval History 3/10/2022:   Sheila Costa presents to the clinic for a follow-up appointment for neck pain. Since the last visit, Sheila Costa states the pain has been stable. Worse in the mornings. Feels like a stiffness in the neck without radicular symptoms as her radicular symptoms had been resolved since her MICHELLE. Some paraesthesia in arms and hands with typing. Patient states that mobic is beneficial. Best relief of her axial neck pain in the past was with RFA done previously. She discontinued her amitriptyline prescription and did not notice a difference in pain with discontinuation. Denies bowel/bladder dysfunction or difficulty with fine motor skills.     Interval History 1/6/2022:   Sheila Costa presents to the clinic for a follow-up appointment s/p Cervical  Interlaminar Epidural Steroid Injection at the end of this past December. Since the last visit, Sheila Costa states the pain has been improving. Current pain intensity is 3/10 but she is still experiencing stiffness. Pain is primarly throbbing that would travel down BL arms. She has been doing well. Patient states that mobic and elavil have been helping with pain. Patient is sleeping well currently.     Interval History 11/15/2021:   Pt returns to clinic today due to resurgence of her bilateral neck and arm pain. At her last interventional pain encounter she had RFA left C4,5,6 on 03/04/2020 and the right done on 06/2020. Pt reports this provided significant relief of her pain however she has been having increased neck pain with radiation into her arms down to her hands. She has had a cervical epidural in the past with at least 50% relief of her pain. She has been utilizing OTC ibuprofen as well as a chiropractor.   Pt also reports low back pain without any radiation into the lower extremities. She states that the pain is aching and worsened with prolonged physical activity.      Interval History 11/25/2019:  The patient returns to clinic today for follow up. She reports a few days of relief with trigger point injections at last visit. She continues to report neck pain with intermittent radiating pain into both arms to her hands. She also reports mid back pain. 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 She states the previous MICHELLE helped for her arm pain but not for her neck pain. She has had limited relief with NSAIDs and physical therapy. She denies any other health changes. Her pain today is 7/10.     Interval History 10/28/2019:  The patient returns to clinic today for follow up. She is s/p C7-T1 IL MICHELLE on 10/2/2019. She reports 50% relief of her neck and arm pain. She reports intermittent neck pain that is sore in nature. She does report increased mid back pain. She describes this pain as  tight and aching in nature. She denies any radicular arm pain. She denies any other health changes. Her pain today is 6/10.      Pain Disability Index Review:     PDI Score:       7/25/2025     3:13 PM 2/17/2025     3:13 PM 11/29/2024     3:21 PM   Last 3 PDI Scores   Pain Disability Index (PDI) 21 12 60             Pain Procedures:   10/2/2019- C7-T1 IL MICHELLE- 50% pain relief  1/8/2020- Bilateral C4,5,6 MBB  1/29/2020- Bilateral C4,5,6 MBB  3/4/2020: Left C4,5,6 RFA - 80% relief  6/3/2020: Right C4,5,6 RFA - 80% relief  12/22/2021- C7/T1 IL MICHELLE  3/23/2022- Right C4,5,6 RFA -80-85% relief  4/6/2022- Left C4,5,6 RFA- 75% relief   5/8/2022- Left SI joint and GTB injection  7/6/2022- C7/T1 IL MICHELLE  12/14/2022- Bilateral L3,4,5 MBB- 95% relief  1/25/2023- Bilateral L3,4,5 MBB  3/1/2023- Bilateral L3,4,5 RFA  5/3/2023- Bilateral C4,5,6 RFA  5/24/2023- Bilateral SI joint injections  2/26/2024 - Intracept at L5 and S1  5/29/2024 - Bilateral C4, 5, 6 with 80% relief  12/18/24 B/L C4,5,6 RFA- 80% relief  6/23/25 B/L C4,5,6 RFA- 80% relief     Physical Therapy/Home Exercise: yes     Imaging:   MRI Lumbar Spine 11/11/2022:  COMPARISON:  11/01/2022     FINDINGS:  Alignment: Normal.     Vertebrae: Degenerative endplate changes at L5-S1.  No fracture or marrow infiltrative process.     Discs: Mild disc height loss at L5-S1 with annular fissure.  No evidence for discitis.     Cord: Conus terminates at L1 and appears unremarkable.  Cauda equina appears unremarkable.     Degenerative findings:     T12-L1: No spinal canal stenosis or neural foraminal narrowing.     L1-L2: No spinal canal stenosis or neural foraminal narrowing.     L2-L3: No spinal canal stenosis or neural foraminal narrowing.     L3-L4: No spinal canal stenosis or neural foraminal narrowing.     L4-L5: Circumferential disc bulge and mild facet arthropathy result in mild left neural foraminal narrowing.     L5-S1: Circumferential disc bulge and moderate facet arthropathy  result in mild right, moderate left neural foraminal narrowing.     Paraspinal muscles & soft tissues: Paraspinal muscle bulk is maintained.  Small bilateral renal cysts noted.     Impression:     1. Degenerative changes of the lower lumbar spine detailed above.  Moderate left neural foraminal narrowing noted at L5-S1.     MRI Cervical Spine 11/20/2019:  COMPARISON:  MRI cervical spine without contrast 03/06/2019     FINDINGS:  Alignment: Sagittal alignment is preserved.     Vertebrae: Normal marrow signal without osseous destructive process or aggressive bone marrow replacement process.  No fracture.     Discs: There is mild disc space narrowing at C2-C3 with endplate irregularity, likely degenerative and similar to the prior examination.  Remaining discs demonstrate normal height and signal.     Cord: Normal caliber, morphology, and signal.     Degenerative findings:     C2-C3: Posterior disc osteophyte complex and left uncovertebral joint spurring contribute to mild left neural foraminal narrowing.  No spinal canal stenosis.     C3-C4: Mild posterior disc osteophyte complex results in mild effacement of the ventral thecal sac without significant spinal canal stenosis or neural foraminal narrowing.     C4-C5: Posterior disc osteophyte complex results in effacement of the anterior thecal sac without significant spinal canal stenosis or neural foraminal narrowing.     C5-C6: Left uncovertebral joint spurring contributes to mild left neural foraminal narrowing without significant spinal canal stenosis.     C6-C7: No significant disc abnormality, spinal canal stenosis, or neural foraminal narrowing.     C7-T1: No significant disc abnormality, spinal canal stenosis, or neural foraminal narrowing.     Limited sagittal evaluation of the upper thoracic spine reveals a disc protrusion at T4-T5 causing effacement of the anterior thecal sac and cord abutment.     Paraspinal muscles & soft tissues: Unremarkable without spinal  canal or paraspinal mass.     Impression:     Mild cervical degenerative changes contributing to mild left neural foraminal narrowing at C2-C3 and C5-C6.  No spinal canal stenosis.     T4-T5 disc protrusion resulting in effacement of the anterior thecal sac, abutting the cord.     Narrowing of the C2-C3 disc space with endplate irregularity, likely degenerative and stable compared to the prior examination         Past Medical History:   Diagnosis Date    Alpha thalassemia     Back pain     Cervical spondylosis 12/30/2019    Colon polyp     Fatty liver     Hypertension     Prediabetes     Premenstrual symptom      Past Surgical History:   Procedure Laterality Date    COLONOSCOPY N/A 6/18/2020    Procedure: COLONOSCOPY;  Surgeon: Eliot Hill MD;  [facility]  Service: Endoscopy;  Laterality: N/A;  COVID screening on 6/16/20-[place]    COLONOSCOPY N/A 7/6/2020    Procedure: COLONOSCOPY;  Surgeon: Kang Guzmán MD;  [address]  Service: Colon and Rectal;  Laterality: N/A;    COLONOSCOPY N/A 7/9/2021    Procedure: COLONOSCOPY;  Surgeon: GREGG Guzmán MD;  [facility]  Service: Endoscopy;  Laterality: N/A;  in July 2021  covid test [place] 7/6    COLONOSCOPY N/A 9/13/2024    Procedure: COLONOSCOPY;  Surgeon: GREGG Guzmán MD;  [facility]  Service: Endoscopy;  Laterality: N/A;  ref by / suflave inst portal-RB  polyps noted on procedure report 7/9/2021; Dr. Guzmán patient  6/25 r/s, unopened Suflave, updated instr. to portal, pt states she may be restarting semaglutide on Tuesdays-instr. to be hold for 8 days-st  9/9-lvm for pre warren    DESTRUCTION, INTRAOSSEOUS BASIVERTEBRAL NERVE, EACH ADD'L BODY, LUM/SAC Bilateral 2/26/2024    Procedure: DESTRUCTION,INTRAOSSEOUS BASIVERTEBRAL NERVE,EACH ADD'L BODY,LUM/SAC;  Surgeon: Cosme Kirkland MD;  Location: [facility];  Service: Pain Management;  Laterality: Bilateral;   L5 & S1    EPIDURAL STEROID INJECTION N/A 10/2/2019    Procedure: INJECTION, STEROID, EPIDURAL, C7-T1;  Surgeon: Cosme Kirkland MD;  Location: [facility];  Service: Pain Management;  Laterality: N/A;    EPIDURAL STEROID INJECTION N/A 12/22/2021    Procedure: INJECTION, STEROID, EPIDURAL, C7-T1 NEED CONSENT;  Surgeon: Cosme Kirkland MD;  Location: [facility];  Service: Pain Management;  Laterality: N/A;    EPIDURAL STEROID INJECTION N/A 7/6/2022    Procedure: INJECTION, STEROID, EPIDURAL, C7-T1 IL  CONTRAST;  Surgeon: Cosme Kirkland MD;  Location: [facility];  Service: Pain Management;  Laterality: N/A;    EYE SURGERY      INJECTION OF ANESTHETIC AGENT AROUND NERVE Bilateral 1/8/2020    Procedure: BLOCK, NERVE C4,5,6;  Surgeon: Cosme Kirkland MD;  Location: [facility];  Service: Pain Management;  Laterality: Bilateral;  B/L MBB C4,5,6    INJECTION OF ANESTHETIC AGENT AROUND NERVE Bilateral 1/29/2020    Procedure: BLOCK, NERVE, Repeat C4-C5-C6 MEDIAL BRANCH;  Surgeon: Cosme Kirkland MD;  Location: [facility];  Service: Pain Management;  Laterality: Bilateral;    INJECTION OF ANESTHETIC AGENT AROUND NERVE Bilateral 12/14/2022    Procedure: BLOCK, NERVE BILATERAL L3,L4,L5 MEDIAL BRANCH NEEDS CONSENT;  Surgeon: Cosme Kirkland MD;  Location: [facility];  Service: Pain Management;  Laterality: Bilateral;    INJECTION OF ANESTHETIC AGENT AROUND NERVE Bilateral 1/25/2023    Procedure: BLOCK, NERVE BILATERAL L3,L4,L5 MEDIAL BRANCH;  Surgeon: Cosme Kirkland MD;  Location: [facility];  Service: Pain Management;  Laterality: Bilateral;    INJECTION OF ANESTHETIC AGENT AROUND NERVE Bilateral 11/1/2023    Procedure: BLOCK, NERVE BILATERAL L5, S1, AND S2 LATERAL BRANCH;  Surgeon: Cosme Kirkland MD;  Location: [facility];  Service: Pain Management;  Laterality: Bilateral;    INJECTION OF ANESTHETIC AGENT AROUND NERVE Bilateral 11/22/2023    Procedure: BLOCK, NERVE BILATERAL  L5, S1, AND S2 MEDIAL BRANCH;  Surgeon: Cosme Kirkland MD;  Location: [facility];  Service: Pain Management;  Laterality: Bilateral;    INJECTION OF JOINT Left 5/18/2022    Procedure: INJECTION, JOINT, LEFT SI and GTB *LORI PT*;  Surgeon: Jed Phillips MD;  Location: [facility];  Service: Pain Management;  Laterality: Left;    INJECTION, SACROILIAC JOINT Bilateral 5/24/2023    Procedure: INJECTION,SACROILIAC JOINT BIALTERAL;  Surgeon: Cosme Kirkland MD;  Location: [facility];  Service: Pain Management;  Laterality: Bilateral;    LAPAROSCOPIC SURGICAL REMOVAL OF CECUM N/A 7/6/2020    Procedure: EXCISION, CECUM, LAPAROSCOPIC, colonoscopy to start, ERAS low;  Surgeon: Kang Gumzán MD;  Location: [facility] OR 2ND FLR;  Service: Colon and Rectal;  Laterality: N/A;    RADIOFREQUENCY ABLATION Left 3/4/2020    Procedure: RADIOFREQUENCY ABLATION, C4-C5-C6 ME DIAL BRANCH 1 OF 2 need consent;  Surgeon: Cosme Kirkland MD;  Location: [facility];  Service: Pain Management;  Laterality: Left;    RADIOFREQUENCY ABLATION Right 6/3/2020    Procedure: RADIOFREQUENCY ABLATION, C4-C5-C6 MEDIAL BRANCH 2 OF 2 need consent;  Surgeon: Cosme Kirkland MD;  Location: [facility];  Service: Pain Management;  Laterality: Right;    RADIOFREQUENCY ABLATION Right 3/23/2022    Procedure: Radiofrequency Ablation RIGHT C4,5,6;  Surgeon: Cosme Kirkland MD;  Location: [facility];  Service: Pain Management;  Laterality: Right;    RADIOFREQUENCY ABLATION Left 4/6/2022    Procedure: Radiofrequency Ablation LEFT C4,5,6;  Surgeon: Cosme Kirkland MD;  Location: [facility];  Service: Pain Management;  Laterality: Left;    RADIOFREQUENCY ABLATION Bilateral 3/1/2023    Procedure: RADIOFREQUENCY ABLATION BILATERAL L3,L4,L5 BOTH SIDES SAME TIME PER DR KIRKLAND;  Surgeon: Cosme Kirkland MD;  Location: [facility];  Service: Pain Management;  Laterality: Bilateral;    RADIOFREQUENCY ABLATION Bilateral  5/3/2023    Procedure: RADIOFREQUENCY ABLATION BILATERAL C4,C5,C6 BOTH SIDES PER DR SANDOVAL;  Surgeon: Cosme Sandoval MD;  Location: [facility];  Service: Pain Management;  Laterality: Bilateral;    RADIOFREQUENCY ABLATION Bilateral 5/29/2024    Procedure: RADIOFREQUENCY ABLATION BILATERAL C4, 5, 6;  Surgeon: Cosme Sandoval MD;  Location: [facility];  Service: Pain Management;  Laterality: Bilateral;  [phone]  4 WK F/U LORI    RADIOFREQUENCY ABLATION Bilateral 12/18/2024    Procedure: RADIOFREQUENCY ABLATION BILATERAL C4, 5, 6 *ECONSENTED*;  Surgeon: Cosme Sandoval MD;  Location: [facility];  Service: Pain Management;  Laterality: Bilateral;  4 WK F/U NERISSA    RADIOFREQUENCY ABLATION Bilateral 6/23/2025    Procedure: RADIOFREQUENCY ABLATION BILATERAL C4, 5, 6;  Surgeon: Cosme Sandoval MD;  Location: [facility];  Service: Pain Management;  Laterality: Bilateral;  4 WK F/U NERISSA  *SCHEDULE AFTER 6/18    REFRACTIVE SURGERY  2008    SMALL INTESTINE SURGERY  7/6/20     Social History[1]  Family History   Problem Relation Name Age of Onset    Hypertension Mother Celsa Hayes     Thyroid disease Mother Celsa Hayes     Rheum arthritis Mother Celsa Hayes     Hearing loss Mother Celsa Hayes     Heart disease Father Wes Irving         Pacemaker    Cancer Maternal Grandmother Katy Altair Ray     Cataracts Maternal Grandmother Katy Altair Ray     Diabetes Maternal Grandmother Katy Altair Ray     Hypertension Maternal Grandmother Katy Zara Ray     Thyroid disease Maternal Grandmother Katy Zara Ray     Breast cancer Maternal Grandmother Katy Zara Ray     Hearing loss Maternal Grandmother Katy Zara Ray     Cataracts Maternal Grandfather Max Garrison, Sr.     Diabetes Maternal Grandfather Max Garrison, Sr.     Hypertension Maternal Grandfather Max Garrison, Sr.     Thyroid disease Maternal Grandfather Max Garrison, Sr.      Lupus Cousin mother's 1st cousin     Cancer Maternal Uncle      No Known Problems Paternal Grandmother      No Known Problems Paternal Grandfather      Amblyopia Neg Hx      Blindness Neg Hx      Glaucoma Neg Hx      Macular degeneration Neg Hx      Retinal detachment Neg Hx      Strabismus Neg Hx      Stroke Neg Hx      Ovarian cancer Neg Hx      Colon cancer Neg Hx      Cirrhosis Neg Hx         Review of patient's allergies indicates:   Allergen Reactions    Amoxicillin Hives       Current Outpatient Medications   Medication Sig    ascorbic acid, vitamin C, (VITAMIN C) 100 MG tablet Take 100 mg by mouth once daily.    atenoloL-chlorthalidone (TENORETIC) 100-25 mg per tablet Take 1 tablet by mouth once daily.    celecoxib (CELEBREX) 100 MG capsule Take 1 capsule (100 mg total) by mouth 2 (two) times daily.    DULoxetine (CYMBALTA) 60 MG capsule Take 1 capsule (60 mg total) by mouth once daily.    estradioL (VIVELLE-DOT) 0.0375 mg/24 hr Place 1 patch onto the skin twice a week. Apply one patch twice weekly to abdomen or upper buttock. Alternate sites with each application (I.e. Mondays & Thursdays)    FLUoxetine 20 MG capsule Take 20 mg by mouth.    levonorgestrel (MIRENA) 20 mcg/24 hr (5 years) IUD 1 Intra Uterine Device by Intrauterine route once. for 1 dose    loratadine (CLARITIN) 10 mg tablet Take 10 mg by mouth every morning.     metFORMIN (GLUCOPHAGE) 500 MG tablet Take 1 tablet (500 mg total) by mouth daily with breakfast.    methocarbamoL (ROBAXIN) 500 MG Tab Take 1 tablet (500 mg total) by mouth 3 (three) times daily as needed.    multivitamin capsule Take 1 capsule by mouth once daily.    mupirocin (BACTROBAN) 2 % ointment Apply topically once daily.    ondansetron (ZOFRAN-ODT) 4 MG TbDL Take 4 mg by mouth every 6 (six) hours as needed.    potassium chloride (MICRO-K) 10 MEQ CpSR TAKE 2 CAPSULES(20 MEQ) BY MOUTH DAILY    sod sulf-pot chloride-mag sulf (SUTAB) 1.479-0.188- 0.225 gram tablet Take 12 tablets  "by mouth once daily. Please follow Endoscopy 's instructions. Please apply coupon code. Please apply coupon code. BIN: [id number], PCN: [id number], GROUP: [id number], MEMBER ID: [id number]    UNABLE TO FIND medication name: Testosterone 1% in versa base  Apply one click to upper inner thigh daily    vitamin D (VITAMIN D3) 1000 units Tab Take 1,000 Units by mouth once daily.     No current facility-administered medications for this visit.         ROS:  GENERAL: No fever. No chills. No fatigue. Denies weight loss. Denies weight gain.  HEENT: Denies headaches. Denies vision change. Denies eye pain. Denies double vision. Denies ear pain.   CV: Denies chest pain.   PULM: Denies of shortness of breath.  GI: Denies constipation. No diarrhea. No abdominal pain. Denies nausea. Denies vomiting. No blood in stool.  HEME: Denies bleeding problems.  : Denies urgency. No painful urination. No blood in urine.  MS: Denies joint stiffness. Denies joint swelling.    SKIN: Denies rash.   NEURO: Denies seizures. No weakness.  PSYCH:  Denies difficulty sleeping. No anxiety. Denies depression. No suicidal thoughts.       VITALS:   Vitals:    07/25/25 1513   BP: 121/73   Pulse: 65   Resp: 19   Temp: 98.8 °F (37.1 °C)   TempSrc: Oral   SpO2: 100%   Weight: 71.5 kg (157 lb 10.1 oz)   Height: 5' 1" (1.549 m)   PainSc:   3   PainLoc: Neck       PHYSICAL EXAM:   GENERAL: Well appearing, in no acute distress, alert and oriented x3.  PSYCH:  Mood and affect appropriate.  SKIN: Skin color, texture, turgor normal, no rashes or lesions.  HEENT:  Normocephalic, atraumatic. Cranial nerves grossly intact.  NECK:   Positive for pain to palpation over the cervical paraspinous and trapezius muscles.   There is mild pain with neck flexion and extension with normal ROM.  Spurling Test was Negative bilaterally  PULM: No evidence of respiratory difficulty, symmetric chest rise.  EXTREMITIES:   No deformities, edema, or skin discoloration.   NEURO: Sensation " is equal and appropriate bilaterally. Bilateral upper extremity strength is normal and symmetric. Ríos is negative bilaterally.   GAIT: Normal      ASSESSMENT: 54 y.o. year old with axial neck pain, consistent with:    1. Cervical spondylosis        2. Myofascial pain        3. Chronic pain syndrome        4. Vertebrogenic low back pain              PLAN:  Patient Education:  Discussed the importance of physical therapy, activity modification, and a home exercise plan to help with pain and improve health.  Therapy referral:  continue HEP  Medications:   Patient can continue with pain medications for now per their provider since they are providing benefits, using them appropriately, and without side effects.  Can take Celebrex and Robaxin PRN.  Schedule pain intervention for: repeat cervical RFA BL C4,5,6 has provided 80% pain relief.  Counseled patient: regarding the importance of activity modification and physical therapy  Return to clinic: in 4 months or sooner if needed.      The above plan and management options were discussed at length with patient. Patient is in agreement with the above and verbalized understanding.     Paula Roberts, [facility]  07/25/2025      This note was generated with the assistance of ambient listening technology. Verbal consent was obtained by the patient and accompanying visitor(s) for the recording of patient appointment to facilitate this note. I attest to having reviewed and edited the generated note for accuracy, though some syntax or spelling errors may persist. Please contact the author of this note for any clarification.       [1]   Social History  Socioeconomic History    Marital status:    Occupational History     Employer: [employer]   Tobacco Use    Smoking status: Never    Smokeless tobacco: Never   Substance and Sexual Activity    Alcohol use: Yes     Alcohol/week: 1.0 standard drink of alcohol     Types: 1 Standard drinks or equivalent per week     Comment: 3  drinks weekly     Drug use: No    Sexual activity: Yes     Partners: Male     Birth control/protection: I.U.D.   Social History Narrative     Children - Dolores and FlorinGraciela - Florin        Used to walk, ride bikes. Occasional uses elliptical at home.      Social Drivers of Health     Financial Resource Strain: Low Risk  (7/18/2025)    Overall Financial Resource Strain (CARDIA)     Difficulty of Paying Living Expenses: Not hard at all   Food Insecurity: No Food Insecurity (7/18/2025)    Hunger Vital Sign     Worried About Running Out of Food in the Last Year: Never true     Ran Out of Food in the Last Year: Never true   Transportation Needs: No Transportation Needs (7/18/2025)    PRAPARE - Transportation     Lack of Transportation (Medical): No     Lack of Transportation (Non-Medical): No   Physical Activity: Insufficiently Active (7/18/2025)    Exercise Vital Sign     Days of Exercise per Week: 2 days     Minutes of Exercise per Session: 30 min   Stress: No Stress Concern Present (7/18/2025)    [employer] [place] [employer] - Occupational Stress Questionnaire     Feeling of Stress : Not at all   Housing Stability: Low Risk  (7/18/2025)    Housing Stability Vital Sign     Unable to Pay for Housing in the Last Year: No     Number of Times Moved in the Last Year: 0     Homeless in the Last Year: No

## 2025-08-12 RX ORDER — POTASSIUM CHLORIDE 750 MG/1
CAPSULE, EXTENDED RELEASE ORAL
Qty: 180 CAPSULE | Refills: 3 | Status: SHIPPED | OUTPATIENT
Start: 2025-08-12

## 2025-08-14 ENCOUNTER — PATIENT MESSAGE (OUTPATIENT)
Dept: INTERNAL MEDICINE | Facility: CLINIC | Age: 55
End: 2025-08-14
Payer: COMMERCIAL

## (undated) DEVICE — DRESSING LEUKOPLAST FLEX 1X3IN

## (undated) DEVICE — NDL HYPO REG 25G X 1 1/2

## (undated) DEVICE — TROCAR KII BLLN 12MM 10CM

## (undated) DEVICE — SET DECANTER MEDICHOICE

## (undated) DEVICE — KIT ANTIFOG

## (undated) DEVICE — TRAY FOLEY 16FR INFECTION CONT

## (undated) DEVICE — SUT 1 36IN PDS II VIO MONO

## (undated) DEVICE — RELOAD ECHELON FLEX BLU 60MM

## (undated) DEVICE — SEE MEDLINE ITEM 152622

## (undated) DEVICE — MARKER SKIN STND TIP BLUE BARR

## (undated) DEVICE — INTRACEPT RF PROBE

## (undated) DEVICE — SEE MEDLINE ITEM 146417

## (undated) DEVICE — SEALER LIGASURE LAP 37CM 5MM

## (undated) DEVICE — DRESSING ADH ISLAND 3.6 X 14

## (undated) DEVICE — APPLICATOR CHLORAPREP ORN 26ML

## (undated) DEVICE — NDL BOX COUNTER

## (undated) DEVICE — SEE MEDLINE ITEM 157117

## (undated) DEVICE — DRAPE ABDOMINAL TIBURON 14X11

## (undated) DEVICE — NDL 22GA X1 1/2 REG BEVEL

## (undated) DEVICE — COVER LIGHT HANDLE 80/CA

## (undated) DEVICE — Device

## (undated) DEVICE — SUT 3-0 VICRYL SH CR/8 18

## (undated) DEVICE — SYR B-D DISP CONTROL 10CC100/C

## (undated) DEVICE — SYR ONLY LUER LOCK 20CC

## (undated) DEVICE — GOWN ECLIPSE REINF LVL4 TWL LG

## (undated) DEVICE — SUT 0 VICRYL / UR6 (J603)

## (undated) DEVICE — TROCAR ENDOPATH XCEL 5X75MM

## (undated) DEVICE — TROCAR ENDOPATH XCEL 5MM 7.5CM

## (undated) DEVICE — SEE MEDLINE ITEM 157144

## (undated) DEVICE — SEE MEDLINE ITEM 152487

## (undated) DEVICE — DRESSING ABSRBNT ISLAND 3.6X8

## (undated) DEVICE — INTRACEPT ACCESS INSTRUMENTS

## (undated) DEVICE — DRESSING TRANS 4X4 TEGADERM

## (undated) DEVICE — UNDERGLOVES BIOGEL PI SIZE 8

## (undated) DEVICE — SEE MEDLINE ITEM 146347

## (undated) DEVICE — SEE MEDLINE ITEM 156902

## (undated) DEVICE — STRAP SECURE 5MM

## (undated) DEVICE — ELECTRODE REM PLYHSV RETURN 9

## (undated) DEVICE — BAG TISS RETRV MONARCH 10MM

## (undated) DEVICE — COVER SNAP 36IN X 30IN

## (undated) DEVICE — SEE MEDLINE ITEM 154981

## (undated) DEVICE — STAPLER ECHELON FLEX 60MM 44CM

## (undated) DEVICE — DRAPE C-ARMOR EQUIPMENT COVER

## (undated) DEVICE — BANDAGE ADHESIVE

## (undated) DEVICE — SUT COATED VICRYL 4/0 27IN

## (undated) DEVICE — STRIP MEDI WND CLSR 1/2X4IN

## (undated) DEVICE — SPONGE COTTON TRAY 4X4IN

## (undated) DEVICE — CUTTER PROXIMATE BLUE 100MM

## (undated) DEVICE — LUBRICANT SURGILUBE 2 OZ

## (undated) DEVICE — STRIP STERI REIN CLSR 1/2X2IN

## (undated) DEVICE — TUBING HF INSUFFLATION W/ FLTR

## (undated) DEVICE — GLOVE BIOGEL SKINSENSE PI 8.0